# Patient Record
Sex: FEMALE | Race: WHITE | NOT HISPANIC OR LATINO | Employment: OTHER | URBAN - METROPOLITAN AREA
[De-identification: names, ages, dates, MRNs, and addresses within clinical notes are randomized per-mention and may not be internally consistent; named-entity substitution may affect disease eponyms.]

---

## 2017-01-26 ENCOUNTER — TELEPHONE (OUTPATIENT)
Dept: FAMILY MEDICINE | Facility: CLINIC | Age: 67
End: 2017-01-26

## 2017-01-26 NOTE — TELEPHONE ENCOUNTER
Spoke with pt regarding if she has a more recent eye exam. Pt stated that she didn't and she would make an apt. Also set her annual up with [facility] due to [facility] being booked up. Pt understanding verified.

## 2017-03-09 RX ORDER — ALLOPURINOL 300 MG/1
TABLET ORAL
Qty: 90 TABLET | Refills: 3 | Status: SHIPPED | OUTPATIENT
Start: 2017-03-09 | End: 2017-03-31 | Stop reason: DRUGHIGH

## 2017-03-13 RX ORDER — SITAGLIPTIN 100 MG/1
TABLET, FILM COATED ORAL
Qty: 90 TABLET | Refills: 3 | Status: SHIPPED | OUTPATIENT
Start: 2017-03-13 | End: 2017-03-31

## 2017-03-16 ENCOUNTER — LAB VISIT (OUTPATIENT)
Dept: LAB | Facility: HOSPITAL | Age: 67
End: 2017-03-16
Attending: FAMILY MEDICINE
Payer: MEDICARE

## 2017-03-16 DIAGNOSIS — E11.9 TYPE 2 DIABETES MELLITUS WITHOUT COMPLICATION: ICD-10-CM

## 2017-03-16 PROCEDURE — 83036 HEMOGLOBIN GLYCOSYLATED A1C: CPT

## 2017-03-16 PROCEDURE — 36415 COLL VENOUS BLD VENIPUNCTURE: CPT

## 2017-03-17 LAB
ESTIMATED AVG GLUCOSE: 214 MG/DL
HBA1C MFR BLD HPLC: 9.1 %

## 2017-03-24 ENCOUNTER — HOSPITAL ENCOUNTER (OUTPATIENT)
Dept: RADIOLOGY | Facility: HOSPITAL | Age: 67
Discharge: HOME | End: 2017-03-24
Attending: FAMILY MEDICINE
Payer: MEDICARE

## 2017-03-24 ENCOUNTER — DOCUMENTATION ONLY (OUTPATIENT)
Dept: FAMILY MEDICINE | Facility: CLINIC | Age: 67
End: 2017-03-24

## 2017-03-24 DIAGNOSIS — R92.8 ABNORMAL MAMMOGRAM: ICD-10-CM

## 2017-03-24 PROCEDURE — 77061 BREAST TOMOSYNTHESIS UNI: CPT | Mod: 26,RT,, | Performed by: RADIOLOGY

## 2017-03-24 PROCEDURE — 77065 DX MAMMO INCL CAD UNI: CPT | Mod: 26,RT,, | Performed by: RADIOLOGY

## 2017-03-24 PROCEDURE — 77061 BREAST TOMOSYNTHESIS UNI: CPT | Mod: TC,RT

## 2017-03-24 NOTE — PATIENT INSTRUCTIONS
Diabetes (General Information)  Diabetes is a long-term health problem. It means your body does not make enough insulin. Or it may mean that your body cannot use the insulin it makes. Insulin is a hormone in your body. It lets blood sugar (glucose) reach the cells in your body. All of your cells need glucose for fuel.  When you have diabetes, the glucose in your blood builds up because it cannot get into the cells. This buildup is called high blood sugar (hyperglycemia).  Your blood sugar level depends on several things. It depends on what kind of food you eat and how much of it you eat. It also depends on how much exercise you get, and how much insulin you have in your body. Eating too much of the wrong kinds of food or not taking diabetes medicine on time can cause high blood sugar. Infections can cause high blood sugar even if you are taking medicines correctly.  These things can also cause low blood sugar:  · Missing meals  · Not eating enough food  · Taking too much diabetes medicine  Diabetes can cause serious problems over time if you do not get treated. These problems include heart disease, stroke, kidney failure, and blindness. They also include nerve pain or loss of feeling in your legs and feet, and gangrene of the feet. By keeping your blood sugar under control you can prevent or delay these problems.  Normal blood sugar levels are 80 to 100 before a meal and less than 180 in the 1 to 2 hours after a meal.  Home care  Follow these guidelines when caring for yourself at home:  · Follow the diet your healthcare provider gives you. Take insulin or other diabetes medicine exactly as told to.  · Watch your blood sugar as you are told to. Keep a log of your results. This will help your provider change your medicines to keep your blood sugar under control.  · Try to reach your ideal weight. You may be able to cut back on or not have to take diabetes medicine if you eat the right foods and get exercise.  · Do  not smoke. Smoking worsens the effects of diabetes on your circulation. You are much more likely to have a heart attack if you have diabetes and you smoke.  · Take good care of your feet. If you have lost feeling in your feet, you may not see an injury or infection. Check your feet and between your toes at least once a week.  · Wear a medical alert bracelet or necklace, or carry a card in your wallet that says you have diabetes. This will help healthcare providers give you the right care if you get very ill and cannot tell them that you have diabetes.  Sick day plan  If you get a cold, the flu, or a bacterial or viral infection, take these steps:  · Look at your diabetes sick plan and call your healthcare provider as you were told to. You may need to call your provider right away if:  ¨ Your blood sugar is above 240 while taking your diabetes medicine  ¨ Your urine ketone levels are above normal or high  ¨ You have been vomiting more than 6 hours  ¨ You have trouble breathing or your breath ha s a fruity smell  ¨ You have a high fever  ¨ You have a fever for several days and you are not getting better  ¨ You get light-headed and are sleepier than usual  · Keep taking your diabetes pills (oral medicine) even if you have been vomiting and are feeling sick. Call your provider right away because you may need insulin to lower your blood sugar until you recover from your illness.  · Keep taking your insulin even if you have been vomiting and are feeling sick. Call your provider right away to ask if you need to change your insulin dose. This will depend on your blood sugar results.  · Check your blood sugar every 2 to 4 hours, or at least 4 times a day.  · Check your ketones often. If you are vomiting and having diarrhea, watch them more often.  · Do not skip meals. Try to eat small meals on a regular schedule. Do this even if you do not feel like eating.  · Drink water or other liquids that do not have caffeine or  calories. This will keep you from getting dehydrated. If you are nauseated or vomiting, takes small sips every 5 minutes. To prevent dehydration try to drink a cup (8 ounces) of fluids every hour while you are awake.  General care  Always bring a source of fast-acting sugar with you in case you have symptoms of low blood sugar (below 70). At the first sign of low blood sugar, eat or drink 15 to 20 grams of fast-acting sugar to raise your blood sugar. Examples are:  · 3 to 4 glucose tablets. You can buy these at most [employer].  · 4 ounces (1/2 cup) of regular (not diet) soft drinks  · 4 ounces (1/2 cup) of any fruit juice  · 8 ounces (1 cup) of milk  · 5 to 6 pieces of hard candy  · 1 tablespoon of honey  Check your blood sugar 15 minutes after treating yourself. If it is still below 70, take 15 to 20 more grams of fast-acting sugar. Test again in 15 minutes. If it returns to normal (70 or above), eat a snack or meal to keep your blood sugar in a safe range. If it stays low, call your doctor or go to an emergency room.  Follow-up care  Follow-up with your healthcare provider, or as advised. For more information about diabetes, visit the American Diabetes Association website at www.diabetes.org or call 114-479-2979.  When to seek medical advice  Call your healthcare provider right away if you have any of these symptoms of high blood sugar:  · Frequent urination  · Dizziness  · Drowsiness  · Thirst  · Headache  · Nausea or vomiting  · Abdominal pain  · Eyesight changes  · Fast breathing  · Confusion or loss of consciousness  Also call your provider right away if you have any of these signs of low blood sugar:  · Fatigue  · Headache  · Shakes  · Excess sweating  · Hunger  · Feeling anxious or restless  · Eyesight changes  · Drowsiness  · Weakness  · Confusion or loss of consciousness  Call 911  Call for emergency help right away if any of these occur:  · Chest pain or shortness of breath  · Dizziness or  fainting  · Weakness of an arm or leg or one side of the face  · Trouble speaking or seeing   Date Last Reviewed: 6/1/2016 © 2000-2016 [employer]. [address] [place] [address], [place] [address]. All rights reserved. This information is not intended as a substitute for professional medical care. Always follow your healthcare professional's instructions.        Controlling High Blood Pressure  High blood pressure (hypertension) is often called the silent killer. This is because many people who have it dont know it. High blood pressure is defined as 140/90 mm Hg or higher. Know your blood pressure and remember to check it regularly. Doing so can save your life. Here are some things you can do to help control your blood pressure.    Choose heart-healthy foods  · Select low-salt, low-fat foods. Limit sodium intake to 2,400 mg per day or the amount suggested by your healthcare provider.  · Limit canned, dried, cured, packaged, and fast foods. These can contain a lot of salt.  · Eat 8 to 10 servings of fruits and vegetables every day.  · Choose lean meats, fish, or chicken.  · Eat whole-grain pasta, brown rice, and beans.  · Eat 2 to 3 servings of low-fat or fat-free dairy products.  · Ask your doctor about the DASH eating plan. This plan helps reduce blood pressure.  · When you go to a restaurant, ask that your meal be prepared with no added salt.  Maintain a healthy weight  · Ask your healthcare provider how many calories to eat a day. Then stick to that number.  · Ask your healthcare provider what weight range is healthiest for you. If you are overweight, a weight loss of only 3% to 5% of your body weight can help lower blood pressure. Generally, a good weight loss goal is to lose 10% of your body weight in a year.  · Limit snacks and sweets.  · Get regular exercise.  Get up and get active  · Choose activities you enjoy. Find ones you can do with friends or family. This includes bicycling, dancing, walking,  and jogging.  · Park farther away from building entrances.  · Use stairs instead of the elevator.  · When you can, walk or bike instead of driving.  · [place] leaves, garden, or do household repairs.  · Be active at a moderate to vigorous level of physical activity for at least 40 minutes for a minimum of 3 to 4 days a week.   Manage stress  · Make time to relax and enjoy life. Find time to laugh.  · Communicate your concerns with your loved ones and your healthcare provider.  · Visit with family and friends, and keep up with hobbies.  Limit alcohol and quit smoking  · Men should have no more than 2 drinks per day.  · Women should have no more than 1 drink per day.  · Talk with your healthcare provider about quitting smoking. Smoking significantly increases your risk for heart disease and stroke. Ask your healthcare provider about community smoking cessation programs and other options.  Medicines  If lifestyle changes arent enough, your healthcare provider may prescribe high blood pressure medicine. Take all medicines as prescribed. If you have any questions about your medicines, ask your healthcare provider before stopping or changing them.   Date Last Reviewed: 4/27/2016  © 3612-2951 [employer]. [address] [place] [address]. All rights reserved. This information is not intended as a substitute for professional medical care. Always follow your healthcare professional's instructions.        Weight Management: Getting Started  Healthy bodies come in all shapes and sizes. Not all bodies are made to be thin. For some people, a healthy weight is higher than the average weight listed on weight charts. Your healthcare provider can help you decide on a healthy weight for you.    Reasons to lose weight  Losing weight can help with some health problems, such as high blood pressure, heart disease, diabetes, sleep apnea, and arthritis. You may also feel more energy.  Set your long-term goal  Your goal  doesn't even have to be a specific weight. You may decide on a fitness goal (such as being able to walk 10 miles a week), or a health goal (such as lowering your blood pressure). Choose a goal that is measurable and reasonable, so you know when you've reached it. A goal of reaching a BMI of less than 25 is not always reasonable (or possible).   Make an action plan  Habits dont change overnight. Setting your goals too high can leave you feeling discouraged if you cant reach them. Be realistic. Choose one or two small changes you can make now. Set an action plan for how you are going to make these changes. When you can stick to this plan, keep making a few more small changes. Taking small steps will help you stay on the path to success.  Track your progress  Write down your goals. Then, keep a daily record of your progress. Write down what you eat and how active you are. This record lets you look back on how much youve done. It may also help when youre feeling frustrated. Reward yourself for success. Even if you dont reach every goal, give yourself credit for what you do get done.  Get support  Encouragement from others can help make losing weight easier. Ask your family members and friends for support. They may even want to join you. Also look to your healthcare provider, registered dietitian, and  for help. Your local hospital can give you more information about nutrition, exercise, and weight loss.  Date Last Reviewed: 1/31/2016  © 8947-3120 [employer]. [address] [place] [address], [place] [address]. All rights reserved. This information is not intended as a substitute for professional medical care. Always follow your healthcare professional's instructions.        Walking for Fitness  Fitness walking has something for everyone, even people who are already fit. Walking is one of the safest ways to condition your body aerobically. It can boost energy, help you lose weight, and  reduce stress.    Physical benefits  · Walking strengthens your heart and lungs, and tones your muscles.  · When walking, your feet land with less impact than in other sports. This reduces chances of muscle, bone, and joint injury.  · Regular walking improves your cholesterol levels and lowers your risk of heart disease. And it helps you control your blood sugar if you have diabetes.  · Walking is a weight-bearing activity, which helps maintain bone density. This can help prevent osteoporosis.  Personal rewards  · Taking walks can help you relax and manage stress. And fitness walking may make you feel better about yourself.  · Walking can help you sleep better at night and make you less likely to be depressed.  · Regular walking may help maintain your memory as you get older.  · Walking is a great way to spend extra time with friends and family members. Be sure to invite your dog along!  Q&A about fitness walking  Q: Will walking keep me fit?  A: Yes. Regular walking at the right pace gives you all the benefits of other aerobic activities, such as jogging and swimming.  Q: Will walking help me lose weight and keep it off?  A: Yes. Per mile, walking can burn as many calories as jogging. Your health care provider can help work walking into your weight-loss plan.  Q: Is walking safe for my health?  A: Yes. Walking is safe if you have high blood pressure, diabetes, heart disease, or other conditions. Talk to your health care provider before you start.  Date Last Reviewed: 5/9/2015 © 2000-2016 [employer]. [address] [place] [address], [place] [address]. All rights reserved. This information is not intended as a substitute for professional medical care. Always follow your healthcare professional's instructions.

## 2017-03-24 NOTE — PROGRESS NOTES
Pre-Visit Chart Review  For Appointment Scheduled on 03/30/2017    Health Maintenance Due   Topic Date Due    DEXA SCAN  10/19/1990    Zoster Vaccine  10/19/2010    Pneumococcal (65+) (1 of 2 - PCV13) 10/19/2015    Influenza Vaccine  08/01/2016    Eye Exam  12/31/2016    Foot Exam  02/17/2017    Mammogram  03/01/2017

## 2017-03-31 ENCOUNTER — OFFICE VISIT (OUTPATIENT)
Dept: FAMILY MEDICINE | Facility: CLINIC | Age: 67
End: 2017-03-31
Payer: MEDICARE

## 2017-03-31 VITALS
TEMPERATURE: 98 F | SYSTOLIC BLOOD PRESSURE: 134 MMHG | HEART RATE: 70 BPM | DIASTOLIC BLOOD PRESSURE: 74 MMHG | WEIGHT: 187.81 LBS | BODY MASS INDEX: 34.56 KG/M2 | HEIGHT: 62 IN

## 2017-03-31 DIAGNOSIS — I10 ESSENTIAL HYPERTENSION: Primary | ICD-10-CM

## 2017-03-31 DIAGNOSIS — E78.5 HYPERLIPIDEMIA, UNSPECIFIED HYPERLIPIDEMIA TYPE: ICD-10-CM

## 2017-03-31 DIAGNOSIS — E66.9 OBESITY, CLASS I, BMI 30-34.9: ICD-10-CM

## 2017-03-31 DIAGNOSIS — E11.65 TYPE 2 DIABETES MELLITUS WITH HYPERGLYCEMIA, WITHOUT LONG-TERM CURRENT USE OF INSULIN: ICD-10-CM

## 2017-03-31 PROCEDURE — 99999 PR PBB SHADOW E&M-EST. PATIENT-LVL V: CPT | Mod: PBBFAC,,, | Performed by: PHYSICIAN ASSISTANT

## 2017-03-31 PROCEDURE — 99214 OFFICE O/P EST MOD 30 MIN: CPT | Mod: S$PBB,,, | Performed by: PHYSICIAN ASSISTANT

## 2017-03-31 PROCEDURE — 99215 OFFICE O/P EST HI 40 MIN: CPT | Mod: PBBFAC,PO | Performed by: PHYSICIAN ASSISTANT

## 2017-03-31 NOTE — PROGRESS NOTES
Subjective:       Patient ID: Jessica Luna is a 66 y.o. female.    Chief Complaint: Annual Exam    HPI Comments: Mrs. Luna is a 66 year old female who presents to clinic for follow up on type 2 diabetes, HTN, hyperlipidemia, and obesity. Since her last visit, she stopped Januvia due to cost, but states she has been compliant with amaryl 8 mg daily and metformin 1000 mg BID. Recent labs showed uncontrolled type 2 diabetes with HgbA1c 9.1%, which was previously controlled at 6.7%. She is planning to follow up with Dr. Cox for her annual eye exam. She is due for influenza and pneumonia vaccinations. She states her GYN has ordered a DEXA scan on her within the last several years, but she is not sure of the results or when repeat testing is due. Blood pressure is well controlled and she denies chest pain, shortness of breath, or lower ext edema. Recent CBC, CMP, and lipid panel were within normal limits.     Review of Systems   Constitutional: Negative for activity change, appetite change, fatigue and fever.   HENT: Negative for congestion, ear pain, hearing loss, sinus pressure and sore throat.    Eyes: Negative for photophobia and visual disturbance.   Respiratory: Negative for cough, chest tightness, shortness of breath and wheezing.    Cardiovascular: Negative for chest pain, palpitations and leg swelling.   Gastrointestinal: Negative for abdominal pain, constipation, diarrhea, nausea and vomiting.   Genitourinary: Negative for dysuria, flank pain and urgency.   Musculoskeletal: Negative for arthralgias.   Skin: Negative for pallor.   Allergic/Immunologic: Negative for environmental allergies and immunocompromised state.   Neurological: Negative for dizziness, weakness, light-headedness and headaches.   Hematological: Negative for adenopathy.   Psychiatric/Behavioral: Negative for confusion and sleep disturbance. The patient is not nervous/anxious.        Objective:      Vitals:    03/31/17 0934   BP:  134/74   Pulse: 70   Temp: 98.1 °F (36.7 °C)     Physical Exam   Constitutional: She is oriented to person, place, and time. She appears well-developed.   Obese body habitus.   HENT:   Head: Normocephalic and atraumatic.   Right Ear: Hearing, tympanic membrane, external ear and ear canal normal.   Left Ear: Hearing, tympanic membrane, external ear and ear canal normal.   Eyes: Conjunctivae, EOM and lids are normal. Pupils are equal, round, and reactive to light.   Cardiovascular: Regular rhythm, S1 normal and S2 normal.    Pulses:       Dorsalis pedis pulses are 2+ on the right side, and 2+ on the left side.        Posterior tibial pulses are 2+ on the right side, and 2+ on the left side.   Pulmonary/Chest: Effort normal and breath sounds normal.   Abdominal: Normal appearance and bowel sounds are normal.   Musculoskeletal:        Right foot: There is normal range of motion and no deformity.        Left foot: There is normal range of motion and no deformity.   Feet:   Right Foot:   Protective Sensation: 5 sites tested. 5 sites sensed.   Skin Integrity: Negative for ulcer, blister or skin breakdown.   Left Foot:   Protective Sensation: 5 sites tested. 5 sites sensed.   Skin Integrity: Negative for ulcer, blister or skin breakdown.   Neurological: She is alert and oriented to person, place, and time.   Skin: Skin is warm and dry.   Psychiatric: She has a normal mood and affect. Her speech is normal and behavior is normal. Thought content normal.   Vitals reviewed.      Assessment:       1. Essential hypertension    2. Type 2 diabetes mellitus with hyperglycemia, without long-term current use of insulin    3. Hyperlipidemia, unspecified hyperlipidemia type    4. Obesity, Class I, BMI 30-34.9        Plan:       Essential hypertension        - Controlled, continue current medications    Type 2 diabetes mellitus with hyperglycemia, without long-term current use of insulin        - Uncontrolled, continue metformin and  amaryl as prescribed. Discuss alternative treatment options with Dr. Brock.   -     Ambulatory Referral to Diabetes Education  - Foot exam completed today  - Follow up with Optometry for annual retinal exam    Hyperlipidemia, unspecified hyperlipidemia type        - Continue atorvastatin per Cardiology.     Obesity, Class I, BMI 30-34.9        - Uncontrolled, encouraged weight loss and increasing physical activity.     Patient readiness: acceptance and barriers:none    During the course of the visit the patient was educated and counseled about the following:     Diabetes:  Discussed foot care.  Reminded to get yearly retinal exam.  Hypertension:   Medication: no change.  Obesity:   Regular aerobic exercise program discussed.    Goals: Diabetes: Maintain Hemoglobin A1C below 7, Hypertension: Reduce Blood Pressure and Obesity: Reduce calorie intake and BMI    Did patient meet goals/outcomes: No to DM, Yes to HTN, No to Obesity    The following self management tools provided: declined    Patient Instructions (the written plan) was given to the patient/family.     Time spent with patient: 30 minutes      Information regarding pneumovax provided for patient review and consideration.

## 2017-03-31 NOTE — MR AVS SNAPSHOT
[facility]  [address]  [place] [address]  Phone: [phone]  Fax: [phone]                  Jessica Luna   3/31/2017 9:40 AM   Office Visit    Description:  Female : 1950   Provider:  Irene De La Rosa PA-C   Department:  [place] [facility]           Reason for Visit     Annual Exam           Diagnoses this Visit        Comments    Essential hypertension    -  Primary     Type 2 diabetes mellitus with hyperglycemia, without long-term current use of insulin         Hyperlipidemia, unspecified hyperlipidemia type         Obesity, Class I, BMI 30-34.9                To Do List           Future Appointments        Provider Department Dept Phone    2017 2:00 PM KULWANT, ENDOCRINE EDUCATOR [facility] Diabetes Management [phone]    2017 9:40 AM Felipa Brock MD [facility] [phone]      Goals (5 Years of Data)     None      [facility]     [facility] On Call Nurse Care Line -  Assistance  Unless otherwise directed by your provider, please contact [facility], our nurse care line that is available for  assistance.     Registered nurses in the [facility] provide: appointment scheduling, clinical advisement, health education, and other advisory services.  Call: [phone] (toll free)               Medications           Message regarding Medications     Verify the changes and/or additions to your medication regime listed below are the same as discussed with your clinician today.  If any of these changes or additions are incorrect, please notify your healthcare provider.        STOP taking these medications     JANUVIA 100 mg Tab TAKE ONE TABLET BY MOUTH ONCE DAILY           Verify that the below list of medications is an accurate representation of the medications you are currently taking.  If none reported, the list may be blank. If incorrect, please contact your healthcare provider. Carry this list with you  "in case of emergency.           Current Medications     acetaminophen (TYLENOL) 500 MG tablet Take 500 mg by mouth every 6 (six) hours as needed for Pain.    allopurinol (ZYLOPRIM) 100 MG tablet Take 100 mg by mouth once daily.    aspirin 81 MG Chew Take 1 tablet (81 mg total) by mouth once daily.    ATORVASTATIN CALCIUM (ATORVASTATIN ORAL) Take 1 tablet by mouth once daily.    glimepiride (AMARYL) 4 MG tablet Take 2 tablets (8 mg total) by mouth daily with breakfast.    losartan (COZAAR) 100 MG tablet Take 1 tablet (100 mg total) by mouth once daily.    MAGNESIUM CHLORIDE (SLOW-MAG) 64 mg TbSR once daily.     metformin (GLUCOPHAGE) 1000 MG tablet TAKE 1 TABLET (1,000 MG TOTAL) BY MOUTH 2 (TWO) TIMES DAILY WITH MEALS.    metoprolol (LOPRESSOR) 50 MG tablet Take 50 mg by mouth 2 (two) times daily. Twice a day    omega-3 fatty acids-vitamin E (OMEGA-3 FISH OIL) 1,000-5 mg-unit Cap Take 1 capsule by mouth 2 (two) times daily. Two times a day    potassium chloride SA (K-DUR,KLOR-CON) 20 MEQ tablet Take 20 mEq by mouth once daily. Every day    potassium citrate 15 mEq TbSR Take 1 tablet by mouth once daily.           Clinical Reference Information           Your Vitals Were     BP Pulse Temp Height Weight BMI    134/74 (BP Location: Right arm, Patient Position: Sitting, BP Method: Automatic) 70 98.1 °F (36.7 °C) (Oral) 5' 2" (1.575 m) 85.2 kg (187 lb 13.3 oz) 34.35 kg/m2      Blood Pressure          Most Recent Value    BP  134/74      Allergies as of 3/31/2017     No Known Drug Allergies      Immunizations Administered on Date of Encounter - 3/31/2017     None      Orders Placed During Today's Visit      Normal Orders This Visit    Ambulatory Referral to Diabetes Education       [facility] Sign-Up     Activating your [facility] account is as easy as 1-2-3!     1) Visit [email or web address], select Sign Up Now, enter this activation code and your date of birth, then select Next.  -[email or web address]  Expires: 5/15/2017 10:34 AM  "     2) Create a username and password to use when you visit [facility] in the future and select a security question in case you lose your password and select Next.    3) Enter your e-mail address and click Sign Up!    Additional Information  If you have questions, please e-mail [email or web address] or call [phone] to talk to our [facility] staff. Remember, [facility] is NOT to be used for urgent needs. For medical emergencies, dial 911.         Instructions      Diabetes (General Information)  Diabetes is a long-term health problem. It means your body does not make enough insulin. Or it may mean that your body cannot use the insulin it makes. Insulin is a hormone in your body. It lets blood sugar (glucose) reach the cells in your body. All of your cells need glucose for fuel.  When you have diabetes, the glucose in your blood builds up because it cannot get into the cells. This buildup is called high blood sugar (hyperglycemia).  Your blood sugar level depends on several things. It depends on what kind of food you eat and how much of it you eat. It also depends on how much exercise you get, and how much insulin you have in your body. Eating too much of the wrong kinds of food or not taking diabetes medicine on time can cause high blood sugar. Infections can cause high blood sugar even if you are taking medicines correctly.  These things can also cause low blood sugar:  · Missing meals  · Not eating enough food  · Taking too much diabetes medicine  Diabetes can cause serious problems over time if you do not get treated. These problems include heart disease, stroke, kidney failure, and blindness. They also include nerve pain or loss of feeling in your legs and feet, and gangrene of the feet. By keeping your blood sugar under control you can prevent or delay these problems.  Normal blood sugar levels are 80 to 100 before a meal and less than 180 in the 1 to 2 hours after a meal.  Home care  Follow these guidelines  when caring for yourself at home:  · Follow the diet your healthcare provider gives you. Take insulin or other diabetes medicine exactly as told to.  · Watch your blood sugar as you are told to. Keep a log of your results. This will help your provider change your medicines to keep your blood sugar under control.  · Try to reach your ideal weight. You may be able to cut back on or not have to take diabetes medicine if you eat the right foods and get exercise.  · Do not smoke. Smoking worsens the effects of diabetes on your circulation. You are much more likely to have a heart attack if you have diabetes and you smoke.  · Take good care of your feet. If you have lost feeling in your feet, you may not see an injury or infection. Check your feet and between your toes at least once a week.  · Wear a medical alert bracelet or necklace, or carry a card in your wallet that says you have diabetes. This will help healthcare providers give you the right care if you get very ill and cannot tell them that you have diabetes.  Sick day plan  If you get a cold, the flu, or a bacterial or viral infection, take these steps:  · Look at your diabetes sick plan and call your healthcare provider as you were told to. You may need to call your provider right away if:  ¨ Your blood sugar is above 240 while taking your diabetes medicine  ¨ Your urine ketone levels are above normal or high  ¨ You have been vomiting more than 6 hours  ¨ You have trouble breathing or your breath ha s a fruity smell  ¨ You have a high fever  ¨ You have a fever for several days and you are not getting better  ¨ You get light-headed and are sleepier than usual  · Keep taking your diabetes pills (oral medicine) even if you have been vomiting and are feeling sick. Call your provider right away because you may need insulin to lower your blood sugar until you recover from your illness.  · Keep taking your insulin even if you have been vomiting and are feeling sick.  Call your provider right away to ask if you need to change your insulin dose. This will depend on your blood sugar results.  · Check your blood sugar every 2 to 4 hours, or at least 4 times a day.  · Check your ketones often. If you are vomiting and having diarrhea, watch them more often.  · Do not skip meals. Try to eat small meals on a regular schedule. Do this even if you do not feel like eating.  · Drink water or other liquids that do not have caffeine or calories. This will keep you from getting dehydrated. If you are nauseated or vomiting, takes small sips every 5 minutes. To prevent dehydration try to drink a cup (8 ounces) of fluids every hour while you are awake.  General care  Always bring a source of fast-acting sugar with you in case you have symptoms of low blood sugar (below 70). At the first sign of low blood sugar, eat or drink 15 to 20 grams of fast-acting sugar to raise your blood sugar. Examples are:  · 3 to 4 glucose tablets. You can buy these at most drugstores.  · 4 ounces (1/2 cup) of regular (not diet) soft drinks  · 4 ounces (1/2 cup) of any fruit juice  · 8 ounces (1 cup) of milk  · 5 to 6 pieces of hard candy  · 1 tablespoon of honey  Check your blood sugar 15 minutes after treating yourself. If it is still below 70, take 15 to 20 more grams of fast-acting sugar. Test again in 15 minutes. If it returns to normal (70 or above), eat a snack or meal to keep your blood sugar in a safe range. If it stays low, call your doctor or go to an emergency room.  Follow-up care  Follow-up with your healthcare provider, or as advised. For more information about diabetes, visit the American Diabetes Association website at www.diabetes.org or call 326-528-6039.  When to seek medical advice  Call your healthcare provider right away if you have any of these symptoms of high blood sugar:  · Frequent urination  · Dizziness  · Drowsiness  · Thirst  · Headache  · Nausea or vomiting  · Abdominal pain  · Eyesight  changes  · Fast breathing  · Confusion or loss of consciousness  Also call your provider right away if you have any of these signs of low blood sugar:  · Fatigue  · Headache  · Shakes  · Excess sweating  · Hunger  · Feeling anxious or restless  · Eyesight changes  · Drowsiness  · Weakness  · Confusion or loss of consciousness  Call 911  Call for emergency help right away if any of these occur:  · Chest pain or shortness of breath  · Dizziness or fainting  · Weakness of an arm or leg or one side of the face  · Trouble speaking or seeing   Date Last Reviewed: 6/1/2016 © 2000-2016 [employer]. [address] [place] [address]. All rights reserved. This information is not intended as a substitute for professional medical care. Always follow your healthcare professional's instructions.        Controlling High Blood Pressure  High blood pressure (hypertension) is often called the silent killer. This is because many people who have it dont know it. High blood pressure is defined as 140/90 mm Hg or higher. Know your blood pressure and remember to check it regularly. Doing so can save your life. Here are some things you can do to help control your blood pressure.    Choose heart-healthy foods  · Select low-salt, low-fat foods. Limit sodium intake to 2,400 mg per day or the amount suggested by your healthcare provider.  · Limit canned, dried, cured, packaged, and fast foods. These can contain a lot of salt.  · Eat 8 to 10 servings of fruits and vegetables every day.  · Choose lean meats, fish, or chicken.  · Eat whole-grain pasta, brown rice, and beans.  · Eat 2 to 3 servings of low-fat or fat-free dairy products.  · Ask your doctor about the DASH eating plan. This plan helps reduce blood pressure.  · When you go to a restaurant, ask that your meal be prepared with no added salt.  Maintain a healthy weight  · Ask your healthcare provider how many calories to eat a day. Then stick to that  number.  · Ask your healthcare provider what weight range is healthiest for you. If you are overweight, a weight loss of only 3% to 5% of your body weight can help lower blood pressure. Generally, a good weight loss goal is to lose 10% of your body weight in a year.  · Limit snacks and sweets.  · Get regular exercise.  Get up and get active  · Choose activities you enjoy. Find ones you can do with friends or family. This includes bicycling, dancing, walking, and jogging.  · Park farther away from building entrances.  · Use stairs instead of the elevator.  · When you can, walk or bike instead of driving.  · [place] leaves, garden, or do household repairs.  · Be active at a moderate to vigorous level of physical activity for at least 40 minutes for a minimum of 3 to 4 days a week.   Manage stress  · Make time to relax and enjoy life. Find time to laugh.  · Communicate your concerns with your loved ones and your healthcare provider.  · Visit with family and friends, and keep up with hobbies.  Limit alcohol and quit smoking  · Men should have no more than 2 drinks per day.  · Women should have no more than 1 drink per day.  · Talk with your healthcare provider about quitting smoking. Smoking significantly increases your risk for heart disease and stroke. Ask your healthcare provider about community smoking cessation programs and other options.  Medicines  If lifestyle changes arent enough, your healthcare provider may prescribe high blood pressure medicine. Take all medicines as prescribed. If you have any questions about your medicines, ask your healthcare provider before stopping or changing them.   Date Last Reviewed: 4/27/2016 © 2000-2016 [employer]. [address] [place] [address], [place] [address]. All rights reserved. This information is not intended as a substitute for professional medical care. Always follow your healthcare professional's instructions.        Weight Management: Getting Started  Healthy  bodies come in all shapes and sizes. Not all bodies are made to be thin. For some people, a healthy weight is higher than the average weight listed on weight charts. Your healthcare provider can help you decide on a healthy weight for you.    Reasons to lose weight  Losing weight can help with some health problems, such as high blood pressure, heart disease, diabetes, sleep apnea, and arthritis. You may also feel more energy.  Set your long-term goal  Your goal doesn't even have to be a specific weight. You may decide on a fitness goal (such as being able to walk 10 miles a week), or a health goal (such as lowering your blood pressure). Choose a goal that is measurable and reasonable, so you know when you've reached it. A goal of reaching a BMI of less than 25 is not always reasonable (or possible).   Make an action plan  Habits dont change overnight. Setting your goals too high can leave you feeling discouraged if you cant reach them. Be realistic. Choose one or two small changes you can make now. Set an action plan for how you are going to make these changes. When you can stick to this plan, keep making a few more small changes. Taking small steps will help you stay on the path to success.  Track your progress  Write down your goals. Then, keep a daily record of your progress. Write down what you eat and how active you are. This record lets you look back on how much youve done. It may also help when youre feeling frustrated. Reward yourself for success. Even if you dont reach every goal, give yourself credit for what you do get done.  Get support  Encouragement from others can help make losing weight easier. Ask your family members and friends for support. They may even want to join you. Also look to your healthcare provider, registered dietitian, and  for help. Your local hospital can give you more information about nutrition, exercise, and weight loss.  Date Last Reviewed: 1/31/2016  ©  0147-7665 [employer]. [address] [place] [address], [place] [address]. All rights reserved. This information is not intended as a substitute for professional medical care. Always follow your healthcare professional's instructions.        Walking for Fitness  Fitness walking has something for everyone, even people who are already fit. Walking is one of the safest ways to condition your body aerobically. It can boost energy, help you lose weight, and reduce stress.    Physical benefits  · Walking strengthens your heart and lungs, and tones your muscles.  · When walking, your feet land with less impact than in other sports. This reduces chances of muscle, bone, and joint injury.  · Regular walking improves your cholesterol levels and lowers your risk of heart disease. And it helps you control your blood sugar if you have diabetes.  · Walking is a weight-bearing activity, which helps maintain bone density. This can help prevent osteoporosis.  Personal rewards  · Taking walks can help you relax and manage stress. And fitness walking may make you feel better about yourself.  · Walking can help you sleep better at night and make you less likely to be depressed.  · Regular walking may help maintain your memory as you get older.  · Walking is a great way to spend extra time with friends and family members. Be sure to invite your dog along!  Q&A about fitness walking  Q: Will walking keep me fit?  A: Yes. Regular walking at the right pace gives you all the benefits of other aerobic activities, such as jogging and swimming.  Q: Will walking help me lose weight and keep it off?  A: Yes. Per mile, walking can burn as many calories as jogging. Your health care provider can help work walking into your weight-loss plan.  Q: Is walking safe for my health?  A: Yes. Walking is safe if you have high blood pressure, diabetes, heart disease, or other conditions. Talk to your health care provider before you start.  Date Last  Reviewed: 5/9/2015  © 2365-9449 [employer]. [address] [place] [address], [place] [address]. All rights reserved. This information is not intended as a substitute for professional medical care. Always follow your healthcare professional's instructions.             Language Assistance Services     ATTENTION: Language assistance services are available, free of charge. Please call [phone].      ATENCIÓN: Si habla español, tiene a chavira disposición servicios gratuitos de asistencia lingüística. Llame al [phone].     CHÚ Ý: N?u b?n nói Ti?ng Vi?t, có các d?ch v? h? tr? ngôn ng? mi?n phí dành cho b?n. G?i s? [phone].         [facility] complies with applicable Federal civil rights laws and does not discriminate on the basis of race, color, national origin, age, disability, or sex.

## 2017-03-31 NOTE — Clinical Note
Please obtain DEXA scan from Dr. Delgado (GYN) Please call [facility] pharmacy to find out what dose of lipitor she is taking to update her medication list.  Please obtain eye exam from Dr. Cox

## 2017-04-03 RX ORDER — METOPROLOL TARTRATE 25 MG/1
TABLET, FILM COATED ORAL
COMMUNITY
Start: 2017-03-19 | End: 2017-08-30

## 2017-04-03 RX ORDER — ATORVASTATIN CALCIUM 40 MG/1
40 TABLET, FILM COATED ORAL DAILY
COMMUNITY
Start: 2017-02-02 | End: 2020-09-28 | Stop reason: SDUPTHER

## 2017-04-06 ENCOUNTER — TELEPHONE (OUTPATIENT)
Dept: FAMILY MEDICINE | Facility: CLINIC | Age: 67
End: 2017-04-06

## 2017-04-06 NOTE — TELEPHONE ENCOUNTER
Patient notified that a referral to our pharmacy assistance program to see if they can help with the cost of Januvia. Please let us know if she doesn't hear from them within a few days!  Patient verbalized understanding

## 2017-04-06 NOTE — TELEPHONE ENCOUNTER
Please call the patient and let her know that I have put in a referral to our pharmacy assistance program to see if they can help with the cost of Gerauvia. Please let us know if she doesn't hear from them within a few days!

## 2017-04-12 ENCOUNTER — CLINICAL SUPPORT (OUTPATIENT)
Dept: DIABETES | Facility: CLINIC | Age: 67
End: 2017-04-12
Payer: MEDICARE

## 2017-04-12 DIAGNOSIS — E11.65 TYPE 2 DIABETES MELLITUS WITH HYPERGLYCEMIA, WITHOUT LONG-TERM CURRENT USE OF INSULIN: ICD-10-CM

## 2017-04-12 PROCEDURE — G0108 DIAB MANAGE TRN  PER INDIV: HCPCS | Mod: PBBFAC,PO | Performed by: DIETITIAN, REGISTERED

## 2017-04-12 PROCEDURE — 99999 PR PBB SHADOW E&M-EST. PATIENT-LVL III: CPT | Mod: PBBFAC,,,

## 2017-04-12 PROCEDURE — 99213 OFFICE O/P EST LOW 20 MIN: CPT | Mod: PBBFAC,PO

## 2017-04-18 ENCOUNTER — TELEPHONE (OUTPATIENT)
Dept: PHARMACY | Facility: CLINIC | Age: 67
End: 2017-04-18

## 2017-04-18 NOTE — TELEPHONE ENCOUNTER
Spoke with pt to assist with the cost of medications. Pt stated her MD changed her medicine to a cheaper alternative. She will be following up with provider for blood work and will contact PPAP if assistance is needed.

## 2017-04-19 ENCOUNTER — TELEPHONE (OUTPATIENT)
Dept: FAMILY MEDICINE | Facility: CLINIC | Age: 67
End: 2017-04-19

## 2017-04-19 VITALS — BODY MASS INDEX: 34.41 KG/M2 | HEIGHT: 62 IN | WEIGHT: 187 LBS

## 2017-05-21 RX ORDER — METFORMIN HYDROCHLORIDE 1000 MG/1
TABLET ORAL
Qty: 180 TABLET | Refills: 3 | Status: SHIPPED | OUTPATIENT
Start: 2017-05-21 | End: 2018-06-29 | Stop reason: SDUPTHER

## 2017-06-13 ENCOUNTER — DOCUMENTATION ONLY (OUTPATIENT)
Dept: FAMILY MEDICINE | Facility: CLINIC | Age: 67
End: 2017-06-13

## 2017-06-13 NOTE — PROGRESS NOTES
Pre-Visit Chart Review  For Appointment Scheduled on 6/23/17.    Health Maintenance Due   Topic Date Due    DEXA SCAN  10/19/1990    Zoster Vaccine  10/19/2010    Pneumococcal (65+) (1 of 2 - PCV13) 10/19/2015    Eye Exam  07/29/2016    Hemoglobin A1c  06/16/2017

## 2017-06-19 DIAGNOSIS — E11.9 TYPE 2 DIABETES, HBA1C GOAL < 7%: Primary | ICD-10-CM

## 2017-06-19 DIAGNOSIS — N91.2 AMENORRHEA: ICD-10-CM

## 2017-06-19 DIAGNOSIS — E28.319 PREMATURE MENOPAUSE: ICD-10-CM

## 2017-06-19 DIAGNOSIS — N18.1 CHRONIC KIDNEY DISEASE, STAGE I: ICD-10-CM

## 2017-06-26 RX ORDER — GLIMEPIRIDE 4 MG/1
TABLET ORAL
Qty: 180 TABLET | Refills: 3 | Status: SHIPPED | OUTPATIENT
Start: 2017-06-26 | End: 2018-06-29 | Stop reason: SDUPTHER

## 2017-07-20 ENCOUNTER — LAB VISIT (OUTPATIENT)
Dept: LAB | Facility: HOSPITAL | Age: 67
End: 2017-07-20
Attending: RADIOLOGY
Payer: MEDICARE

## 2017-07-20 DIAGNOSIS — I25.10 CORONARY ATHEROSCLEROSIS OF UNSPECIFIED TYPE OF VESSEL, NATIVE OR GRAFT: ICD-10-CM

## 2017-07-20 DIAGNOSIS — E11.9 DIABETES MELLITUS WITHOUT COMPLICATION: ICD-10-CM

## 2017-07-20 DIAGNOSIS — E78.5 OTHER AND UNSPECIFIED HYPERLIPIDEMIA: Primary | ICD-10-CM

## 2017-07-20 LAB
ALBUMIN SERPL BCP-MCNC: 3.6 G/DL
ALP SERPL-CCNC: 115 U/L
ALT SERPL W/O P-5'-P-CCNC: 14 U/L
ANION GAP SERPL CALC-SCNC: 9 MMOL/L
AST SERPL-CCNC: 12 U/L
BILIRUB SERPL-MCNC: 0.4 MG/DL
BUN SERPL-MCNC: 26 MG/DL
CALCIUM SERPL-MCNC: 9.8 MG/DL
CHLORIDE SERPL-SCNC: 106 MMOL/L
CHOLEST/HDLC SERPL: 4 {RATIO}
CO2 SERPL-SCNC: 25 MMOL/L
CREAT SERPL-MCNC: 0.8 MG/DL
ERYTHROCYTE [DISTWIDTH] IN BLOOD BY AUTOMATED COUNT: 13.6 %
EST. GFR  (AFRICAN AMERICAN): >60 ML/MIN/1.73 M^2
EST. GFR  (NON AFRICAN AMERICAN): >60 ML/MIN/1.73 M^2
GLUCOSE SERPL-MCNC: 218 MG/DL
HCT VFR BLD AUTO: 37.8 %
HDL/CHOLESTEROL RATIO: 25.3 %
HDLC SERPL-MCNC: 162 MG/DL
HDLC SERPL-MCNC: 41 MG/DL
HGB BLD-MCNC: 12.7 G/DL
LDLC SERPL CALC-MCNC: 71 MG/DL
MCH RBC QN AUTO: 28.3 PG
MCHC RBC AUTO-ENTMCNC: 33.5 G/DL
MCV RBC AUTO: 84 FL
NEUTROPHILS # BLD AUTO: 7 K/UL
NONHDLC SERPL-MCNC: 121 MG/DL
PLATELET # BLD AUTO: 214 K/UL
PMV BLD AUTO: 9.5 FL
POTASSIUM SERPL-SCNC: 4.4 MMOL/L
PROT SERPL-MCNC: 7.1 G/DL
RBC # BLD AUTO: 4.49 M/UL
SODIUM SERPL-SCNC: 140 MMOL/L
TRIGL SERPL-MCNC: 250 MG/DL
WBC # BLD AUTO: 10 K/UL

## 2017-07-20 PROCEDURE — 80053 COMPREHEN METABOLIC PANEL: CPT

## 2017-07-20 PROCEDURE — 36415 COLL VENOUS BLD VENIPUNCTURE: CPT

## 2017-07-20 PROCEDURE — 80061 LIPID PANEL: CPT

## 2017-07-20 PROCEDURE — 85027 COMPLETE CBC AUTOMATED: CPT

## 2017-08-18 ENCOUNTER — DOCUMENTATION ONLY (OUTPATIENT)
Dept: FAMILY MEDICINE | Facility: CLINIC | Age: 67
End: 2017-08-18

## 2017-08-18 NOTE — PROGRESS NOTES
Pre-Visit Chart Review  For Appointment Scheduled on 8/30/17.    Health Maintenance Due   Topic Date Due    DEXA SCAN  10/19/1990    Zoster Vaccine  10/19/2010    Pneumococcal (65+) (1 of 2 - PCV13) 10/19/2015    Eye Exam  07/29/2016    Hemoglobin A1c  06/16/2017    Influenza Vaccine  08/01/2017

## 2017-08-30 ENCOUNTER — OFFICE VISIT (OUTPATIENT)
Dept: FAMILY MEDICINE | Facility: CLINIC | Age: 67
End: 2017-08-30
Payer: MEDICARE

## 2017-08-30 VITALS
TEMPERATURE: 98 F | BODY MASS INDEX: 34.4 KG/M2 | SYSTOLIC BLOOD PRESSURE: 131 MMHG | HEART RATE: 68 BPM | HEIGHT: 62 IN | DIASTOLIC BLOOD PRESSURE: 82 MMHG | WEIGHT: 186.94 LBS

## 2017-08-30 DIAGNOSIS — M89.9 DISORDER OF BONE AND CARTILAGE: ICD-10-CM

## 2017-08-30 DIAGNOSIS — I25.10 CORONARY ARTERY DISEASE INVOLVING NATIVE CORONARY ARTERY WITHOUT ANGINA PECTORIS, UNSPECIFIED WHETHER NATIVE OR TRANSPLANTED HEART: ICD-10-CM

## 2017-08-30 DIAGNOSIS — E66.9 OBESITY, CLASS I, BMI 30-34.9: ICD-10-CM

## 2017-08-30 DIAGNOSIS — E78.5 HYPERLIPIDEMIA ASSOCIATED WITH TYPE 2 DIABETES MELLITUS: ICD-10-CM

## 2017-08-30 DIAGNOSIS — E11.9 TYPE 2 DIABETES MELLITUS WITHOUT COMPLICATION, WITHOUT LONG-TERM CURRENT USE OF INSULIN: Primary | ICD-10-CM

## 2017-08-30 DIAGNOSIS — I15.2 HYPERTENSION ASSOCIATED WITH DIABETES: ICD-10-CM

## 2017-08-30 DIAGNOSIS — Z13.820 SCREENING FOR OSTEOPOROSIS: ICD-10-CM

## 2017-08-30 DIAGNOSIS — E11.69 HYPERLIPIDEMIA ASSOCIATED WITH TYPE 2 DIABETES MELLITUS: ICD-10-CM

## 2017-08-30 DIAGNOSIS — Z23 IMMUNIZATION DUE: ICD-10-CM

## 2017-08-30 DIAGNOSIS — E11.59 HYPERTENSION ASSOCIATED WITH DIABETES: ICD-10-CM

## 2017-08-30 DIAGNOSIS — M94.9 DISORDER OF BONE AND CARTILAGE: ICD-10-CM

## 2017-08-30 LAB
CREAT UR-MCNC: 26 MG/DL
MICROALBUMIN UR DL<=1MG/L-MCNC: <2.5 UG/ML
MICROALBUMIN/CREATININE RATIO: NORMAL UG/MG

## 2017-08-30 PROCEDURE — 99999 PR PBB SHADOW E&M-EST. PATIENT-LVL III: CPT | Mod: PBBFAC,,, | Performed by: FAMILY MEDICINE

## 2017-08-30 PROCEDURE — 3075F SYST BP GE 130 - 139MM HG: CPT | Mod: ,,, | Performed by: FAMILY MEDICINE

## 2017-08-30 PROCEDURE — 3079F DIAST BP 80-89 MM HG: CPT | Mod: ,,, | Performed by: FAMILY MEDICINE

## 2017-08-30 PROCEDURE — 82570 ASSAY OF URINE CREATININE: CPT

## 2017-08-30 PROCEDURE — G0009 ADMIN PNEUMOCOCCAL VACCINE: HCPCS | Mod: PBBFAC,PO

## 2017-08-30 PROCEDURE — 90670 PCV13 VACCINE IM: CPT | Mod: PBBFAC,PO

## 2017-08-30 PROCEDURE — 99214 OFFICE O/P EST MOD 30 MIN: CPT | Mod: 25,S$PBB,, | Performed by: FAMILY MEDICINE

## 2017-08-30 PROCEDURE — 3046F HEMOGLOBIN A1C LEVEL >9.0%: CPT | Mod: ,,, | Performed by: FAMILY MEDICINE

## 2017-08-30 PROCEDURE — 99213 OFFICE O/P EST LOW 20 MIN: CPT | Mod: PBBFAC,PO | Performed by: FAMILY MEDICINE

## 2017-08-30 PROCEDURE — 1159F MED LIST DOCD IN RCRD: CPT | Mod: ,,, | Performed by: FAMILY MEDICINE

## 2017-08-30 PROCEDURE — 1126F AMNT PAIN NOTED NONE PRSNT: CPT | Mod: ,,, | Performed by: FAMILY MEDICINE

## 2017-08-30 NOTE — PROGRESS NOTES
CHIEF COMPLAINT: follow up      HISTORY OF PRESENT ILLNESS:  Jesscia Luna is a 66 y.o. female patient who presents to clinic for follow up. She has not been seen by myself for her chronic medical conditions in over 2 years. She has type 2 DM and states that due to the price of januvia has only started taking this in the last several weeks. When she takes this along with the metformin and amaryl her fasting blood sugars are in the 120s. She had evidence of urine microalbuminuria and is on losartan, she was not able to make an appointment with nephrology.  She has CAD, hyperlipidemia and is on lipitor and ASA, she follows up with Dr. Bolton every 4 months. She is due for her eye exam and states that she has an appointment. She is due for her prevnar 13 and pneumovax 23. She is due for a DEXA scan.         REVIEW OF SYSTEMS:  The patient denies any fever, chills, night sweats, headaches, vision changes, difficulty speaking or swallowing, decreased hearing, weight loss, weight gain, chest pain, palpitations, shortness of breath, cough, nausea, vomiting, abdominal pain, dysuria, diarrhea, constipation, hematuria, hematochezia, melena, changes in her hair, skin, nails, numbness or weakness in her extremities, erythema, pain or swelling over any of her joints, myalgias, swollen glands, easy bruising, fatigue, edema, symptoms of anxiety or depression. She denies any vaginal discharge, breast masses, nipple discharge, change in the skin overlying her breasts.      MEDICATIONS:   Reviewed and/or reconciled in EPIC    ALLERGIES:  Reviewed and/or reconciled in [facility]    PAST MEDICAL/SURGICAL HISTORY:   Past Medical History:   Diagnosis Date    Anticoagulant long-term use     Arthritis     CAD (coronary artery disease)     Diabetes mellitus     Diabetes mellitus type II     Diverticulosis     Hyperlipidemia     Hypertension     Myocardial infarction     Renal stone     Wears glasses     Wears glasses       Past  "Surgical History:   Procedure Laterality Date     SECTION      CHOLECYSTECTOMY      COLONOSCOPY N/A 10/7/2015    Procedure: COLONOSCOPY;  Surgeon: Washington Rodriguez MD;  Location: [facility];  Service: Endoscopy;  Laterality: N/A;    CORONARY STENT PLACEMENT      2 vessels    CYSTOSCOPY      CYSTOSCOPY W/ LASER LITHOTRIPSY  12/15/15    HYSTERECTOMY      GRACE/BSO, DUB    PERCUTANEOUS NEPHROLITHOTRIPSY  2016    ROTATOR CUFF REPAIR      left    ureteroscopy  12/15/15       FAMILY HISTORY:    Family History   Problem Relation Age of Onset    Heart disease Father 90    Cancer Mother      breast cancer    Diabetes Brother        SOCIAL HISTORY:    Social History     Social History    Marital status:      Spouse name: N/A    Number of children: N/A    Years of education: N/A     Occupational History    Not on file.     Social History Main Topics    Smoking status: Never Smoker    Smokeless tobacco: Never Used    Alcohol use No    Drug use: No    Sexual activity: Yes     Partners: Male     Other Topics Concern    Not on file     Social History Narrative    No narrative on file       PHYSICAL EXAM:  VITAL SIGNS:   Vitals:    17 1447   BP: 131/82   BP Location: Right arm   Patient Position: Sitting   BP Method: Small (Automatic)   Pulse: 68   Temp: 98.2 °F (36.8 °C)   TempSrc: Oral   Weight: 84.8 kg (186 lb 15.2 oz)   Height: 5' 2" (1.575 m)     GENERAL:  Patient appears well nourished, sitting on exam table, in no acute distress.  HEENT:  Atraumatic, normocephalic, PERRLA, EOMI, no conjunctival injection, sclerae are anicteric, normal external auditory canals,TMs clear b/l, gross hearing intact to whisper, MMM, no oropharygneal erythema or exudate.  NECK:  Supple, normal ROM, trachea is midline , no supraclavicular or cervical LAD or masses palpated.  Thyroid gland not palpable.  CARDIOVASCULAR:  RRR, normal S1 and S2, no m/r/g.  RESPIRATORY:  CTA b/l, no wheezes, rhonchi, " rales.  No increased work of breathing, no  use of accessory muscles.  ABDOMEN:  Soft, nontender, nondistended, normoactive bowel sounds in all four quadrants, no rebound or guarding, no HSM or masses palpated.  Normal percussion.  EXTREMITIES:  2+ DP pulses b/l, no edema.  SKIN:  Warm, no lesions on exposed skin.  NEUROMUSCULAR:  Cranial nerves II-XII grossly intact.   2+ biceps and patellar reflexes b/l. No clubbing or cyanosis of digits/nails.  Steady gait.  PSYCH:  Patient is alert and oriented to person, time, place.  Normal insight and judgement    LABORATORY/IMAGING STUDIES: pending    ASSESSMENT/PLAN: This is a 66 y.o. female who presents to clinic annual exam  1. Type 2 diabetes mellitus without complication, without long-term current use of insulin  See below    2. Coronary artery disease involving native coronary artery without angina pectoris, unspecified whether native or transplanted heart, hyperlipidemia  Continue with ASA, lipitor, low fat diet. Follow up with Dr. Bolton as scheduled    3. Hypertension associated with diabetes  See below    4. Screening for osteoporosis  - DXA Bone Density Spine And Hip; Future    5. Immunization due  Will receive prevnar 13 in clinic today and need pneumovax 23 in 6 months.     6. Obesity, Class I, BMI 30-34.9  See below        Patient readiness: acceptance and barriers:economic    During the course of the visit the patient was educated and counseled about the following:     Diabetes:  Labs: hemoglobin A1C and microalbuminuria. Consider referral to nephrology. May need to find alternative to januvia due to cost.   Hypertension:   Medication: no change.  Obesity:   General weight loss/lifestyle modification strategies discussed (elicit support from others; identify saboteurs; non-food rewards, etc).  Diet interventions: moderate (500 kCal/d) deficit diet.  Informal exercise measures discussed, e.g. taking stairs instead of elevator.  Regular aerobic exercise program  discussed.    Goals: Diabetes: Maintain Hemoglobin A1C below 7, Hypertension: Reduce Blood Pressure and Obesity: Reduce calorie intake and BMI    Did patient meet goals/outcomes: No    The following self management tools provided: declined    Patient Instructions (the written plan) was given to the patient/family.     Time spent with patient: 30 minutes              FOLLOW UP: 6 months      Felipa Brock MD

## 2017-08-30 NOTE — PATIENT INSTRUCTIONS
Diabetes (General Information)  Diabetes is a long-term health problem. It means your body does not make enough insulin. Or it may mean that your body cannot use the insulin it makes. Insulin is a hormone in your body. It lets blood sugar (glucose) reach the cells in your body. All of your cells need glucose for fuel.  When you have diabetes, the glucose in your blood builds up because it cannot get into the cells. This buildup is called high blood sugar (hyperglycemia).  Your blood sugar level depends on several things. It depends on what kind of food you eat and how much of it you eat. It also depends on how much exercise you get, and how much insulin you have in your body. Eating too much of the wrong kinds of food or not taking diabetes medicine on time can cause high blood sugar. Infections can cause high blood sugar even if you are taking medicines correctly.  These things can also cause low blood sugar:  · Missing meals  · Not eating enough food  · Taking too much diabetes medicine  Diabetes can cause serious problems over time if you do not get treated. These problems include heart disease, stroke, kidney failure, and blindness. They also include nerve pain or loss of feeling in your legs and feet, and gangrene of the feet. By keeping your blood sugar under control you can prevent or delay these problems.  Normal blood sugar levels are 80 to 100 before a meal and less than 180 in the 1 to 2 hours after a meal.  Home care  Follow these guidelines when caring for yourself at home:  · Follow the diet your healthcare provider gives you. Take insulin or other diabetes medicine exactly as told to.  · Watch your blood sugar as you are told to. Keep a log of your results. This will help your provider change your medicines to keep your blood sugar under control.  · Try to reach your ideal weight. You may be able to cut back on or not have to take diabetes medicine if you eat the right foods and get exercise.  · Do  not smoke. Smoking worsens the effects of diabetes on your circulation. You are much more likely to have a heart attack if you have diabetes and you smoke.  · Take good care of your feet. If you have lost feeling in your feet, you may not see an injury or infection. Check your feet and between your toes at least once a week.  · Wear a medical alert bracelet or necklace, or carry a card in your wallet that says you have diabetes. This will help healthcare providers give you the right care if you get very ill and cannot tell them that you have diabetes.  Sick day plan  If you get a cold, the flu, or a bacterial or viral infection, take these steps:  · Look at your diabetes sick plan and call your healthcare provider as you were told to. You may need to call your provider right away if:  ¨ Your blood sugar is above 240 while taking your diabetes medicine  ¨ Your urine ketone levels are above normal or high  ¨ You have been vomiting more than 6 hours  ¨ You have trouble breathing or your breath ha s a fruity smell  ¨ You have a high fever  ¨ You have a fever for several days and you are not getting better  ¨ You get light-headed and are sleepier than usual  · Keep taking your diabetes pills (oral medicine) even if you have been vomiting and are feeling sick. Call your provider right away because you may need insulin to lower your blood sugar until you recover from your illness.  · Keep taking your insulin even if you have been vomiting and are feeling sick. Call your provider right away to ask if you need to change your insulin dose. This will depend on your blood sugar results.  · Check your blood sugar every 2 to 4 hours, or at least 4 times a day.  · Check your ketones often. If you are vomiting and having diarrhea, watch them more often.  · Do not skip meals. Try to eat small meals on a regular schedule. Do this even if you do not feel like eating.  · Drink water or other liquids that do not have caffeine or  calories. This will keep you from getting dehydrated. If you are nauseated or vomiting, takes small sips every 5 minutes. To prevent dehydration try to drink a cup (8 ounces) of fluids every hour while you are awake.  General care  Always bring a source of fast-acting sugar with you in case you have symptoms of low blood sugar (below 70). At the first sign of low blood sugar, eat or drink 15 to 20 grams of fast-acting sugar to raise your blood sugar. Examples are:  · 3 to 4 glucose tablets. You can buy these at most [employer].  · 4 ounces (1/2 cup) of regular (not diet) soft drinks  · 4 ounces (1/2 cup) of any fruit juice  · 8 ounces (1 cup) of milk  · 5 to 6 pieces of hard candy  · 1 tablespoon of honey  Check your blood sugar 15 minutes after treating yourself. If it is still below 70, take 15 to 20 more grams of fast-acting sugar. Test again in 15 minutes. If it returns to normal (70 or above), eat a snack or meal to keep your blood sugar in a safe range. If it stays low, call your doctor or go to an emergency room.  Follow-up care  Follow-up with your healthcare provider, or as advised. For more information about diabetes, visit the American Diabetes Association website at www.diabetes.org or call 224-600-8538.  When to seek medical advice  Call your healthcare provider right away if you have any of these symptoms of high blood sugar:  · Frequent urination  · Dizziness  · Drowsiness  · Thirst  · Headache  · Nausea or vomiting  · Abdominal pain  · Eyesight changes  · Fast breathing  · Confusion or loss of consciousness  Also call your provider right away if you have any of these signs of low blood sugar:  · Fatigue  · Headache  · Shakes  · Excess sweating  · Hunger  · Feeling anxious or restless  · Eyesight changes  · Drowsiness  · Weakness  · Confusion or loss of consciousness  Call 911  Call for emergency help right away if any of these occur:  · Chest pain or shortness of breath  · Dizziness or  fainting  · Weakness of an arm or leg or one side of the face  · Trouble speaking or seeing   Date Last Reviewed: 6/1/2016  © 3963-9526 [employer]. [address] [place] [address], [place] [address]. All rights reserved. This information is not intended as a substitute for professional medical care. Always follow your healthcare professional's instructions.

## 2017-08-31 ENCOUNTER — HOSPITAL ENCOUNTER (OUTPATIENT)
Dept: RADIOLOGY | Facility: CLINIC | Age: 67
Discharge: HOME | End: 2017-08-31
Attending: FAMILY MEDICINE
Payer: MEDICARE

## 2017-08-31 DIAGNOSIS — M94.9 DISORDER OF BONE AND CARTILAGE: ICD-10-CM

## 2017-08-31 DIAGNOSIS — Z13.820 SCREENING FOR OSTEOPOROSIS: ICD-10-CM

## 2017-08-31 DIAGNOSIS — M89.9 DISORDER OF BONE AND CARTILAGE: ICD-10-CM

## 2017-08-31 PROCEDURE — 77080 DXA BONE DENSITY AXIAL: CPT | Mod: 26,,, | Performed by: RADIOLOGY

## 2017-08-31 PROCEDURE — 77080 DXA BONE DENSITY AXIAL: CPT | Mod: TC,PO

## 2017-10-18 ENCOUNTER — TELEPHONE (OUTPATIENT)
Dept: FAMILY MEDICINE | Facility: CLINIC | Age: 67
End: 2017-10-18

## 2017-10-18 DIAGNOSIS — Z12.39 SCREENING FOR BREAST CANCER: ICD-10-CM

## 2017-10-18 DIAGNOSIS — R92.8 ABNORMAL MAMMOGRAM: Primary | ICD-10-CM

## 2017-10-18 NOTE — TELEPHONE ENCOUNTER
----- Message from Madhavi Desai sent at 10/17/2017 10:30 AM CDT -----  Patient is asking office to call her concerning a letter she received telling her it is time for her mammogram but office needs to send in the order to have it done at [facility]. Her last one was on 3/24/17 but she has a family history of cancer. Please call to schedule at [phone].

## 2017-10-18 NOTE — TELEPHONE ENCOUNTER
She is not getting the  Mammogram done in 6 months because of a family history of breast cancer.    She is getting it repeated because she has had abnormal mammograms of the right breast demonstrating calcifications. Please make sure she understands this.

## 2017-10-18 NOTE — TELEPHONE ENCOUNTER
Dr Brock patient to have 6 month u/s and mammogram. I can not scheduled the orders that are in the system.   is the mammogram correct

## 2017-10-27 RX ORDER — POTASSIUM CITRATE 15 MEQ/1
TABLET, EXTENDED RELEASE ORAL
Qty: 90 TABLET | Refills: 3 | Status: SHIPPED | OUTPATIENT
Start: 2017-10-27 | End: 2019-02-18 | Stop reason: SDUPTHER

## 2017-10-31 ENCOUNTER — HOSPITAL ENCOUNTER (OUTPATIENT)
Dept: RADIOLOGY | Facility: HOSPITAL | Age: 67
Discharge: HOME | End: 2017-10-31
Attending: FAMILY MEDICINE
Payer: MEDICARE

## 2017-10-31 DIAGNOSIS — R92.8 ABNORMAL MAMMOGRAM: ICD-10-CM

## 2017-10-31 DIAGNOSIS — Z12.39 SCREENING FOR BREAST CANCER: ICD-10-CM

## 2017-10-31 PROCEDURE — 77062 BREAST TOMOSYNTHESIS BI: CPT | Mod: TC

## 2017-10-31 PROCEDURE — 77066 DX MAMMO INCL CAD BI: CPT | Mod: 26,,, | Performed by: RADIOLOGY

## 2017-10-31 PROCEDURE — 77062 BREAST TOMOSYNTHESIS BI: CPT | Mod: 26,,, | Performed by: RADIOLOGY

## 2018-01-05 DIAGNOSIS — E11.9 TYPE 2 DIABETES MELLITUS WITHOUT COMPLICATION: ICD-10-CM

## 2018-03-19 RX ORDER — ALLOPURINOL 300 MG/1
TABLET ORAL
Qty: 90 TABLET | Refills: 3 | Status: SHIPPED | OUTPATIENT
Start: 2018-03-19 | End: 2019-01-07

## 2018-06-20 ENCOUNTER — LAB VISIT (OUTPATIENT)
Dept: LAB | Facility: HOSPITAL | Age: 68
End: 2018-06-20
Attending: INTERNAL MEDICINE
Payer: MEDICARE

## 2018-06-20 DIAGNOSIS — R94.31 NONSPECIFIC ABNORMAL ELECTROCARDIOGRAM (ECG) (EKG): Primary | ICD-10-CM

## 2018-06-20 DIAGNOSIS — I25.10 CORONARY ATHEROSCLEROSIS OF NATIVE CORONARY ARTERY: ICD-10-CM

## 2018-06-20 DIAGNOSIS — E11.9 DIABETES MELLITUS WITHOUT COMPLICATION: ICD-10-CM

## 2018-06-20 DIAGNOSIS — E78.5 HYPERLIPEMIA: ICD-10-CM

## 2018-06-20 LAB
ALBUMIN SERPL BCP-MCNC: 3.6 G/DL
ALP SERPL-CCNC: 122 U/L
ALT SERPL W/O P-5'-P-CCNC: 13 U/L
ANION GAP SERPL CALC-SCNC: 8 MMOL/L
AST SERPL-CCNC: 11 U/L
BASOPHILS # BLD AUTO: 0 K/UL
BASOPHILS NFR BLD: 0.3 %
BILIRUB SERPL-MCNC: 0.6 MG/DL
BUN SERPL-MCNC: 20 MG/DL
CALCIUM SERPL-MCNC: 10 MG/DL
CHLORIDE SERPL-SCNC: 107 MMOL/L
CHOLEST SERPL-MCNC: 148 MG/DL
CHOLEST/HDLC SERPL: 3.7 {RATIO}
CO2 SERPL-SCNC: 25 MMOL/L
CREAT SERPL-MCNC: 0.8 MG/DL
DIFFERENTIAL METHOD: ABNORMAL
EOSINOPHIL # BLD AUTO: 0.3 K/UL
EOSINOPHIL NFR BLD: 3.4 %
ERYTHROCYTE [DISTWIDTH] IN BLOOD BY AUTOMATED COUNT: 13.3 %
EST. GFR  (AFRICAN AMERICAN): >60 ML/MIN/1.73 M^2
EST. GFR  (NON AFRICAN AMERICAN): >60 ML/MIN/1.73 M^2
GLUCOSE SERPL-MCNC: 145 MG/DL
HCT VFR BLD AUTO: 37.3 %
HDLC SERPL-MCNC: 40 MG/DL
HDLC SERPL: 27 %
HGB BLD-MCNC: 12.9 G/DL
LDLC SERPL CALC-MCNC: 58.2 MG/DL
LYMPHOCYTES # BLD AUTO: 2.3 K/UL
LYMPHOCYTES NFR BLD: 27.1 %
MCH RBC QN AUTO: 29.2 PG
MCHC RBC AUTO-ENTMCNC: 34.7 G/DL
MCV RBC AUTO: 84 FL
MONOCYTES # BLD AUTO: 0.4 K/UL
MONOCYTES NFR BLD: 5.1 %
NEUTROPHILS # BLD AUTO: 5.5 K/UL
NEUTROPHILS NFR BLD: 64.1 %
NONHDLC SERPL-MCNC: 108 MG/DL
PLATELET # BLD AUTO: 216 K/UL
PMV BLD AUTO: 9.1 FL
POTASSIUM SERPL-SCNC: 4.4 MMOL/L
PROT SERPL-MCNC: 7 G/DL
RBC # BLD AUTO: 4.42 M/UL
SODIUM SERPL-SCNC: 140 MMOL/L
TRIGL SERPL-MCNC: 249 MG/DL
WBC # BLD AUTO: 8.5 K/UL

## 2018-06-20 PROCEDURE — 36415 COLL VENOUS BLD VENIPUNCTURE: CPT

## 2018-06-20 PROCEDURE — 80053 COMPREHEN METABOLIC PANEL: CPT

## 2018-06-20 PROCEDURE — 85025 COMPLETE CBC W/AUTO DIFF WBC: CPT

## 2018-06-20 PROCEDURE — 80061 LIPID PANEL: CPT

## 2018-07-02 RX ORDER — METFORMIN HYDROCHLORIDE 1000 MG/1
TABLET ORAL
Qty: 180 TABLET | Refills: 3 | Status: SHIPPED | OUTPATIENT
Start: 2018-07-02 | End: 2019-08-05 | Stop reason: SDUPTHER

## 2018-07-02 RX ORDER — GLIMEPIRIDE 4 MG/1
TABLET ORAL
Qty: 180 TABLET | Refills: 3 | Status: SHIPPED | OUTPATIENT
Start: 2018-07-02 | End: 2019-07-02 | Stop reason: SDUPTHER

## 2018-10-10 ENCOUNTER — TELEPHONE (OUTPATIENT)
Dept: FAMILY MEDICINE | Facility: CLINIC | Age: 68
End: 2018-10-10

## 2018-10-10 DIAGNOSIS — Z12.39 SCREENING FOR BREAST CANCER: Primary | ICD-10-CM

## 2018-10-10 NOTE — TELEPHONE ENCOUNTER
Called pt regarding below message. Pt is requesting orders for mammogram. Orders placed. Appt date, time, and location given. Pt verbalized understanding with no further questions.    ----- Message from Maria T Negrete sent at 10/9/2018  4:49 PM CDT -----  Contact: self  Patient needs mammogram orders. Please call patient at [phone]. Thanks!

## 2018-11-01 ENCOUNTER — HOSPITAL ENCOUNTER (OUTPATIENT)
Dept: RADIOLOGY | Facility: HOSPITAL | Age: 68
Discharge: HOME | End: 2018-11-01
Attending: FAMILY MEDICINE
Payer: MEDICARE

## 2018-11-01 DIAGNOSIS — Z12.39 SCREENING FOR BREAST CANCER: ICD-10-CM

## 2018-11-01 PROCEDURE — 77067 SCR MAMMO BI INCL CAD: CPT | Mod: 26,,, | Performed by: RADIOLOGY

## 2018-11-01 PROCEDURE — 77063 BREAST TOMOSYNTHESIS BI: CPT | Mod: 26,,, | Performed by: RADIOLOGY

## 2018-11-01 PROCEDURE — 77063 BREAST TOMOSYNTHESIS BI: CPT | Mod: TC

## 2018-11-06 ENCOUNTER — NURSE TRIAGE (OUTPATIENT)
Dept: ADMINISTRATIVE | Facility: CLINIC | Age: 68
End: 2018-11-06

## 2018-11-06 ENCOUNTER — DOCUMENTATION ONLY (OUTPATIENT)
Dept: FAMILY MEDICINE | Facility: CLINIC | Age: 68
End: 2018-11-06

## 2018-11-07 ENCOUNTER — OFFICE VISIT (OUTPATIENT)
Dept: FAMILY MEDICINE | Facility: CLINIC | Age: 68
End: 2018-11-07
Payer: MEDICARE

## 2018-11-07 ENCOUNTER — DOCUMENTATION ONLY (OUTPATIENT)
Dept: FAMILY MEDICINE | Facility: CLINIC | Age: 68
End: 2018-11-07

## 2018-11-07 ENCOUNTER — TELEPHONE (OUTPATIENT)
Dept: FAMILY MEDICINE | Facility: CLINIC | Age: 68
End: 2018-11-07

## 2018-11-07 VITALS
TEMPERATURE: 98 F | DIASTOLIC BLOOD PRESSURE: 70 MMHG | HEIGHT: 62 IN | HEART RATE: 78 BPM | WEIGHT: 183.19 LBS | SYSTOLIC BLOOD PRESSURE: 144 MMHG | BODY MASS INDEX: 33.71 KG/M2

## 2018-11-07 DIAGNOSIS — E11.9 TYPE 2 DIABETES MELLITUS WITHOUT COMPLICATION, WITHOUT LONG-TERM CURRENT USE OF INSULIN: Primary | ICD-10-CM

## 2018-11-07 DIAGNOSIS — E11.59 HYPERTENSION ASSOCIATED WITH DIABETES: ICD-10-CM

## 2018-11-07 DIAGNOSIS — E78.5 HYPERLIPIDEMIA ASSOCIATED WITH TYPE 2 DIABETES MELLITUS: ICD-10-CM

## 2018-11-07 DIAGNOSIS — E66.9 OBESITY, CLASS I, BMI 30-34.9: ICD-10-CM

## 2018-11-07 DIAGNOSIS — E11.69 HYPERLIPIDEMIA ASSOCIATED WITH TYPE 2 DIABETES MELLITUS: ICD-10-CM

## 2018-11-07 DIAGNOSIS — I15.2 HYPERTENSION ASSOCIATED WITH DIABETES: ICD-10-CM

## 2018-11-07 DIAGNOSIS — I25.10 CORONARY ARTERY DISEASE INVOLVING NATIVE CORONARY ARTERY WITHOUT ANGINA PECTORIS, UNSPECIFIED WHETHER NATIVE OR TRANSPLANTED HEART: ICD-10-CM

## 2018-11-07 DIAGNOSIS — Z23 IMMUNIZATION DUE: ICD-10-CM

## 2018-11-07 LAB
ALBUMIN/CREAT UR: NORMAL UG/MG
CREAT UR-MCNC: 37 MG/DL
MICROALBUMIN UR DL<=1MG/L-MCNC: <2.5 UG/ML

## 2018-11-07 PROCEDURE — 99214 OFFICE O/P EST MOD 30 MIN: CPT | Mod: PBBFAC,PO | Performed by: FAMILY MEDICINE

## 2018-11-07 PROCEDURE — 82043 UR ALBUMIN QUANTITATIVE: CPT

## 2018-11-07 PROCEDURE — 99999 PR PBB SHADOW E&M-EST. PATIENT-LVL IV: CPT | Mod: PBBFAC,,, | Performed by: FAMILY MEDICINE

## 2018-11-07 PROCEDURE — 90732 PPSV23 VACC 2 YRS+ SUBQ/IM: CPT | Mod: PBBFAC,PO

## 2018-11-07 PROCEDURE — 99214 OFFICE O/P EST MOD 30 MIN: CPT | Mod: 25,S$PBB,, | Performed by: FAMILY MEDICINE

## 2018-11-07 PROCEDURE — 90662 IIV NO PRSV INCREASED AG IM: CPT | Mod: PBBFAC,PO

## 2018-11-07 RX ORDER — METOPROLOL TARTRATE 25 MG/1
25 TABLET, FILM COATED ORAL 2 TIMES DAILY
COMMUNITY
End: 2020-09-28 | Stop reason: SDUPTHER

## 2018-11-07 NOTE — TELEPHONE ENCOUNTER
Patient called to report the following:     -patient cancel appointment by accident   -appointment rescheduled     Reason for Disposition   Requesting regular office appointment    Protocols used: ST INFORMATION ONLY CALL-A-AH

## 2018-11-07 NOTE — PROGRESS NOTES
CHIEF COMPLAINT: follow up type 2 DM, HTN      HISTORY OF PRESENT ILLNESS:  Jessica Luna is a 68 y.o. female patient who presents to clinic for follow up. She has not been seen by myself for her chronic medical conditions in over 1 year.  She has type 2 DM and is on metformin, amaryl, januvia, however the januvia is expensive. She had evidence of urine microalbuminuria and is on losartan, she was not able to make an appointment with nephrology.  She has CAD, hyperlipidemia and is on lipitor and ASA, she was established with Dr. Bolton and will be establishing care with a new cardiologist. . She is due for her eye exam and states that she has an appointment with Dr. Anderson this month. She is due for her influenza vaccine pneumovax 23.        REVIEW OF SYSTEMS:  The patient denies any fever, chills, night sweats, headaches, vision changes, difficulty speaking or swallowing, decreased hearing, weight loss, weight gain, chest pain, palpitations, shortness of breath, cough, nausea, vomiting, abdominal pain, dysuria, diarrhea, constipation, hematuria, hematochezia, melena, changes in her hair, skin, nails, numbness or weakness in her extremities, erythema, pain or swelling over any of her joints, myalgias, swollen glands, easy bruising, fatigue, edema, symptoms of anxiety or depression. She denies any vaginal discharge, breast masses, nipple discharge, change in the skin overlying her breasts.      MEDICATIONS:   Reviewed and/or reconciled in EPIC    ALLERGIES:  Reviewed and/or reconciled in [facility]    PAST MEDICAL/SURGICAL HISTORY:   Past Medical History:   Diagnosis Date    Anticoagulant long-term use     Arthritis     CAD (coronary artery disease)     Diabetes mellitus     Diabetes mellitus type II     Diverticulosis     Hyperlipidemia     Hypertension     Myocardial infarction     Obstructive uropathy 10/16/2015    Renal stone     Status post cystoscopy - Left laser percutaneous nephrolithotripsy  2016    Ureterolithiasis Obstructive-left 1/3/2015    Wears glasses     Wears glasses       Past Surgical History:   Procedure Laterality Date     SECTION      CHOLECYSTECTOMY      COLONOSCOPY N/A 10/7/2015    Procedure: COLONOSCOPY;  Surgeon: Washington Rodriguez MD;  Location: [facility];  Service: Endoscopy;  Laterality: N/A;    COLONOSCOPY N/A 10/7/2015    [facility] at [facility]    CORONARY STENT PLACEMENT  2006    2 vessels    CYSTOSCOPY      CYSTOSCOPY W/ LASER LITHOTRIPSY  12/15/15    CYSTOSCOPY W/ PERCUTANEOUS BLADDER STONE EXTRACTION Left 2016    [facility] at [facility] OR    CYSTOSCOPY WITH RETROGRADE PYELOGRAM Bilateral 2015    [facility] OR    CYSTOSCOPY WITH STENT PLACEMENT N/A 1/3/2015    [facility] by [facility]ra Guzmán [facility] at [facility] OR    CYSTOSCOPY, RETROGRADE, URETEROSCOPY, STENT PLACEMENT  10/16/2015    [facility] at [facility] OR    CYSTOSCOPY, RETROGRADE, URETEROSCOPY, STENT PLACEMENT N/A 2014    [facility] at [facility] OR    EXTRACTION - STONE Left 10/16/2015    [facility] at [facility] OR    EXTRACTION-STONE-URETEROSCOPY Left 12/15/2015    [facility] at [facility] OR    EXTRACTION-STONE-URETEROSCOPY Left 2014    [facility] at [facility]    HYSTERECTOMY      GRACE/BSO, DUB    LITHOTRIPSY-EXTRACORPOREAL SHOCK WAVE Left 2015    [facility] at [facility] OR    LITHOTRIPSY-EXTRACORPOREAL SHOCK WAVE Left 2015    [facility] at [facility] OR    LITHOTRIPSY-LASER Left 12/15/2015    [facility] at [facility] OR    LITHOTRIPSY-LASER Left 10/16/2015    [facility] at [facility]    LITHOTRIPSY-LASER Left 2014    [facility] OR    NEPHROSTOMY N/A 2016  "   [facility]    PERCUTANEOUS NEPHROLITHOTRIPSY  06/28/2016    PYLEOSCOPY Bilateral 12/15/2015    [facility]    REMOVAL-STONE-URETERAL- Holmium Laser Left 12/15/2015    [facility] OR    ROTATOR CUFF REPAIR      left    ureteroscopy  12/15/15       FAMILY HISTORY:    Family History   Problem Relation Age of Onset    Heart disease Father 90    Cancer Mother         breast cancer    Breast cancer Mother     Diabetes Brother     Cancer Sister         breast cancer    Breast cancer Sister        SOCIAL HISTORY:    Social History     Socioeconomic History    Marital status:      Spouse name: Not on file    Number of children: Not on file    Years of education: Not on file    Highest education level: Not on file   Social Needs    Financial resource strain: Not on file    Food insecurity - worry: Not on file    Food insecurity - inability: Not on file    Transportation needs - medical: Not on file    Transportation needs - non-medical: Not on file   Occupational History    Not on file   Tobacco Use    Smoking status: Never Smoker    Smokeless tobacco: Never Used   Substance and Sexual Activity    Alcohol use: No    Drug use: No    Sexual activity: Yes     Partners: Male   Other Topics Concern    Not on file   Social History Narrative    Not on file       PHYSICAL EXAM:  VITAL SIGNS:   Vitals:    11/07/18 1542   BP: (!) 160/81   BP Location: Left arm   Patient Position: Sitting   BP Method: Medium (Automatic)   Pulse: 78   Temp: 98.4 °F (36.9 °C)   TempSrc: Oral   Weight: 83.1 kg (183 lb 3.2 oz)   Height: 5' 2" (1.575 m)     GENERAL:  Patient appears well nourished, sitting on exam table, in no acute distress.  HEENT:  Atraumatic, normocephalic, PERRLA, EOMI, no conjunctival injection, sclerae are anicteric, normal external auditory canals,TMs clear b/l, gross hearing intact to whisper, " MMM, no oropharygneal erythema or exudate.  NECK:  Supple, normal ROM, trachea is midline , no supraclavicular or cervical LAD or masses palpated.  Thyroid gland not palpable.  CARDIOVASCULAR:  RRR, normal S1 and S2, no m/r/g.  RESPIRATORY:  CTA b/l, no wheezes, rhonchi, rales.  No increased work of breathing, no  use of accessory muscles.  ABDOMEN:  Soft, nontender, nondistended, normoactive bowel sounds in all four quadrants, no rebound or guarding, no HSM or masses palpated.  Normal percussion.  EXTREMITIES:  2+ DP pulses b/l, no edema.  SKIN:  Warm, no lesions on exposed skin.  NEUROMUSCULAR:  Cranial nerves II-XII grossly intact.   2+ biceps and patellar reflexes b/l. No clubbing or cyanosis of digits/nails.  Steady gait.  PSYCH:  Patient is alert and oriented to person, time, place.  Normal insight and judgement    LABORATORY/IMAGING STUDIES: pending    ASSESSMENT/PLAN: This is a 68 y.o. female who presents to clinic annual exam  1. Type 2 diabetes mellitus without complication, without long-term current use of insulin  See below    2. Coronary artery disease involving native coronary artery without angina pectoris, unspecified whether native or transplanted heart  -cmp  -lipid panel    3. Hyperlipidemia associated with type 2 diabetes mellitus  -cmp  -lipid panel    4. Hypertension associated with diabetes  See below    5. Obesity, Class I, BMI 30-34.9  See below    6. Immunization due  - Influenza - High Dose (65+) (PF) (IM)  - Pneumococcal Polysaccharide Vaccine (23 Valent) (SQ/IM)        Patient readiness: acceptance and barriers:economic    During the course of the visit the patient was educated and counseled about the following:     Diabetes:  Labs: hemoglobin A1C and microalbuminuria. cmp, lipid panel. Will contact pharmacy to find alternatives to januvia due to cost.   Hypertension:   Medication: no change. nurse bp check in 2-3 weeks  Obesity:   General weight loss/lifestyle modification strategies  discussed (elicit support from others; identify saboteurs; non-food rewards, etc).  Diet interventions: moderate (500 kCal/d) deficit diet.  Informal exercise measures discussed, e.g. taking stairs instead of elevator.  Regular aerobic exercise program discussed.    Goals: Diabetes: Maintain Hemoglobin A1C below 7, Hypertension: Reduce Blood Pressure and Obesity: Reduce calorie intake and BMI    Did patient meet goals/outcomes: No    The following self management tools provided: declined    Patient Instructions (the written plan) was given to the patient/family.     Time spent with patient: 30 minutes              FOLLOW UP: 6 months      Felipa Brock MD

## 2018-11-07 NOTE — PROGRESS NOTES
Pre-Visit Chart Review  For Appointment Scheduled on 11/7/2018    Health Maintenance Due   Topic Date Due    Zoster Vaccine  10/19/2010    Hemoglobin A1c  11/30/2017    Foot Exam  03/31/2018    Influenza Vaccine  08/01/2018    Pneumococcal (65+) (2 of 2 - PPSV23) 08/30/2018    Eye Exam  09/08/2018    Colonoscopy  10/07/2018

## 2018-11-07 NOTE — PROGRESS NOTES
Administered HD-Flu and Pneumovax 23 IM. Patient tolerated well. No bleeding at insertion site noted. Pain scale 0/10 with injection. Two patient identifiers used (Name and ). Asceptic technique maintained.

## 2018-11-08 ENCOUNTER — TELEPHONE (OUTPATIENT)
Dept: FAMILY MEDICINE | Facility: CLINIC | Age: 68
End: 2018-11-08

## 2018-11-08 ENCOUNTER — LAB VISIT (OUTPATIENT)
Dept: LAB | Facility: HOSPITAL | Age: 68
End: 2018-11-08
Attending: FAMILY MEDICINE
Payer: MEDICARE

## 2018-11-08 DIAGNOSIS — E11.9 TYPE 2 DIABETES MELLITUS WITHOUT COMPLICATION, WITHOUT LONG-TERM CURRENT USE OF INSULIN: ICD-10-CM

## 2018-11-08 LAB
ALBUMIN SERPL BCP-MCNC: 3.7 G/DL
ALP SERPL-CCNC: 107 U/L
ALT SERPL W/O P-5'-P-CCNC: 11 U/L
ANION GAP SERPL CALC-SCNC: 11 MMOL/L
AST SERPL-CCNC: 14 U/L
BILIRUB SERPL-MCNC: 0.7 MG/DL
BUN SERPL-MCNC: 17 MG/DL
CALCIUM SERPL-MCNC: 10.3 MG/DL
CHLORIDE SERPL-SCNC: 105 MMOL/L
CHOLEST SERPL-MCNC: 137 MG/DL
CHOLEST/HDLC SERPL: 3.8 {RATIO}
CO2 SERPL-SCNC: 24 MMOL/L
CREAT SERPL-MCNC: 0.8 MG/DL
EST. GFR  (AFRICAN AMERICAN): >60 ML/MIN/1.73 M^2
EST. GFR  (NON AFRICAN AMERICAN): >60 ML/MIN/1.73 M^2
ESTIMATED AVG GLUCOSE: 186 MG/DL
GLUCOSE SERPL-MCNC: 180 MG/DL
HBA1C MFR BLD HPLC: 8.1 %
HDLC SERPL-MCNC: 36 MG/DL
HDLC SERPL: 26.3 %
LDLC SERPL CALC-MCNC: 65.2 MG/DL
NONHDLC SERPL-MCNC: 101 MG/DL
POTASSIUM SERPL-SCNC: 4.3 MMOL/L
PROT SERPL-MCNC: 6.8 G/DL
SODIUM SERPL-SCNC: 140 MMOL/L
TRIGL SERPL-MCNC: 179 MG/DL

## 2018-11-08 PROCEDURE — 36415 COLL VENOUS BLD VENIPUNCTURE: CPT | Mod: PO

## 2018-11-08 PROCEDURE — 80061 LIPID PANEL: CPT

## 2018-11-08 PROCEDURE — 83036 HEMOGLOBIN GLYCOSYLATED A1C: CPT

## 2018-11-08 PROCEDURE — 80053 COMPREHEN METABOLIC PANEL: CPT

## 2018-11-08 NOTE — TELEPHONE ENCOUNTER
Called pt regarding below message. Pt confirmed pharmacy benefits are through [employer]. Confirmed ID# and Name on card. Informed pt I will call the insurance company again to determine an alternative to Januvia. Pt verbalized understanding with no further questions.     ----- Message from Jennifer Naik sent at 11/8/2018 11:15 AM CST -----  Contact: Jessica  Type:  Patient Returning Call    Who Called:  patient  Who Left Message for Patient:  Anushka  Does the patient know what this is regarding?:  Medication   Best Call Back Number:  [phone]  Additional Information:  na

## 2018-11-13 ENCOUNTER — TELEPHONE (OUTPATIENT)
Dept: FAMILY MEDICINE | Facility: CLINIC | Age: 68
End: 2018-11-13

## 2018-11-13 NOTE — TELEPHONE ENCOUNTER
Called Kenny regarding alternative to Januvia. Kenny will cover 50% of cost for Januvia leaving Pt responsibility of $64 for a 30 day supply. Per Kenny a Tier exception can be performed to possibly lower this cost. Alternative offer: Glipizide, glyburide, pioglitazone, tolazamite, tolbutamide. Unable to provide price for alternatives.     Med [facility] [place] [id number]   N [id number]  Grp: [id number]   ID: [id number] . Help Desk Number: [phone]

## 2018-11-13 NOTE — TELEPHONE ENCOUNTER
Called pt regarding below message. Informed pt that I have contacted her insurance company to determine alternative to Januvia. Insurance company is unable to confirm pts coverage stating no member can be found. Instructed pt to contact her insurance company to correct the issue. Informed pt I will call the pharmacy to see if they have an alternative number or can give an alternative to Januvia. Pt verbalized understanding with no further questions.     ----- Message from Rocky Suarez sent at 11/13/2018  8:29 AM CST -----  Type:  Patient Returning Call    Who Called:  Patient   Who Left Message for Patient:  rose mary  Does the patient know what this is regarding?:  Prescriptions   Best Call Back Number: [phone]

## 2018-11-15 NOTE — TELEPHONE ENCOUNTER
januvia sent to pharmacy. Needs to drop off or message bg values in 2-3 weeks, and needs follow up in 3 months, not 6.

## 2018-11-15 NOTE — TELEPHONE ENCOUNTER
Spoke with patient and gave her the results per Dr. Brock. Patient confirmed her understanding of the results without further questions. Appointment scheduled for 2/18.

## 2018-11-20 ENCOUNTER — TELEPHONE (OUTPATIENT)
Dept: RESEARCH | Facility: OTHER | Age: 68
End: 2018-11-20

## 2018-11-26 ENCOUNTER — TELEPHONE (OUTPATIENT)
Dept: RESEARCH | Facility: OTHER | Age: 68
End: 2018-11-26

## 2018-11-28 RX ORDER — POTASSIUM CITRATE 15 MEQ/1
TABLET, EXTENDED RELEASE ORAL
Qty: 30 TABLET | Refills: 11 | OUTPATIENT
Start: 2018-11-28

## 2018-11-29 ENCOUNTER — TELEPHONE (OUTPATIENT)
Dept: UROLOGY | Facility: CLINIC | Age: 68
End: 2018-11-29

## 2018-11-29 NOTE — TELEPHONE ENCOUNTER
----- Message from Maria T Negrete sent at 11/29/2018 11:01 AM CST -----  Contact: self  Patient would like to know why her prescription of potassium citrate 15 mEq TbSR was denied. Please call patient at [phone]. Thanks!

## 2018-11-29 NOTE — TELEPHONE ENCOUNTER
----- Message from Marni iMller sent at 11/29/2018 11:21 AM CST -----  Type:  Patient Returning Call    Who Called:  Self Who Left Message for Patient:  Suri   Does the patient know what this is regarding?:  NA Best Call Back Number:  [phone] Information:

## 2018-11-29 NOTE — TELEPHONE ENCOUNTER
Informed pt via voicemail that Dr. Ozuna wants her to come in for an appt before filling RX. Left call back number.

## 2018-12-18 ENCOUNTER — TELEPHONE (OUTPATIENT)
Dept: UROLOGY | Facility: CLINIC | Age: 68
End: 2018-12-18

## 2018-12-18 NOTE — TELEPHONE ENCOUNTER
Spoke w pt regarding schedule appt time change on 1/7/2019 from 3 pm to 1 pm pt voiced ok appt change time reminder mailed to pts home.

## 2019-01-06 NOTE — PROGRESS NOTES
"[facility] Urology Progress Note    Jessica Luna is a 68 y.o. female who presents for kidney stone follow up, transitioning care from Dr Ozuna    She underwent a percutaneous nephrolithotripsy on the left side for large multiple uric acid stones in June 2016.  Since then, the patient is doing very well.    She was last seen in Sept 2016 having recently performed a 24-hour urine with adequate urine volume at 3.1 liters with low UpH at 5.0 and low urinary citrate  She was started on UroCitK 15 mEq daily and advised to f/u in one year but did not.    6/20/16 L laser pcnl of multiple stones in renal pelvis after unsuccessful ESWL 1/7/15 and ESWL with retrograde contrast to outline stone filling defects 6/17/15 and 8/18/15, and subsequent ureteroscopy x2 with only partial stone extraction in Oct 2015 and Dec 2015. After all this she only had 11mm renal pelvic stone remaining on CT 1/11/16 and had stent in place but did not return for treatment until June 2016    Above was first known stone episode(s)    Has jeanie on allopurinol 300mg daily and 15mEq urocit-k daily  No problems, no pain, no hematuria dysuria stone passage  No interim stone passage  Uric acid in 2015 was 5.3 normal  Brother has stones    udip negative      ROS: A comprehensive 10 system review was performed and is negative except as noted above in HPI    PHYSICAL EXAM:    Vitals:    01/07/19 1251   BP: 139/67   Pulse: 69   Resp: 18   Temp: 98.3 °F (36.8 °C)     Body mass index is 33.47 kg/m². Weight: 83 kg (182 lb 15.7 oz) Height: 5' 2" (157.5 cm)       General: Alert, cooperative, no distress, appears stated age   Head: Normocephalic, without obvious abnormality, atraumatic   Eyes: PERRL, conjunctiva/corneas clear   Lungs: Respirations unlabored   Heart: Warm and well perfused   Abdomen: soft NT ND no CVAT  Extremities: Extremities normal, atraumatic, no cyanosis or edema   Skin: Skin color, texture, turgor normal, no rashes or lesions   Psych: " Appropriate   Neurologic: Non-focal       Recent Results (from the past 336 hour(s))   POCT URINE DIPSTICK WITHOUT MICROSCOPE    Collection Time: 01/07/19 12:52 PM   Result Value Ref Range    Color, UA yellow     Spec Grav UA 1.025     pH, UA 5     WBC, UA neg     Nitrite, UA neg     Protein neg     Glucose, UA neg     Ketones, UA neg     Urobilinogen, UA neg     Bilirubin neg     Blood, UA neg        ASSESSMENT   1. Nephrolithiasis  POCT URINE DIPSTICK WITHOUT MICROSCOPE    CT Renal Stone Study ABD Pelvis WO    Basic metabolic panel    Uric acid    US Retroperitoneal Complete (Kidney and       Plan    We did review that potassium citrate is used in the medical management of stone prevention for uric acid stone formers.  She is at a very low dose and only 15 mEq daily.  We did discuss that routine low dosing, which can be increased, is often 15 mEq b.i.d. so have change her prescription and advised of increased dose.  We did discuss that the use of allopurinol in medical management of stone disease is if there is hyperuricemia contributing.  She did have normal serum uric acid in 2015 and at this time advised she could discontinue her allopurinol use in favor of continuing only her potassium citrate and further future evaluation.  On chart review she is noted to also be on 20 mEq of potassium chloride as per Cardiology, and I will send a note to Dr. Jeronimo in a indicating the increased dose of potassium citrate for stone formation to see if her potassium chloride dosing should be adjusted.  We will advise patient upon receipt of answer.    Given how significant of a stone burden she had and how many procedure she went through to clear it previously, as well as stones being uric acid and not visible on x-ray, and with renal ultrasound as a non sensitive measure of screening for renal calculi, I did advise a repeat baseline CT stone protocol be done in the next few weeks to evaluate for any silent stone  disease.    Reviewed recommendations for stone prevention such as adequate daily hydration to produce goal daily UOP > 2L, low salt low sodium moderate protein diet, avoiding tea and other oxalate rich foods, and use of fresh lemon as citrate is a natural stone inhibitor.  As well, discussed adhering to low purine/low uric acid diet    I will have her repeat a 24 hour urine in 2 months, after adherence to increased dose of potassium citrate, and complete metabolic workup will get correlating serum labs including repeat assessment of serum uric acid the week of urine collection    Will chart check the results of her CT scan and her metabolic workup as noted above and if all are stable she can resume annual follow-up, with renal ultrasound as a screening measure in 1 year before follow-up

## 2019-01-07 ENCOUNTER — OFFICE VISIT (OUTPATIENT)
Dept: UROLOGY | Facility: CLINIC | Age: 69
End: 2019-01-07
Payer: MEDICARE

## 2019-01-07 VITALS
RESPIRATION RATE: 18 BRPM | HEART RATE: 69 BPM | WEIGHT: 183 LBS | SYSTOLIC BLOOD PRESSURE: 139 MMHG | BODY MASS INDEX: 33.68 KG/M2 | DIASTOLIC BLOOD PRESSURE: 67 MMHG | TEMPERATURE: 98 F | HEIGHT: 62 IN

## 2019-01-07 DIAGNOSIS — N20.0 NEPHROLITHIASIS: Primary | ICD-10-CM

## 2019-01-07 LAB
BILIRUB SERPL-MCNC: NORMAL MG/DL
BLOOD URINE, POC: NORMAL
COLOR, POC UA: YELLOW
GLUCOSE UR QL STRIP: NORMAL
KETONES UR QL STRIP: NORMAL
LEUKOCYTE ESTERASE URINE, POC: NORMAL
NITRITE, POC UA: NORMAL
PH, POC UA: 5
PROTEIN, POC: NORMAL
SPECIFIC GRAVITY, POC UA: 1.02
UROBILINOGEN, POC UA: NORMAL

## 2019-01-07 PROCEDURE — 99999 PR PBB SHADOW E&M-EST. PATIENT-LVL IV: ICD-10-PCS | Mod: PBBFAC,,, | Performed by: UROLOGY

## 2019-01-07 PROCEDURE — 99214 OFFICE O/P EST MOD 30 MIN: CPT | Mod: S$PBB,,, | Performed by: UROLOGY

## 2019-01-07 PROCEDURE — 99214 OFFICE O/P EST MOD 30 MIN: CPT | Mod: PBBFAC,PN | Performed by: UROLOGY

## 2019-01-07 PROCEDURE — 99999 PR PBB SHADOW E&M-EST. PATIENT-LVL IV: CPT | Mod: PBBFAC,,, | Performed by: UROLOGY

## 2019-01-07 PROCEDURE — 99214 PR OFFICE/OUTPT VISIT, EST, LEVL IV, 30-39 MIN: ICD-10-PCS | Mod: S$PBB,,, | Performed by: UROLOGY

## 2019-01-07 PROCEDURE — 81002 URINALYSIS NONAUTO W/O SCOPE: CPT | Mod: PBBFAC,PN | Performed by: UROLOGY

## 2019-01-07 RX ORDER — POTASSIUM CITRATE 15 MEQ/1
15 TABLET, EXTENDED RELEASE ORAL 2 TIMES DAILY
Qty: 180 TABLET | Refills: 3 | Status: SHIPPED | OUTPATIENT
Start: 2019-01-07 | End: 2020-01-30

## 2019-01-13 ENCOUNTER — TELEPHONE (OUTPATIENT)
Dept: UROLOGY | Facility: CLINIC | Age: 69
End: 2019-01-13

## 2019-01-13 NOTE — TELEPHONE ENCOUNTER
Please notify patient that as per recommendations from Dr Jeronimo, that with her increased dose of potassium citrate to twice daily dosing, she should discontinue the use of her potassium chloride.

## 2019-01-16 ENCOUNTER — HOSPITAL ENCOUNTER (OUTPATIENT)
Dept: RADIOLOGY | Facility: HOSPITAL | Age: 69
Discharge: HOME | End: 2019-01-16
Attending: UROLOGY
Payer: MEDICARE

## 2019-01-16 DIAGNOSIS — N20.0 NEPHROLITHIASIS: ICD-10-CM

## 2019-01-16 PROCEDURE — 74176 CT ABD & PELVIS W/O CONTRAST: CPT | Mod: 26,,, | Performed by: RADIOLOGY

## 2019-01-16 PROCEDURE — 74176 CT RENAL STONE STUDY ABD PELVIS WO: ICD-10-PCS | Mod: 26,,, | Performed by: RADIOLOGY

## 2019-01-16 PROCEDURE — 74176 CT ABD & PELVIS W/O CONTRAST: CPT | Mod: TC

## 2019-01-24 DIAGNOSIS — E11.9 TYPE 2 DIABETES MELLITUS WITHOUT COMPLICATION, UNSPECIFIED WHETHER LONG TERM INSULIN USE: ICD-10-CM

## 2019-01-29 ENCOUNTER — DOCUMENTATION ONLY (OUTPATIENT)
Dept: FAMILY MEDICINE | Facility: CLINIC | Age: 69
End: 2019-01-29

## 2019-01-29 NOTE — PROGRESS NOTES
Pre-Visit Chart Review  For Appointment Scheduled on 2/18/2019    Health Maintenance Due   Topic Date Due    Foot Exam  03/31/2018    Eye Exam  09/08/2018    Colonoscopy  10/07/2018

## 2019-02-04 ENCOUNTER — PATIENT OUTREACH (OUTPATIENT)
Dept: ADMINISTRATIVE | Facility: HOSPITAL | Age: 69
End: 2019-02-04

## 2019-02-04 DIAGNOSIS — E11.9 TYPE 2 DIABETES MELLITUS WITHOUT COMPLICATION, WITHOUT LONG-TERM CURRENT USE OF INSULIN: Primary | ICD-10-CM

## 2019-02-04 NOTE — LETTER
February 12, 2019    Jessica Luna  [address] [address]             [facility] [address] [place] [address]  Phone: [phone]

## 2019-02-18 ENCOUNTER — LAB VISIT (OUTPATIENT)
Dept: LAB | Facility: HOSPITAL | Age: 69
End: 2019-02-18
Attending: FAMILY MEDICINE
Payer: MEDICARE

## 2019-02-18 ENCOUNTER — OFFICE VISIT (OUTPATIENT)
Dept: FAMILY MEDICINE | Facility: CLINIC | Age: 69
End: 2019-02-18
Payer: MEDICARE

## 2019-02-18 VITALS
HEIGHT: 62 IN | HEART RATE: 59 BPM | DIASTOLIC BLOOD PRESSURE: 68 MMHG | SYSTOLIC BLOOD PRESSURE: 124 MMHG | TEMPERATURE: 98 F | WEIGHT: 183.63 LBS | BODY MASS INDEX: 33.79 KG/M2

## 2019-02-18 DIAGNOSIS — I15.2 HYPERTENSION ASSOCIATED WITH DIABETES: ICD-10-CM

## 2019-02-18 DIAGNOSIS — E11.9 TYPE 2 DIABETES MELLITUS WITHOUT COMPLICATION, WITHOUT LONG-TERM CURRENT USE OF INSULIN: Primary | ICD-10-CM

## 2019-02-18 DIAGNOSIS — E66.9 OBESITY, CLASS I, BMI 30-34.9: ICD-10-CM

## 2019-02-18 DIAGNOSIS — E11.9 TYPE 2 DIABETES MELLITUS WITHOUT COMPLICATION, WITHOUT LONG-TERM CURRENT USE OF INSULIN: ICD-10-CM

## 2019-02-18 DIAGNOSIS — I25.10 CORONARY ARTERY DISEASE INVOLVING NATIVE CORONARY ARTERY WITHOUT ANGINA PECTORIS, UNSPECIFIED WHETHER NATIVE OR TRANSPLANTED HEART: ICD-10-CM

## 2019-02-18 DIAGNOSIS — E11.69 HYPERLIPIDEMIA ASSOCIATED WITH TYPE 2 DIABETES MELLITUS: ICD-10-CM

## 2019-02-18 DIAGNOSIS — E11.59 HYPERTENSION ASSOCIATED WITH DIABETES: ICD-10-CM

## 2019-02-18 DIAGNOSIS — E11.9 ENCOUNTER FOR DIABETIC FOOT EXAM: ICD-10-CM

## 2019-02-18 DIAGNOSIS — E78.5 HYPERLIPIDEMIA ASSOCIATED WITH TYPE 2 DIABETES MELLITUS: ICD-10-CM

## 2019-02-18 LAB
ESTIMATED AVG GLUCOSE: 154 MG/DL
HBA1C MFR BLD HPLC: 7 %

## 2019-02-18 PROCEDURE — 36415 COLL VENOUS BLD VENIPUNCTURE: CPT | Mod: PO

## 2019-02-18 PROCEDURE — 99999 PR PBB SHADOW E&M-EST. PATIENT-LVL IV: CPT | Mod: PBBFAC,,, | Performed by: FAMILY MEDICINE

## 2019-02-18 PROCEDURE — 83036 HEMOGLOBIN GLYCOSYLATED A1C: CPT

## 2019-02-18 PROCEDURE — 99214 OFFICE O/P EST MOD 30 MIN: CPT | Mod: S$PBB,,, | Performed by: FAMILY MEDICINE

## 2019-02-18 PROCEDURE — 99214 PR OFFICE/OUTPT VISIT, EST, LEVL IV, 30-39 MIN: ICD-10-PCS | Mod: S$PBB,,, | Performed by: FAMILY MEDICINE

## 2019-02-18 PROCEDURE — 99214 OFFICE O/P EST MOD 30 MIN: CPT | Mod: PBBFAC,PO | Performed by: FAMILY MEDICINE

## 2019-02-18 PROCEDURE — 99999 PR PBB SHADOW E&M-EST. PATIENT-LVL IV: ICD-10-PCS | Mod: PBBFAC,,, | Performed by: FAMILY MEDICINE

## 2019-05-14 ENCOUNTER — HOSPITAL ENCOUNTER (OUTPATIENT)
Dept: RADIOLOGY | Facility: HOSPITAL | Age: 69
Discharge: HOME | End: 2019-05-14
Attending: ORTHOPAEDIC SURGERY
Payer: MEDICARE

## 2019-05-14 ENCOUNTER — OFFICE VISIT (OUTPATIENT)
Dept: ORTHOPEDICS | Facility: CLINIC | Age: 69
End: 2019-05-14
Payer: MEDICARE

## 2019-05-14 VITALS — HEIGHT: 62 IN | BODY MASS INDEX: 33.79 KG/M2 | WEIGHT: 183.63 LBS | RESPIRATION RATE: 20 BRPM

## 2019-05-14 DIAGNOSIS — M25.552 LEFT HIP PAIN: Primary | ICD-10-CM

## 2019-05-14 DIAGNOSIS — M25.552 LEFT HIP PAIN: ICD-10-CM

## 2019-05-14 DIAGNOSIS — S76.312A LEFT HAMSTRING STRAIN, INITIAL ENCOUNTER: Primary | ICD-10-CM

## 2019-05-14 PROCEDURE — 99203 PR OFFICE/OUTPT VISIT, NEW, LEVL III, 30-44 MIN: ICD-10-PCS | Mod: S$PBB,,, | Performed by: ORTHOPAEDIC SURGERY

## 2019-05-14 PROCEDURE — 99999 PR PBB SHADOW E&M-EST. PATIENT-LVL III: ICD-10-PCS | Mod: PBBFAC,,, | Performed by: ORTHOPAEDIC SURGERY

## 2019-05-14 PROCEDURE — 73502 X-RAY EXAM HIP UNI 2-3 VIEWS: CPT | Mod: TC,PN,LT

## 2019-05-14 PROCEDURE — 73502 X-RAY EXAM HIP UNI 2-3 VIEWS: CPT | Mod: 26,LT,, | Performed by: RADIOLOGY

## 2019-05-14 PROCEDURE — 99999 PR PBB SHADOW E&M-EST. PATIENT-LVL III: CPT | Mod: PBBFAC,,, | Performed by: ORTHOPAEDIC SURGERY

## 2019-05-14 PROCEDURE — 99203 OFFICE O/P NEW LOW 30 MIN: CPT | Mod: S$PBB,,, | Performed by: ORTHOPAEDIC SURGERY

## 2019-05-14 PROCEDURE — 99213 OFFICE O/P EST LOW 20 MIN: CPT | Mod: PBBFAC,25,PN | Performed by: ORTHOPAEDIC SURGERY

## 2019-05-14 PROCEDURE — 73502 XR HIP 2 VIEW LEFT: ICD-10-PCS | Mod: 26,LT,, | Performed by: RADIOLOGY

## 2019-05-15 NOTE — PROGRESS NOTES
Past Medical History:   Diagnosis Date    Anticoagulant long-term use     Arthritis     CAD (coronary artery disease)     Diabetes mellitus     Diabetes mellitus type II     Diverticulosis     Hyperlipidemia     Hypertension     Myocardial infarction     Obstructive uropathy 10/16/2015    Renal stone     Status post cystoscopy - Left laser percutaneous nephrolithotripsy 2016    Ureterolithiasis Obstructive-left 1/3/2015    Wears glasses     Wears glasses        Past Surgical History:   Procedure Laterality Date     SECTION      CHOLECYSTECTOMY      COLONOSCOPY N/A 10/7/2015    Performed by Washington Rodriguez [facility] at [facility]    CORONARY STENT PLACEMENT  2006    2 vessels    CYSTOSCOPY      CYSTOSCOPY W/ LASER LITHOTRIPSY  12/15/15    CYSTOSCOPY W/ PERCUTANEOUS BLADDER STONE EXTRACTION Left 2016    Per[facility] at [facility] OR    CYSTOSCOPY WITH RETROGRADE PYELOGRAM Bilateral 2015    [facility] at [facility] OR    CYSTOSCOPY WITH STENT PLACEMENT N/A 1/3/2015    Performed by [facility]ra Guzmán MD at [facility] OR    CYSTOSCOPY, RETROGRADE, URETEROSCOPY, STENT PLACEMENT  10/16/2015    Per[facility] by [facility] at [facility] OR    CYSTOSCOPY, RETROGRADE, URETEROSCOPY, STENT PLACEMENT N/A 2014    Per[facility] by [facility] at [facility] OR    EXTRACTION - STONE Left 10/16/2015    Per[facility] by [facility]zmán [facility] at [facility]    EXTRACTION-STONE-URETEROSCOPY Left 12/15/2015    Per[facility] OR    EXTRACTION-STONE-URETEROSCOPY Left 2014    Per[facility] at [facility] OR    HYSTERECTOMY      GRACE/BSO, DUB    LITHOTRIPSY-EXTRACORPOREAL SHOCK WAVE Left 2015    Per[facility] at [facility] OR    LITHOTRIPSY-EXTRACORPOREAL SHOCK WAVE Left 2015    Performed by Juan Carlos Ozuna [facility] at [facility]    LITHOTRIPSY-LASER Left 12/15/2015    Performed by  [facility]    LITHOTRIPSY-LASER Left 10/16/2015    Performed by [facility]zm[facility]    LITHOTRIPSY-LASER Left 8/5/2014    [facility]    NEPHROSTOMY N/A 6/28/2016    [facility]    PERCUTANEOUS NEPHROLITHOTRIPSY  06/28/2016    PYLEOSCOPY Bilateral 12/15/2015    [facility]    REMOVAL-STONE-URETERAL- Holmium Laser Left 12/15/2015    [facility]    ROTATOR CUFF REPAIR      left    ureteroscopy  12/15/15       Current Outpatient Medications   Medication Sig    acetaminophen (TYLENOL) 500 MG tablet Take 500 mg by mouth every 6 (six) hours as needed for Pain.    atorvastatin (LIPITOR) 40 MG tablet Take 40 mg by mouth once daily.     glimepiride (AMARYL) 4 MG tablet TAKE TWO TABLETS BY MOUTH ONCE DAILY WITH BREAKFAST    losartan (COZAAR) 100 MG tablet Take 1 tablet (100 mg total) by mouth once daily.    MAGNESIUM CHLORIDE (SLOW-MAG) 64 mg TbSR once daily.     metFORMIN (GLUCOPHAGE) 1000 MG tablet TAKE ONE TABLET BY MOUTH TWICE DAILY WITH MEALS    metoprolol tartrate (LOPRESSOR) 25 MG tablet Take 25 mg by mouth 2 (two) times daily.    omega-3 fatty acids-vitamin E (OMEGA-3 FISH OIL) 1,000-5 mg-unit Cap Take 1 capsule by mouth 2 (two) times daily. Two times a day    potassium citrate (UROCIT-K 15) 15 mEq TbSR Take 15 mEq by mouth 2 (two) times daily.    SITagliptin (JANUVIA) 100 MG Tab Take 1 tablet (100 mg total) by mouth once daily.    aspirin 81 MG Chew Take 1 tablet (81 mg total) by mouth once daily.     No current facility-administered medications for this visit.        Review of patient's allergies indicates:   Allergen Reactions    No known drug allergies        Family History   Problem Relation Age of Onset    Heart disease Father 90    Cancer Mother         breast cancer    Breast cancer Mother     Diabetes Brother      Kidney disease Brother         nephrectomy due to cancer    Spina bifida Brother     Cancer Sister         breast cancer    Breast cancer Sister        Social History     Socioeconomic History    Marital status:      Spouse name: Not on file    Number of children: Not on file    Years of education: Not on file    Highest education level: Not on file   Occupational History    Not on file   Social Needs    Financial resource strain: Not on file    Food insecurity:     Worry: Not on file     Inability: Not on file    Transportation needs:     Medical: Not on file     Non-medical: Not on file   Tobacco Use    Smoking status: Never Smoker    Smokeless tobacco: Never Used   Substance and Sexual Activity    Alcohol use: No    Drug use: No    Sexual activity: Yes     Partners: Male   Lifestyle    Physical activity:     Days per week: Not on file     Minutes per session: Not on file    Stress: Not on file   Relationships    Social connections:     Talks on phone: Not on file     Gets together: Not on file     Attends Taoist service: Not on file     Active member of club or organization: Not on file     Attends meetings of clubs or organizations: Not on file     Relationship status: Not on file   Other Topics Concern    Not on file   Social History Narrative    Not on file       Chief Complaint:   Chief Complaint   Patient presents with    Left Femur - Pain, Injury       Consulting Physician: Self, Aaareferral    History of present illness:    This is a 68 y.o. female who complains of left posterior thigh pain and swelling since she had a fall on 05/11/2019 off of her porch.  She reports that she landed approximately 4 ft down and strained her leg. She puts her pain an 8/10 and worse with activities.  She has been using Tylenol for pain.    Review of Systems:    Constitution: Denies chills, fever, and sweats.  HENT: Denies headaches or blurry vision.  Cardiovascular: Denies chest pain or  "irregular heart beat.  Respiratory: Denies cough or shortness of breath.  Gastrointestinal: Denies abdominal pain, nausea, or vomiting.  Musculoskeletal:  Denies muscle cramps.  Neurological: Denies dizziness or focal weakness.  Psychiatric/Behavioral: Normal mental status.  Hematologic/Lymphatic: Denies bleeding problem or easy bruising/bleeding.  Skin: Denies rash or suspicious lesions.    Examination:    Vital Signs:    Vitals:    05/14/19 1441   Resp: 20   Weight: 83.3 kg (183 lb 10.3 oz)   Height: 5' 2" (1.575 m)   PainSc:   8   PainLoc: Leg       Body mass index is 33.59 kg/m².    This a well-developed, well nourished patient in no acute distress.    Alert and oriented x 3 and cooperative to examination.       Physical Exam: Left Hip Exam    Gait:   Normal    Skin  Rash:   None  Scars:   None    Inspection  Erythema:  None  Bruising:  Posterior thigh  Swelling:  None  Masses:  None  Lymphadenopathy: None    Range of Motion  Flexion:  120°  Extension:  0°  External Rotation: 50°  Internal Rotation: 15°  Abduction:  50°    Straight Leg Raise: Positive in hamstring  Log Roll:  Negative    Tenderness  Groin:   Negative  Greater Trochanter: Negative  Hamstring  Middle tenderness  Strength:  5/5    Stability:  Normal    Sensation:  Intact    Vascular  Pulses:  Palpable distally    Left Knee Exam    Gait   Normal    Skin  Rash:   None  Scars:   None    Inspection  Erythema:  None  Bruising:  None  Effusion:  None  Masses:  None  Lymphadenopathy: None    Range of Motion: 0 to 130°    Medial Joint : None  Lateral Joint : None    Patellofemoral Tenderness: None  Patellofemoral Crepitus: None    Lachman:  Normal  Anterior Drawer: Normal  Posterior Drawer: Normal    Mumtaz's:  Negative  Apley's:  Negative    Varus Stress:  Stable  Valgus Stress:  Stable    Strength:  5/5    Pulses:  Palpable distal    Sensation:  Intact            Imaging: X-rays ordered and images interpreted today personally by " me of left hip appear normal.        Assessment: Left hamstring strain, initial encounter        Plan:  She has a strain of the midportion of her hamstring.  She does not have any tenderness proximally or distally.  Will go ahead and [facility]  She is using Tylenol for pain and will check her back in 2 weeks time.      DISCLAIMER: This note may have been dictated using voice recognition software and may contain grammatical errors.     NOTE: Consult report sent to referring provider via [employer] EMR.

## 2019-05-24 ENCOUNTER — TELEPHONE (OUTPATIENT)
Dept: ORTHOPEDICS | Facility: CLINIC | Age: 69
End: 2019-05-24

## 2019-05-24 NOTE — TELEPHONE ENCOUNTER
----- Message from Michel Mart sent at 5/24/2019  9:45 AM CDT -----  Contact: Jessica with [facility]  Type: Needs Medical Advice    Evaluation results  Best Call Back Number:   Additional Information: Please call Jessica

## 2019-05-28 ENCOUNTER — OFFICE VISIT (OUTPATIENT)
Dept: ORTHOPEDICS | Facility: CLINIC | Age: 69
End: 2019-05-28
Payer: MEDICARE

## 2019-05-28 VITALS — RESPIRATION RATE: 20 BRPM | WEIGHT: 183.63 LBS | HEIGHT: 62 IN | BODY MASS INDEX: 33.79 KG/M2

## 2019-05-28 DIAGNOSIS — S76.312A LEFT HAMSTRING STRAIN, INITIAL ENCOUNTER: Primary | ICD-10-CM

## 2019-05-28 PROCEDURE — 99999 PR PBB SHADOW E&M-EST. PATIENT-LVL III: ICD-10-PCS | Mod: PBBFAC,,, | Performed by: ORTHOPAEDIC SURGERY

## 2019-05-28 PROCEDURE — 99213 OFFICE O/P EST LOW 20 MIN: CPT | Mod: PBBFAC,PN | Performed by: ORTHOPAEDIC SURGERY

## 2019-05-28 PROCEDURE — 99999 PR PBB SHADOW E&M-EST. PATIENT-LVL III: CPT | Mod: PBBFAC,,, | Performed by: ORTHOPAEDIC SURGERY

## 2019-05-28 PROCEDURE — 99213 PR OFFICE/OUTPT VISIT, EST, LEVL III, 20-29 MIN: ICD-10-PCS | Mod: S$PBB,,, | Performed by: ORTHOPAEDIC SURGERY

## 2019-05-28 PROCEDURE — 99213 OFFICE O/P EST LOW 20 MIN: CPT | Mod: S$PBB,,, | Performed by: ORTHOPAEDIC SURGERY

## 2019-05-31 NOTE — PROGRESS NOTES
Past Medical History:   Diagnosis Date    Anticoagulant long-term use     Arthritis     CAD (coronary artery disease)     Diabetes mellitus     Diabetes mellitus type II     Diverticulosis     Hyperlipidemia     Hypertension     Myocardial infarction     Obstructive uropathy 10/16/2015    Renal stone     Status post cystoscopy - Left laser percutaneous nephrolithotripsy 2016    Ureterolithiasis Obstructive-left 1/3/2015    Wears glasses     Wears glasses        Past Surgical History:   Procedure Laterality Date     SECTION      CHOLECYSTECTOMY      COLONOSCOPY N/A 10/7/2015    [facility]    CORONARY STENT PLACEMENT  2006    2 vessels    CYSTOSCOPY      CYSTOSCOPY W/ LASER LITHOTRIPSY  12/15/15    CYSTOSCOPY W/ PERCUTANEOUS BLADDER STONE EXTRACTION Left 2016    Performed by Juan Carlos [facility]    CYSTOSCOPY WITH RETROGRADE PYELOGRAM Bilateral 2015    [facility]    CYSTOSCOPY WITH STENT PLACEMENT N/A 1/3/2015    [facility] by Mayra [facility] OR    CYSTOSCOPY, RETROGRADE, URETEROSCOPY, STENT PLACEMENT  10/16/2015    [facility]    CYSTOSCOPY, RETROGRADE, URETEROSCOPY, STENT PLACEMENT N/A 2014    [facility]    EXTRACTION - STONE Left 10/16/2015    [facility]    EXTRACTION-STONE-URETEROSCOPY Left 12/15/2015    [facility]    EXTRACTION-STONE-URETEROSCOPY Left 2014    [facility]    HYSTERECTOMY      GRACE/BSO, DUB    LITHOTRIPSY-EXTRACORPOREAL SHOCK WAVE Left 2015    [facility]    LITHOTRIPSY-EXTRACORPOREAL SHOCK WAVE Left 2015    [facility]    LITHOTRIPSY-LASER Left 12/15/2015    [facility]    LITHOTRIPSY-LASER Left 10/16/2015    [facility] OR    LITHOTRIPSY-LASER Left 8/5/2014    [facility]    NEPHROSTOMY N/A 6/28/2016    [facility]    PERCUTANEOUS NEPHROLITHOTRIPSY  06/28/2016    PYLEOSCOPY Bilateral 12/15/2015    [facility]    REMOVAL-STONE-URETERAL- Holmium Laser Left 12/15/2015    [facility] OR    ROTATOR CUFF REPAIR      left    ureteroscopy  12/15/15       Current Outpatient Medications   Medication Sig    acetaminophen (TYLENOL) 500 MG tablet Take 500 mg by mouth every 6 (six) hours as needed for Pain.    atorvastatin (LIPITOR) 40 MG tablet Take 40 mg by mouth once daily.     glimepiride (AMARYL) 4 MG tablet TAKE TWO TABLETS BY MOUTH ONCE DAILY WITH BREAKFAST    losartan (COZAAR) 100 MG tablet Take 1 tablet (100 mg total) by mouth once daily.    MAGNESIUM CHLORIDE (SLOW-MAG) 64 mg TbSR once daily.     metFORMIN (GLUCOPHAGE) 1000 MG tablet TAKE ONE TABLET BY MOUTH TWICE DAILY WITH MEALS    metoprolol tartrate (LOPRESSOR) 25 MG tablet Take 25 mg by mouth 2 (two) times daily.    omega-3 fatty acids-vitamin E (OMEGA-3 FISH OIL) 1,000-5 mg-unit Cap Take 1 capsule by mouth 2 (two) times daily. Two times a day    potassium citrate (UROCIT-K 15) 15 mEq TbSR Take 15 mEq by mouth 2 (two) times daily.    SITagliptin (JANUVIA) 100 MG Tab Take 1 tablet (100 mg total) by mouth once daily.    aspirin 81 MG Chew Take 1 tablet (81 mg total) by mouth once daily.     No current facility-administered medications for this visit.        Review of patient's allergies indicates:   Allergen Reactions    No known drug allergies        Family History   Problem Relation Age of Onset    Heart disease Father 90    Cancer Mother         breast cancer    Breast cancer Mother     Diabetes Brother      Kidney disease Brother         nephrectomy due to cancer    Spina bifida Brother     Cancer Sister         breast cancer    Breast cancer Sister        Social History     Socioeconomic History    Marital status:      Spouse name: Not on file    Number of children: Not on file    Years of education: Not on file    Highest education level: Not on file   Occupational History    Not on file   Social Needs    Financial resource strain: Not on file    Food insecurity:     Worry: Not on file     Inability: Not on file    Transportation needs:     Medical: Not on file     Non-medical: Not on file   Tobacco Use    Smoking status: Never Smoker    Smokeless tobacco: Never Used   Substance and Sexual Activity    Alcohol use: No    Drug use: No    Sexual activity: Yes     Partners: Male   Lifestyle    Physical activity:     Days per week: Not on file     Minutes per session: Not on file    Stress: Not on file   Relationships    Social connections:     Talks on phone: Not on file     Gets together: Not on file     Attends [employer] service: Not on file     Active member of club or organization: Not on file     Attends meetings of clubs or organizations: Not on file     Relationship status: Not on file   Other Topics Concern    Not on file   Social History Narrative    Not on file       Chief Complaint:   Chief Complaint   Patient presents with    Left Femur - Pain       Consulting Physician: No ref. provider found    History of present illness:    This is a 68 y.o. female who complains of left posterior thigh pain and swelling since she had a fall on 05/11/2019 off of her porch.  She reports that she landed approximately 4 ft down and strained her leg. She puts her pain an 0/10.    Review of Systems:    Constitution: Denies chills, fever, and sweats.  HENT: Denies headaches or blurry vision.  Cardiovascular: Denies chest pain or irregular heart beat.  Respiratory: Denies cough or shortness of  "breath.  Gastrointestinal: Denies abdominal pain, nausea, or vomiting.  Musculoskeletal:  Denies muscle cramps.  Neurological: Denies dizziness or focal weakness.  Psychiatric/Behavioral: Normal mental status.  Hematologic/Lymphatic: Denies bleeding problem or easy bruising/bleeding.  Skin: Denies rash or suspicious lesions.    Examination:    Vital Signs:    Vitals:    05/28/19 0956   Resp: 20   Weight: 83.3 kg (183 lb 10.3 oz)   Height: 5' 2" (1.575 m)   PainSc: 0-No pain   PainLoc: Leg       Body mass index is 33.59 kg/m².    This a well-developed, well nourished patient in no acute distress.    Alert and oriented x 3 and cooperative to examination.       Physical Exam: Left Hip Exam    Gait:   Normal    Skin  Rash:   None  Scars:   None    Inspection  Erythema:  None  Bruising:  none  Swelling:  None  Masses:  None  Lymphadenopathy: None    Range of Motion  Flexion:  120°  Extension:  0°  External Rotation: 50°  Internal Rotation: 15°  Abduction:  50°    Straight Leg Raise: mild  Log Roll:  Negative    Tenderness  Groin:   Negative  Greater Trochanter: Negative  Hamstring  Middle tenderness  Strength:  5/5    Stability:  Normal    Sensation:  Intact    Vascular  Pulses:  Palpable distally    Left Knee Exam    Gait   Normal    Skin  Rash:   None  Scars:   None    Inspection  Erythema:  None  Bruising:  None  Effusion:  None  Masses:  None  Lymphadenopathy: None    Range of Motion: 0 to 130°    Medial Joint : None  Lateral Joint : None    Patellofemoral Tenderness: None  Patellofemoral Crepitus: None    Lachman:  Normal  Anterior Drawer: Normal  Posterior Drawer: Normal    Mumtaz's:  Negative  Apley's:  Negative    Varus Stress:  Stable  Valgus Stress:  Stable    Strength:  5/5    Pulses:  Palpable distal    Sensation:  Intact            Imaging: X-rays of left hip appear normal.        Assessment: Left hamstring strain, initial encounter        Plan:  She has a strain of the midportion of " her hamstring.  She is improving with PT. Back in 4 weeks.      DISCLAIMER: This note may have been dictated using voice recognition software and may contain grammatical errors.     NOTE: Consult report sent to referring provider via [employer] EMR.

## 2019-06-17 ENCOUNTER — LAB VISIT (OUTPATIENT)
Dept: LAB | Facility: HOSPITAL | Age: 69
End: 2019-06-17
Attending: INTERNAL MEDICINE
Payer: MEDICARE

## 2019-06-17 DIAGNOSIS — I10 ESSENTIAL HYPERTENSION, MALIGNANT: ICD-10-CM

## 2019-06-17 DIAGNOSIS — R94.31 NONSPECIFIC ABNORMAL ELECTROCARDIOGRAM (ECG) (EKG): ICD-10-CM

## 2019-06-17 DIAGNOSIS — I25.10 CORONARY ATHEROSCLEROSIS OF NATIVE CORONARY ARTERY: Primary | ICD-10-CM

## 2019-06-17 DIAGNOSIS — I42.7: ICD-10-CM

## 2019-06-17 DIAGNOSIS — E11.9 DIABETES MELLITUS WITHOUT COMPLICATION: ICD-10-CM

## 2019-06-17 DIAGNOSIS — E78.5 HYPERLIPEMIA: ICD-10-CM

## 2019-06-17 DIAGNOSIS — Z00.00 ROUTINE GENERAL MEDICAL EXAMINATION AT A HEALTH CARE FACILITY: ICD-10-CM

## 2019-06-17 LAB
ALBUMIN SERPL BCP-MCNC: 3.9 G/DL (ref 3.5–5.2)
ALP SERPL-CCNC: 87 U/L (ref 55–135)
ALT SERPL W/O P-5'-P-CCNC: 14 U/L (ref 10–44)
ANION GAP SERPL CALC-SCNC: 10 MMOL/L (ref 8–16)
AST SERPL-CCNC: 14 U/L (ref 10–40)
BASOPHILS # BLD AUTO: 0.05 K/UL (ref 0–0.2)
BASOPHILS NFR BLD: 0.5 % (ref 0–1.9)
BILIRUB SERPL-MCNC: 0.5 MG/DL (ref 0.1–1)
BUN SERPL-MCNC: 17 MG/DL (ref 8–23)
CALCIUM SERPL-MCNC: 10.1 MG/DL (ref 8.7–10.5)
CHLORIDE SERPL-SCNC: 109 MMOL/L (ref 95–110)
CHOLEST SERPL-MCNC: 126 MG/DL (ref 120–199)
CHOLEST/HDLC SERPL: 3.4 {RATIO} (ref 2–5)
CO2 SERPL-SCNC: 24 MMOL/L (ref 23–29)
CREAT SERPL-MCNC: 0.8 MG/DL (ref 0.5–1.4)
DIFFERENTIAL METHOD: ABNORMAL
EOSINOPHIL # BLD AUTO: 0.3 K/UL (ref 0–0.5)
EOSINOPHIL NFR BLD: 2.8 % (ref 0–8)
ERYTHROCYTE [DISTWIDTH] IN BLOOD BY AUTOMATED COUNT: 13.8 % (ref 11.5–14.5)
EST. GFR  (AFRICAN AMERICAN): >60 ML/MIN/1.73 M^2
EST. GFR  (NON AFRICAN AMERICAN): >60 ML/MIN/1.73 M^2
GLUCOSE SERPL-MCNC: 102 MG/DL (ref 70–110)
HCT VFR BLD AUTO: 39.1 % (ref 37–48.5)
HDLC SERPL-MCNC: 37 MG/DL (ref 40–75)
HDLC SERPL: 29.4 % (ref 20–50)
HGB BLD-MCNC: 12.4 G/DL (ref 12–16)
IMM GRANULOCYTES # BLD AUTO: 0.05 K/UL (ref 0–0.04)
IMM GRANULOCYTES NFR BLD AUTO: 0.5 % (ref 0–0.5)
LDLC SERPL CALC-MCNC: 65.2 MG/DL (ref 63–159)
LYMPHOCYTES # BLD AUTO: 2.3 K/UL (ref 1–4.8)
LYMPHOCYTES NFR BLD: 21.7 % (ref 18–48)
MAGNESIUM SERPL-MCNC: 1.2 MG/DL (ref 1.6–2.6)
MCH RBC QN AUTO: 28.8 PG (ref 27–31)
MCHC RBC AUTO-ENTMCNC: 31.7 G/DL (ref 32–36)
MCV RBC AUTO: 91 FL (ref 82–98)
MONOCYTES # BLD AUTO: 0.5 K/UL (ref 0.3–1)
MONOCYTES NFR BLD: 5.1 % (ref 4–15)
NEUTROPHILS # BLD AUTO: 7.3 K/UL (ref 1.8–7.7)
NEUTROPHILS NFR BLD: 69.4 % (ref 38–73)
NONHDLC SERPL-MCNC: 89 MG/DL
NRBC BLD-RTO: 0 /100 WBC
PLATELET # BLD AUTO: 251 K/UL (ref 150–350)
PMV BLD AUTO: 11.9 FL (ref 9.2–12.9)
POTASSIUM SERPL-SCNC: 4.1 MMOL/L (ref 3.5–5.1)
PROT SERPL-MCNC: 7.1 G/DL (ref 6–8.4)
RBC # BLD AUTO: 4.31 M/UL (ref 4–5.4)
SODIUM SERPL-SCNC: 143 MMOL/L (ref 136–145)
TRIGL SERPL-MCNC: 119 MG/DL (ref 30–150)
WBC # BLD AUTO: 10.53 K/UL (ref 3.9–12.7)

## 2019-06-17 PROCEDURE — 36415 COLL VENOUS BLD VENIPUNCTURE: CPT | Mod: PO

## 2019-06-17 PROCEDURE — 83735 ASSAY OF MAGNESIUM: CPT

## 2019-06-17 PROCEDURE — 80061 LIPID PANEL: CPT

## 2019-06-17 PROCEDURE — 85025 COMPLETE CBC W/AUTO DIFF WBC: CPT

## 2019-06-17 PROCEDURE — 80053 COMPREHEN METABOLIC PANEL: CPT

## 2019-07-03 RX ORDER — GLIMEPIRIDE 4 MG/1
TABLET ORAL
Qty: 180 TABLET | Refills: 0 | Status: SHIPPED | OUTPATIENT
Start: 2019-07-03 | End: 2019-09-30 | Stop reason: SDUPTHER

## 2019-07-08 ENCOUNTER — LAB VISIT (OUTPATIENT)
Dept: LAB | Facility: HOSPITAL | Age: 69
End: 2019-07-08
Attending: INTERNAL MEDICINE
Payer: MEDICARE

## 2019-07-08 ENCOUNTER — OFFICE VISIT (OUTPATIENT)
Dept: ORTHOPEDICS | Facility: CLINIC | Age: 69
End: 2019-07-08
Payer: MEDICARE

## 2019-07-08 VITALS — WEIGHT: 183.63 LBS | HEIGHT: 62 IN | BODY MASS INDEX: 33.79 KG/M2 | RESPIRATION RATE: 20 BRPM

## 2019-07-08 DIAGNOSIS — E83.42 HYPOMAGNESEMIA: Primary | ICD-10-CM

## 2019-07-08 DIAGNOSIS — S76.312A LEFT HAMSTRING STRAIN, INITIAL ENCOUNTER: Primary | ICD-10-CM

## 2019-07-08 LAB — MAGNESIUM SERPL-MCNC: 1.3 MG/DL (ref 1.6–2.6)

## 2019-07-08 PROCEDURE — 83735 ASSAY OF MAGNESIUM: CPT

## 2019-07-08 PROCEDURE — 36415 COLL VENOUS BLD VENIPUNCTURE: CPT

## 2019-07-08 PROCEDURE — 99213 OFFICE O/P EST LOW 20 MIN: CPT | Mod: S$PBB,,, | Performed by: ORTHOPAEDIC SURGERY

## 2019-07-08 PROCEDURE — 99213 PR OFFICE/OUTPT VISIT, EST, LEVL III, 20-29 MIN: ICD-10-PCS | Mod: S$PBB,,, | Performed by: ORTHOPAEDIC SURGERY

## 2019-07-08 PROCEDURE — 99999 PR PBB SHADOW E&M-EST. PATIENT-LVL III: ICD-10-PCS | Mod: PBBFAC,,, | Performed by: ORTHOPAEDIC SURGERY

## 2019-07-08 PROCEDURE — 99213 OFFICE O/P EST LOW 20 MIN: CPT | Mod: PBBFAC,PN | Performed by: ORTHOPAEDIC SURGERY

## 2019-07-08 PROCEDURE — 99999 PR PBB SHADOW E&M-EST. PATIENT-LVL III: CPT | Mod: PBBFAC,,, | Performed by: ORTHOPAEDIC SURGERY

## 2019-07-18 NOTE — PROGRESS NOTES
Past Medical History:   Diagnosis Date    Anticoagulant long-term use     Arthritis     CAD (coronary artery disease)     Diabetes mellitus     Diabetes mellitus type II     Diverticulosis     Hyperlipidemia     Hypertension     Myocardial infarction     Obstructive uropathy 10/16/2015    Renal stone     Status post cystoscopy - Left laser percutaneous nephrolithotripsy 2016    Ureterolithiasis Obstructive-left 1/3/2015    Wears glasses     Wears glasses        Past Surgical History:   Procedure Laterality Date     SECTION      CHOLECYSTECTOMY      COLONOSCOPY N/A 10/7/2015    [facility]formed by [facility]    CORONARY STENT PLACEMENT  2006    2 vessels    CYSTOSCOPY      CYSTOSCOPY W/ LASER LITHOTRIPSY  12/15/15    CYSTOSCOPY W/ PERCUTANEOUS BLADDER STONE EXTRACTION Left 2016    [facility]    CYSTOSCOPY WITH RETROGRADE PYELOGRAM Bilateral 2015    [facility] OR    CYSTOSCOPY WITH STENT PLACEMENT N/A 1/3/2015    [facility] OR    CYSTOSCOPY, RETROGRADE, URETEROSCOPY, STENT PLACEMENT  10/16/2015    [facility]    CYSTOSCOPY, RETROGRADE, URETEROSCOPY, STENT PLACEMENT N/A 2014    [facility]    EXTRACTION - STONE Left 10/16/2015    [facility]    EXTRACTION-STONE-URETEROSCOPY Left 12/15/2015    [facility]    EXTRACTION-STONE-URETEROSCOPY Left 2014    [facility]    HYSTERECTOMY      GRACE/BSO, DUB    LITHOTRIPSY-EXTRACORPOREAL SHOCK WAVE Left 2015    [facility]    LITHOTRIPSY-EXTRACORPOREAL SHOCK WAVE Left 2015    [facility]    LITHOTRIPSY-LASER Left 12/15/2015    [facility]    LITHOTRIPSY-LASER Left 10/16/2015    [facility]    LITHOTRIPSY-LASER Left 8/5/2014    [facility]    NEPHROSTOMY N/A 6/28/2016    [facility]    PERCUTANEOUS NEPHROLITHOTRIPSY  06/28/2016    PYLEOSCOPY Bilateral 12/15/2015    [facility]    REMOVAL-STONE-URETERAL- Holmium Laser Left 12/15/2015    [facility]    ROTATOR CUFF REPAIR      left    ureteroscopy  12/15/15       Current Outpatient Medications   Medication Sig    acetaminophen (TYLENOL) 500 MG tablet Take 500 mg by mouth every 6 (six) hours as needed for Pain.    atorvastatin (LIPITOR) 40 MG tablet Take 40 mg by mouth once daily.     glimepiride (AMARYL) 4 MG tablet TAKE 2 TABLETS BY MOUTH ONCE DAILY WITH BREAKFAST    losartan (COZAAR) 100 MG tablet Take 1 tablet (100 mg total) by mouth once daily.    metFORMIN (GLUCOPHAGE) 1000 MG tablet TAKE ONE TABLET BY MOUTH TWICE DAILY WITH MEALS    metoprolol tartrate (LOPRESSOR) 25 MG tablet Take 25 mg by mouth 2 (two) times daily.    omega-3 fatty acids-vitamin E (OMEGA-3 FISH OIL) 1,000-5 mg-unit Cap Take 1 capsule by mouth 2 (two) times daily. Two times a day    potassium citrate (UROCIT-K 15) 15 mEq TbSR Take 15 mEq by mouth 2 (two) times daily.    SITagliptin (JANUVIA) 100 MG Tab Take 1 tablet (100 mg total) by mouth once daily.    aspirin 81 MG Chew Take 1 tablet (81 mg total) by mouth once daily.    MAGNESIUM CHLORIDE (SLOW-MAG) 64 mg TbSR once daily.      No current facility-administered medications for this visit.        Review of patient's allergies indicates:   Allergen Reactions    No known drug allergies        Family History   Problem Relation Age of Onset    Heart disease Father 90    Cancer Mother         breast cancer    Breast cancer Mother     Diabetes Brother      Kidney disease Brother         nephrectomy due to cancer    Spina bifida Brother     Cancer Sister         breast cancer    Breast cancer Sister        Social History     Socioeconomic History    Marital status:      Spouse name: Not on file    Number of children: Not on file    Years of education: Not on file    Highest education level: Not on file   Occupational History    Not on file   Social Needs    Financial resource strain: Not on file    Food insecurity:     Worry: Not on file     Inability: Not on file    Transportation needs:     Medical: Not on file     Non-medical: Not on file   Tobacco Use    Smoking status: Never Smoker    Smokeless tobacco: Never Used   Substance and Sexual Activity    Alcohol use: No    Drug use: No    Sexual activity: Yes     Partners: Male   Lifestyle    Physical activity:     Days per week: Not on file     Minutes per session: Not on file    Stress: Not on file   Relationships    Social connections:     Talks on phone: Not on file     Gets together: Not on file     Attends Yarsanism service: Not on file     Active member of club or organization: Not on file     Attends meetings of clubs or organizations: Not on file     Relationship status: Not on file   Other Topics Concern    Not on file   Social History Narrative    Not on file       Chief Complaint:   Chief Complaint   Patient presents with    left hamstring strain       Consulting Physician: No ref. provider found    History of present illness:    This is a 68 y.o. female who complains of left posterior thigh pain and swelling since she had a fall on 05/11/2019 off of her porch.  She reports that she landed approximately 4 ft down and strained her leg. She puts her pain an 0/10.    Review of Systems:    Constitution: Denies chills, fever, and sweats.  HENT: Denies headaches or blurry vision.  Cardiovascular: Denies chest pain or irregular heart beat.  Respiratory: Denies cough or shortness of  "breath.  Gastrointestinal: Denies abdominal pain, nausea, or vomiting.  Musculoskeletal:  Denies muscle cramps.  Neurological: Denies dizziness or focal weakness.  Psychiatric/Behavioral: Normal mental status.  Hematologic/Lymphatic: Denies bleeding problem or easy bruising/bleeding.  Skin: Denies rash or suspicious lesions.    Examination:    Vital Signs:    Vitals:    07/08/19 1423   Resp: 20   Weight: 83.3 kg (183 lb 10.3 oz)   Height: 5' 2" (1.575 m)   PainSc: 0-No pain   PainLoc: Leg       Body mass index is 33.59 kg/m².    This a well-developed, well nourished patient in no acute distress.    Alert and oriented x 3 and cooperative to examination.       Physical Exam: Left Hip Exam    Gait:   Normal    Skin  Rash:   None  Scars:   None    Inspection  Erythema:  None  Bruising:  none  Swelling:  None  Masses:  None  Lymphadenopathy: None    Range of Motion  Flexion:  120°  Extension:  0°  External Rotation: 50°  Internal Rotation: 15°  Abduction:  50°    Straight Leg Raise: mild  Log Roll:  Negative    Tenderness  Groin:   Negative  Greater Trochanter: Negative  Hamstring  Middle tenderness  Strength:  5/5    Stability:  Normal    Sensation:  Intact    Vascular  Pulses:  Palpable distally    Left Knee Exam    Gait   Normal    Skin  Rash:   None  Scars:   None    Inspection  Erythema:  None  Bruising:  None  Effusion:  None  Masses:  None  Lymphadenopathy: None    Range of Motion: 0 to 130°    Medial Joint : None  Lateral Joint : None    Patellofemoral Tenderness: None  Patellofemoral Crepitus: None    Lachman:  Normal  Anterior Drawer: Normal  Posterior Drawer: Normal    Mumtaz's:  Negative  Apley's:  Negative    Varus Stress:  Stable  Valgus Stress:  Stable    Strength:  5/5    Pulses:  Palpable distal    Sensation:  Intact            Imaging: X-rays of left hip appear normal.        Assessment: Left hamstring strain, initial encounter        Plan: Full ROM with no TTP or swelling. " PRN.    DISCLAIMER: This note may have been dictated using voice recognition software and may contain grammatical errors.     NOTE: Consult report sent to referring provider via [employer] EMR.

## 2019-07-30 ENCOUNTER — LAB VISIT (OUTPATIENT)
Dept: LAB | Facility: HOSPITAL | Age: 69
End: 2019-07-30
Attending: INTERNAL MEDICINE
Payer: MEDICARE

## 2019-07-30 DIAGNOSIS — E83.42 HYPOMAGNESEMIA: Primary | ICD-10-CM

## 2019-07-30 LAB — MAGNESIUM SERPL-MCNC: 1.3 MG/DL (ref 1.6–2.6)

## 2019-07-30 PROCEDURE — 36415 COLL VENOUS BLD VENIPUNCTURE: CPT | Mod: PO

## 2019-07-30 PROCEDURE — 83735 ASSAY OF MAGNESIUM: CPT

## 2019-07-31 ENCOUNTER — PATIENT OUTREACH (OUTPATIENT)
Dept: ADMINISTRATIVE | Facility: HOSPITAL | Age: 69
End: 2019-07-31

## 2019-08-01 RX ORDER — METFORMIN HYDROCHLORIDE 1000 MG/1
1000 TABLET ORAL 2 TIMES DAILY WITH MEALS
Qty: 180 TABLET | Refills: 3 | OUTPATIENT
Start: 2019-08-01

## 2019-08-05 ENCOUNTER — TELEPHONE (OUTPATIENT)
Dept: FAMILY MEDICINE | Facility: CLINIC | Age: 69
End: 2019-08-05

## 2019-08-05 RX ORDER — METFORMIN HYDROCHLORIDE 1000 MG/1
1000 TABLET ORAL 2 TIMES DAILY WITH MEALS
Qty: 180 TABLET | Refills: 3 | Status: CANCELLED | OUTPATIENT
Start: 2019-08-05

## 2019-08-05 RX ORDER — METFORMIN HYDROCHLORIDE 1000 MG/1
1000 TABLET ORAL 2 TIMES DAILY WITH MEALS
Qty: 180 TABLET | Refills: 0 | Status: SHIPPED | OUTPATIENT
Start: 2019-08-05 | End: 2019-11-15 | Stop reason: SDUPTHER

## 2019-08-05 NOTE — TELEPHONE ENCOUNTER
LOV: 2/18/19 Delmy  Next appt: 8/14/19 [facility] care w/Juan   Labs: 6/17/19  Last refill: 7/2/18       Sent to on call.

## 2019-08-05 NOTE — TELEPHONE ENCOUNTER
----- Message from Evelyn Herrera sent at 8/5/2019  3:34 PM CDT -----  Contact: Patient  Type: Needs Medical Advice    Who Called:  Patient  Pharmacy name and phone #:    [id number] - [place] - [address]  [place] [address]  Phone: [phone] Fax: [phone]      Best Call Back Number: [phone]  Additional Information: Calling to speak with the Nurse to follow up on the refill request for metFORMIN (GLUCOPHAGE) 1000 MG tablet. She is scheduled to see  Dr Martinez on 8/14/19 but she will be out of the medication today. Please advise

## 2019-08-10 NOTE — PROGRESS NOTES
[facility] Urology Progress Note    Jessica Luna is a 68 y.o. female who presents for evaluation of gross hematuria, with history of kidney stones    She underwent a percutaneous nephrolithotripsy on the left side for large multiple uric acid stones in June 2016.  Previously followed by Dr Ozuna, last seen 9/16 after having recently performed a 24-hour urine with adequate urine volume at 3.1 liters with low UpH at 5.0 and low urinary citrate. She was started on UroCitK 15 mEq daily.  6/20/16 L laser pcnl of multiple stones in renal pelvis after unsuccessful ESWL 1/7/15 and ESWL with retrograde contrast to outline stone filling defects 6/17/15 and 8/18/15, and subsequent ureteroscopy x2 with only partial stone extraction in Oct 2015 and Dec 2015. After all this she only had 11mm renal pelvic stone remaining on CT 1/11/16 and had stent in place but did not return for treatment until June 2016. This was her first known stone episode(s)    She established care with me 1/13/19. Had been on allopurinol 300mg daily and 15mEq urocit-k daily. Denied hematuria, dysuria, interim stone passage  DCed allopurinol and increased UroCitK to 15mEq BID. DCed KCl. CT unremarkable with only punctate 1mm stone.    She returns today noting:  Approximately 4 weeks ago she had 1 day of blood in the urine with all voids.  Noted to be pink without clots were thick blood.  This spontaneously resolved.  She had no flank pain dysuria or any other associated symptoms.  She did not think much of it until approximately 1 week ago this recurred in a similar fashion. She again a search that she has had no pain, flank pain, burning with urination.  Because of her history of uric acid stones we did do a CT stone protocol in January of this year which was negative except for a small 1 mm stone  Mostly drinks water and has increased water intake.  Has been compliant with her potassium citrate twice daily.  Is a diabetic, poorly controlled. A1C 7.0 in  "Feb 2019.   Of note does have low Magnesium and had bad reaction to MagOx  Nonsmoker.  Brother had cancer in his kidney and it was removed.  Fell through bannister flat on stomach in May. No injury or fractures, just pulled hamstring    ROS: A comprehensive 10 system review was performed and is negative except as noted above in HPI    PHYSICAL EXAM:    Vitals:    08/12/19 0902   BP: (!) 159/86   Pulse: 75   Resp: 18     Body mass index is 31.51 kg/m². Weight: 78.1 kg (172 lb 4.6 oz) Height: 5' 2" (157.5 cm)       General: Alert, cooperative, no distress, appears stated age   Head: Normocephalic, without obvious abnormality, atraumatic   Eyes: PERRL, conjunctiva/corneas clear   Lungs: Respirations unlabored   Heart: Warm and well perfused   Abdomen: soft NT ND no CVAT  Extremities: Extremities normal, atraumatic, no cyanosis or edema   Skin: Skin color, texture, turgor normal, no rashes or lesions   Psych: Appropriate   Neurologic: Non-focal       Recent Results (from the past 336 hour(s))   Magnesium    Collection Time: 07/30/19  8:42 AM   Result Value Ref Range    Magnesium 1.3 (L) 1.6 - 2.6 mg/dL   POCT URINE DIPSTICK WITHOUT MICROSCOPE    Collection Time: 08/12/19  9:13 AM   Result Value Ref Range    Color, UA yellow     Spec Grav UA 1.020     pH, UA 5     WBC, UA tr     Nitrite, UA neg     Protein neg     Glucose, UA neg     Ketones, UA neg     Urobilinogen, UA 0.2     Bilirubin neg     Blood, UA small        ASSESSMENT   1. Gross hematuria  POCT URINE DIPSTICK WITHOUT MICROSCOPE    Urine culture    Cytology, urine    CT Urogram Abd Pelvis W WO    Basic metabolic panel    Case Request Operating Room: CYSTOSCOPY    Place in Outpatient   2. History of uric acid staghorn calculus  Uric acid       Plan    We did discuss the appropriate workup for as well as etiologies of gross hematuria.  She did have gross hematuria as a presenting sign of her significant uric acid staghorn calculus in the past, though has had no " flank pain.  She does not have recurrent urinary tract infections and did not have any dysuria associated with the blood in her urine.  Her urine is nitrite negative today though it does still have small blood and trace leukocytes and there is concern for recurrence of stones.      We did discuss that the workup for blood in the urine includes a CT urogram and the non contrasted phase will identify any urinary tract calcifications and a contrasted phase will further evaluate her upper urinary tracts.  I will send the urine for culture as well as cytology, and we did discuss that a cystoscopy is the last step and a workup for blood in the urine.  Will go ahead and plan an office based cystoscopy at the [facility] and the procedure in detail diagnostic flexible cystourethroscopy with local instillation of xylocaine jelly for local anesthesia was described to the patient and all questions answered.      Certainly if there is significant stone disease requiring intervention on her CT scan, cystoscopy could be bundled into any treatment at that time.  At this time, all urine test and CT scan have been ordered and cystoscopy has been planned for 9/10/19.  Can adjust this plan as needed based on result review.  As well, given her history of uric acid stones, having discontinued allopurinol and focused on urinary alkalization therapy, as I recheck her BMP for renal function prior to CT scan, I will also recheck a serum uric acid.    20 min spent in encounter, over half in counseling.  All questions patient had were answered and she is agreeable to the treatment plan.

## 2019-08-12 ENCOUNTER — OFFICE VISIT (OUTPATIENT)
Dept: UROLOGY | Facility: CLINIC | Age: 69
End: 2019-08-12
Payer: MEDICARE

## 2019-08-12 ENCOUNTER — LAB VISIT (OUTPATIENT)
Dept: LAB | Facility: HOSPITAL | Age: 69
End: 2019-08-12
Attending: UROLOGY
Payer: MEDICARE

## 2019-08-12 VITALS
DIASTOLIC BLOOD PRESSURE: 86 MMHG | SYSTOLIC BLOOD PRESSURE: 159 MMHG | HEIGHT: 62 IN | RESPIRATION RATE: 18 BRPM | WEIGHT: 172.31 LBS | BODY MASS INDEX: 31.71 KG/M2 | HEART RATE: 75 BPM

## 2019-08-12 DIAGNOSIS — N20.0 NEPHROLITHIASIS: ICD-10-CM

## 2019-08-12 DIAGNOSIS — Z87.442 HISTORY OF URIC ACID STAGHORN CALCULUS: ICD-10-CM

## 2019-08-12 DIAGNOSIS — R31.0 GROSS HEMATURIA: Primary | ICD-10-CM

## 2019-08-12 LAB
ANION GAP SERPL CALC-SCNC: 10 MMOL/L (ref 8–16)
BILIRUB SERPL-MCNC: ABNORMAL MG/DL
BLOOD URINE, POC: ABNORMAL
BUN SERPL-MCNC: 19 MG/DL (ref 8–23)
CALCIUM SERPL-MCNC: 10.4 MG/DL (ref 8.7–10.5)
CHLORIDE SERPL-SCNC: 107 MMOL/L (ref 95–110)
CO2 SERPL-SCNC: 25 MMOL/L (ref 23–29)
COLOR, POC UA: YELLOW
CREAT SERPL-MCNC: 0.8 MG/DL (ref 0.5–1.4)
EST. GFR  (AFRICAN AMERICAN): >60 ML/MIN/1.73 M^2
EST. GFR  (NON AFRICAN AMERICAN): >60 ML/MIN/1.73 M^2
GLUCOSE SERPL-MCNC: 128 MG/DL (ref 70–110)
GLUCOSE UR QL STRIP: ABNORMAL
KETONES UR QL STRIP: ABNORMAL
LEUKOCYTE ESTERASE URINE, POC: ABNORMAL
NITRITE, POC UA: ABNORMAL
PH, POC UA: 5
POTASSIUM SERPL-SCNC: 4.9 MMOL/L (ref 3.5–5.1)
PROTEIN, POC: ABNORMAL
SODIUM SERPL-SCNC: 142 MMOL/L (ref 136–145)
SPECIFIC GRAVITY, POC UA: 1.02
URATE SERPL-MCNC: 6 MG/DL (ref 2.4–5.7)
UROBILINOGEN, POC UA: 0.2

## 2019-08-12 PROCEDURE — 88112 CYTOLOGY SPECIMEN-URINE: ICD-10-PCS | Mod: 26,,, | Performed by: PATHOLOGY

## 2019-08-12 PROCEDURE — 99214 PR OFFICE/OUTPT VISIT, EST, LEVL IV, 30-39 MIN: ICD-10-PCS | Mod: S$PBB,,, | Performed by: UROLOGY

## 2019-08-12 PROCEDURE — 87086 URINE CULTURE/COLONY COUNT: CPT

## 2019-08-12 PROCEDURE — 99214 OFFICE O/P EST MOD 30 MIN: CPT | Mod: S$PBB,,, | Performed by: UROLOGY

## 2019-08-12 PROCEDURE — 36415 COLL VENOUS BLD VENIPUNCTURE: CPT

## 2019-08-12 PROCEDURE — 80048 BASIC METABOLIC PNL TOTAL CA: CPT

## 2019-08-12 PROCEDURE — 88112 CYTOPATH CELL ENHANCE TECH: CPT | Mod: 26,,, | Performed by: PATHOLOGY

## 2019-08-12 PROCEDURE — 99214 OFFICE O/P EST MOD 30 MIN: CPT | Mod: PBBFAC,PN | Performed by: UROLOGY

## 2019-08-12 PROCEDURE — 99999 PR PBB SHADOW E&M-EST. PATIENT-LVL IV: CPT | Mod: PBBFAC,,, | Performed by: UROLOGY

## 2019-08-12 PROCEDURE — 99999 PR PBB SHADOW E&M-EST. PATIENT-LVL IV: ICD-10-PCS | Mod: PBBFAC,,, | Performed by: UROLOGY

## 2019-08-12 PROCEDURE — 88112 CYTOPATH CELL ENHANCE TECH: CPT | Performed by: PATHOLOGY

## 2019-08-12 PROCEDURE — 84550 ASSAY OF BLOOD/URIC ACID: CPT

## 2019-08-12 PROCEDURE — 81002 URINALYSIS NONAUTO W/O SCOPE: CPT | Mod: PBBFAC,PN | Performed by: UROLOGY

## 2019-08-12 RX ORDER — LIDOCAINE HYDROCHLORIDE 20 MG/ML
JELLY TOPICAL ONCE
Status: CANCELLED | OUTPATIENT
Start: 2019-08-12 | End: 2019-08-12

## 2019-08-14 LAB — BACTERIA UR CULT: NORMAL

## 2019-08-19 ENCOUNTER — LAB VISIT (OUTPATIENT)
Dept: LAB | Facility: HOSPITAL | Age: 69
End: 2019-08-19
Attending: INTERNAL MEDICINE
Payer: MEDICARE

## 2019-08-19 DIAGNOSIS — E83.42 HYPOMAGNESEMIA: Primary | ICD-10-CM

## 2019-08-19 LAB — MAGNESIUM SERPL-MCNC: 1.3 MG/DL (ref 1.6–2.6)

## 2019-08-19 PROCEDURE — 36415 COLL VENOUS BLD VENIPUNCTURE: CPT

## 2019-08-19 PROCEDURE — 83735 ASSAY OF MAGNESIUM: CPT

## 2019-08-19 RX ORDER — ALLOPURINOL 100 MG/1
100 TABLET ORAL DAILY
Qty: 30 TABLET | Refills: 11 | Status: SHIPPED | OUTPATIENT
Start: 2019-08-19 | End: 2020-08-19

## 2019-08-20 ENCOUNTER — INFUSION (OUTPATIENT)
Dept: INFUSION THERAPY | Facility: HOSPITAL | Age: 69
End: 2019-08-20
Attending: INTERNAL MEDICINE
Payer: MEDICARE

## 2019-08-20 VITALS
SYSTOLIC BLOOD PRESSURE: 118 MMHG | TEMPERATURE: 99 F | OXYGEN SATURATION: 93 % | DIASTOLIC BLOOD PRESSURE: 84 MMHG | HEART RATE: 63 BPM | RESPIRATION RATE: 14 BRPM

## 2019-08-20 DIAGNOSIS — R31.0 GROSS HEMATURIA: Primary | ICD-10-CM

## 2019-08-20 PROCEDURE — 63600175 PHARM REV CODE 636 W HCPCS: Performed by: INTERNAL MEDICINE

## 2019-08-20 PROCEDURE — 96365 THER/PROPH/DIAG IV INF INIT: CPT

## 2019-08-20 PROCEDURE — 96370 SC THER INFUSION ADDL HR: CPT

## 2019-08-20 NOTE — PATIENT INSTRUCTIONS
Instructed to follow up with Dr. Jeroniom as scheduled or to notify him with any problems or concerns or return to the ER for any serious sx/sy such as CP, SOB

## 2019-08-21 ENCOUNTER — HOSPITAL ENCOUNTER (OUTPATIENT)
Dept: RADIOLOGY | Facility: HOSPITAL | Age: 69
Discharge: HOME | End: 2019-08-21
Attending: UROLOGY
Payer: MEDICARE

## 2019-08-21 DIAGNOSIS — R31.0 GROSS HEMATURIA: ICD-10-CM

## 2019-08-21 PROCEDURE — 25500020 PHARM REV CODE 255: Performed by: UROLOGY

## 2019-08-21 PROCEDURE — 74178 CT ABD&PLV WO CNTR FLWD CNTR: CPT | Mod: 26,,, | Performed by: RADIOLOGY

## 2019-08-21 PROCEDURE — 74178 CT ABD&PLV WO CNTR FLWD CNTR: CPT | Mod: TC

## 2019-08-21 PROCEDURE — 74178 CT UROGRAM ABD PELVIS W WO: ICD-10-PCS | Mod: 26,,, | Performed by: RADIOLOGY

## 2019-08-21 RX ADMIN — IOHEXOL 125 ML: 350 INJECTION, SOLUTION INTRAVENOUS at 08:08

## 2019-08-29 ENCOUNTER — LAB VISIT (OUTPATIENT)
Dept: LAB | Facility: HOSPITAL | Age: 69
End: 2019-08-29
Attending: INTERNAL MEDICINE
Payer: MEDICARE

## 2019-08-29 DIAGNOSIS — E83.42 HYPOMAGNESEMIA: Primary | ICD-10-CM

## 2019-08-29 LAB — MAGNESIUM SERPL-MCNC: 1.4 MG/DL (ref 1.6–2.6)

## 2019-08-29 PROCEDURE — 36415 COLL VENOUS BLD VENIPUNCTURE: CPT

## 2019-08-29 PROCEDURE — 83735 ASSAY OF MAGNESIUM: CPT

## 2019-09-05 DIAGNOSIS — N20.0 RENAL CALCULUS, LEFT: ICD-10-CM

## 2019-09-05 DIAGNOSIS — R31.0 GROSS HEMATURIA: Primary | ICD-10-CM

## 2019-09-12 ENCOUNTER — HOSPITAL ENCOUNTER (OUTPATIENT)
Dept: RADIOLOGY | Facility: HOSPITAL | Age: 69
Discharge: HOME | End: 2019-09-12
Attending: UROLOGY
Payer: MEDICARE

## 2019-09-12 ENCOUNTER — HOSPITAL ENCOUNTER (OUTPATIENT)
Dept: PREADMISSION TESTING | Facility: HOSPITAL | Age: 69
Discharge: HOME | End: 2019-09-12
Attending: UROLOGY
Payer: MEDICARE

## 2019-09-12 VITALS — HEIGHT: 62 IN | BODY MASS INDEX: 32.39 KG/M2 | WEIGHT: 176 LBS

## 2019-09-12 DIAGNOSIS — N20.0 RENAL CALCULUS, LEFT: ICD-10-CM

## 2019-09-12 DIAGNOSIS — R31.0 GROSS HEMATURIA: ICD-10-CM

## 2019-09-12 PROCEDURE — 99900103 DSU ONLY-NO CHARGE-INITIAL HR (STAT)

## 2019-09-12 PROCEDURE — 71046 X-RAY EXAM CHEST 2 VIEWS: CPT | Mod: 26,,, | Performed by: RADIOLOGY

## 2019-09-12 PROCEDURE — 71046 X-RAY EXAM CHEST 2 VIEWS: CPT | Mod: TC,FY

## 2019-09-12 PROCEDURE — 71046 XR CHEST PA AND LATERAL: ICD-10-PCS | Mod: 26,,, | Performed by: RADIOLOGY

## 2019-09-12 PROCEDURE — 99900104 DSU ONLY-NO CHARGE-EA ADD'L HR (STAT)

## 2019-09-12 NOTE — DISCHARGE INSTRUCTIONS
To confirm, Your doctor has instructed you that surgery is scheduled for: 9/19/19   EMMANUEL  [phone]    Please report to [facility], [facility] the morning of surgery. You must check-in and receive a wristband before going to your procedure.    Pre-Op will call the afternoon prior to surgery between 1:00 and 6:00 PM with the final arrival time.  Phone number: [phone]    PLEASE NOTE:  The surgery schedule has many variables which may affect the time of your surgery case.  Family members should be available if your surgery time changes.  Plan to be here the day of your procedure between 4-6 hours.    MEDICATIONS:  TAKE ONLY THESE MEDICATIONS WITH A SMALL SIP OF WATER THE MORNING OF YOUR PROCEDURE:  LIPITOOS  DO NOT TAKE THESE MEDICATIONS 5-7 DAYS PRIOR to your procedure or per your surgeon's request: ASPIRIN, ALEVE, ADVIL, IBUPROFEN, FISH OIL VITAMIN E, HERBALS - LAST DOSE 9/13/19  (May take Tylenol)                                               NO METFORMIN PM OR AM BEFORE SURGERY    ONLY if you are prescribed any types of blood thinners such as:  Aspirin, Coumadin, Plavix, Pradaxa, Xarelto, Aggrenox, Effient, Eliquis, Savasya, Brilinta, or any other, ask your surgeon whether you should stop taking them and how long before surgery you should stop.  You may also need to verify with the prescribing physician if it is ok to stop your medication.      INSTRUCTIONS IMPORTANT!!  · Do not eat or drink anything between midnight and the time of your procedure- this includes gum, mints, and candy.  · Do not smoke or drink alcoholic beverages 24 hours prior to your procedure.  · Shower the night before AND the morning of your procedure with a Chlorhexidine wash such as Hibiclens or Dial antibacterial soap from the neck down.  Do not get it on your face or in your eyes.  You may use your own shampoo and face wash. This helps your skin to be as bacteria free as possible.    · If you wear contact  lenses, dentures, hearing aids or glasses, bring a container to put them in during surgery and give to a family member for safe keeping.  Please leave all jewelry, piercing's and valuables at home.   · DO NOT remove hair from the surgery site.  Do not shave the incision site unless you are given specific instructions to do so.    · ONLY if you have been diagnosed with sleep apnea please bring your C-PAP machine.  · ONLY if you wear home oxygen please bring your portable oxygen tank the day of your procedure.  · ONLY if you have a history of OPEN HEART SURGERY you will need a clearance from your Cardiologist per Anesthesia.      · ONLY for patients requiring bowel prep, written instructions will be given by your doctor's office.  · ONLY if you have a neuro stimulator, please bring the controller with you the morning of surgery  · ONLY if a type and screen test is needed before surgery, please return:  · If your doctor has scheduled you for an overnight stay, bring a small overnight bag with any personal items you need.  · Make arrangements in advance for transportation home by a responsible adult.  It is not safe to drive a vehicle during the 24 hours after anesthesia.      · Visiting hours are 10:00AM to 8:30PM.  For the safety of all patients, visitors under the age of 12 are not allowed above the first floor of the hospital.    · All [facility] facilities and properties are tobacco free.  Smoking is NOT allowed.       If you have any questions about these instructions, call Pre-Op Admit  Nursing at [phone] or the Pre-Op Day Surgery Unit at [phone].

## 2019-09-18 ENCOUNTER — OFFICE VISIT (OUTPATIENT)
Dept: GASTROENTEROLOGY | Facility: CLINIC | Age: 69
End: 2019-09-18
Payer: MEDICARE

## 2019-09-18 ENCOUNTER — ANESTHESIA EVENT (OUTPATIENT)
Dept: SURGERY | Facility: HOSPITAL | Age: 69
End: 2019-09-18
Payer: MEDICARE

## 2019-09-18 VITALS
DIASTOLIC BLOOD PRESSURE: 72 MMHG | SYSTOLIC BLOOD PRESSURE: 159 MMHG | HEART RATE: 60 BPM | BODY MASS INDEX: 32.34 KG/M2 | WEIGHT: 176.81 LBS

## 2019-09-18 DIAGNOSIS — R16.2 HEPATOSPLENOMEGALY: ICD-10-CM

## 2019-09-18 DIAGNOSIS — R19.7 DIARRHEA, UNSPECIFIED TYPE: Primary | ICD-10-CM

## 2019-09-18 PROCEDURE — 99999 PR PBB SHADOW E&M-EST. PATIENT-LVL III: ICD-10-PCS | Mod: PBBFAC,,, | Performed by: INTERNAL MEDICINE

## 2019-09-18 PROCEDURE — 99999 PR PBB SHADOW E&M-EST. PATIENT-LVL III: CPT | Mod: PBBFAC,,, | Performed by: INTERNAL MEDICINE

## 2019-09-18 PROCEDURE — 99213 OFFICE O/P EST LOW 20 MIN: CPT | Mod: PBBFAC,PO | Performed by: INTERNAL MEDICINE

## 2019-09-18 PROCEDURE — 99214 PR OFFICE/OUTPT VISIT, EST, LEVL IV, 30-39 MIN: ICD-10-PCS | Mod: S$PBB,,, | Performed by: INTERNAL MEDICINE

## 2019-09-18 PROCEDURE — 99214 OFFICE O/P EST MOD 30 MIN: CPT | Mod: S$PBB,,, | Performed by: INTERNAL MEDICINE

## 2019-09-18 RX ORDER — TALC
0.5 POWDER (GRAM) TOPICAL DAILY
COMMUNITY

## 2019-09-18 NOTE — H&P (VIEW-ONLY)
[facility]    CC: Diarrhea    HPI 68 y.o. female with history of diabetes presents for evaluation of 2-3 months of persistent, non-bloody, primarily post-prandial diarrhea not associated with nausea, vomiting, abdominal pain or weight loss. She stopped using artificial sweeteners without improvement in symptoms and she avoids lactose. She denies NSAID use. Her last colonoscopy was in 2015 with 5 small tubular adenomas removed.       Past Medical History:   Diagnosis Date    Anticoagulant long-term use     Arthritis     CAD (coronary artery disease)     Diabetes mellitus     Diabetes mellitus type II     Diverticulosis     Hyperlipidemia     Hypertension     Low magnesium levels     Myocardial infarction     Obstructive uropathy 10/16/2015    Renal stone     Status post cystoscopy - Left laser percutaneous nephrolithotripsy 6/29/2016    Ureterolithiasis Obstructive-left 1/3/2015    Wears dentures     FULL UPPER - PARTIAL BOTTOM    Wears glasses     Wears glasses          Review of Systems  General ROS: negative for - chills, fever or weight loss  Cardiovascular ROS: no chest pain or dyspnea on exertion  Gastrointestinal ROS: + diarrhea, no vomiting, no blood in stool     Physical Examination  BP (!) 159/72   Pulse 60   Wt 80.2 kg (176 lb 12.9 oz)   BMI 32.34 kg/m²   General appearance: alert, cooperative, no distress  HENT: Normocephalic, atraumatic, neck symmetrical, no nasal discharge, sclera anicteric   Lungs: clear to auscultation bilaterally, symmetric chest wall expansion bilaterally  Heart: regular rate and rhythm without rub; no displacement of the PMI   Abdomen: soft, nontender,nondistended, BS active  Extremities: extremities symmetric; no clubbing, cyanosis, or edema    Labs:  Lab Results   Component Value Date    WBC 9.18 09/12/2019    HGB 12.2 09/12/2019    HCT 37.0 09/12/2019    MCV 86 09/12/2019     09/12/2019         Imaging:  CT urogram was independently  visualized and reviewed by me and showed renal stones, chronic HSM    Assessment:   68 y.o. female presents for evaluation of several months of post-prandial diarrhea    Plan:  -Schedule colonoscopy with biopsies for microscopic colitis  -Check stool studies   -TTG, IgA  -? Etiology of chronic, stable HSM (? Fatty liver)- will check abdominal ultrasound, LFTs and viral hepatitis serologies    Viviane Simeon MD  [facility]  [address], [address]  [place] [address]  Office: [phone]  Fax: [phone]

## 2019-09-18 NOTE — PROGRESS NOTES
[facility]    CC: Diarrhea    HPI 68 y.o. female with history of diabetes presents for evaluation of 2-3 months of persistent, non-bloody, primarily post-prandial diarrhea not associated with nausea, vomiting, abdominal pain or weight loss. She stopped using artificial sweeteners without improvement in symptoms and she avoids lactose. She denies NSAID use. Her last colonoscopy was in 2015 with 5 small tubular adenomas removed.       Past Medical History:   Diagnosis Date    Anticoagulant long-term use     Arthritis     CAD (coronary artery disease)     Diabetes mellitus     Diabetes mellitus type II     Diverticulosis     Hyperlipidemia     Hypertension     Low magnesium levels     Myocardial infarction     Obstructive uropathy 10/16/2015    Renal stone     Status post cystoscopy - Left laser percutaneous nephrolithotripsy 6/29/2016    Ureterolithiasis Obstructive-left 1/3/2015    Wears dentures     FULL UPPER - PARTIAL BOTTOM    Wears glasses     Wears glasses          Review of Systems  General ROS: negative for - chills, fever or weight loss  Cardiovascular ROS: no chest pain or dyspnea on exertion  Gastrointestinal ROS: + diarrhea, no vomiting, no blood in stool     Physical Examination  BP (!) 159/72   Pulse 60   Wt 80.2 kg (176 lb 12.9 oz)   BMI 32.34 kg/m²   General appearance: alert, cooperative, no distress  HENT: Normocephalic, atraumatic, neck symmetrical, no nasal discharge, sclera anicteric   Lungs: clear to auscultation bilaterally, symmetric chest wall expansion bilaterally  Heart: regular rate and rhythm without rub; no displacement of the PMI   Abdomen: soft, nontender,nondistended, BS active  Extremities: extremities symmetric; no clubbing, cyanosis, or edema    Labs:  Lab Results   Component Value Date    WBC 9.18 09/12/2019    HGB 12.2 09/12/2019    HCT 37.0 09/12/2019    MCV 86 09/12/2019     09/12/2019         Imaging:  CT urogram was independently  visualized and reviewed by me and showed renal stones, chronic HSM    Assessment:   68 y.o. female presents for evaluation of several months of post-prandial diarrhea    Plan:  -Schedule colonoscopy with biopsies for microscopic colitis  -Check stool studies   -TTG, IgA  -? Etiology of chronic, stable HSM (? Fatty liver)- will check abdominal ultrasound, LFTs and viral hepatitis serologies    Viviane Simeon MD  [facility]  [address]  [place] [address]  Office: [phone]  Fax: [phone]

## 2019-09-19 ENCOUNTER — HOSPITAL ENCOUNTER (OUTPATIENT)
Facility: HOSPITAL | Age: 69
Discharge: HOME | End: 2019-09-19
Attending: UROLOGY | Admitting: UROLOGY
Payer: MEDICARE

## 2019-09-19 ENCOUNTER — ANESTHESIA (OUTPATIENT)
Dept: SURGERY | Facility: HOSPITAL | Age: 69
End: 2019-09-19
Payer: MEDICARE

## 2019-09-19 DIAGNOSIS — N20.0 RENAL CALCULUS, LEFT: ICD-10-CM

## 2019-09-19 DIAGNOSIS — R31.0 GROSS HEMATURIA: ICD-10-CM

## 2019-09-19 PROCEDURE — 36000707: Performed by: UROLOGY

## 2019-09-19 PROCEDURE — 25000003 PHARM REV CODE 250: Performed by: ANESTHESIOLOGY

## 2019-09-19 PROCEDURE — 71000033 HC RECOVERY, INTIAL HOUR: Performed by: UROLOGY

## 2019-09-19 PROCEDURE — 52356 PR CYSTO/URETERO W/LITHOTRIPSY: ICD-10-PCS | Mod: LT,ICN,, | Performed by: UROLOGY

## 2019-09-19 PROCEDURE — 52356 CYSTO/URETERO W/LITHOTRIPSY: CPT | Mod: LT,ICN,, | Performed by: UROLOGY

## 2019-09-19 PROCEDURE — 37000009 HC ANESTHESIA EA ADD 15 MINS: Performed by: UROLOGY

## 2019-09-19 PROCEDURE — 74420 UROGRAPHY RTRGR +-KUB: CPT | Mod: 26,ICN,, | Performed by: UROLOGY

## 2019-09-19 PROCEDURE — C2617 STENT, NON-COR, TEM W/O DEL: HCPCS | Performed by: UROLOGY

## 2019-09-19 PROCEDURE — D9220A PRA ANESTHESIA: ICD-10-PCS | Mod: ANES,,, | Performed by: ANESTHESIOLOGY

## 2019-09-19 PROCEDURE — 36000706: Performed by: UROLOGY

## 2019-09-19 PROCEDURE — 37000008 HC ANESTHESIA 1ST 15 MINUTES: Performed by: UROLOGY

## 2019-09-19 PROCEDURE — 63600175 PHARM REV CODE 636 W HCPCS: Performed by: NURSE ANESTHETIST, CERTIFIED REGISTERED

## 2019-09-19 PROCEDURE — 74420 PR  X-RAY RETROGRADE PYELOGRAM: ICD-10-PCS | Mod: 26,ICN,, | Performed by: UROLOGY

## 2019-09-19 PROCEDURE — 76000 PR  FLUOROSCOPE EXAMINATION: ICD-10-PCS | Mod: 26,59,ICN, | Performed by: UROLOGY

## 2019-09-19 PROCEDURE — D9220A PRA ANESTHESIA: ICD-10-PCS | Mod: CRNA,,, | Performed by: NURSE ANESTHETIST, CERTIFIED REGISTERED

## 2019-09-19 PROCEDURE — 71000039 HC RECOVERY, EACH ADD'L HOUR: Performed by: UROLOGY

## 2019-09-19 PROCEDURE — D9220A PRA ANESTHESIA: Mod: ANES,,, | Performed by: ANESTHESIOLOGY

## 2019-09-19 PROCEDURE — 63600175 PHARM REV CODE 636 W HCPCS: Performed by: ANESTHESIOLOGY

## 2019-09-19 PROCEDURE — 71000015 HC POSTOP RECOV 1ST HR: Performed by: UROLOGY

## 2019-09-19 PROCEDURE — 71000016 HC POSTOP RECOV ADDL HR: Performed by: UROLOGY

## 2019-09-19 PROCEDURE — C1758 CATHETER, URETERAL: HCPCS | Performed by: UROLOGY

## 2019-09-19 PROCEDURE — 63600175 PHARM REV CODE 636 W HCPCS: Performed by: UROLOGY

## 2019-09-19 PROCEDURE — C1894 INTRO/SHEATH, NON-LASER: HCPCS | Performed by: UROLOGY

## 2019-09-19 PROCEDURE — 82365 CALCULUS SPECTROSCOPY: CPT

## 2019-09-19 PROCEDURE — 76000 FLUOROSCOPY <1 HR PHYS/QHP: CPT | Mod: 26,59,ICN, | Performed by: UROLOGY

## 2019-09-19 PROCEDURE — 99900103 DSU ONLY-NO CHARGE-INITIAL HR (STAT): Performed by: UROLOGY

## 2019-09-19 PROCEDURE — C1769 GUIDE WIRE: HCPCS | Performed by: UROLOGY

## 2019-09-19 PROCEDURE — D9220A PRA ANESTHESIA: Mod: CRNA,,, | Performed by: NURSE ANESTHETIST, CERTIFIED REGISTERED

## 2019-09-19 PROCEDURE — 99900104 DSU ONLY-NO CHARGE-EA ADD'L HR (STAT): Performed by: UROLOGY

## 2019-09-19 PROCEDURE — 27200651 HC AIRWAY, LMA: Performed by: NURSE ANESTHETIST, CERTIFIED REGISTERED

## 2019-09-19 PROCEDURE — 25000003 PHARM REV CODE 250: Performed by: NURSE ANESTHETIST, CERTIFIED REGISTERED

## 2019-09-19 PROCEDURE — 25500020 PHARM REV CODE 255: Performed by: UROLOGY

## 2019-09-19 PROCEDURE — 27201423 OPTIME MED/SURG SUP & DEVICES STERILE SUPPLY: Performed by: UROLOGY

## 2019-09-19 DEVICE — STENT SET URETERAL 6FR 22CM: Type: IMPLANTABLE DEVICE | Site: URETER | Status: FUNCTIONAL

## 2019-09-19 RX ORDER — SODIUM CHLORIDE, SODIUM LACTATE, POTASSIUM CHLORIDE, CALCIUM CHLORIDE 600; 310; 30; 20 MG/100ML; MG/100ML; MG/100ML; MG/100ML
INJECTION, SOLUTION INTRAVENOUS CONTINUOUS
Status: DISCONTINUED | OUTPATIENT
Start: 2019-09-19 | End: 2019-09-19 | Stop reason: HOSPADM

## 2019-09-19 RX ORDER — CEFAZOLIN SODIUM 2 G/50ML
2 SOLUTION INTRAVENOUS
Status: DISCONTINUED | OUTPATIENT
Start: 2019-09-19 | End: 2019-09-19 | Stop reason: HOSPADM

## 2019-09-19 RX ORDER — FENTANYL CITRATE 50 UG/ML
25 INJECTION, SOLUTION INTRAMUSCULAR; INTRAVENOUS EVERY 5 MIN PRN
Status: DISCONTINUED | OUTPATIENT
Start: 2019-09-19 | End: 2019-09-19 | Stop reason: HOSPADM

## 2019-09-19 RX ORDER — ONDANSETRON 2 MG/ML
INJECTION INTRAMUSCULAR; INTRAVENOUS
Status: DISCONTINUED | OUTPATIENT
Start: 2019-09-19 | End: 2019-09-19

## 2019-09-19 RX ORDER — LIDOCAINE HYDROCHLORIDE 10 MG/ML
1 INJECTION, SOLUTION EPIDURAL; INFILTRATION; INTRACAUDAL; PERINEURAL ONCE
Status: DISCONTINUED | OUTPATIENT
Start: 2019-09-19 | End: 2019-09-19 | Stop reason: HOSPADM

## 2019-09-19 RX ORDER — METOPROLOL TARTRATE 1 MG/ML
3 INJECTION, SOLUTION INTRAVENOUS ONCE AS NEEDED
Status: COMPLETED | OUTPATIENT
Start: 2019-09-19 | End: 2019-09-19

## 2019-09-19 RX ORDER — OXYCODONE HYDROCHLORIDE 5 MG/1
5 TABLET ORAL
Status: DISCONTINUED | OUTPATIENT
Start: 2019-09-19 | End: 2019-09-19 | Stop reason: HOSPADM

## 2019-09-19 RX ORDER — EPHEDRINE SULFATE 50 MG/ML
INJECTION, SOLUTION INTRAVENOUS
Status: DISCONTINUED | OUTPATIENT
Start: 2019-09-19 | End: 2019-09-19

## 2019-09-19 RX ORDER — MIDAZOLAM HYDROCHLORIDE 1 MG/ML
INJECTION, SOLUTION INTRAMUSCULAR; INTRAVENOUS
Status: DISCONTINUED | OUTPATIENT
Start: 2019-09-19 | End: 2019-09-19

## 2019-09-19 RX ORDER — SULFAMETHOXAZOLE AND TRIMETHOPRIM 800; 160 MG/1; MG/1
1 TABLET ORAL 2 TIMES DAILY
Qty: 10 TABLET | Refills: 0 | Status: SHIPPED | OUTPATIENT
Start: 2019-09-19 | End: 2019-09-24

## 2019-09-19 RX ORDER — ONDANSETRON 2 MG/ML
4 INJECTION INTRAMUSCULAR; INTRAVENOUS DAILY PRN
Status: DISCONTINUED | OUTPATIENT
Start: 2019-09-19 | End: 2019-09-19 | Stop reason: HOSPADM

## 2019-09-19 RX ORDER — SODIUM CHLORIDE 0.9 % (FLUSH) 0.9 %
3 SYRINGE (ML) INJECTION
Status: DISCONTINUED | OUTPATIENT
Start: 2019-09-19 | End: 2019-09-19 | Stop reason: HOSPADM

## 2019-09-19 RX ORDER — FENTANYL CITRATE 50 UG/ML
INJECTION, SOLUTION INTRAMUSCULAR; INTRAVENOUS
Status: DISCONTINUED | OUTPATIENT
Start: 2019-09-19 | End: 2019-09-19

## 2019-09-19 RX ORDER — GLYCOPYRROLATE 0.2 MG/ML
INJECTION INTRAMUSCULAR; INTRAVENOUS
Status: DISCONTINUED | OUTPATIENT
Start: 2019-09-19 | End: 2019-09-19

## 2019-09-19 RX ORDER — PHENYLEPHRINE HYDROCHLORIDE 10 MG/ML
INJECTION INTRAVENOUS
Status: DISCONTINUED | OUTPATIENT
Start: 2019-09-19 | End: 2019-09-19

## 2019-09-19 RX ORDER — TAMSULOSIN HYDROCHLORIDE 0.4 MG/1
0.4 CAPSULE ORAL DAILY
Qty: 30 CAPSULE | Refills: 0 | Status: ON HOLD | OUTPATIENT
Start: 2019-09-19 | End: 2019-10-01 | Stop reason: HOSPADM

## 2019-09-19 RX ORDER — DEXAMETHASONE SODIUM PHOSPHATE 4 MG/ML
INJECTION, SOLUTION INTRA-ARTICULAR; INTRALESIONAL; INTRAMUSCULAR; INTRAVENOUS; SOFT TISSUE
Status: DISCONTINUED | OUTPATIENT
Start: 2019-09-19 | End: 2019-09-19

## 2019-09-19 RX ORDER — MAGNESIUM SULFATE HEPTAHYDRATE 40 MG/ML
2 INJECTION, SOLUTION INTRAVENOUS ONCE
Status: COMPLETED | OUTPATIENT
Start: 2019-09-19 | End: 2019-09-19

## 2019-09-19 RX ORDER — LIDOCAINE HCL/PF 100 MG/5ML
SYRINGE (ML) INTRAVENOUS
Status: DISCONTINUED | OUTPATIENT
Start: 2019-09-19 | End: 2019-09-19

## 2019-09-19 RX ORDER — KETOROLAC TROMETHAMINE 30 MG/ML
INJECTION, SOLUTION INTRAMUSCULAR; INTRAVENOUS
Status: DISCONTINUED | OUTPATIENT
Start: 2019-09-19 | End: 2019-09-19

## 2019-09-19 RX ORDER — KETOROLAC TROMETHAMINE 10 MG/1
10 TABLET, FILM COATED ORAL EVERY 8 HOURS PRN
Qty: 10 TABLET | Refills: 0 | Status: SHIPPED | OUTPATIENT
Start: 2019-09-19 | End: 2019-09-26

## 2019-09-19 RX ORDER — PROPOFOL 10 MG/ML
VIAL (ML) INTRAVENOUS
Status: DISCONTINUED | OUTPATIENT
Start: 2019-09-19 | End: 2019-09-19

## 2019-09-19 RX ADMIN — MIDAZOLAM 2 MG: 1 INJECTION INTRAMUSCULAR; INTRAVENOUS at 09:09

## 2019-09-19 RX ADMIN — FENTANYL CITRATE 100 MCG: 50 INJECTION, SOLUTION INTRAMUSCULAR; INTRAVENOUS at 09:09

## 2019-09-19 RX ADMIN — GLYCOPYRROLATE 0.2 MG: 0.2 INJECTION, SOLUTION INTRAMUSCULAR; INTRAVENOUS at 10:09

## 2019-09-19 RX ADMIN — METOPROLOL TARTRATE 3 MG: 1 INJECTION, SOLUTION INTRAVENOUS at 12:09

## 2019-09-19 RX ADMIN — ONDANSETRON 4 MG: 2 INJECTION, SOLUTION INTRAMUSCULAR; INTRAVENOUS at 09:09

## 2019-09-19 RX ADMIN — KETOROLAC TROMETHAMINE 30 MG: 30 INJECTION, SOLUTION INTRAMUSCULAR; INTRAVENOUS at 10:09

## 2019-09-19 RX ADMIN — PHENYLEPHRINE HYDROCHLORIDE 100 MCG: 10 INJECTION INTRAVENOUS at 09:09

## 2019-09-19 RX ADMIN — PROPOFOL 150 MG: 10 INJECTION, EMULSION INTRAVENOUS at 09:09

## 2019-09-19 RX ADMIN — FENTANYL CITRATE 100 MCG: 50 INJECTION, SOLUTION INTRAMUSCULAR; INTRAVENOUS at 10:09

## 2019-09-19 RX ADMIN — SODIUM CHLORIDE, SODIUM LACTATE, POTASSIUM CHLORIDE, AND CALCIUM CHLORIDE: .6; .31; .03; .02 INJECTION, SOLUTION INTRAVENOUS at 06:09

## 2019-09-19 RX ADMIN — PHENYLEPHRINE HYDROCHLORIDE 200 MCG: 10 INJECTION INTRAVENOUS at 10:09

## 2019-09-19 RX ADMIN — PHENYLEPHRINE HYDROCHLORIDE 200 MCG: 10 INJECTION INTRAVENOUS at 09:09

## 2019-09-19 RX ADMIN — SODIUM CHLORIDE, SODIUM LACTATE, POTASSIUM CHLORIDE, AND CALCIUM CHLORIDE: .6; .31; .03; .02 INJECTION, SOLUTION INTRAVENOUS at 10:09

## 2019-09-19 RX ADMIN — MAGNESIUM SULFATE 2 G: 2 INJECTION INTRAVENOUS at 06:09

## 2019-09-19 RX ADMIN — EPHEDRINE SULFATE 25 MG: 50 INJECTION, SOLUTION INTRAMUSCULAR; INTRAVENOUS; SUBCUTANEOUS at 10:09

## 2019-09-19 RX ADMIN — LIDOCAINE HYDROCHLORIDE 100 MG: 20 INJECTION, SOLUTION INTRAVENOUS at 09:09

## 2019-09-19 RX ADMIN — DEXAMETHASONE SODIUM PHOSPHATE 4 MG: 4 INJECTION, SOLUTION INTRAMUSCULAR; INTRAVENOUS at 09:09

## 2019-09-19 NOTE — DISCHARGE INSTRUCTIONS
General Information:    1.  Do not drink alcoholic beverages including beer for 24 hours or as long as you are on pain medication..  2.  Do not drive a motor vehicle, operate machinery or power tools, or signs legal papers for 24 hours or as long as you are on pain medication.   3.  You may experience light-headedness, dizziness, and sleepiness following surgery. Please do not stay alone. A responsible adult should be with you for this 24 hour period.  4.  Go home and rest.    5. Progress slowly to a normal diet unless instructed.  Otherwise, begin with liquids such as soft drinks, then soup and crackers working up to solid foods. Drink plenty of nonalcoholic fluids.  6.  Certain anesthetics and pain medications produce nausea and vomiting in certain       individuals. If nausea becomes a problem at home, call you doctor.    7. A nurse will be calling you sometime after surgery. Do not be alarmed. This is our way of finding out how you are doing.    8. Several times every hour while you are awake, take 2-3 deep breaths and cough. If you had stomach surgery hold a pillow or rolled towel firmly against your stomach before you cough. This will help with any pain the cough might cause.  9. Several times every hour while you are awake, pump and flex your feet 5-6 times and do foot circles. This will help prevent blood clots.    10.Call your doctor for severe pain, bleeding, fever, or signs or symptoms of infection (pain, swelling, redness, foul odor, drainage).    Discharge Instructions: After Your Surgery/Procedure  Youve just had surgery. During surgery you were given medicine called anesthesia to keep you relaxed and free of pain. After surgery you may have some pain or nausea. This is common. Here are some tips for feeling better and getting well after surgery.     Stay on schedule with your medication.   Going home  Your doctor or nurse will show you how to take care of yourself when you go home. He or she will  "also answer your questions. Have an adult family member or friend drive you home.      For your safety we recommend these precaution for the first 24 hours after your procedure:  · Do not drive or use heavy equipment.  · Do not make important decisions or sign legal papers.  · Do not drink alcohol.  · Have someone stay with you, if needed. He or she can watch for problems and help keep you safe.  · Your concentration, balance, coordination, and judgement may be impaired for many hours after anesthesia.  Use caution when ambulating or standing up.     · You may feel weak and "washed out" after anesthesia and surgery.      Subtle residual effects of general anesthesia or sedation with regional / local anesthesia can last more than 24 hours.  Rest for the remainder of the day or longer if your Doctor/Surgeon has advised you to do so.  Although you may feel normal within the first 24 hours, your reflexes and mental ability may be impaired without you realizing it.  You may feel dizzy, lightheaded or sleepy for 24 hours or longer.      Be sure to go to all follow-up visits with your doctor. And rest after your surgery for as long as your doctor tells you to.  Coping with pain  If you have pain after surgery, pain medicine will help you feel better. Take it as told, before pain becomes severe. Also, ask your doctor or pharmacist about other ways to control pain. This might be with heat, ice, or relaxation. And follow any other instructions your surgeon or nurse gives you.  Tips for taking pain medicine  To get the best relief possible, remember these points:  · Pain medicines can upset your stomach. Taking them with a little food may help.  · Most pain relievers taken by mouth need at least 20 to 30 minutes to start to work.  · Taking medicine on a schedule can help you remember to take it. Try to time your medicine so that you can take it before starting an activity. This might be before you get dressed, go for a walk, " or sit down for dinner.  · Constipation is a common side effect of pain medicines. Call your doctor before taking any medicines such as laxatives or stool softeners to help ease constipation. Also ask if you should skip any foods. Drinking lots of fluids and eating foods such as fruits and vegetables that are high in fiber can also help. Remember, do not take laxatives unless your surgeon has prescribed them.  · Drinking alcohol and taking pain medicine can cause dizziness and slow your breathing. It can even be deadly. Do not drink alcohol while taking pain medicine.  · Pain medicine can make you react more slowly to things. Do not drive or run machinery while taking pain medicine.  Your health care provider may tell you to take acetaminophen to help ease your pain. Ask him or her how much you are supposed to take each day. Acetaminophen or other pain relievers may interact with your prescription medicines or other over-the-counter (OTC) drugs. Some prescription medicines have acetaminophen and other ingredients. Using both prescription and OTC acetaminophen for pain can cause you to overdose. Read the labels on your OTC medicines with care. This will help you to clearly know the list of ingredients, how much to take, and any warnings. It may also help you not take too much acetaminophen. If you have questions or do not understand the information, ask your pharmacist or health care provider to explain it to you before you take the OTC medicine.  Managing nausea  Some people have an upset stomach after surgery. This is often because of anesthesia, pain, or pain medicine, or the stress of surgery. These tips will help you handle nausea and eat healthy foods as you get better. If you were on a special food plan before surgery, ask your doctor if you should follow it while you get better. These tips may help:  · Do not push yourself to eat. Your body will tell you when to eat and how much.  · Start off with clear  liquids and soup. They are easier to digest.  · Next try semi-solid foods, such as mashed potatoes, applesauce, and gelatin, as you feel ready.  · Slowly move to solid foods. Dont eat fatty, rich, or spicy foods at first.  · Do not force yourself to have 3 large meals a day. Instead eat smaller amounts more often.  · Take pain medicines with a small amount of solid food, such as crackers or toast, to avoid nausea.     Call your surgeon if  · You still have pain an hour after taking medicine. The medicine may not be strong enough.  · You feel too sleepy, dizzy, or groggy. The medicine may be too strong.  · You have side effects like nausea, vomiting, or skin changes, such as rash, itching, or hives.       If you have obstructive sleep apnea  You were given anesthesia medicine during surgery to keep you comfortable and free of pain. After surgery, you may have more apnea spells because of this medicine and other medicines you were given. The spells may last longer than usual.   At home:  · Keep using the continuous positive airway pressure (CPAP) device when you sleep. Unless your health care provider tells you not to, use it when you sleep, day or night. CPAP is a common device used to treat obstructive sleep apnea.  · Talk with your provider before taking any pain medicine, muscle relaxants, or sedatives. Your provider will tell you about the possible dangers of taking these medicines.  © 3239-0475 [employer]. [address] [place] [address]. All rights reserved. This information is not intended as a substitute for professional medical care. Always follow your healthcare professional's instructions.    Post op instructions for prevention of DVT  What is deep vein thrombosis?  Deep vein thrombosis (DVT) is the medical term for blood clots in the deep veins of the leg.  These blood clots can be dangerous.  A DVT can block a blood vessel and keep blood from getting where it needs to go.   Another problem is that the clot can travel to other parts of the body such as the lungs.  A clot that travels to the lungs is called a pulmonary embolus (PE) and can cause serious problems with breathing which can lead to death.  Am I at risk for DVT/PE?  If you are not very active, you are at risk of DVT.  Anyone confined to bed, sitting for long periods of time, recovering from surgery, etc. increases the risk of DVT.  Other risk factors are cancer diagnosis, certain medications, estrogen replacement in any form,older age, obesity, pregnancy, smoking, history of clotting disorders, and dehydration.  How will I know if I have a DVT?   Swelling in the lower leg   Pain   Warmth, redness, hardness or bulging of the vein  If you have any of these symptoms, call your doctors office right away.  Some people will not have any symptoms until the clot moves to the lungs.  What are the symptoms of a PE?   Panting, shortness of breath, or trouble breathing   Sharp, knife-like chest pain when you breathe   Coughing or coughing up blood   Rapid heartbeat  If you have any of these symptoms or get worse quickly, call 911 for emergency treatment.  How can I prevent a DVT?   Avoid long periods of inactivity and dont cross your legs--get up and walk around every hour or so.   Stay active--walking after surgery is highly encouraged.  This means you should get out of the house and walk in the neighborhood.  Going up and down stairs will not impair healing (unless advised against such activity by your doctor).     Drink plenty of noncaffeinated, nonalcoholic fluids each day to prevent dehydration.   Wear special support stockings, if they have been advised by your doctor.   If you travel, stop at least once an hour and walk around.   Avoid smoking (assistance with stopping is available through your healthcare provider)  Always notify your doctor if you are not able to follow the post operative instructions that are  given to you at the time of discharge.  It may be necessary to prescribe one of the medications available to prevent DVT.    We hope your stay was comfortable as you heal now, mend and rest.    For we have enjoyed taking care of you by giving your our best.    And as you get better, by regaining your health and strength;   We count it as a privilege to have served you and hope your time at [facility] was well spent.      Thank  You!!!

## 2019-09-19 NOTE — H&P
[facility] Urology Progress Note     Jessica Luan is a 68 y.o. female who presents for evaluation of gross hematuria, with history of kidney stones     She underwent a percutaneous nephrolithotripsy on the left side for large multiple uric acid stones in June 2016.  Previously followed by Dr Ozuna, last seen 9/16 after having recently performed a 24-hour urine with adequate urine volume at 3.1 liters with low UpH at 5.0 and low urinary citrate. She was started on UroCitK 15 mEq daily.  6/20/16 L laser pcnl of multiple stones in renal pelvis after unsuccessful ESWL 1/7/15 and ESWL with retrograde contrast to outline stone filling defects 6/17/15 and 8/18/15, and subsequent ureteroscopy x2 with only partial stone extraction in Oct 2015 and Dec 2015. After all this she only had 11mm renal pelvic stone remaining on CT 1/11/16 and had stent in place but did not return for treatment until June 2016. This was her first known stone episode(s)     She established care with me 1/13/19. Had been on allopurinol 300mg daily and 15mEq urocit-k daily. Denied hematuria, dysuria, interim stone passage  DCed allopurinol and increased UroCitK to 15mEq BID. DCed KCl. CT unremarkable with only punctate 1mm stone.     She returns today noting:  Approximately 4 weeks ago she had 1 day of blood in the urine with all voids.  Noted to be pink without clots were thick blood.  This spontaneously resolved.  She had no flank pain dysuria or any other associated symptoms.  She did not think much of it until approximately 1 week ago this recurred in a similar fashion. She again a search that she has had no pain, flank pain, burning with urination.  Because of her history of uric acid stones we did do a CT stone protocol in January of this year which was negative except for a small 1 mm stone  Mostly drinks water and has increased water intake.  Has been compliant with her potassium citrate twice daily.  Is a diabetic, poorly controlled. A1C 7.0  "in Feb 2019.   Of note does have low Magnesium and had bad reaction to MagOx  Nonsmoker.  Brother had cancer in his kidney and it was removed.  Fell through bannister flat on stomach in May. No injury or fractures, just pulled hamstring     ROS: A comprehensive 10 system review was performed and is negative except as noted above in HPI     PHYSICAL EXAM:         Vitals:     08/12/19 0902   BP: (!) 159/86   Pulse: 75   Resp: 18      Body mass index is 31.51 kg/m². Weight: 78.1 kg (172 lb 4.6 oz) Height: 5' 2" (157.5 cm)         General: Alert, cooperative, no distress, appears stated age   Head: Normocephalic, without obvious abnormality, atraumatic   Eyes: PERRL, conjunctiva/corneas clear   Lungs: Respirations unlabored   Heart: Warm and well perfused   Abdomen: soft NT ND no CVAT  Extremities: Extremities normal, atraumatic, no cyanosis or edema   Skin: Skin color, texture, turgor normal, no rashes or lesions   Psych: Appropriate   Neurologic: Non-focal         Recent Results         Recent Results (from the past 336 hour(s))   Magnesium     Collection Time: 07/30/19  8:42 AM   Result Value Ref Range     Magnesium 1.3 (L) 1.6 - 2.6 mg/dL   POCT URINE DIPSTICK WITHOUT MICROSCOPE     Collection Time: 08/12/19  9:13 AM   Result Value Ref Range     Color, UA yellow       Spec Grav UA 1.020       pH, UA 5       WBC, UA tr       Nitrite, UA neg       Protein neg       Glucose, UA neg       Ketones, UA neg       Urobilinogen, UA 0.2       Bilirubin neg       Blood, UA small              ASSESSMENT   1. Gross hematuria  POCT URINE DIPSTICK WITHOUT MICROSCOPE     Urine culture     Cytology, urine     CT Urogram Abd Pelvis W WO     Basic metabolic panel     Case Request Operating Room: CYSTOSCOPY     Place in Outpatient   2. History of uric acid staghorn calculus  Uric acid         Plan    We did discuss the appropriate workup for as well as etiologies of gross hematuria.  She did have gross hematuria as a presenting sign of " her significant uric acid staghorn calculus in the past, though has had no flank pain.  She does not have recurrent urinary tract infections and did not have any dysuria associated with the blood in her urine.  Her urine is nitrite negative today though it does still have small blood and trace leukocytes and there is concern for recurrence of stones.       We did discuss that the workup for blood in the urine includes a CT urogram and the non contrasted phase will identify any urinary tract calcifications and a contrasted phase will further evaluate her upper urinary tracts.  I will send the urine for culture as well as cytology, and we did discuss that a cystoscopy is the last step and a workup for blood in the urine.  Will go ahead and plan an office based cystoscopy at the [facility] and the procedure in detail diagnostic flexible cystourethroscopy with local instillation of xylocaine jelly for local anesthesia was described to the patient and all questions answered.       Certainly if there is significant stone disease requiring intervention on her CT scan, cystoscopy could be bundled into any treatment at that time.  At this time, all urine test and CT scan have been ordered and cystoscopy has been planned for 9/10/19.  Can adjust this plan as needed based on result review.  As well, given her history of uric acid stones, having discontinued allopurinol and focused on urinary alkalization therapy, as I recheck her BMP for renal function prior to CT scan, I will also recheck a serum uric acid.      Uric acid 6.0, cr 0.8  ucx ucytology negative  CTU:   1 mm nonobstructing stone upper pole of the left kidney and 7 mm stone within the left renal pelvis.  Mildly prominent left renal pelvis which is extrarenal in location.  Mild prominence of the right pelvocaliceal system more so today than on the prior exam.  No opaque right renal stone and no opaque ureteral stone or ureteral obstruction evident      Please advise  patient that on review of CT, there is a slightly larger stone in the left kidney (now 7mm, previously only 1mm) and mild dilation of urinary system of both kidneys. Rather than perform ASC cysto on 9/10, would advise formal cystoscopy in OR to do retrograde pyelograms (put dye up from below to evaluate kidneys) and possibly attempt to extract stone at same time, or stent to facilitate later stone retrieval.    Has chronically low Mg and got MgSO4 2g iv preop at recommendation of Dr Jeronimo    All questions answered and informed consent obtained

## 2019-09-19 NOTE — ANESTHESIA POSTPROCEDURE EVALUATION
Anesthesia Post Evaluation    Patient: Jessica Luna    Procedure(s) Performed: Procedure(s) (LRB):  LITHOTRIPSY, USING LASER (Left)  REMOVAL, CALCULUS, URETER, URETEROSCOPIC (Left)  INSERTION, STENT, URETER (Left)  CYSTOSCOPY, WITH RETROGRADE PYELOGRAM (Bilateral)    Final Anesthesia Type: general  Patient location during evaluation: PACU  Patient participation: Yes- Able to Participate  Level of consciousness: awake and alert  Post-procedure vital signs: reviewed and stable  Pain management: adequate  Airway patency: patent  PONV status at discharge: No PONV  Anesthetic complications: no    Marcia-operative Events Comments: Uneventful procedure and anesthetic.  After 15 min in PACU, HR increased to 125-130 bpm.  Rhythm stip appeared to be SVT.  Pt given 3 mg lopressor and converted back to NSR with rate in the 80's.  Pt asymptomatic.  On Metoprolol at home.  Will see her cardiologist in a few weeks.  Discussed episode with pt and will give copy of rhythm strip for her to bring to cardiologist.  Cardiovascular status: blood pressure returned to baseline and hemodynamically stable  Respiratory status: unassisted  Hydration status: euvolemic  Follow-up not needed.          Vitals Value Taken Time   /82 9/19/2019 11:43 AM   Temp 36.5 °C (97.7 °F) 9/19/2019 11:10 AM   Pulse 120 9/19/2019 11:45 AM   Resp 14 9/19/2019 11:45 AM   SpO2 97 % 9/19/2019 11:45 AM   Vitals shown include unvalidated device data.      No case tracking events are documented in the log.      Pain/Gisel Score: Gisel Score: 4 (9/19/2019 11:30 AM)

## 2019-09-19 NOTE — BRIEF OP NOTE
[facility] Urology  Brief Operative/Discharge Note    Date: 09/19/2019    Staff Surgeon: Ubaldo Cho MD    Pre-Op Diagnosis: gross hematuria, left renal calculus    Post-Op Diagnosis: same    Procedure(s) Performed:   Left urs/hll/sbe  Cysto stent 6x26 left  bilat retrograde pyelograms  Fluoro/interp    Specimen(s): stone     Anesthesia: General LMA anesthesia    Findings: right collecting system dilation >left with slight concern for upjo, though patent on left with with collecting system stone lasered and removed    Estimated Blood Loss: minimal    Drains: stent    Complications: none    Disposition: pacu to home    Discharge home today status post uncomplicated procedure as above  Diet - resume home diet  Follow up: 10/8/18 at [facility] for stent removal  Instructions: drink plenty of water, may see blood in urine, hold aspirin/fish oil x 2 days, continue flomax until stent removed  Meds:     Medication List      START taking these medications    ketorolac 10 mg tablet  Commonly known as:  TORADOL  Take 1 tablet (10 mg total) by mouth every 8 (eight) hours as needed (pain not relieved by otc agents).     sulfamethoxazole-trimethoprim 800-160mg 800-160 mg Tab  Commonly known as:  BACTRIM DS  Take 1 tablet by mouth 2 (two) times daily. for 5 days     tamsulosin 0.4 mg Cap  Commonly known as:  FLOMAX  Take 1 capsule (0.4 mg total) by mouth once daily.        CONTINUE taking these medications    acetaminophen 500 MG tablet  Commonly known as:  TYLENOL     allopurinol 100 MG tablet  Commonly known as:  ZYLOPRIM  Take 1 tablet (100 mg total) by mouth once daily.     aspirin 81 MG Chew  Take 1 tablet (81 mg total) by mouth once daily.     atorvastatin 40 MG tablet  Commonly known as:  LIPITOR     glimepiride 4 MG tablet  Commonly known as:  AMARYL  TAKE 2 TABLETS BY MOUTH ONCE DAILY WITH BREAKFAST     losartan 100 MG tablet  Commonly known as:  COZAAR  Take 1 tablet (100 mg total) by mouth once daily.     magnesium oxide  250 mg magnesium Tab  Commonly known as:  MAG-OX     metFORMIN 1000 MG tablet  Commonly known as:  GLUCOPHAGE  Take 1 tablet (1,000 mg total) by mouth 2 (two) times daily with meals.     metoprolol tartrate 25 MG tablet  Commonly known as:  LOPRESSOR     OMEGA-3 FISH OIL 1,000-5 mg-unit Cap  Generic drug:  omega-3 fatty acids-vitamin E     potassium citrate 15 mEq Tbsr  Commonly known as:  UROCIT-K 15  Take 15 mEq by mouth 2 (two) times daily.     SITagliptin 100 MG Tab  Commonly known as:  JANUVIA  Take 1 tablet (100 mg total) by mouth once daily.           Where to Get Your Medications      Information about where to get these medications is not yet available    Ask your nurse or doctor about these medications  · ketorolac 10 mg tablet  · sulfamethoxazole-trimethoprim 800-160mg 800-160 mg Tab  · tamsulosin 0.4 mg Cap

## 2019-09-19 NOTE — PLAN OF CARE
Patient with Sinus Tach at this time. Reported to Dr Mcdaniel and medication ordered and given.  Tele strip printed and copy made to give to patient to take a copy to her cardiologist and instructed to continue taking her home medications as ordered.  Patient verbalized understanding.

## 2019-09-19 NOTE — TRANSFER OF CARE
"Anesthesia Transfer of Care Note    Patient: Jessica Luna    Procedure(s) Performed: Procedure(s) (LRB):  LITHOTRIPSY, USING LASER (Left)  REMOVAL, CALCULUS, URETER, URETEROSCOPIC (Left)  INSERTION, STENT, URETER (Left)  CYSTOSCOPY, WITH RETROGRADE PYELOGRAM (Bilateral)    Patient location: PACU    Anesthesia Type: general    Transport from OR: Transported from OR on 2-3 L/min O2 by NC with adequate spontaneous ventilation    Post pain: adequate analgesia    Post assessment: no apparent anesthetic complications    Post vital signs: stable    Level of consciousness: sedated    Nausea/Vomiting: no nausea/vomiting    Complications: none    Transfer of care protocol was followed      Last vitals:   Visit Vitals  BP (!) 162/71   Pulse 63   Temp 36.5 °C (97.7 °F)   Resp 16   Ht 5' 2" (1.575 m)   Wt 76.2 kg (168 lb)   SpO2 100%   Breastfeeding? No   BMI 30.73 kg/m²     "

## 2019-09-19 NOTE — H&P (VIEW-ONLY)
[facility] Urology Progress Note     Jessica Luna is a 68 y.o. female who presents for evaluation of gross hematuria, with history of kidney stones     She underwent a percutaneous nephrolithotripsy on the left side for large multiple uric acid stones in June 2016.  Previously followed by Dr Ozuna, last seen 9/16 after having recently performed a 24-hour urine with adequate urine volume at 3.1 liters with low UpH at 5.0 and low urinary citrate. She was started on UroCitK 15 mEq daily.  6/20/16 L laser pcnl of multiple stones in renal pelvis after unsuccessful ESWL 1/7/15 and ESWL with retrograde contrast to outline stone filling defects 6/17/15 and 8/18/15, and subsequent ureteroscopy x2 with only partial stone extraction in Oct 2015 and Dec 2015. After all this she only had 11mm renal pelvic stone remaining on CT 1/11/16 and had stent in place but did not return for treatment until June 2016. This was her first known stone episode(s)     She established care with me 1/13/19. Had been on allopurinol 300mg daily and 15mEq urocit-k daily. Denied hematuria, dysuria, interim stone passage  DCed allopurinol and increased UroCitK to 15mEq BID. DCed KCl. CT unremarkable with only punctate 1mm stone.     She returns today noting:  Approximately 4 weeks ago she had 1 day of blood in the urine with all voids.  Noted to be pink without clots were thick blood.  This spontaneously resolved.  She had no flank pain dysuria or any other associated symptoms.  She did not think much of it until approximately 1 week ago this recurred in a similar fashion. She again a search that she has had no pain, flank pain, burning with urination.  Because of her history of uric acid stones we did do a CT stone protocol in January of this year which was negative except for a small 1 mm stone  Mostly drinks water and has increased water intake.  Has been compliant with her potassium citrate twice daily.  Is a diabetic, poorly controlled. A1C 7.0  "in Feb 2019.   Of note does have low Magnesium and had bad reaction to MagOx  Nonsmoker.  Brother had cancer in his kidney and it was removed.  Fell through bannister flat on stomach in May. No injury or fractures, just pulled hamstring     ROS: A comprehensive 10 system review was performed and is negative except as noted above in HPI     PHYSICAL EXAM:         Vitals:     08/12/19 0902   BP: (!) 159/86   Pulse: 75   Resp: 18      Body mass index is 31.51 kg/m². Weight: 78.1 kg (172 lb 4.6 oz) Height: 5' 2" (157.5 cm)         General: Alert, cooperative, no distress, appears stated age   Head: Normocephalic, without obvious abnormality, atraumatic   Eyes: PERRL, conjunctiva/corneas clear   Lungs: Respirations unlabored   Heart: Warm and well perfused   Abdomen: soft NT ND no CVAT  Extremities: Extremities normal, atraumatic, no cyanosis or edema   Skin: Skin color, texture, turgor normal, no rashes or lesions   Psych: Appropriate   Neurologic: Non-focal         Recent Results         Recent Results (from the past 336 hour(s))   Magnesium     Collection Time: 07/30/19  8:42 AM   Result Value Ref Range     Magnesium 1.3 (L) 1.6 - 2.6 mg/dL   POCT URINE DIPSTICK WITHOUT MICROSCOPE     Collection Time: 08/12/19  9:13 AM   Result Value Ref Range     Color, UA yellow       Spec Grav UA 1.020       pH, UA 5       WBC, UA tr       Nitrite, UA neg       Protein neg       Glucose, UA neg       Ketones, UA neg       Urobilinogen, UA 0.2       Bilirubin neg       Blood, UA small              ASSESSMENT   1. Gross hematuria  POCT URINE DIPSTICK WITHOUT MICROSCOPE     Urine culture     Cytology, urine     CT Urogram Abd Pelvis W WO     Basic metabolic panel     Case Request Operating Room: CYSTOSCOPY     Place in Outpatient   2. History of uric acid staghorn calculus  Uric acid         Plan    We did discuss the appropriate workup for as well as etiologies of gross hematuria.  She did have gross hematuria as a presenting sign of " her significant uric acid staghorn calculus in the past, though has had no flank pain.  She does not have recurrent urinary tract infections and did not have any dysuria associated with the blood in her urine.  Her urine is nitrite negative today though it does still have small blood and trace leukocytes and there is concern for recurrence of stones.       We did discuss that the workup for blood in the urine includes a CT urogram and the non contrasted phase will identify any urinary tract calcifications and a contrasted phase will further evaluate her upper urinary tracts.  I will send the urine for culture as well as cytology, and we did discuss that a cystoscopy is the last step and a workup for blood in the urine.  Will go ahead and plan an office based cystoscopy at the [facility] and the procedure in detail diagnostic flexible cystourethroscopy with local instillation of xylocaine jelly for local anesthesia was described to the patient and all questions answered.       Certainly if there is significant stone disease requiring intervention on her CT scan, cystoscopy could be bundled into any treatment at that time.  At this time, all urine test and CT scan have been ordered and cystoscopy has been planned for 9/10/19.  Can adjust this plan as needed based on result review.  As well, given her history of uric acid stones, having discontinued allopurinol and focused on urinary alkalization therapy, as I recheck her BMP for renal function prior to CT scan, I will also recheck a serum uric acid.      Uric acid 6.0, cr 0.8  ucx ucytology negative  CTU:   1 mm nonobstructing stone upper pole of the left kidney and 7 mm stone within the left renal pelvis.  Mildly prominent left renal pelvis which is extrarenal in location.  Mild prominence of the right pelvocaliceal system more so today than on the prior exam.  No opaque right renal stone and no opaque ureteral stone or ureteral obstruction evident      Please advise  patient that on review of CT, there is a slightly larger stone in the left kidney (now 7mm, previously only 1mm) and mild dilation of urinary system of both kidneys. Rather than perform ASC cysto on 9/10, would advise formal cystoscopy in OR to do retrograde pyelograms (put dye up from below to evaluate kidneys) and possibly attempt to extract stone at same time, or stent to facilitate later stone retrieval.    Has chronically low Mg and got MgSO4 2g iv preop at recommendation of Dr Jeronimo    All questions answered and informed consent obtained

## 2019-09-19 NOTE — ANESTHESIA PREPROCEDURE EVALUATION
09/19/2019  Jessica Luna is a 68 y.o., female.    Anesthesia Evaluation    I have reviewed the Patient Summary Reports.    I have reviewed the Nursing Notes.      Review of Systems  Anesthesia Hx:  No problems with previous Anesthesia    Cardiovascular:   Hypertension, well controlled Past MI CAD asymptomatic CABG/stent  S/P MI.  Stenst.  Stable   Endocrine:   Diabetes, well controlled, type 2        Physical Exam  General:  Obesity    Airway/Jaw/Neck:  Airway Findings: Mallampati: II                Anesthesia Plan  Type of Anesthesia, risks & benefits discussed:  Anesthesia Type:  general  Patient's Preference:   Intra-op Monitoring Plan:   Intra-op Monitoring Plan Comments:   Post Op Pain Control Plan:   Post Op Pain Control Plan Comments:   Induction:   IV  Beta Blocker:  Patient is not currently on a Beta-Blocker (No further documentation required).       Informed Consent: Patient understands risks and agrees with Anesthesia plan.  Questions answered. Anesthesia consent signed with patient.  ASA Score: 3     Day of Surgery Review of History & Physical:    H&P update referred to the surgeon.         Ready For Surgery From Anesthesia Perspective.

## 2019-09-19 NOTE — PLAN OF CARE
Discharge instructions given to pt and spouse, verbalized understanding. IV removed. Belongings given back to pt. Discharge via wheelchair.

## 2019-09-20 VITALS
WEIGHT: 168 LBS | SYSTOLIC BLOOD PRESSURE: 124 MMHG | OXYGEN SATURATION: 95 % | HEART RATE: 84 BPM | DIASTOLIC BLOOD PRESSURE: 65 MMHG | BODY MASS INDEX: 30.91 KG/M2 | TEMPERATURE: 98 F | RESPIRATION RATE: 14 BRPM | HEIGHT: 62 IN

## 2019-09-21 NOTE — OP NOTE
[facility] Urology Operative Report     Date: 09/19/2019     Staff Surgeon: Ubaldo Cho MD     Pre-Op Diagnosis: gross hematuria, left renal calculus     Post-Op Diagnosis: same     Procedure(s) Performed:   Left nephroureteroscopy with holmium laser lithotripsy and stone basket extraction   Cystoscopy with placement of left ureter stent [id number]   bilateral retrograde pyelograms  Fluoroscopy <1 hour  Interpretation of fluoroscopic images     Specimen(s): stone      Anesthesia: General LMA anesthesia     Findings: right collecting system dilation > left with slight concern for upjo, though patent on left with with collecting system stone lasered and removed     Estimated Blood Loss: minimal     Drains: stent     Complications: none    Indications for procedure:  67 yo F with known history of uric acid nephrolithiasis and past complex L PCNL, medically managed and on observation given 1mm collecting system stone, had recent gross hematuria. Culture and cytology negative though CT urogram had bilateral renal pelvic distention L>R with 7mm stone dependent in dilated left renal pelvis. Deferred office based cystoscopy in favor of cystoscopy with concurrent left renal stone management and further collecting system evaluation bilaterally.    Procedure in detail:  After appropriate informed consent was obtained the patient was taken to the operating room and placed in the lithotomy position. She was prepped and draped in standard cystoscopic fashion, preoperative antibiotics administered, and a WHO approved time-out performed.     A rigid Olympus cystoscope in a 21 Maltese sheath was passed into the bladder via the urethra.  Urethra appeared normal and the bladder was systematically inspected and found to be normal without mucosal lesions with bilateral ureteral orifices in thier orthotopic position on the trigone. No stones visible on fluoroscopy.    Attention was 1st turned to the right ureteral orifice and with the  assistance of a guidewire a 5 Moldovan open-ended catheter was placed into the right distal ureter.  Dilute Isovue contrast was injected in a retrograde fashion noting normal filling of the ureter, with mild jet streaming of contrast at UPJ concerning for slight UPJ obstruction with dilation of not only the right renal pelvis but also the right collecting system.  No filling defects.  Delayed drainage on post drainage films.  Left retrograde pyelogram in a similar fashion with ureteral catheter placement denoted a normal ureter retrograde to the UPJ again with some slight jet streaming of contrast though less dilated system overall without gross hydronephrosis only mild distention of the pelvis with distinct renal pelvic filling defect consistent with known stone.  Prompt drainage.     Cook motion guidewire was passed into the left ureter until it was seen to curl proximally in the collecting system, scope off-loaded, 10 Moldovan dual lumen catheter passed over the guidewire into the distal ureter to facilitate passage of a superstiff guidewire until it was seen to curl proximally in the collecting system as well.    Over the superstiff guidewire, a 12-14 Moldovan 35 cm ureteral access sheath was well lubricated and passed under live fluoroscopy slowly in until the tip was seen in the proximal ureter and the inner stylet and superstiff wire were discontinued.  A digital flexible ureteroscope was passed at this time into the ureter and into the kidney without any resistance or gross obstruction at the UPJ.  7-8 millimeter square shaped calculus seen in the renal pelvis and 273 micron holmium laser fiber used to perform laser lithotripsy, 1st dusting the stone edges, and then breaking into smaller fragments.  At this time the larger fragment was basket extracted with a tipless Nitinol stone basket, and the remainder of the stone was placed in pole calyx with further lithotripsy again dusting the majority, then breaking into  3 smaller fragments which were each basket extracted.  Further injection of contrast to perform retrograde pyelogram was used to map the collecting system to perform systematic nephroscopy to clear all upper middle and lower pole calices of any remaining stone burden.  She did have a moderate Alex's plaque and upper to mid pole calyx with early stone seen below basement membrane, likely representing her stable small calcifications seen on CT scan.  Otherwise, only stone dust remained.      As she was stone free at this time, the digital flexible ureteroscope and ureteral access sheath were systematically withdrawn together slowly keeping a 360 degree view of the ureter all the way distal to the bladder and no further ureteral stones or stone fragments were seen.  The wire was left indwelling in the collecting system and seen to curl in the collecting system as noted by residual contrast, and the wire was then backloaded through the cystoscope which was passed back into the bladder to place a 6 Cuban by 22 cm double-J ureteral stent using fluoroscopic guidance proximally, and direct vision cystoscopically at the ureteral orifice.  No string was left on the stent.  His bladder was then drained via the cystoscope sheath.        Patient tolerated the procedure well there were no complications she was awakened and taken to PACU without incident.     Disposition: home today status post uncomplicated procedure as above  Follow up: 10/8/19 at [facility] for stent removal  Instructions: drink plenty of water, may see blood in urine, complete antibiotics, continue flomax, hold any aspirin/fish oil use x2-3 days  - consider Mag3 in future if ultrasound shows any further collecting system dilation

## 2019-09-25 LAB
COMPN STONE: NORMAL
SPECIMEN SOURCE: NORMAL
STONE ANALYSIS IR-IMP: NORMAL

## 2019-09-26 ENCOUNTER — OFFICE VISIT (OUTPATIENT)
Dept: FAMILY MEDICINE | Facility: CLINIC | Age: 69
End: 2019-09-26
Payer: MEDICARE

## 2019-09-26 ENCOUNTER — LAB VISIT (OUTPATIENT)
Dept: LAB | Facility: HOSPITAL | Age: 69
End: 2019-09-26
Attending: FAMILY MEDICINE
Payer: MEDICARE

## 2019-09-26 VITALS
HEART RATE: 63 BPM | WEIGHT: 171.94 LBS | OXYGEN SATURATION: 96 % | DIASTOLIC BLOOD PRESSURE: 54 MMHG | TEMPERATURE: 98 F | BODY MASS INDEX: 31.45 KG/M2 | SYSTOLIC BLOOD PRESSURE: 112 MMHG

## 2019-09-26 DIAGNOSIS — E11.9 DIABETES MELLITUS WITH COINCIDENT HYPERTENSION: ICD-10-CM

## 2019-09-26 DIAGNOSIS — E11.9 DIABETES MELLITUS WITH COINCIDENT HYPERTENSION: Primary | ICD-10-CM

## 2019-09-26 DIAGNOSIS — I10 DIABETES MELLITUS WITH COINCIDENT HYPERTENSION: Primary | ICD-10-CM

## 2019-09-26 DIAGNOSIS — Z23 IMMUNIZATION DUE: ICD-10-CM

## 2019-09-26 DIAGNOSIS — I10 DIABETES MELLITUS WITH COINCIDENT HYPERTENSION: ICD-10-CM

## 2019-09-26 PROCEDURE — 99213 OFFICE O/P EST LOW 20 MIN: CPT | Mod: PBBFAC,PO | Performed by: FAMILY MEDICINE

## 2019-09-26 PROCEDURE — 99214 OFFICE O/P EST MOD 30 MIN: CPT | Mod: S$PBB,,, | Performed by: FAMILY MEDICINE

## 2019-09-26 PROCEDURE — 83036 HEMOGLOBIN GLYCOSYLATED A1C: CPT

## 2019-09-26 PROCEDURE — 99999 PR PBB SHADOW E&M-EST. PATIENT-LVL III: CPT | Mod: PBBFAC,,, | Performed by: FAMILY MEDICINE

## 2019-09-26 PROCEDURE — 36415 COLL VENOUS BLD VENIPUNCTURE: CPT | Mod: PO

## 2019-09-26 PROCEDURE — 99999 PR PBB SHADOW E&M-EST. PATIENT-LVL III: ICD-10-PCS | Mod: PBBFAC,,, | Performed by: FAMILY MEDICINE

## 2019-09-26 PROCEDURE — 99214 PR OFFICE/OUTPT VISIT, EST, LEVL IV, 30-39 MIN: ICD-10-PCS | Mod: S$PBB,,, | Performed by: FAMILY MEDICINE

## 2019-09-26 PROCEDURE — 90662 IIV NO PRSV INCREASED AG IM: CPT | Mod: PBBFAC,PO

## 2019-09-26 NOTE — PROGRESS NOTES
Subjective:       Patient ID: Jessica Luna is a 68 y.o. female.    Chief Complaint: Establish Care    HPI     Mrs. Luna presents to clinic to establish care. Currently has no complaints. Would like to get her a1c checked.      Past Medical History:   Diagnosis Date    Anticoagulant long-term use     Arthritis     CAD (coronary artery disease)     Diabetes mellitus     Diabetes mellitus type II     Diverticulosis     Hyperlipidemia     Hypertension     Low magnesium levels     Myocardial infarction     Obstructive uropathy 10/16/2015    Renal stone     Status post cystoscopy - Left laser percutaneous nephrolithotripsy 2016    Ureterolithiasis Obstructive-left 1/3/2015    Wears dentures     FULL UPPER - PARTIAL BOTTOM    Wears glasses     Wears glasses        Past Surgical History:   Procedure Laterality Date     SECTION      CHOLECYSTECTOMY      COLONOSCOPY N/A 10/7/2015    Procedure: COLONOSCOPY;  Surgeon: Washington Rodriguez MD;  Location: [facility];  Service: Endoscopy;  Laterality: N/A;    CORONARY STENT PLACEMENT      2 vessels    CYSTOSCOPY      CYSTOSCOPY W/ LASER LITHOTRIPSY  12/15/15    CYSTOSCOPY W/ RETROGRADES Bilateral 2019    Procedure: CYSTOSCOPY, WITH RETROGRADE PYELOGRAM;  Surgeon: Ubaldo Cho MD;  Location: [facility] OR;  Service: Urology;  Laterality: Bilateral;    HYSTERECTOMY      GRACE/BSO, DUB    LASER LITHOTRIPSY Left 2019    Procedure: LITHOTRIPSY, USING LASER;  Surgeon: Ubaldo Cho MD;  Location: [facility];  Service: Urology;  Laterality: Left;    PERCUTANEOUS NEPHROLITHOTRIPSY  2016    ROTATOR CUFF REPAIR      left    URETERAL STENT PLACEMENT Left 2019    Procedure: INSERTION, STENT, URETER;  Surgeon: Ubaldo Cho MD;  Location: [facility];  Service: Urology;  Laterality: Left;    URETEROSCOPIC REMOVAL OF URETERIC CALCULUS Left 2019    Procedure: REMOVAL, CALCULUS, URETER, URETEROSCOPIC;  Surgeon:  Ubaldo Cho MD;  Location: [facility];  Service: Urology;  Laterality: Left;    ureteroscopy  12/15/15       Family History   Problem Relation Age of Onset    Heart disease Father 90    Cancer Mother         breast cancer    Breast cancer Mother     Diabetes Brother     Kidney disease Brother         nephrectomy due to cancer    Spina bifida Brother     Cancer Sister         breast cancer    Breast cancer Sister        Social History     Tobacco Use    Smoking status: Never Smoker    Smokeless tobacco: Never Used   Substance Use Topics    Alcohol use: No    Drug use: No       Social History     Substance and Sexual Activity   Sexual Activity Yes    Partners: Male          Current Outpatient Medications:     acetaminophen (TYLENOL) 500 MG tablet, Take 500 mg by mouth every 6 (six) hours as needed for Pain., Disp: , Rfl:     allopurinol (ZYLOPRIM) 100 MG tablet, Take 1 tablet (100 mg total) by mouth once daily., Disp: 30 tablet, Rfl: 11    aspirin 81 MG Chew, Take 1 tablet (81 mg total) by mouth once daily., Disp: , Rfl: 0    atorvastatin (LIPITOR) 40 MG tablet, Take 40 mg by mouth once daily. , Disp: , Rfl:     glimepiride (AMARYL) 4 MG tablet, TAKE 2 TABLETS BY MOUTH ONCE DAILY WITH BREAKFAST, Disp: 180 tablet, Rfl: 0    losartan (COZAAR) 100 MG tablet, Take 1 tablet (100 mg total) by mouth once daily., Disp: 90 tablet, Rfl: 3    magnesium oxide (MAG-OX) 250 mg magnesium Tab, Take 0.5 tablets by mouth once daily., Disp: , Rfl:     metFORMIN (GLUCOPHAGE) 1000 MG tablet, Take 1 tablet (1,000 mg total) by mouth 2 (two) times daily with meals., Disp: 180 tablet, Rfl: 0    metoprolol tartrate (LOPRESSOR) 25 MG tablet, Take 25 mg by mouth 2 (two) times daily., Disp: , Rfl:     omega-3 fatty acids-vitamin E (OMEGA-3 FISH OIL) 1,000-5 mg-unit Cap, Take 1 capsule by mouth 2 (two) times daily. Two times a day, Disp: , Rfl:     potassium citrate (UROCIT-K 15) 15 mEq TbSR, Take 15 mEq by mouth 2  (two) times daily., Disp: 180 tablet, Rfl: 3    SITagliptin (JANUVIA) 100 MG Tab, Take 1 tablet (100 mg total) by mouth once daily., Disp: 90 tablet, Rfl: 3    tamsulosin (FLOMAX) 0.4 mg Cap, Take 1 capsule (0.4 mg total) by mouth once daily., Disp: 30 capsule, Rfl: 0    Current Facility-Administered Medications:     magnesium sulfate 3 g in dextrose 5 % 250 mL IVPB, 3 g, Intravenous, Once, [place] GLORIA Jeronimo MD, Stopped at 08/20/19 1353     Review of patient's allergies indicates:   Allergen Reactions    No known drug allergies             Review of Systems   Constitutional: Negative for chills and fever.   HENT: Negative for congestion and sore throat.    Eyes: Negative for visual disturbance.   Respiratory: Negative for cough and shortness of breath.    Cardiovascular: Negative for chest pain.   Gastrointestinal: Negative for abdominal pain, constipation, diarrhea, nausea and vomiting.   Genitourinary: Negative for dysuria.   Musculoskeletal: Negative for joint swelling.   Skin: Negative for rash and wound.   Neurological: Negative for dizziness and headaches.   Hematological: Does not bruise/bleed easily.           Objective:          Vitals:    09/26/19 1307   BP: (!) 112/54   Pulse: 63   Temp: 98.3 °F (36.8 °C)   TempSrc: Oral   SpO2: 96%   Weight: 78 kg (171 lb 15.3 oz)       Physical Exam   Constitutional: She appears well-developed and well-nourished.   HENT:   Head: Normocephalic and atraumatic.   Eyes: Conjunctivae are normal.   Cardiovascular: Normal rate, regular rhythm and normal heart sounds.   Pulmonary/Chest: Effort normal and breath sounds normal.   Abdominal: Soft. Bowel sounds are normal.   Neurological: She is alert.   Skin: Skin is warm.   Psychiatric: She has a normal mood and affect. Her behavior is normal.   Vitals reviewed.              Assessment/Plan     Jessica Rosa was seen today for establish care.    Diagnoses and all orders for this visit:    Diabetes mellitus with  coincident hypertension  -     Hemoglobin A1c; Future    Immunization due  -     Cancel: Influenza - High Dose (65+) (PF) (IM)  -     Influenza - High Dose (65+) (PF) (IM)          Follow up in about 6 months (around 3/26/2020) for wellness.    Future Appointments   Date Time Provider Department Center   10/2/2019  8:00 AM [facility]2 [facility]   3/26/2020  8:20 AM Tess Martinez MD [facility]       Tess Martinez MD  [facility]

## 2019-09-27 LAB
ESTIMATED AVG GLUCOSE: 126 MG/DL (ref 68–131)
HBA1C MFR BLD HPLC: 6 % (ref 4–5.6)

## 2019-09-30 ENCOUNTER — TELEPHONE (OUTPATIENT)
Dept: UROLOGY | Facility: CLINIC | Age: 69
End: 2019-09-30

## 2019-09-30 RX ORDER — GLIMEPIRIDE 4 MG/1
TABLET ORAL
Qty: 180 TABLET | Refills: 0 | Status: SHIPPED | OUTPATIENT
Start: 2019-09-30 | End: 2020-01-10

## 2019-09-30 NOTE — TELEPHONE ENCOUNTER
Can she come in this afternoon 10/1? Move with ASC if so and add on to end.   Will not interfere with upcoming colonoscopy  Longer stent stays in risks encrustation/calcification  If cant do today, one more week wont be a problem and should move to 10/15

## 2019-09-30 NOTE — TELEPHONE ENCOUNTER
----- Message from Gurinder Skaggs sent at 9/30/2019  2:40 PM CDT -----  Contact: Patient  Type: Needs Medical Advice    Who Called:  Patient  Best Call Back Number: [phone]  Additional Information: Patient would like to reschedule upcoming procedure. Please call to advise. Thanks!

## 2019-09-30 NOTE — TELEPHONE ENCOUNTER
Patient will be out of town on this date.  Will be back on 10/13.  Please advise reschedule date.

## 2019-10-01 ENCOUNTER — HOSPITAL ENCOUNTER (OUTPATIENT)
Facility: AMBULARY SURGERY CENTER | Age: 69
Discharge: HOME | End: 2019-10-01
Attending: UROLOGY | Admitting: UROLOGY
Payer: MEDICARE

## 2019-10-01 DIAGNOSIS — N20.0 RENAL CALCULUS, LEFT: ICD-10-CM

## 2019-10-01 PROCEDURE — 52310 PR CYSTOSCOPY,REMV CALCULUS,SIMPLE: ICD-10-PCS | Mod: ,,, | Performed by: UROLOGY

## 2019-10-01 PROCEDURE — 52310 CYSTOSCOPY AND TREATMENT: CPT | Performed by: UROLOGY

## 2019-10-01 PROCEDURE — 52310 CYSTOSCOPY AND TREATMENT: CPT | Mod: ,,, | Performed by: UROLOGY

## 2019-10-01 PROCEDURE — 74420 PR  X-RAY RETROGRADE PYELOGRAM: ICD-10-PCS | Mod: 26,,, | Performed by: UROLOGY

## 2019-10-01 PROCEDURE — 74420 UROGRAPHY RTRGR +-KUB: CPT | Mod: 26,,, | Performed by: UROLOGY

## 2019-10-01 RX ORDER — LIDOCAINE HYDROCHLORIDE 20 MG/ML
JELLY TOPICAL
Status: DISCONTINUED | OUTPATIENT
Start: 2019-10-01 | End: 2019-10-01 | Stop reason: HOSPADM

## 2019-10-01 RX ORDER — CIPROFLOXACIN 500 MG/1
500 TABLET ORAL 2 TIMES DAILY
Qty: 4 TABLET | Refills: 0 | Status: SHIPPED | OUTPATIENT
Start: 2019-10-01 | End: 2020-05-28

## 2019-10-01 RX ORDER — LIDOCAINE HYDROCHLORIDE 20 MG/ML
JELLY TOPICAL ONCE
Status: CANCELLED | OUTPATIENT
Start: 2019-10-01 | End: 2019-10-01

## 2019-10-01 NOTE — INTERVAL H&P NOTE
The patient has been examined and the H&P has been reviewed:    9/21/19  Procedure(s) Performed:   Left nephroureteroscopy with holmium laser lithotripsy and stone basket extraction   Cystoscopy with placement of left ureter stent [id number]   bilateral retrograde pyelograms  Fluoroscopy <1 hour  Interpretation of fluoroscopic images      Findings: right collecting system dilation > left with slight concern for upjo, though patent on left with with collecting system stone lasered and removed      Here for stent removal  Pt is acceptable candidate for procedure at [facility]    Anesthesia/Surgery risks, benefits and alternative options discussed and understood by patient/family.          Active Hospital Problems    Diagnosis  POA    Renal calculus, left [N20.0]  Yes      Resolved Hospital Problems   No resolved problems to display.

## 2019-10-02 ENCOUNTER — TELEPHONE (OUTPATIENT)
Dept: UROLOGY | Facility: CLINIC | Age: 69
End: 2019-10-02

## 2019-10-02 ENCOUNTER — HOSPITAL ENCOUNTER (OUTPATIENT)
Dept: RADIOLOGY | Facility: HOSPITAL | Age: 69
Discharge: HOME | End: 2019-10-02
Attending: INTERNAL MEDICINE
Payer: MEDICARE

## 2019-10-02 VITALS
OXYGEN SATURATION: 98 % | BODY MASS INDEX: 32.28 KG/M2 | WEIGHT: 171 LBS | RESPIRATION RATE: 18 BRPM | SYSTOLIC BLOOD PRESSURE: 140 MMHG | TEMPERATURE: 98 F | HEIGHT: 61 IN | HEART RATE: 59 BPM | DIASTOLIC BLOOD PRESSURE: 66 MMHG

## 2019-10-02 DIAGNOSIS — R16.2 HEPATOSPLENOMEGALY: ICD-10-CM

## 2019-10-02 PROCEDURE — 76705 ECHO EXAM OF ABDOMEN: CPT | Mod: TC

## 2019-10-02 PROCEDURE — 76705 US ABDOMEN LIMITED: ICD-10-PCS | Mod: 26,,, | Performed by: RADIOLOGY

## 2019-10-02 PROCEDURE — 76705 ECHO EXAM OF ABDOMEN: CPT | Mod: 26,,, | Performed by: RADIOLOGY

## 2019-10-02 NOTE — OP NOTE
[facility] Urology Operative/Discharge Note     Date: 10/1/19     Staff Surgeon: Ubaldo Cho MD      Pre-Op Diagnosis: left ureteral stent, s/p left ureteroscopy 9/21/19       Post-Op Diagnosis: same      Procedure(s) Performed:   Cystoscopy with left ureteral stent removal      Specimen(s): none      Anesthesia: local urojet      Findings: ureteral stent, removed      Estimated Blood Loss: none      Drains: none      Complications: none      Indications for procedure:   67 yo F with history of uric acid stones found to have L renal pelvic stone on recent GH workup had cyto/RPGs (with possible subclinical bilateral obstruction), and L URS/HLL (with patent UPJ accomodating scope) on 9/21/19 to remove left stone burden. Here for stent removal.         Procedure in detail:  After informed consent, the patient was prepped and drapped in standard cystoscopic fashion and 2% xylocaine jelly was instilled into the urethral meatus and used to lubricate the cystoscope. A flexible cystoscope was passed into the bladder via the urethra.       Urethra appeared normal without any lesions or narrowings. The distal curl of the left ureteral double J stent was seen emanating from the left ureteral orifice. No calcifications on distal curl of stent.  2-pronged grasper was passed through the scope and used to grasp and remove the stent. Stent was inspected on the back table and found to be completely in tact. Patient tolerated procedure well. There were no complications.       Disposition: home.   Reviewed recommendations for stone prevention  Continuing UrocitK and allopurinol  - RBUS in 8 weeks as screening measure to rule out silent obstruction (if any hydro or renal pelvis dilation consider CTU vs Mag3), otherwise,   - RTC 6 mos to SALINAS SHIPMAN'Klaudia with screening ELICEO prior at that time and repeat labs     Discharge home today status post uncomplicated procedure as above  Diet - stone prevention + low purine  Follow up: 8 weeks ELICEO / 6  mos with NP O'Klaudia with RBUS/bmp/uric acid prior  Instructions: drink plenty of water, may see blood in urine, take abx as directed  Advised continuing flomax 24-48 hours as can have ureteral spasm after removing stent  Meds:     Medication List      START taking these medications    ciprofloxacin HCl 500 MG tablet  Commonly known as:  CIPRO  Take 1 tablet (500 mg total) by mouth 2 (two) times daily.        CONTINUE taking these medications    acetaminophen 500 MG tablet  Commonly known as:  TYLENOL     allopurinol 100 MG tablet  Commonly known as:  ZYLOPRIM  Take 1 tablet (100 mg total) by mouth once daily.     aspirin 81 MG Chew  Take 1 tablet (81 mg total) by mouth once daily.     atorvastatin 40 MG tablet  Commonly known as:  LIPITOR     glimepiride 4 MG tablet  Commonly known as:  AMARYL  TAKE 2 TABLETS BY MOUTH ONCE DAILY WITH BREAKFAST     losartan 100 MG tablet  Commonly known as:  COZAAR  Take 1 tablet (100 mg total) by mouth once daily.     magnesium oxide 250 mg magnesium Tab  Commonly known as:  MAG-OX     metFORMIN 1000 MG tablet  Commonly known as:  GLUCOPHAGE  Take 1 tablet (1,000 mg total) by mouth 2 (two) times daily with meals.     metoprolol tartrate 25 MG tablet  Commonly known as:  LOPRESSOR     OMEGA-3 FISH OIL 1,000-5 mg-unit Cap  Generic drug:  omega-3 fatty acids-vitamin E     potassium citrate 15 mEq Tbsr  Commonly known as:  UROCIT-K 15  Take 15 mEq by mouth 2 (two) times daily.     SITagliptin 100 MG Tab  Commonly known as:  JANUVIA  Take 1 tablet (100 mg total) by mouth once daily.        STOP taking these medications    tamsulosin 0.4 mg Cap  Commonly known as:  FLOMAX           Where to Get Your Medications      These medications were sent to [id number]  [address]NA [address] - [address] [facility].  [address] [place], [place] [address]ANA[place] [address]    Phone:  [phone]   · ciprofloxacin HCl 500 MG tablet

## 2019-10-02 NOTE — TELEPHONE ENCOUNTER
Spoke w pt to schedule f/u testing.    Needs 8 week renal us  Then 6 mos renal us and labs (bmp/uric acid) prior to 6 mos fu tyler  Please arrange all w pt    Arranged with pt voiced ok and understanding.

## 2019-10-03 ENCOUNTER — HOSPITAL ENCOUNTER (OUTPATIENT)
Facility: HOSPITAL | Age: 69
Discharge: HOME | End: 2019-10-03
Attending: INTERNAL MEDICINE | Admitting: INTERNAL MEDICINE
Payer: MEDICARE

## 2019-10-03 ENCOUNTER — ANESTHESIA (OUTPATIENT)
Dept: ENDOSCOPY | Facility: HOSPITAL | Age: 69
End: 2019-10-03
Payer: MEDICARE

## 2019-10-03 ENCOUNTER — ANESTHESIA EVENT (OUTPATIENT)
Dept: ENDOSCOPY | Facility: HOSPITAL | Age: 69
End: 2019-10-03
Payer: MEDICARE

## 2019-10-03 DIAGNOSIS — R19.7 DIARRHEA, UNSPECIFIED: ICD-10-CM

## 2019-10-03 PROCEDURE — D9220A PRA ANESTHESIA: Mod: ANES,,, | Performed by: ANESTHESIOLOGY

## 2019-10-03 PROCEDURE — 37000008 HC ANESTHESIA 1ST 15 MINUTES: Performed by: INTERNAL MEDICINE

## 2019-10-03 PROCEDURE — 45380 COLONOSCOPY AND BIOPSY: CPT | Performed by: INTERNAL MEDICINE

## 2019-10-03 PROCEDURE — 45380 PR COLONOSCOPY,BIOPSY: ICD-10-PCS | Mod: ,,, | Performed by: INTERNAL MEDICINE

## 2019-10-03 PROCEDURE — 45380 COLONOSCOPY AND BIOPSY: CPT | Mod: ,,, | Performed by: INTERNAL MEDICINE

## 2019-10-03 PROCEDURE — 88305 TISSUE EXAM BY PATHOLOGIST: CPT | Mod: 59 | Performed by: PATHOLOGY

## 2019-10-03 PROCEDURE — 63600175 PHARM REV CODE 636 W HCPCS: Performed by: NURSE ANESTHETIST, CERTIFIED REGISTERED

## 2019-10-03 PROCEDURE — 88305 TISSUE SPECIMEN TO PATHOLOGY - SURGERY: ICD-10-PCS | Mod: 26,,, | Performed by: PATHOLOGY

## 2019-10-03 PROCEDURE — 37000009 HC ANESTHESIA EA ADD 15 MINS: Performed by: INTERNAL MEDICINE

## 2019-10-03 PROCEDURE — D9220A PRA ANESTHESIA: ICD-10-PCS | Mod: CRNA,,, | Performed by: NURSE ANESTHETIST, CERTIFIED REGISTERED

## 2019-10-03 PROCEDURE — D9220A PRA ANESTHESIA: Mod: CRNA,,, | Performed by: NURSE ANESTHETIST, CERTIFIED REGISTERED

## 2019-10-03 PROCEDURE — 63600175 PHARM REV CODE 636 W HCPCS: Performed by: INTERNAL MEDICINE

## 2019-10-03 PROCEDURE — 27201012 HC FORCEPS, HOT/COLD, DISP: Performed by: INTERNAL MEDICINE

## 2019-10-03 PROCEDURE — D9220A PRA ANESTHESIA: ICD-10-PCS | Mod: ANES,,, | Performed by: ANESTHESIOLOGY

## 2019-10-03 RX ORDER — PROPOFOL 10 MG/ML
VIAL (ML) INTRAVENOUS
Status: DISCONTINUED | OUTPATIENT
Start: 2019-10-03 | End: 2019-10-03

## 2019-10-03 RX ORDER — LIDOCAINE HCL/PF 100 MG/5ML
SYRINGE (ML) INTRAVENOUS
Status: DISCONTINUED | OUTPATIENT
Start: 2019-10-03 | End: 2019-10-03

## 2019-10-03 RX ORDER — SODIUM CHLORIDE 9 MG/ML
INJECTION, SOLUTION INTRAVENOUS CONTINUOUS
Status: DISCONTINUED | OUTPATIENT
Start: 2019-10-03 | End: 2019-10-03 | Stop reason: HOSPADM

## 2019-10-03 RX ADMIN — PROPOFOL 80 MG: 10 INJECTION, EMULSION INTRAVENOUS at 09:10

## 2019-10-03 RX ADMIN — PROPOFOL 40 MG: 10 INJECTION, EMULSION INTRAVENOUS at 09:10

## 2019-10-03 RX ADMIN — SODIUM CHLORIDE: 0.9 INJECTION, SOLUTION INTRAVENOUS at 09:10

## 2019-10-03 RX ADMIN — LIDOCAINE HYDROCHLORIDE 75 MG: 20 INJECTION, SOLUTION INTRAVENOUS at 09:10

## 2019-10-03 NOTE — TRANSFER OF CARE
Anesthesia Transfer of Care Note    Patient: Jessica Luna    Procedure(s) Performed: Procedure(s) (LRB):  COLONOSCOPY (N/A)    Patient location: PACU    Anesthesia Type: general    Transport from OR: Transported from OR on room air with adequate spontaneous ventilation    Post pain: adequate analgesia    Post assessment: no apparent anesthetic complications and tolerated procedure well    Post vital signs: stable    Level of consciousness: awake, alert and oriented    Nausea/Vomiting: no nausea/vomiting    Complications: none    Transfer of care protocol was followed      Last vitals:   Visit Vitals  /71   Pulse 87   Temp 36.7 °C (98.1 °F) (Skin)   Resp 19   SpO2 100%   Breastfeeding? No

## 2019-10-03 NOTE — ANESTHESIA PREPROCEDURE EVALUATION
10/03/2019  Jessica Luna is a 68 y.o., female.    Anesthesia Evaluation    I have reviewed the Patient Summary Reports.    I have reviewed the Nursing Notes.   I have reviewed the Medications.     Review of Systems  Anesthesia Hx:  Denies Family Hx of Anesthesia complications.   Denies Personal Hx of Anesthesia complications.   Social:  Non-Smoker    Cardiovascular:   Hypertension Past MI CAD asymptomatic CABG/stent  ECG has been reviewed.    Renal/:   renal calculi    Hepatic/GI:   Bowel Prep.    Neurological:   Denies TIA.    Endocrine:   Diabetes, type 2        Physical Exam  General:  Obesity    Airway/Jaw/Neck:  Airway Findings: Mouth Opening: Normal Tongue: Normal  General Airway Assessment: Adult  Mallampati: II  TM Distance: Normal, at least 6 cm  Jaw/Neck Findings:  Neck ROM: Normal ROM      Dental:  Dental Findings: Upper Dentures, Lower partial dentures   Chest/Lungs:  Chest/Lungs Clear    Heart/Vascular:  Heart Findings: Normal            Anesthesia Plan  Type of Anesthesia, risks & benefits discussed:  Anesthesia Type:  general  Patient's Preference:   Intra-op Monitoring Plan: standard ASA monitors  Intra-op Monitoring Plan Comments:   Post Op Pain Control Plan:   Post Op Pain Control Plan Comments:   Induction:   IV  Beta Blocker:  Patient is not currently on a Beta-Blocker (No further documentation required).       Informed Consent: Patient understands risks and agrees with Anesthesia plan.  Questions answered. Anesthesia consent signed with patient.  ASA Score: 3     Day of Surgery Review of History & Physical:    H&P update referred to the provider.         Ready For Surgery From Anesthesia Perspective.

## 2019-10-03 NOTE — ANESTHESIA POSTPROCEDURE EVALUATION
Anesthesia Post Evaluation    Patient: Jessica Luna    Procedure(s) Performed: Procedure(s) (LRB):  COLONOSCOPY (N/A)    Final Anesthesia Type: general  Patient location during evaluation: PACU  Patient participation: Yes- Able to Participate  Level of consciousness: awake and alert  Post-procedure vital signs: reviewed and stable  Pain management: adequate  Airway patency: patent  PONV status at discharge: No PONV  Anesthetic complications: no      Cardiovascular status: blood pressure returned to baseline  Respiratory status: unassisted  Hydration status: euvolemic  Follow-up not needed.          Vitals Value Taken Time   /68 10/3/2019 10:34 AM   Temp 36.7 °C (98 °F) 10/3/2019 10:10 AM   Pulse 72 10/3/2019 10:37 AM   Resp 17 10/3/2019 10:30 AM   SpO2 100 % 10/3/2019 10:37 AM   Vitals shown include unvalidated device data.      Event Time     Out of Recovery 10:56:08          Pain/Gisel Score: Pain Rating Prior to Med Admin: 0 (10/3/2019 10:30 AM)  Gisel Score: 10 (10/3/2019 10:30 AM)

## 2019-10-03 NOTE — INTERVAL H&P NOTE
The patient has been examined and the H&P has been reviewed:    I concur with the findings and no changes have occurred since H&P was written.    Anesthesia/Surgery risks, benefits and alternative options discussed and understood by patient/family.          Active Hospital Problems    Diagnosis  POA    Diarrhea, unspecified [R19.7]  Yes      Resolved Hospital Problems   No resolved problems to display.

## 2019-10-03 NOTE — DISCHARGE INSTRUCTIONS
Hemorrhoids    Hemorrhoids are swollen and inflamed veins inside the rectum and near the anus. The rectum is the last several inches of the colon. The anus is the passage between the rectum and the outside of the body.  Causes  The veins can become swollen due to increased pressure in them. This is most often caused by:  · Chronic constipation or diarrhea  · Straining when having a bowel movement  · Sitting too long on the toilet  · A low-fiber diet  · Pregnancy  Symptoms  · Bleeding from the rectum (this may be noticeable after bowel movements)  · Lump near the anus  · Itching around the anus  · Pain around the anus  There are different types of hemorrhoids. Depending on the type you have and the severity, you may be able to treat yourself at home. In some cases, a procedure may be the best treatment option. Your healthcare provider can tell you more about this, if needed.  Home care  General care  · To get relief from pain or itching, try:  ¨ Topical products. Your healthcare provider may prescribe or recommend creams, ointments, or pads that can be applied to the hemorrhoid. Use these exactly as directed.  ¨ Medicines. Your healthcare provider may recommend stool softeners, suppositories, or laxatives to help manage constipation. Use these exactly as directed.  ¨ Sitz baths. A sitz bath involves sitting in a few inches of warm bath water. Be careful not to make the water so hot that you burn yourself--test it before sitting in it. Soak for about 10 to 15 minutes a few times a day. This may help relieve pain.  Tips to help prevent hemorrhoids  · Eat more fiber. Fiber adds bulk to stool and absorbs water as it moves through your colon. This makes stool softer and easier to pass.  ¨ Increase the fiber in your diet with more fiber-rich foods. These include fresh fruit, vegetables, and whole grains.  ¨ Take a fiber supplement or bulking agent, if advised to by your provider. These include products such as psyllium  or methylcellulose.  · Drink plenty of water, if directed to by your provider. This can help keep stool soft.  · Be more active. Frequent exercise aids digestion and helps prevent constipation. It may also help make bowel movements more regular.  · Dont strain during bowel movements. This can make hemorrhoids more likely. Also, dont sit on the toilet for long periods of time.  Follow-up care  Follow up with your healthcare provider, or as advised. If a culture or imaging tests were done, you will be notified of the results when they are ready. This may take a few days or longer.  When to seek medical advice  Call your healthcare provider right away if any of these occur:  · Increased bleeding from the rectum  · Increased pain around the rectum or anus  · Weakness or dizziness  Call 911  Call 911 or return to the emergency department right away if any of these occur:  · Trouble breathing or swallowing  · Fainting or loss of consciousness  · Unusually fast heart rate  · Vomiting blood  · Large amounts of blood in stool  Date Last Reviewed: 6/22/2015 © 2000-2017 [employer]. [address] [place] [address]. All rights reserved. This information is not intended as a substitute for professional medical care. Always follow your healthcare professional's instructions.        Diverticulosis    Diverticulosis means that small pouches have formed in the wall of your large intestine (colon). Most often, this problem causes no symptoms and is common as people age. But the pouches in the colon are at risk of becoming infected. When this happens, the condition is called diverticulitis. Although most people with diverticulosis never develop diverticulitis, it is still not uncommon. Rectal bleeding can also occur and in less common situations, a type of colon inflammation called colitis.  While most people do not have symptoms, some people with diverticulosis may have:  · Abdominal cramps and  pain  · Bloating  · Constipation  · Change in bowel habits  Causes  The exact cause of diverticulosis (and diverticulitis) has not been proved, but a few things are associated with the condition:  · Low-fiber diet  · Constipation  · Lack of exercise  Your healthcare provider will talk with you about how to manage your condition. Diet changes may be all that are needed to help control diverticulosis and prevent progression to diverticulitis. If you develop diverticulitis, you will likely need other treatments.  Home care  You may be told to take fiber supplements daily. Fiber adds bulk to the stool so that it passes through the colon more easily. Stool softeners may be recommended. You may also be given medications for pain relief. Be sure to take all medications as directed.  In the past, people were told to avoid corn, nuts, and seeds. This is no longer necessary.  Follow these guidelines when caring for yourself at home:  · Eat unprocessed foods that are high in fiber. Whole grains, fruits, and vegetables are good choices.  · Drink 6 to 8 glasses of water every day unless your healthcare provider has you limit how much fluid you should have.  · Watch for changes in your bowel movements. Tell your provider if you notice any changes.  · Begin an exercise program. Ask your provider how to get started. Generally, walking is the best.  · Get plenty of rest and sleep.  Follow-up care  Follow up with your healthcare provider, or as advised. Regular visits may be needed to check on your health. Sometimes special procedures such as colonoscopy, are needed after an episode of diverticulitis or blooding. Be sure to keep all your appointments.  If a stool sample was taken, or cultures were done, you should be told if they are positive, or if your treatment needs to be changed. You can call as directed for the results.  If X-rays were done, a radiologist will look at them. You will be told if there is a change in your  treatment.  If antibiotics were prescribed, be sure to finish them all.  When to seek medical advice  Call your healthcare provider right away if any of these occur:  · Fever of 100.4°F (38°C) or higher, or as directed by your healthcare provider  · Severe cramps in the lower left side of the abdomen or pain that is getting worse  · Tenderness in the lower left side of the abdomen or worsening pain throughout the abdomen  · Diarrhea or constipation that doesn't get better within 24 hours  · Nausea and vomiting  · Bleeding from the rectum  Call 911  Call emergency services if any of the following occur:  · Trouble breathing  · Confusion  · Very drowsy or trouble awakening  · Fainting or loss of consciousness  · Rapid heart rate  · Chest pain  Date Last Reviewed: 12/30/2015 © 2000-2017 [employer]. [address] [place] [address]. All rights reserved. This information is not intended as a substitute for professional medical care. Always follow your healthcare professional's instructions.        Colonoscopy     A camera attached to a flexible tube with a viewing lens is used to take video pictures.     Colonoscopy is a test to view the inside of your lower digestive tract (colon and rectum). Sometimes it can show the last part of the small intestine (ileum). During the test, small pieces of tissue may be removed for testing. This is called a biopsy. Small growths, such as polyps, may also be removed.   Why is colonoscopy done?  The test is done to help look for colon cancer. And it can help find the source of abdominal pain, bleeding, and changes in bowel habits. It may be needed once a year, depending on factors such as your:  · Age  · Health history  · Family health history  · Symptoms  · Results from any prior colonoscopy  Risks and possible complications  These include:  · Bleeding               · A puncture or tear in the colon   · Risks of anesthesia  · A cancer lesion not being  seen  Getting ready   To prepare for the test:  · Talk with your healthcare provider about the risks of the test (see below). Also ask your healthcare provider about alternatives to the test.  · Tell your healthcare provider about any medicines you take. Also tell him or her about any health conditions you may have.  · Make sure your rectum and colon are empty for the test. Follow the diet and bowel prep instructions exactly. If you dont, the test may need to be rescheduled.  · Plan for a friend or family member to drive you home after the test.     Colonoscopy provides an inside view of the entire colon.     You may discuss the results with your doctor right away or at a future visit.  During the test   The test is usually done in the hospital on an outpatient basis. This means you go home the same day. The procedure takes about 30 minutes. During that time:  · You are given relaxing (sedating) medicine through an IV line. You may be drowsy, or fully asleep.  · The healthcare provider will first give you a physical exam to check for anal and rectal problems.  · Then the anus is lubricated and the scope inserted.  · If you are awake, you may have a feeling similar to needing to have a bowel movement. You may also feel pressure as air is pumped into the colon. Its OK to pass gas during the procedure.  · Biopsy, polyp removal, or other treatments may be done during the test.  After the test   You may have gas right after the test. It can help to try to pass it to help prevent later bloating. Your healthcare provider may discuss the results with you right away. Or you may need to schedule a follow-up visit to talk about the results. After the test, you can go back to your normal eating and other activities. You may be tired from the sedation and need to rest for a few hours.  Date Last Reviewed: 11/1/2016  © 3812-6827 [employer]. [address] [place] [address], [place] [address]. All rights reserved. This  "information is not intended as a substitute for professional medical care. Always follow your healthcare professional's instructions.      Discharge Instructions: After Your Surgery/Procedure  Youve just had surgery. During surgery you were given medicine called anesthesia to keep you relaxed and free of pain. After surgery you may have some pain or nausea. This is common. Here are some tips for feeling better and getting well after surgery.     Stay on schedule with your medication.   Going home  Your doctor or nurse will show you how to take care of yourself when you go home. He or she will also answer your questions. Have an adult family member or friend drive you home.      For your safety we recommend these precaution for the first 24 hours after your procedure:  · Do not drive or use heavy equipment.  · Do not make important decisions or sign legal papers.  · Do not drink alcohol.  · Have someone stay with you, if needed. He or she can watch for problems and help keep you safe.  · Your concentration, balance, coordination, and judgement may be impaired for many hours after anesthesia.  Use caution when ambulating or standing up.     · You may feel weak and "washed out" after anesthesia and surgery.      Subtle residual effects of general anesthesia or sedation with regional / local anesthesia can last more than 24 hours.  Rest for the remainder of the day or longer if your Doctor/Surgeon has advised you to do so.  Although you may feel normal within the first 24 hours, your reflexes and mental ability may be impaired without you realizing it.  You may feel dizzy, lightheaded or sleepy for 24 hours or longer.      Be sure to go to all follow-up visits with your doctor. And rest after your surgery for as long as your doctor tells you to.  Coping with pain  If you have pain after surgery, pain medicine will help you feel better. Take it as told, before pain becomes severe. Also, ask your doctor or pharmacist " about other ways to control pain. This might be with heat, ice, or relaxation. And follow any other instructions your surgeon or nurse gives you.  Tips for taking pain medicine  To get the best relief possible, remember these points:  · Pain medicines can upset your stomach. Taking them with a little food may help.  · Most pain relievers taken by mouth need at least 20 to 30 minutes to start to work.  · Taking medicine on a schedule can help you remember to take it. Try to time your medicine so that you can take it before starting an activity. This might be before you get dressed, go for a walk, or sit down for dinner.  · Constipation is a common side effect of pain medicines. Call your doctor before taking any medicines such as laxatives or stool softeners to help ease constipation. Also ask if you should skip any foods. Drinking lots of fluids and eating foods such as fruits and vegetables that are high in fiber can also help. Remember, do not take laxatives unless your surgeon has prescribed them.  · Drinking alcohol and taking pain medicine can cause dizziness and slow your breathing. It can even be deadly. Do not drink alcohol while taking pain medicine.  · Pain medicine can make you react more slowly to things. Do not drive or run machinery while taking pain medicine.  Your health care provider may tell you to take acetaminophen to help ease your pain. Ask him or her how much you are supposed to take each day. Acetaminophen or other pain relievers may interact with your prescription medicines or other over-the-counter (OTC) drugs. Some prescription medicines have acetaminophen and other ingredients. Using both prescription and OTC acetaminophen for pain can cause you to overdose. Read the labels on your OTC medicines with care. This will help you to clearly know the list of ingredients, how much to take, and any warnings. It may also help you not take too much acetaminophen. If you have questions or do not  understand the information, ask your pharmacist or health care provider to explain it to you before you take the OTC medicine.  Managing nausea  Some people have an upset stomach after surgery. This is often because of anesthesia, pain, or pain medicine, or the stress of surgery. These tips will help you handle nausea and eat healthy foods as you get better. If you were on a special food plan before surgery, ask your doctor if you should follow it while you get better. These tips may help:  · Do not push yourself to eat. Your body will tell you when to eat and how much.  · Start off with clear liquids and soup. They are easier to digest.  · Next try semi-solid foods, such as mashed potatoes, applesauce, and gelatin, as you feel ready.  · Slowly move to solid foods. Dont eat fatty, rich, or spicy foods at first.  · Do not force yourself to have 3 large meals a day. Instead eat smaller amounts more often.  · Take pain medicines with a small amount of solid food, such as crackers or toast, to avoid nausea.     Call your surgeon if  · You still have pain an hour after taking medicine. The medicine may not be strong enough.  · You feel too sleepy, dizzy, or groggy. The medicine may be too strong.  · You have side effects like nausea, vomiting, or skin changes, such as rash, itching, or hives.       If you have obstructive sleep apnea  You were given anesthesia medicine during surgery to keep you comfortable and free of pain. After surgery, you may have more apnea spells because of this medicine and other medicines you were given. The spells may last longer than usual.   At home:  · Keep using the continuous positive airway pressure (CPAP) device when you sleep. Unless your health care provider tells you not to, use it when you sleep, day or night. CPAP is a common device used to treat obstructive sleep apnea.  · Talk with your provider before taking any pain medicine, muscle relaxants, or sedatives. Your provider will  "tell you about the possible dangers of taking these medicines.  © 0567-5428 [employer]. [address] [place] [address], [place] [address]. All rights reserved. This information is not intended as a substitute for professional medical care. Always follow your healthcare professional's instructions.  Discharge Instructions: After Your Surgery/Procedure  Youve just had surgery. During surgery you were given medicine called anesthesia to keep you relaxed and free of pain. After surgery you may have some pain or nausea. This is common. Here are some tips for feeling better and getting well after surgery.     Stay on schedule with your medication.   Going home  Your doctor or nurse will show you how to take care of yourself when you go home. He or she will also answer your questions. Have an adult family member or friend drive you home.      For your safety we recommend these precaution for the first 24 hours after your procedure:  · Do not drive or use heavy equipment.  · Do not make important decisions or sign legal papers.  · Do not drink alcohol.  · Have someone stay with you, if needed. He or she can watch for problems and help keep you safe.  · Your concentration, balance, coordination, and judgement may be impaired for many hours after anesthesia.  Use caution when ambulating or standing up.     · You may feel weak and "washed out" after anesthesia and surgery.      Subtle residual effects of general anesthesia or sedation with regional / local anesthesia can last more than 24 hours.  Rest for the remainder of the day or longer if your Doctor/Surgeon has advised you to do so.  Although you may feel normal within the first 24 hours, your reflexes and mental ability may be impaired without you realizing it.  You may feel dizzy, lightheaded or sleepy for 24 hours or longer.      Be sure to go to all follow-up visits with your doctor. And rest after your surgery for as long as your doctor tells you to.  Coping " with pain  If you have pain after surgery, pain medicine will help you feel better. Take it as told, before pain becomes severe. Also, ask your doctor or pharmacist about other ways to control pain. This might be with heat, ice, or relaxation. And follow any other instructions your surgeon or nurse gives you.  Tips for taking pain medicine  To get the best relief possible, remember these points:  · Pain medicines can upset your stomach. Taking them with a little food may help.  · Most pain relievers taken by mouth need at least 20 to 30 minutes to start to work.  · Taking medicine on a schedule can help you remember to take it. Try to time your medicine so that you can take it before starting an activity. This might be before you get dressed, go for a walk, or sit down for dinner.  · Constipation is a common side effect of pain medicines. Call your doctor before taking any medicines such as laxatives or stool softeners to help ease constipation. Also ask if you should skip any foods. Drinking lots of fluids and eating foods such as fruits and vegetables that are high in fiber can also help. Remember, do not take laxatives unless your surgeon has prescribed them.  · Drinking alcohol and taking pain medicine can cause dizziness and slow your breathing. It can even be deadly. Do not drink alcohol while taking pain medicine.  · Pain medicine can make you react more slowly to things. Do not drive or run machinery while taking pain medicine.  Your health care provider may tell you to take acetaminophen to help ease your pain. Ask him or her how much you are supposed to take each day. Acetaminophen or other pain relievers may interact with your prescription medicines or other over-the-counter (OTC) drugs. Some prescription medicines have acetaminophen and other ingredients. Using both prescription and OTC acetaminophen for pain can cause you to overdose. Read the labels on your OTC medicines with care. This will help  you to clearly know the list of ingredients, how much to take, and any warnings. It may also help you not take too much acetaminophen. If you have questions or do not understand the information, ask your pharmacist or health care provider to explain it to you before you take the OTC medicine.  Managing nausea  Some people have an upset stomach after surgery. This is often because of anesthesia, pain, or pain medicine, or the stress of surgery. These tips will help you handle nausea and eat healthy foods as you get better. If you were on a special food plan before surgery, ask your doctor if you should follow it while you get better. These tips may help:  · Do not push yourself to eat. Your body will tell you when to eat and how much.  · Start off with clear liquids and soup. They are easier to digest.  · Next try semi-solid foods, such as mashed potatoes, applesauce, and gelatin, as you feel ready.  · Slowly move to solid foods. Dont eat fatty, rich, or spicy foods at first.  · Do not force yourself to have 3 large meals a day. Instead eat smaller amounts more often.  · Take pain medicines with a small amount of solid food, such as crackers or toast, to avoid nausea.     Call your surgeon if  · You still have pain an hour after taking medicine. The medicine may not be strong enough.  · You feel too sleepy, dizzy, or groggy. The medicine may be too strong.  · You have side effects like nausea, vomiting, or skin changes, such as rash, itching, or hives.       If you have obstructive sleep apnea  You were given anesthesia medicine during surgery to keep you comfortable and free of pain. After surgery, you may have more apnea spells because of this medicine and other medicines you were given. The spells may last longer than usual.   At home:  · Keep using the continuous positive airway pressure (CPAP) device when you sleep. Unless your health care provider tells you not to, use it when you sleep, day or night. CPAP is  a common device used to treat obstructive sleep apnea.  · Talk with your provider before taking any pain medicine, muscle relaxants, or sedatives. Your provider will tell you about the possible dangers of taking these medicines.  © 2719-0000 [employer]. [address] [place] [address], [place] [address]. All rights reserved. This information is not intended as a substitute for professional medical care. Always follow your healthcare professional's instructions.

## 2019-10-03 NOTE — OR NURSING
Have you had a colonoscopy LESS THAN 3 years ago?   * If YES, answer these questions*: No Last colonoscopy in 2015    1. Did patient have a prior colonic polyp in a previous surveillance/diagnostic colonoscopy and is 18 years or older on date of encounter?  2. Documentation of < 3 year interval since the patients last colonoscopy due to medical reasons (eg., last colonoscopy incomplete, last colonoscopy had inadequate prep, piecemeal removal of adenomas, or last colonoscopy found > 10 adenomas) ?

## 2019-10-03 NOTE — [employer]OVATION PATIENT INSTRUCTIONS
Discharge Summary/Instructions after an Endoscopic Procedure  Patient Name: Jessica Easley  Patient MRN: [id number]  Patient YOB: 1950  Thursday, October 03, 2019  Viviane Simeon MD  RESTRICTIONS:  During your procedure today, you received medications for sedation.  These   medications may affect your judgment, balance and coordination.  Therefore,   for 24 hours, you have the following restrictions:   - DO NOT drive a car, operate machinery, make legal/financial decisions,   sign important papers or drink alcohol.    ACTIVITY:  Today: no heavy lifting, straining or running due to procedural   sedation/anesthesia.  The following day: return to full activity including work.  DIET:  Eat and drink normally unless instructed otherwise.     TREATMENT FOR COMMON SIDE EFFECTS:  - Mild abdominal pain, nausea, belching, bloating or excessive gas:  rest,   eat lightly and use a heating pad.  - Sore Throat: treat with throat lozenges and/or gargle with warm salt   water.  - Because air was used during the procedure, expelling large amounts of air   from your rectum or belching is normal.  - If a bowel prep was taken, you may not have a bowel movement for 1-3 days.    This is normal.  SYMPTOMS TO WATCH FOR AND REPORT TO YOUR PHYSICIAN:  1. Abdominal pain or bloating, other than gas cramps.  2. Chest pain.  3. Back pain.  4. Signs of infection such as: chills or fever occurring within 24 hours   after the procedure.  5. Rectal bleeding, which would show as bright red, maroon, or black stools.   (A tablespoon of blood from the rectum is not serious, especially if   hemorrhoids are present.)  6. Vomiting.  7. Weakness or dizziness.  GO DIRECTLY TO THE NEAREST EMERGENCY ROOM IF YOU HAVE ANY OF THE FOLLOWING:      Difficulty breathing              Chills and/or fever over 101 F   Persistent vomiting and/or vomiting blood   Severe abdominal pain   Severe chest pain   Black, tarry stools   Bleeding- more  than one tablespoon   Any other symptom or condition that you feel may need urgent attention  Your doctor recommends these additional instructions:  If any biopsies were taken, your doctors clinic will contact you in 1 to 2   weeks with any results.  - Discharge patient to home (with escort).   - Patient has a contact number available for emergencies.  The signs and   symptoms of potential delayed complications were discussed with the   patient.  Return to normal activities tomorrow.  Written discharge   instructions were provided to the patient.   - Resume previous diet.   - Continue present medications.   - Await pathology results.   -Submit stool studies  -Consider trial of Cholestyramine   - Repeat colonoscopy date to be determined after pending pathology results   are reviewed for surveillance based on pathology results.   - Return to my office PRN.  For questions, problems or results please call your physician - Viviane Simeon MD at Work:  [phone].  [facility], EMERGENCY ROOM PHONE NUMBER: [phone]  IF A COMPLICATION OR EMERGENCY SITUATION ARISES AND YOU ARE UNABLE TO REACH   YOUR PHYSICIAN - GO DIRECTLY TO THE EMERGENCY ROOM.  Viviane Simeon MD  10/3/2019 10:09:52 AM  This report has been verified and signed electronically.  [employer]

## 2019-10-04 ENCOUNTER — PATIENT MESSAGE (OUTPATIENT)
Dept: GASTROENTEROLOGY | Facility: CLINIC | Age: 69
End: 2019-10-04

## 2019-10-04 VITALS
SYSTOLIC BLOOD PRESSURE: 124 MMHG | TEMPERATURE: 98 F | HEART RATE: 74 BPM | DIASTOLIC BLOOD PRESSURE: 63 MMHG | RESPIRATION RATE: 17 BRPM | OXYGEN SATURATION: 97 %

## 2019-10-18 ENCOUNTER — PATIENT MESSAGE (OUTPATIENT)
Dept: GASTROENTEROLOGY | Facility: CLINIC | Age: 69
End: 2019-10-18

## 2019-10-30 ENCOUNTER — TELEPHONE (OUTPATIENT)
Dept: FAMILY MEDICINE | Facility: CLINIC | Age: 69
End: 2019-10-30

## 2019-10-30 DIAGNOSIS — Z12.31 ENCOUNTER FOR SCREENING MAMMOGRAM FOR MALIGNANT NEOPLASM OF BREAST: ICD-10-CM

## 2019-10-30 DIAGNOSIS — Z12.39 SCREENING FOR BREAST CANCER: Primary | ICD-10-CM

## 2019-10-30 NOTE — TELEPHONE ENCOUNTER
----- Message from Bj Artis sent at 10/30/2019 10:53 AM CDT -----  Contact: Ptnt [phone]  Type: Needs Medical Advice    Who Called:  Ptnt [phone]    Additional Information:       Advised needs order issued for a MAMMO with CAD MAMMO SCREENING W CAD. Would like to have MAMMO tomorrow in [place] when there for other tests labs.  Please advise when the order has been issued.

## 2019-10-31 ENCOUNTER — LAB VISIT (OUTPATIENT)
Dept: LAB | Facility: HOSPITAL | Age: 69
End: 2019-10-31
Attending: INTERNAL MEDICINE
Payer: MEDICARE

## 2019-10-31 DIAGNOSIS — E78.5 HYPERLIPEMIA: Primary | ICD-10-CM

## 2019-10-31 DIAGNOSIS — I25.10 CORONARY ATHEROSCLEROSIS OF NATIVE CORONARY ARTERY: ICD-10-CM

## 2019-10-31 DIAGNOSIS — E83.42 HYPOMAGNESEMIA: ICD-10-CM

## 2019-10-31 LAB
ALBUMIN SERPL BCP-MCNC: 4.1 G/DL (ref 3.5–5.2)
ALP SERPL-CCNC: 113 U/L (ref 55–135)
ALT SERPL W/O P-5'-P-CCNC: 17 U/L (ref 10–44)
ANION GAP SERPL CALC-SCNC: 5 MMOL/L (ref 8–16)
AST SERPL-CCNC: 15 U/L (ref 10–40)
BASOPHILS # BLD AUTO: 0.04 K/UL (ref 0–0.2)
BASOPHILS NFR BLD: 0.5 % (ref 0–1.9)
BILIRUB SERPL-MCNC: 0.7 MG/DL (ref 0.1–1)
BUN SERPL-MCNC: 16 MG/DL (ref 8–23)
CALCIUM SERPL-MCNC: 10.3 MG/DL (ref 8.7–10.5)
CHLORIDE SERPL-SCNC: 108 MMOL/L (ref 95–110)
CHOLEST SERPL-MCNC: 151 MG/DL (ref 120–199)
CHOLEST/HDLC SERPL: 3.4 {RATIO} (ref 2–5)
CO2 SERPL-SCNC: 27 MMOL/L (ref 23–29)
CREAT SERPL-MCNC: 0.8 MG/DL (ref 0.5–1.4)
DIFFERENTIAL METHOD: NORMAL
EOSINOPHIL # BLD AUTO: 0.2 K/UL (ref 0–0.5)
EOSINOPHIL NFR BLD: 2.6 % (ref 0–8)
ERYTHROCYTE [DISTWIDTH] IN BLOOD BY AUTOMATED COUNT: 13.2 % (ref 11.5–14.5)
EST. GFR  (AFRICAN AMERICAN): >60 ML/MIN/1.73 M^2
EST. GFR  (NON AFRICAN AMERICAN): >60 ML/MIN/1.73 M^2
GLUCOSE SERPL-MCNC: 99 MG/DL (ref 70–110)
HCT VFR BLD AUTO: 41 % (ref 37–48.5)
HDLC SERPL-MCNC: 44 MG/DL (ref 40–75)
HDLC SERPL: 29.1 % (ref 20–50)
HGB BLD-MCNC: 13.1 G/DL (ref 12–16)
IMM GRANULOCYTES # BLD AUTO: 0.03 K/UL (ref 0–0.04)
LDLC SERPL CALC-MCNC: 76.8 MG/DL (ref 63–159)
LYMPHOCYTES # BLD AUTO: 2.1 K/UL (ref 1–4.8)
LYMPHOCYTES NFR BLD: 24.3 % (ref 18–48)
MAGNESIUM SERPL-MCNC: 1.5 MG/DL (ref 1.6–2.6)
MCH RBC QN AUTO: 28.4 PG (ref 27–31)
MCHC RBC AUTO-ENTMCNC: 32 G/DL (ref 32–36)
MCV RBC AUTO: 89 FL (ref 82–98)
MONOCYTES # BLD AUTO: 0.6 K/UL (ref 0.3–1)
MONOCYTES NFR BLD: 6.5 % (ref 4–15)
NEUTROPHILS # BLD AUTO: 5.6 K/UL (ref 1.8–7.7)
NEUTROPHILS NFR BLD: 65.7 % (ref 38–73)
NONHDLC SERPL-MCNC: 107 MG/DL
NRBC BLD-RTO: 0 /100 WBC
PLATELET # BLD AUTO: 243 K/UL (ref 150–350)
PMV BLD AUTO: 11.3 FL (ref 9.2–12.9)
POTASSIUM SERPL-SCNC: 4.5 MMOL/L (ref 3.5–5.1)
PROT SERPL-MCNC: 7.4 G/DL (ref 6–8.4)
RBC # BLD AUTO: 4.62 M/UL (ref 4–5.4)
SODIUM SERPL-SCNC: 140 MMOL/L (ref 136–145)
TRIGL SERPL-MCNC: 151 MG/DL (ref 30–150)
WBC # BLD AUTO: 8.48 K/UL (ref 3.9–12.7)

## 2019-10-31 PROCEDURE — 36415 COLL VENOUS BLD VENIPUNCTURE: CPT

## 2019-10-31 PROCEDURE — 80061 LIPID PANEL: CPT

## 2019-10-31 PROCEDURE — 83735 ASSAY OF MAGNESIUM: CPT

## 2019-10-31 PROCEDURE — 80053 COMPREHEN METABOLIC PANEL: CPT

## 2019-10-31 PROCEDURE — 85025 COMPLETE CBC W/AUTO DIFF WBC: CPT

## 2019-11-04 ENCOUNTER — HOSPITAL ENCOUNTER (OUTPATIENT)
Dept: RADIOLOGY | Facility: CLINIC | Age: 69
Discharge: HOME | End: 2019-11-04
Attending: FAMILY MEDICINE
Payer: MEDICARE

## 2019-11-04 DIAGNOSIS — Z12.31 ENCOUNTER FOR SCREENING MAMMOGRAM FOR MALIGNANT NEOPLASM OF BREAST: ICD-10-CM

## 2019-11-04 PROCEDURE — 77063 BREAST TOMOSYNTHESIS BI: CPT | Mod: TC,PO

## 2019-11-04 PROCEDURE — 77063 BREAST TOMOSYNTHESIS BI: CPT | Mod: 26,,, | Performed by: RADIOLOGY

## 2019-11-04 PROCEDURE — 77063 MAMMO DIGITAL SCREENING BILAT WITH TOMOSYNTHESIS_CAD: ICD-10-PCS | Mod: 26,,, | Performed by: RADIOLOGY

## 2019-11-04 PROCEDURE — 77067 MAMMO DIGITAL SCREENING BILAT WITH TOMOSYNTHESIS_CAD: ICD-10-PCS | Mod: 26,,, | Performed by: RADIOLOGY

## 2019-11-04 PROCEDURE — 77067 SCR MAMMO BI INCL CAD: CPT | Mod: 26,,, | Performed by: RADIOLOGY

## 2019-11-15 DIAGNOSIS — E11.9 TYPE 2 DIABETES MELLITUS WITHOUT COMPLICATION, WITHOUT LONG-TERM CURRENT USE OF INSULIN: Primary | ICD-10-CM

## 2019-11-15 RX ORDER — METFORMIN HYDROCHLORIDE 1000 MG/1
1000 TABLET ORAL 2 TIMES DAILY WITH MEALS
Qty: 180 TABLET | Refills: 3 | Status: SHIPPED | OUTPATIENT
Start: 2019-11-15 | End: 2020-12-04

## 2019-11-15 NOTE — TELEPHONE ENCOUNTER
----- Message from Armando Lepe sent at 11/15/2019  8:08 AM CST -----  Contact: pt  Type:  RX Refill Request    Who Called:  pt  Refill or New Rx:  refill  RX Name and Strength:  metFORMIN (GLUCOPHAGE) 1000 MG tablet  How is the patient currently taking it? (ex. 1XDay):  2x per day  Is this a 30 day or 90 day RX:  90  Preferred Pharmacy with phone number:    [id number] - [place] - [address]  [place] [address]  Phone: [phone] Fax: [phone]    Local or Mail Order:  local  Ordering Provider:    Best Call Back Number:  [phone]  Additional Information:  Pt only has one dose left to take this morning. Will not have her evening dose. Please call to advise when script has been sent to pharm so pt can follow up with pharmacy.

## 2020-01-10 RX ORDER — GLIMEPIRIDE 4 MG/1
TABLET ORAL
Qty: 90 TABLET | Refills: 1 | Status: SHIPPED | OUTPATIENT
Start: 2020-01-10 | End: 2020-04-02 | Stop reason: SDUPTHER

## 2020-01-10 RX ORDER — GLIMEPIRIDE 4 MG/1
TABLET ORAL
Qty: 180 TABLET | Refills: 0 | OUTPATIENT
Start: 2020-01-10

## 2020-01-10 NOTE — TELEPHONE ENCOUNTER
----- Message from Juventino Calhoun sent at 1/10/2020  1:40 PM CST -----  Type: Needs Medical Advice    Who Called:  Patient    Pharmacy name and phone #:    [id number] - [place] - [address]  [facility] [place] [address]  Phone: [phone] Fax: [phone]      Best Call Back Number: [phone]  Additional Information: Patient states that her prescription for glimepiride (AMARYL) 4 MG tablet is not at the pharmacy yet.    Please call to advise.  Patient states that she has been out for 2 days

## 2020-01-17 DIAGNOSIS — E11.9 TYPE 2 DIABETES MELLITUS WITHOUT COMPLICATION: ICD-10-CM

## 2020-01-30 RX ORDER — POTASSIUM CITRATE 15 MEQ/1
1 TABLET, EXTENDED RELEASE ORAL 2 TIMES DAILY
Qty: 60 TABLET | Refills: 11 | Status: SHIPPED | OUTPATIENT
Start: 2020-01-30 | End: 2021-02-05

## 2020-01-31 RX ORDER — POTASSIUM CITRATE 15 MEQ/1
TABLET, EXTENDED RELEASE ORAL
Refills: 0 | OUTPATIENT
Start: 2020-01-31

## 2020-02-07 ENCOUNTER — LAB VISIT (OUTPATIENT)
Dept: LAB | Facility: HOSPITAL | Age: 70
End: 2020-02-07
Attending: INTERNAL MEDICINE
Payer: MEDICARE

## 2020-02-07 DIAGNOSIS — I25.10 CORONARY ATHEROSCLEROSIS OF NATIVE CORONARY ARTERY: ICD-10-CM

## 2020-02-07 DIAGNOSIS — E78.5 HYPERLIPEMIA: Primary | ICD-10-CM

## 2020-02-07 LAB — MAGNESIUM SERPL-MCNC: 1.4 MG/DL (ref 1.6–2.6)

## 2020-02-07 PROCEDURE — 83735 ASSAY OF MAGNESIUM: CPT

## 2020-02-07 PROCEDURE — 36415 COLL VENOUS BLD VENIPUNCTURE: CPT

## 2020-02-25 ENCOUNTER — TELEPHONE (OUTPATIENT)
Dept: UROLOGY | Facility: CLINIC | Age: 70
End: 2020-02-25

## 2020-02-28 DIAGNOSIS — E11.9 TYPE 2 DIABETES MELLITUS WITHOUT COMPLICATION, UNSPECIFIED WHETHER LONG TERM INSULIN USE: ICD-10-CM

## 2020-03-24 ENCOUNTER — TELEPHONE (OUTPATIENT)
Dept: UROLOGY | Facility: CLINIC | Age: 70
End: 2020-03-24

## 2020-03-24 ENCOUNTER — TELEPHONE (OUTPATIENT)
Dept: RADIOLOGY | Facility: HOSPITAL | Age: 70
End: 2020-03-24

## 2020-03-24 NOTE — TELEPHONE ENCOUNTER
----- Message from Ubaldo Cho MD sent at 3/24/2020  1:32 PM CDT -----  Ok to postpone  ----- Message -----  From: Gilma Evans  Sent: 3/24/2020   1:17 PM CDT  To: Ubaldo Cho MD        ----- Message -----  From: Xiomara Sarah  Sent: 3/24/2020  12:53 PM CDT  To: Adrian Cardona, RT, Jaqui Sommer, RN, #    Due to concerns associated with Covid19 the radiology team is seeking to reschedule outpatient diagnostic exams between 3/21  4/21 that have been ordered prior to 3/19.  Pt above is scheduled for US on 04/06/20 at [facility].  Please respond to this message if you believe it is safe to postpone this exam and the radiology team will reschedule and update you on the patients response.  If you have concerns that postponement is not safe (urgent or time sensitive diagnostic study), then reply and it will be performed as ordered.   If further discussion needed, please provide a contact number and a Radiologist will reach out to you shortly.

## 2020-04-02 RX ORDER — GLIMEPIRIDE 4 MG/1
8 TABLET ORAL
Qty: 60 TABLET | Refills: 0 | Status: SHIPPED | OUTPATIENT
Start: 2020-04-02 | End: 2020-04-06 | Stop reason: SDUPTHER

## 2020-04-02 NOTE — TELEPHONE ENCOUNTER
----- Message from Madhavi Desai sent at 4/2/2020  9:58 AM CDT -----  Type:  RX Refill Request    Who Called:  Patient  Refill or New Rx:  Refill  RX Name and Strength:  glimepiride (AMARYL) 4 MG tablet   How is the patient currently taking it? (ex. 1XDay):  2xday  Is this a 30 day or 90 day RX:  90 pills  Preferred Pharmacy with phone number:    [id number] - [place] - [address]  [place] [address]  Phone: [phone] Fax: [phone]    Local or Mail Order:  local  Ordering Provider:  same  Best Call Back Number:  [phone]  Additional Information:  States she only has two left. The pharmacy states that they sent in refill requests. Please call to advise when completed.

## 2020-04-02 NOTE — TELEPHONE ENCOUNTER
Prescription refill request.   LOV: 09/26/2019  NOV: 05/28/2020  LDR: 01/10/2020  Last Labs: 10/31/2019    Pt has 2 pills left.

## 2020-04-06 RX ORDER — GLIMEPIRIDE 4 MG/1
TABLET ORAL
Qty: 180 TABLET | Refills: 1 | Status: SHIPPED | OUTPATIENT
Start: 2020-04-06 | End: 2020-05-04

## 2020-05-04 RX ORDER — GLIMEPIRIDE 4 MG/1
TABLET ORAL
Qty: 60 TABLET | Refills: 0 | Status: SHIPPED | OUTPATIENT
Start: 2020-05-04 | End: 2020-05-28 | Stop reason: SDUPTHER

## 2020-05-05 ENCOUNTER — PATIENT MESSAGE (OUTPATIENT)
Dept: ADMINISTRATIVE | Facility: HOSPITAL | Age: 70
End: 2020-05-05

## 2020-05-06 LAB
LEFT EYE DM RETINOPATHY: NEGATIVE
RIGHT EYE DM RETINOPATHY: NEGATIVE

## 2020-05-13 ENCOUNTER — HOSPITAL ENCOUNTER (OUTPATIENT)
Dept: RADIOLOGY | Facility: HOSPITAL | Age: 70
Discharge: HOME | End: 2020-05-13
Attending: UROLOGY
Payer: MEDICARE

## 2020-05-13 DIAGNOSIS — N20.0 RENAL CALCULUS, LEFT: ICD-10-CM

## 2020-05-13 PROCEDURE — 76770 US EXAM ABDO BACK WALL COMP: CPT | Mod: 26,,, | Performed by: RADIOLOGY

## 2020-05-13 PROCEDURE — 76770 US EXAM ABDO BACK WALL COMP: CPT | Mod: TC

## 2020-05-13 PROCEDURE — 76770 US RETROPERITONEAL COMPLETE: ICD-10-PCS | Mod: 26,,, | Performed by: RADIOLOGY

## 2020-05-19 ENCOUNTER — PATIENT OUTREACH (OUTPATIENT)
Dept: ADMINISTRATIVE | Facility: OTHER | Age: 70
End: 2020-05-19

## 2020-05-20 ENCOUNTER — OFFICE VISIT (OUTPATIENT)
Dept: UROLOGY | Facility: CLINIC | Age: 70
End: 2020-05-20
Payer: MEDICARE

## 2020-05-20 VITALS
HEIGHT: 61 IN | HEART RATE: 59 BPM | RESPIRATION RATE: 18 BRPM | DIASTOLIC BLOOD PRESSURE: 70 MMHG | SYSTOLIC BLOOD PRESSURE: 130 MMHG | TEMPERATURE: 98 F | WEIGHT: 177 LBS | BODY MASS INDEX: 33.42 KG/M2

## 2020-05-20 DIAGNOSIS — N20.0 URIC ACID RENAL CALCULUS: ICD-10-CM

## 2020-05-20 DIAGNOSIS — N20.0 NEPHROLITHIASIS: Primary | ICD-10-CM

## 2020-05-20 LAB
BILIRUB SERPL-MCNC: ABNORMAL MG/DL
BLOOD URINE, POC: ABNORMAL
COLOR, POC UA: ABNORMAL
GLUCOSE UR QL STRIP: ABNORMAL
KETONES UR QL STRIP: ABNORMAL
LEUKOCYTE ESTERASE URINE, POC: ABNORMAL
NITRITE, POC UA: ABNORMAL
PH, POC UA: 5.5
PROTEIN, POC: ABNORMAL
SPECIFIC GRAVITY, POC UA: 1.02
UROBILINOGEN, POC UA: 0.2

## 2020-05-20 PROCEDURE — 99215 OFFICE O/P EST HI 40 MIN: CPT | Mod: PBBFAC,PN | Performed by: NURSE PRACTITIONER

## 2020-05-20 PROCEDURE — 99999 PR PBB SHADOW E&M-EST. PATIENT-LVL V: ICD-10-PCS | Mod: PBBFAC,,, | Performed by: NURSE PRACTITIONER

## 2020-05-20 PROCEDURE — 99999 PR PBB SHADOW E&M-EST. PATIENT-LVL V: CPT | Mod: PBBFAC,,, | Performed by: NURSE PRACTITIONER

## 2020-05-20 PROCEDURE — 99214 OFFICE O/P EST MOD 30 MIN: CPT | Mod: S$PBB,,, | Performed by: NURSE PRACTITIONER

## 2020-05-20 PROCEDURE — 99214 PR OFFICE/OUTPT VISIT, EST, LEVL IV, 30-39 MIN: ICD-10-PCS | Mod: S$PBB,,, | Performed by: NURSE PRACTITIONER

## 2020-05-20 PROCEDURE — 81002 URINALYSIS NONAUTO W/O SCOPE: CPT | Mod: PBBFAC,PN | Performed by: NURSE PRACTITIONER

## 2020-05-20 RX ORDER — PREDNISOLONE ACETATE 10 MG/ML
SUSPENSION/ DROPS OPHTHALMIC
COMMUNITY
Start: 2020-05-06 | End: 2021-04-05

## 2020-05-20 NOTE — PROGRESS NOTES
[facility] Note  Staff: PING Weiss    PCP: Dr. Tess Martinez    Chief Complaint: F/UP-Ureteral stones    Subjective:        HPI: Jessica Luna is a 69 y.o. female presents today for routine recheck.    68 yo F with history of uric acid stones found to have L renal pelvic stone on recent GH workup had cyto/RPGs (with possible subclinical bilateral obstruction), and L URS/HLL (with patent UPJ accomodating scope) on 9/21/19 to remove left stone burden.      10/01/2019:  Left ureteral stent removal, s/p left ureteroscopy on 09/21/19 performed by Dr. Cho.    RBUS performed on 05/13/2020:  FINDINGS:  The right kidney measures 10.5 cm in length and the left 12.2 cm.  The resistive indices are mildly elevated at 0.78 on the right and 0.75 on the left.  Moderate cortical thinning is present bilaterally.  There is no stone, mass, or hydronephrosis.  The urinary bladder is unremarkable.      Impression       Features of intrinsic renal disease.     TODAY:  Since last ov with Dr. Cho and procedures, pt has been doing very well with no new stone burden noted at this time.  She denies gross hematuria, dysuria, flank pain as of today's office visit.  Recent lab work done on 05/13/2020:  BMP-Glucose 127 H  Uric Acid-has decreased to 5.3  RBUS showed normal findings.    REVIEW OF SYSTEMS:  A comprehensive 10 system review was performed and is negative except as noted above in HPI    PMHx:  Past Medical History:   Diagnosis Date    Anticoagulant long-term use     Arthritis     CAD (coronary artery disease)     Diabetes mellitus     Diabetes mellitus type II     Diverticulosis     Hyperlipidemia     Hypertension     Low magnesium levels     Myocardial infarction     Obstructive uropathy 10/16/2015    Renal stone     Status post cystoscopy - Left laser percutaneous nephrolithotripsy 6/29/2016    Ureterolithiasis Obstructive-left 1/3/2015    Wears dentures     FULL UPPER -  PARTIAL BOTTOM    Wears glasses     Wears glasses      PSHx:  Past Surgical History:   Procedure Laterality Date     SECTION      CHOLECYSTECTOMY      COLONOSCOPY N/A 10/7/2015    Procedure: COLONOSCOPY;  Surgeon: Washington Rodriguez MD;  Location: [facility] ENDO;  Service: Endoscopy;  Laterality: N/A;    COLONOSCOPY N/A 10/3/2019    Procedure: COLONOSCOPY;  Surgeon: Viviane Simeon MD;  Location: [facility] ENDO;  Service: Endoscopy;  Laterality: N/A;    CORONARY STENT PLACEMENT      2 vessels    CYSTOSCOPY      CYSTOSCOPY W/ LASER LITHOTRIPSY  12/15/15    CYSTOSCOPY W/ RETROGRADES Bilateral 2019    Procedure: CYSTOSCOPY, WITH RETROGRADE PYELOGRAM;  Surgeon: Ubaldo Cho MD;  Location: [facility] OR;  Service: Urology;  Laterality: Bilateral;    CYSTOSCOPY W/ URETERAL STENT REMOVAL Left 10/1/2019    Procedure: CYSTOSCOPY, WITH URETERAL STENT REMOVAL;  Surgeon: Ubaldo Cho MD;  Location: [facility] OR;  Service: Urology;  Laterality: Left;    HYSTERECTOMY      GRACE/BSO, DUB    LASER LITHOTRIPSY Left 2019    Procedure: LITHOTRIPSY, USING LASER;  Surgeon: Ubaldo Cho MD;  Location: [facility] OR;  Service: Urology;  Laterality: Left;    OOPHORECTOMY      PERCUTANEOUS NEPHROLITHOTRIPSY  2016    ROTATOR CUFF REPAIR      left    URETERAL STENT PLACEMENT Left 2019    Procedure: INSERTION, STENT, URETER;  Surgeon: Ubaldo Cho MD;  Location: [facility] OR;  Service: Urology;  Laterality: Left;    URETEROSCOPIC REMOVAL OF URETERIC CALCULUS Left 2019    Procedure: REMOVAL, CALCULUS, URETER, URETEROSCOPIC;  Surgeon: Ubaldo Cho MD;  Location: [facility] OR;  Service: Urology;  Laterality: Left;    ureteroscopy  12/15/15     Allergies:  No known drug allergies    Medications: reviewed   Objective:     Vitals:    20 0837   BP: 130/70   Pulse: (!) 59   Resp: 18   Temp: 98.1 °F (36.7 °C)     Physical Exam   Vitals reviewed.  Constitutional: She is oriented to person, place,  and time. She appears well-developed and well-nourished.   HENT:   Head: Normocephalic and atraumatic.   Eyes: Conjunctivae and EOM are normal. Pupils are equal, round, and reactive to light.   Neck: Normal range of motion. Neck supple.   Cardiovascular: Normal rate, regular rhythm, normal heart sounds and intact distal pulses.    Pulmonary/Chest: Effort normal and breath sounds normal.   Abdominal: Soft. Bowel sounds are normal.   Musculoskeletal: Normal range of motion.   Neurological: She is alert and oriented to person, place, and time. She has normal reflexes.   Skin: Skin is warm and dry.     Psychiatric: She has a normal mood and affect. Her behavior is normal. Judgment and thought content normal.     LABS REVIEW:  UA today:   Color:Clear, Yellow  Spec. Grav.  1.020  PH  5.5  Small amt leukocytes  Negative for nitrates, protein, glucose, ketones, urobili, bili, and blood.    Assessment:       1. Nephrolithiasis    2. Uric acid renal calculus          Plan:   Hx of Uric acid stones:    1. Uric acid and BMP in six months  2. RBUS to be done in six months  3. F/up with Dr. Cho in six months after lab/imaging is completed.  4.   Pt was instructed to continue her     [facility]: Active    PING Burk

## 2020-05-20 NOTE — PATIENT INSTRUCTIONS
Kidney Stone (Urine)  Does this test have other names?  Urine stone risk profile, 24-hour collection  What is this test?  This test checks your urine for chemicals that might cause your body to form kidney stones. The test also looks for blood in your urine, which can be a symptom of kidney stones.  Kidney stones are hard masses of minerals and salts that can form in your kidneys. They can be as small as a grain of sand or more than an inch in diameter. Usually theses stones or crystals pass through your body when you urinate. But sometimes they can get stuck in your urinary tract and cause a lot of pain.  Why do I need this test?  You may need this test if your healthcare provider suspects that you have kidney stones. Symptoms of kidney stones include:  · Pain in your lower belly (abdomen) or side  · Nausea and vomiting  · Sudden, strong urge to urinate  · Pain when urinating  · Blood in your urine  You may also have this test if you had a kidney stone or you are being treated for kidney stones. If you have had a kidney stone or any treatments for a kidney stone, you should wait 1 to 2 months, or until you have completely recovered, before having this test.  You will need to repeat the test at least twice so your healthcare provider can compare the results.  What other tests might I have along with this test?  Your healthcare provider may also order imaging tests. These include an ultrasound, CT scan and, a special type of X-ray (pyelogram) that uses a dye to look for kidney stones.  Your provider is also likely to order blood tests, to look for calcium, phosphate, uric acid, oxalate, and citrate. These are some of the chemicals that are most likely to cause your body to form kidney stones.  What do my test results mean?  Many things may affect your lab test results. These include the method each lab uses to do the test. Even if your test results are different from the normal value, you may not have a problem. To  learn what the results mean for you, talk with your healthcare provider.  The results will show whether your urine has high or low levels of the chemicals that are most likely to cause stones to form. These chemicals are calcium, phosphate, uric acid, oxalate, and citrate.  If your levels are not normal, it may mean that you have a kidney stone or stones.  Abnormal levels may also mean that you have another kidney disorder, such as a urinary tract infection.  How is this test done?  This test requires a 24-hour urine sample. For this sample, you must collect all of your urine for 24 hours. Empty your bladder completely first in the morning without collecting it and note the time. Then collect your urine every time you go to the bathroom over the next 24 hours. As you collect the urine, store it in the refrigerator.  Does this test pose any risks?  This test does not pose any known risks.  What might affect my test results?  Having this test too soon after treatment for a previous kidney stone can affect your results. You should wait several months after treatment before having this test.  How do I get ready for this test?  You don't need to prepare for this test.    © 4124-9124 [employer] [address] [place] [address], [place] [address]. All rights reserved. This information is not intended as a substitute for professional medical care. Always follow your healthcare professional's instructions.

## 2020-05-27 ENCOUNTER — TELEPHONE (OUTPATIENT)
Dept: FAMILY MEDICINE | Facility: CLINIC | Age: 70
End: 2020-05-27

## 2020-05-28 ENCOUNTER — PATIENT OUTREACH (OUTPATIENT)
Dept: ADMINISTRATIVE | Facility: HOSPITAL | Age: 70
End: 2020-05-28

## 2020-05-28 ENCOUNTER — OFFICE VISIT (OUTPATIENT)
Dept: FAMILY MEDICINE | Facility: CLINIC | Age: 70
End: 2020-05-28
Payer: MEDICARE

## 2020-05-28 VITALS
HEART RATE: 73 BPM | TEMPERATURE: 98 F | SYSTOLIC BLOOD PRESSURE: 138 MMHG | OXYGEN SATURATION: 96 % | WEIGHT: 177 LBS | DIASTOLIC BLOOD PRESSURE: 80 MMHG | HEIGHT: 61 IN | BODY MASS INDEX: 33.42 KG/M2

## 2020-05-28 DIAGNOSIS — E11.69 TYPE 2 DIABETES MELLITUS WITH HYPERLIPIDEMIA: ICD-10-CM

## 2020-05-28 DIAGNOSIS — Z76.0 MEDICATION REFILL: ICD-10-CM

## 2020-05-28 DIAGNOSIS — Z00.00 WELLNESS EXAMINATION: Primary | ICD-10-CM

## 2020-05-28 DIAGNOSIS — Z78.0 POST-MENOPAUSE: ICD-10-CM

## 2020-05-28 DIAGNOSIS — Z79.899 ENCOUNTER FOR MONITORING STATIN THERAPY: ICD-10-CM

## 2020-05-28 DIAGNOSIS — E78.5 TYPE 2 DIABETES MELLITUS WITH HYPERLIPIDEMIA: ICD-10-CM

## 2020-05-28 DIAGNOSIS — Z51.81 ENCOUNTER FOR MONITORING STATIN THERAPY: ICD-10-CM

## 2020-05-28 PROCEDURE — 99214 OFFICE O/P EST MOD 30 MIN: CPT | Mod: S$PBB,ICN,, | Performed by: FAMILY MEDICINE

## 2020-05-28 PROCEDURE — 99999 PR PBB SHADOW E&M-EST. PATIENT-LVL IV: ICD-10-PCS | Mod: PBBFAC,,, | Performed by: FAMILY MEDICINE

## 2020-05-28 PROCEDURE — 99214 PR OFFICE/OUTPT VISIT, EST, LEVL IV, 30-39 MIN: ICD-10-PCS | Mod: S$PBB,ICN,, | Performed by: FAMILY MEDICINE

## 2020-05-28 PROCEDURE — 99214 OFFICE O/P EST MOD 30 MIN: CPT | Mod: PBBFAC,PO | Performed by: FAMILY MEDICINE

## 2020-05-28 PROCEDURE — 99999 PR PBB SHADOW E&M-EST. PATIENT-LVL IV: CPT | Mod: PBBFAC,,, | Performed by: FAMILY MEDICINE

## 2020-05-28 RX ORDER — NAPROXEN SODIUM 220 MG/1
81 TABLET, FILM COATED ORAL DAILY
Qty: 90 TABLET | Refills: 1 | Status: SHIPPED | OUTPATIENT
Start: 2020-05-28 | End: 2023-08-30

## 2020-05-28 RX ORDER — GLIMEPIRIDE 4 MG/1
TABLET ORAL
Qty: 180 TABLET | Refills: 1 | Status: SHIPPED | OUTPATIENT
Start: 2020-05-28 | End: 2020-12-04

## 2020-05-28 NOTE — PROGRESS NOTES
Subjective:       Patient ID: Jessica Luna is a 69 y.o. female.    Chief Complaint: Annual Exam    HPI     Presents to clinic for her wellness. Currently has no complaints.     Exercise: Walks up her steps. Does some yard work.     Diet: Tries to eat veggies. Admits she likes sweets.     Cardiologist is Dr. Jeronimo.     Sees Dr. Coburn    Sees Dr. Cho for kideny stones.     Past Medical History:   Diagnosis Date    Anticoagulant long-term use     Arthritis     CAD (coronary artery disease)     Diabetes mellitus     Diabetes mellitus type II     Diverticulosis     Hyperlipidemia     Hypertension     Low magnesium levels     Myocardial infarction     Obstructive uropathy 10/16/2015    Renal stone     Status post cystoscopy - Left laser percutaneous nephrolithotripsy 2016    Ureterolithiasis Obstructive-left 1/3/2015    Wears dentures     FULL UPPER - PARTIAL BOTTOM    Wears glasses     Wears glasses        Past Surgical History:   Procedure Laterality Date     SECTION      CHOLECYSTECTOMY      COLONOSCOPY N/A 10/7/2015    Procedure: COLONOSCOPY;  Surgeon: Washington Rodriguez MD;  Location: [facility] ENDO;  Service: Endoscopy;  Laterality: N/A;    COLONOSCOPY N/A 10/3/2019    Procedure: COLONOSCOPY;  Surgeon: Viviane Simeon MD;  Location: [facility];  Service: Endoscopy;  Laterality: N/A;    CORONARY STENT PLACEMENT      2 vessels    CYSTOSCOPY      CYSTOSCOPY W/ LASER LITHOTRIPSY  12/15/15    CYSTOSCOPY W/ RETROGRADES Bilateral 2019    Procedure: CYSTOSCOPY, WITH RETROGRADE PYELOGRAM;  Surgeon: Ubaldo Cho MD;  Location: [facility] OR;  Service: Urology;  Laterality: Bilateral;    CYSTOSCOPY W/ URETERAL STENT REMOVAL Left 10/1/2019    Procedure: CYSTOSCOPY, WITH URETERAL STENT REMOVAL;  Surgeon: Ubaldo Cho MD;  Location: [facility] OR;  Service: Urology;  Laterality: Left;    HYSTERECTOMY      GRACE/BSO, DUB    LASER LITHOTRIPSY Left 2019    Procedure:  LITHOTRIPSY, USING LASER;  Surgeon: Ubaldo Cho MD;  Location: [facility] OR;  Service: Urology;  Laterality: Left;    OOPHORECTOMY      PERCUTANEOUS NEPHROLITHOTRIPSY  06/28/2016    ROTATOR CUFF REPAIR      left    URETERAL STENT PLACEMENT Left 9/19/2019    Procedure: INSERTION, STENT, URETER;  Surgeon: Ubaldo Cho MD;  Location: [facility] OR;  Service: Urology;  Laterality: Left;    URETEROSCOPIC REMOVAL OF URETERIC CALCULUS Left 9/19/2019    Procedure: REMOVAL, CALCULUS, URETER, URETEROSCOPIC;  Surgeon: Ubaldo Cho MD;  Location: [facility] OR;  Service: Urology;  Laterality: Left;    ureteroscopy  12/15/15       Family History   Problem Relation Age of Onset    Heart disease Father 90    Cancer Mother         breast cancer    Breast cancer Mother 60    Diabetes Brother     Kidney disease Brother         nephrectomy due to cancer    Spina bifida Brother     Cancer Sister         breast cancer    Breast cancer Sister 59       Social History     Tobacco Use    Smoking status: Never Smoker    Smokeless tobacco: Never Used   Substance Use Topics    Alcohol use: No     Frequency: Never     Drinks per session: 1 or 2     Binge frequency: Never    Drug use: No       Social History     Substance and Sexual Activity   Sexual Activity Yes    Partners: Male          Current Outpatient Medications:     acetaminophen (TYLENOL) 500 MG tablet, Take 500 mg by mouth every 6 (six) hours as needed for Pain., Disp: , Rfl:     allopurinol (ZYLOPRIM) 100 MG tablet, Take 1 tablet (100 mg total) by mouth once daily., Disp: 30 tablet, Rfl: 11    atorvastatin (LIPITOR) 40 MG tablet, Take 40 mg by mouth once daily. , Disp: , Rfl:     ciprofloxacin HCl (CIPRO) 500 MG tablet, Take 1 tablet (500 mg total) by mouth 2 (two) times daily., Disp: 4 tablet, Rfl: 0    glimepiride (AMARYL) 4 MG tablet, TAKE 2 TABLETS BY MOUTH ONCE DAILY WITH BREAKFAST, Disp: 60 tablet, Rfl: 0    losartan (COZAAR) 100 MG tablet, Take 1  tablet (100 mg total) by mouth once daily., Disp: 90 tablet, Rfl: 3    magnesium oxide (MAG-OX) 250 mg magnesium Tab, Take 0.5 tablets by mouth once daily., Disp: , Rfl:     metFORMIN (GLUCOPHAGE) 1000 MG tablet, Take 1 tablet (1,000 mg total) by mouth 2 (two) times daily with meals., Disp: 180 tablet, Rfl: 3    metoprolol tartrate (LOPRESSOR) 25 MG tablet, Take 25 mg by mouth 2 (two) times daily., Disp: , Rfl:     omega-3 fatty acids-vitamin E (OMEGA-3 FISH OIL) 1,000-5 mg-unit Cap, Take 1 capsule by mouth 2 (two) times daily. Two times a day, Disp: , Rfl:     potassium citrate (UROCIT-K 15) 15 mEq TbSR, Take 1 tablet by mouth 2 (two) times daily., Disp: 60 tablet, Rfl: 11    prednisoLONE acetate (PRED FORTE) 1 % DrpS, INSTILL 1 DROP INTO LEFT EYE 4 TIMES DAILY, Disp: , Rfl:     SITagliptin (JANUVIA) 100 MG Tab, Take 1 tablet (100 mg total) by mouth once daily., Disp: 90 tablet, Rfl: 3    aspirin 81 MG Chew, Take 1 tablet (81 mg total) by mouth once daily., Disp: , Rfl: 0    Current Facility-Administered Medications:     magnesium sulfate 3 g in dextrose 5 % 250 mL IVPB, 3 g, Intravenous, Once, [place] GLORIA Jeronimo MD, Stopped at 08/20/19 1353     Review of patient's allergies indicates:   Allergen Reactions    No known drug allergies             Review of Systems   Constitutional: Negative for activity change and unexpected weight change.   HENT: Negative for hearing loss, rhinorrhea and trouble swallowing.    Eyes: Negative for discharge and visual disturbance.   Respiratory: Negative for chest tightness and wheezing.    Cardiovascular: Negative for chest pain and palpitations.   Gastrointestinal: Negative for blood in stool, constipation, diarrhea and vomiting.   Endocrine: Negative for polydipsia and polyuria.   Genitourinary: Negative for difficulty urinating, dysuria, hematuria and menstrual problem.   Musculoskeletal: Negative for arthralgias, joint swelling and neck pain.   Neurological:  "Negative for weakness and headaches.   Psychiatric/Behavioral: Negative for confusion and dysphoric mood.           Objective:          Vitals:    05/28/20 1347   BP: 138/80   Pulse: 73   Temp: 98.4 °F (36.9 °C)   SpO2: 96%   Weight: 80.3 kg (177 lb 0.5 oz)   Height: 5' 1" (1.549 m)       Physical Exam   Constitutional: She appears well-developed and well-nourished.   HENT:   Head: Normocephalic and atraumatic.   Right Ear: External ear normal.   Left Ear: External ear normal.   Eyes: Conjunctivae are normal.   Neck: Normal range of motion.   Cardiovascular: Normal rate, regular rhythm and normal heart sounds.   Pulses:       Dorsalis pedis pulses are 2+ on the right side, and 2+ on the left side.   Pulmonary/Chest: Effort normal and breath sounds normal.   Abdominal: Soft. Bowel sounds are normal. There is no tenderness.   Musculoskeletal: Normal range of motion.   Feet:   Right Foot:   Protective Sensation: 4 sites tested. 4 sites sensed.   Skin Integrity: Negative for skin breakdown.   Left Foot:   Protective Sensation: 4 sites tested. 4 sites sensed.   Skin Integrity: Negative for skin breakdown.   Neurological: She is alert.   Skin: Skin is warm.   Psychiatric: She has a normal mood and affect. Her behavior is normal.   Vitals reviewed.    Vibratory sensation in feet normal.           Assessment/Plan     Jessica Rosa was seen today for annual exam.    Diagnoses and all orders for this visit:    Wellness examination    Type 2 diabetes mellitus with hyperlipidemia  -     Hemoglobin A1C; Future  -     Lipid Panel; Future  -     CBC auto differential; Future  -     Refill glimepiride (AMARYL) 4 MG tablet; TAKE 2 TABLETS BY MOUTH ONCE DAILY WITH BREAKFAST    Encounter for monitoring statin therapy  -     Hepatic function panel; Future    Post-menopause  -     DXA Bone Density Spine And Hip; Future    Medication refill  -     aspirin 81 MG Chew; Take 1 tablet (81 mg total) by mouth once daily.      Follow up in about 6 " months (around 11/28/2020) for check up.    Future Appointments   Date Time Provider Department Center   6/5/2020  9:40 AM LAB, [facility] [place]   6/5/2020 11:00 AM [facility] DEXA1 [facility] BMD [place]   11/13/2020  8:00 AM [facility] US1 [facility] ULSOUND [place]   11/20/2020  8:30 AM Ubaldo Cho MD [facility] UROLOGY [place]   12/1/2020  1:40 PM Tess Martinez MD [facility] [place]           Tess Martinez MD  [facility]

## 2020-06-05 ENCOUNTER — HOSPITAL ENCOUNTER (OUTPATIENT)
Dept: RADIOLOGY | Facility: CLINIC | Age: 70
Discharge: HOME | End: 2020-06-05
Attending: FAMILY MEDICINE
Payer: MEDICARE

## 2020-06-05 DIAGNOSIS — Z78.0 POST-MENOPAUSE: ICD-10-CM

## 2020-06-05 PROCEDURE — 77080 DEXA BONE DENSITY SPINE HIP: ICD-10-PCS | Mod: 26,,, | Performed by: RADIOLOGY

## 2020-06-05 PROCEDURE — 77080 DXA BONE DENSITY AXIAL: CPT | Mod: 26,,, | Performed by: RADIOLOGY

## 2020-06-05 PROCEDURE — 77080 DXA BONE DENSITY AXIAL: CPT | Mod: TC,PO

## 2020-06-07 PROBLEM — M85.80 OSTEOPENIA: Status: ACTIVE | Noted: 2020-06-07

## 2020-06-08 NOTE — PROGRESS NOTES
Spoke with patient regarding her results and dr. Martinez's suggestions, patient expressed understanding

## 2020-08-04 ENCOUNTER — LAB VISIT (OUTPATIENT)
Dept: LAB | Facility: HOSPITAL | Age: 70
End: 2020-08-04
Attending: INTERNAL MEDICINE
Payer: MEDICARE

## 2020-08-04 DIAGNOSIS — E11.9 DIABETES MELLITUS WITHOUT COMPLICATION: Primary | ICD-10-CM

## 2020-08-04 LAB — MAGNESIUM SERPL-MCNC: 1.7 MG/DL (ref 1.6–2.6)

## 2020-08-04 PROCEDURE — 83735 ASSAY OF MAGNESIUM: CPT

## 2020-08-04 PROCEDURE — 36415 COLL VENOUS BLD VENIPUNCTURE: CPT

## 2020-08-18 DIAGNOSIS — E11.9 TYPE 2 DIABETES MELLITUS WITHOUT COMPLICATION, WITHOUT LONG-TERM CURRENT USE OF INSULIN: Primary | ICD-10-CM

## 2020-08-21 DIAGNOSIS — E11.9 TYPE 2 DIABETES MELLITUS WITHOUT COMPLICATION, WITHOUT LONG-TERM CURRENT USE OF INSULIN: ICD-10-CM

## 2020-08-21 NOTE — TELEPHONE ENCOUNTER
Please resend Januvia rx to [facility] on [address] for 30 day supply with refills. [facility] is no longer used. Preferred pharmacy has been updated.

## 2020-08-21 NOTE — TELEPHONE ENCOUNTER
----- Message from Bonita Dario sent at 8/20/2020 10:38 AM CDT -----  Regarding: medication refill sent to wrong pharmacy  Contact: patient  Type:  RX Refill Request  Who Called:  patient  Refill or New Rx:  Refill  RX Name and Strength:  SITagliptin (JANUVIA) 100 MG Tab  How is the patient currently taking it? (ex. 1XDay):  1 x day  Is this a 30 day or 90 day RX:  30  Preferred Pharmacy with phone number:    [id number] - [place] [address] [place] [address]  Phone: [phone] Fax: [phone]  Local or Mail Order:  local  Ordering Provider:  brodie Bridges Call Back Number:  [phone]  Additional Information:  Patient states that [facility] is not the correct pharmacy she would like medication to go.  Patient has enough for the week. Please advise.  Thanks!

## 2020-09-14 ENCOUNTER — TELEPHONE (OUTPATIENT)
Dept: CARDIOLOGY | Facility: CLINIC | Age: 70
End: 2020-09-14

## 2020-09-18 DIAGNOSIS — E11.9 TYPE 2 DIABETES MELLITUS WITHOUT COMPLICATION: ICD-10-CM

## 2020-09-18 DIAGNOSIS — E11.9 TYPE 2 DIABETES MELLITUS WITHOUT COMPLICATION, WITHOUT LONG-TERM CURRENT USE OF INSULIN: ICD-10-CM

## 2020-09-28 RX ORDER — ATORVASTATIN CALCIUM 40 MG/1
40 TABLET, FILM COATED ORAL DAILY
Qty: 90 TABLET | Refills: 3 | Status: SHIPPED | OUTPATIENT
Start: 2020-09-28 | End: 2021-11-15

## 2020-09-28 RX ORDER — METOPROLOL TARTRATE 25 MG/1
25 TABLET, FILM COATED ORAL 2 TIMES DAILY
Qty: 180 TABLET | Refills: 3 | Status: SHIPPED | OUTPATIENT
Start: 2020-09-28 | End: 2020-10-05 | Stop reason: SDUPTHER

## 2020-09-28 NOTE — TELEPHONE ENCOUNTER
----- Message from Lianna Alejo sent at 9/28/2020 10:29 AM CDT -----  Regarding: medication refill  Please call patient at  [phone] for medication refill she only have 4 days atorvastatin and metoprolol

## 2020-09-28 NOTE — TELEPHONE ENCOUNTER
----- Message from Lianna Alejo sent at 9/28/2020 10:29 AM CDT -----  Regarding: medication refill  Please call patient at  [phone] for medication refill she only have 4 days atorvastatin and metoprolol

## 2020-10-01 ENCOUNTER — PATIENT MESSAGE (OUTPATIENT)
Dept: OTHER | Facility: OTHER | Age: 70
End: 2020-10-01

## 2020-10-05 ENCOUNTER — PATIENT MESSAGE (OUTPATIENT)
Dept: ADMINISTRATIVE | Facility: HOSPITAL | Age: 70
End: 2020-10-05

## 2020-10-05 RX ORDER — METOPROLOL TARTRATE 25 MG/1
25 TABLET, FILM COATED ORAL 2 TIMES DAILY
Qty: 180 TABLET | Refills: 3 | Status: SHIPPED | OUTPATIENT
Start: 2020-10-05 | End: 2022-04-18 | Stop reason: SDUPTHER

## 2020-11-09 ENCOUNTER — TELEPHONE (OUTPATIENT)
Dept: FAMILY MEDICINE | Facility: CLINIC | Age: 70
End: 2020-11-09

## 2020-11-09 DIAGNOSIS — Z12.31 ENCOUNTER FOR SCREENING MAMMOGRAM FOR BREAST CANCER: Primary | ICD-10-CM

## 2020-11-09 NOTE — TELEPHONE ENCOUNTER
----- Message from Estefania Calix sent at 11/9/2020 11:22 AM CST -----  Contact: patient  Type: Needs Medical Advice  Who Called:  patient    Best Call Back Number: [phone] (home)     Additional Information: would like an order put in for a mammo

## 2020-12-02 ENCOUNTER — HOSPITAL ENCOUNTER (OUTPATIENT)
Dept: RADIOLOGY | Facility: HOSPITAL | Age: 70
Discharge: HOME | End: 2020-12-02
Attending: FAMILY MEDICINE
Payer: MEDICARE

## 2020-12-02 DIAGNOSIS — Z12.31 ENCOUNTER FOR SCREENING MAMMOGRAM FOR BREAST CANCER: ICD-10-CM

## 2020-12-02 PROCEDURE — 77067 SCR MAMMO BI INCL CAD: CPT | Mod: TC

## 2020-12-02 PROCEDURE — 77063 BREAST TOMOSYNTHESIS BI: CPT | Mod: 26,,, | Performed by: RADIOLOGY

## 2020-12-02 PROCEDURE — 77063 MAMMO DIGITAL SCREENING BILAT WITH TOMO: ICD-10-PCS | Mod: 26,,, | Performed by: RADIOLOGY

## 2020-12-02 PROCEDURE — 77067 MAMMO DIGITAL SCREENING BILAT WITH TOMO: ICD-10-PCS | Mod: 26,,, | Performed by: RADIOLOGY

## 2020-12-02 PROCEDURE — 77067 SCR MAMMO BI INCL CAD: CPT | Mod: 26,,, | Performed by: RADIOLOGY

## 2020-12-10 ENCOUNTER — CLINICAL SUPPORT (OUTPATIENT)
Dept: FAMILY MEDICINE | Facility: CLINIC | Age: 70
End: 2020-12-10
Payer: MEDICARE

## 2020-12-10 VITALS
DIASTOLIC BLOOD PRESSURE: 58 MMHG | WEIGHT: 177 LBS | HEART RATE: 63 BPM | HEIGHT: 61 IN | RESPIRATION RATE: 16 BRPM | SYSTOLIC BLOOD PRESSURE: 102 MMHG | TEMPERATURE: 98 F | BODY MASS INDEX: 33.42 KG/M2 | OXYGEN SATURATION: 97 %

## 2020-12-10 DIAGNOSIS — Z23 IMMUNIZATION DUE: Primary | ICD-10-CM

## 2020-12-10 PROCEDURE — 90694 VACC AIIV4 NO PRSRV 0.5ML IM: CPT | Mod: PBBFAC,PO

## 2020-12-10 PROCEDURE — 99999 PR PBB SHADOW E&M-EST. PATIENT-LVL III: ICD-10-PCS | Mod: PBBFAC,,,

## 2020-12-10 PROCEDURE — 99213 OFFICE O/P EST LOW 20 MIN: CPT | Mod: PBBFAC,PO,25

## 2020-12-10 PROCEDURE — 99999 PR PBB SHADOW E&M-EST. PATIENT-LVL III: CPT | Mod: PBBFAC,,,

## 2020-12-10 PROCEDURE — G0008 ADMIN INFLUENZA VIRUS VAC: HCPCS | Mod: PBBFAC,PO

## 2020-12-10 NOTE — PROGRESS NOTES
Identified patient's name and . Administered HD flu  vaccine, IM. Patient tolerated well, aseptic technique maintained. Pain scale 0 out of 10 with injection. Instructed patient to wait in clinic for 15 minutes after injection was given.

## 2020-12-11 ENCOUNTER — PATIENT MESSAGE (OUTPATIENT)
Dept: OTHER | Facility: OTHER | Age: 70
End: 2020-12-11

## 2020-12-15 RX ORDER — LOSARTAN POTASSIUM 100 MG/1
TABLET ORAL
Qty: 90 TABLET | Refills: 0 | Status: SHIPPED | OUTPATIENT
Start: 2020-12-15 | End: 2020-12-15 | Stop reason: SDUPTHER

## 2020-12-16 RX ORDER — LOSARTAN POTASSIUM 100 MG/1
100 TABLET ORAL DAILY
Qty: 90 TABLET | Refills: 2 | Status: SHIPPED | OUTPATIENT
Start: 2020-12-16 | End: 2022-01-24

## 2020-12-22 NOTE — TELEPHONE ENCOUNTER
Colon and Rectal Surgery  Daily Progress Note      Chief Complaint:  Exploratory laparotomy with sigmoid colon and partial rectal resection, transverse loop colostomy, omentectomy, extensive lysis of adhesion and abdominal wash out 12/16/20 with Dr Wagner.    Subjective:  No major overnight events.  Patient seen in conjunction with wound care.  Stoma bolster remains in place with no leakage from appliance.  Patient states pain is adequately controlled with PCA.  Denies nausea or vomiting.     Intake/Output:  Last Stool Occurrence:     I/O last 3 completed shifts:  In: 2884.6 [P.O.:1160; I.V.:934.6; IV Piggyback:216]  Out: 1600 [Urine:1425; Drains:25; Stool:150]    Medications/Infusions:  Scheduled:    • enoxaparin  80 mg Subcutaneous Q12H   • famotidine (PEPCID) injection  20 mg Intravenous 2 times per day   • nystatin   Topical 2 times per day   • Potassium Standard Replacement Protocol   Does not apply See Admin Instructions   • Magnesium Standard Replacement Protocol   Does not apply See Admin Instructions   • TPN - DIETICIAN/PHARMACIST managed   Does not apply See Admin Instructions   • piperacillin-tazobactam (ZOSYN) extended interval IVPB  3.375 g Intravenous Q8H   • sodium chloride (PF)  2 mL Intracatheter 2 times per day     Continuous Infusions:    • dextrose 10 % infusion     • parenteral nutrition adult custom central 83 mL/hr at 12/21/20 2207   • sodium chloride 0.9% infusion 25 mL/hr at 12/22/20 0343   • HYDROmorphone (DILAUDID) 0.2 mg/mL in sodium chl 0.9% 30 mL PCA infusion         Physical Exam:  Vitals: Blood pressure 133/84, pulse (!) 101, temperature 97.5 °F (36.4 °C), temperature source Oral, resp. rate 18, height 5' 6\" (1.676 m), weight (!) 159.5 kg, SpO2 98 %.    General Appearance: awake, alert, cooperative, in no acute distress  Head: normocephalic, atraumatic  Eyes: sclera non-icteric  Respiratory: on room air with no increased work of breathing or accessory muscle use  Heart: normal rate,  Patient scheduled for screening mammo on 12/2/20 at 8:15AM. Patient verbalized understanding.    regular  Abdomen: soft, appropriately tender to palpation zaira-incisionally with no rebound tenderness or guarding; Midline incision with exposed fascia and viable wound bed, new area of desiccation to superior left incision edge; drain with purulent drainage output; stoma retracted, soft red rubber to prevent further retraction in place.  (SEE photos under media tab)  : Owens intact and draining clear yellow urine  Extremities: warm, well perfused; no calf tenderness bilaterally  Neurologic: cranial nerves grossly intact, appropriate affect, alert and oriented x 4    Laboratory Results:    Lab Results   Component Value Date    WBC 15.7 (H) 12/22/2020    HCT 30.3 (L) 12/22/2020    HGB 9.4 (L) 12/22/2020    PLT 53 (L) 12/22/2020    SODIUM 140 12/22/2020    POTASSIUM 4.5 12/22/2020    BUN 36 (H) 12/22/2020    CREATININE 1.04 (H) 12/22/2020    AST 19 12/20/2020    BILIRUBIN 2.4 (H) 12/20/2020    PT 18.8 (H) 12/04/2020    INR 1.8 12/04/2020    PTT 41 (H) 12/04/2020       Assessment:  Exploratory laparotomy with sigmoid colon and partial rectal resection, transverse loop colostomy, omentectomy, extensive lysis of adhesion and abdominal wash out 12/16/20 with Dr Wagner.    Plan:  - Continue current multimodal pain management strategy  - Continue TPN, do not advance past sips of clears  - Ostomy RN and wound care following, continue TID dressing changes with VAshe moistened Kerlex lightly packed all the way down to the base of wound. Add wound gel to skin edge to add moisture to wound.  Monitory ostomy appliance for leakage into wound  - Patient at a very high risk of intraabdominal infection, recommend repeating abdominal CT tomorrow or Thursday  - Lovenox for chemical DVT prophylaxis; SCDs for mechanical DVT prophylaxis  - Encourage ambulation, PT/OT  - Incentive spirometry  - Medical management per attending     MD Vickie Espana, APNP [phone]  After 5 pm please page Dr Snow with any emergent  concerns.

## 2021-01-04 ENCOUNTER — PATIENT MESSAGE (OUTPATIENT)
Dept: ADMINISTRATIVE | Facility: HOSPITAL | Age: 71
End: 2021-01-04

## 2021-01-11 ENCOUNTER — OFFICE VISIT (OUTPATIENT)
Dept: FAMILY MEDICINE | Facility: CLINIC | Age: 71
End: 2021-01-11
Payer: MEDICARE

## 2021-01-11 VITALS
TEMPERATURE: 98 F | RESPIRATION RATE: 16 BRPM | SYSTOLIC BLOOD PRESSURE: 142 MMHG | BODY MASS INDEX: 33.76 KG/M2 | WEIGHT: 178.81 LBS | HEIGHT: 61 IN | DIASTOLIC BLOOD PRESSURE: 68 MMHG

## 2021-01-11 DIAGNOSIS — E78.5 TYPE 2 DIABETES MELLITUS WITH HYPERLIPIDEMIA: ICD-10-CM

## 2021-01-11 DIAGNOSIS — E11.59 HYPERTENSION ASSOCIATED WITH DIABETES: ICD-10-CM

## 2021-01-11 DIAGNOSIS — I15.2 HYPERTENSION ASSOCIATED WITH DIABETES: ICD-10-CM

## 2021-01-11 DIAGNOSIS — E11.9 TYPE 2 DIABETES MELLITUS WITHOUT COMPLICATION, WITHOUT LONG-TERM CURRENT USE OF INSULIN: Primary | ICD-10-CM

## 2021-01-11 DIAGNOSIS — E66.9 DIABETES MELLITUS TYPE 2 IN OBESE: ICD-10-CM

## 2021-01-11 DIAGNOSIS — E11.69 TYPE 2 DIABETES MELLITUS WITH HYPERLIPIDEMIA: ICD-10-CM

## 2021-01-11 DIAGNOSIS — I25.10 CORONARY ARTERY DISEASE INVOLVING NATIVE CORONARY ARTERY WITHOUT ANGINA PECTORIS, UNSPECIFIED WHETHER NATIVE OR TRANSPLANTED HEART: ICD-10-CM

## 2021-01-11 DIAGNOSIS — E11.69 DIABETES MELLITUS TYPE 2 IN OBESE: ICD-10-CM

## 2021-01-11 PROCEDURE — 99999 PR PBB SHADOW E&M-EST. PATIENT-LVL IV: ICD-10-PCS | Mod: PBBFAC,,, | Performed by: NURSE PRACTITIONER

## 2021-01-11 PROCEDURE — 99999 PR PBB SHADOW E&M-EST. PATIENT-LVL IV: CPT | Mod: PBBFAC,,, | Performed by: NURSE PRACTITIONER

## 2021-01-11 PROCEDURE — 99214 OFFICE O/P EST MOD 30 MIN: CPT | Mod: S$PBB,,, | Performed by: NURSE PRACTITIONER

## 2021-01-11 PROCEDURE — 99214 PR OFFICE/OUTPT VISIT, EST, LEVL IV, 30-39 MIN: ICD-10-PCS | Mod: S$PBB,,, | Performed by: NURSE PRACTITIONER

## 2021-01-11 PROCEDURE — 99214 OFFICE O/P EST MOD 30 MIN: CPT | Mod: PBBFAC,PO | Performed by: NURSE PRACTITIONER

## 2021-01-19 ENCOUNTER — LAB VISIT (OUTPATIENT)
Dept: LAB | Facility: HOSPITAL | Age: 71
End: 2021-01-19
Attending: NURSE PRACTITIONER
Payer: MEDICARE

## 2021-01-19 DIAGNOSIS — E11.69 TYPE 2 DIABETES MELLITUS WITH HYPERLIPIDEMIA: ICD-10-CM

## 2021-01-19 DIAGNOSIS — E78.5 TYPE 2 DIABETES MELLITUS WITH HYPERLIPIDEMIA: ICD-10-CM

## 2021-01-19 LAB
ALBUMIN SERPL BCP-MCNC: 4 G/DL (ref 3.5–5.2)
ALP SERPL-CCNC: 126 U/L (ref 55–135)
ALT SERPL W/O P-5'-P-CCNC: 11 U/L (ref 10–44)
ANION GAP SERPL CALC-SCNC: 12 MMOL/L (ref 8–16)
AST SERPL-CCNC: 13 U/L (ref 10–40)
BASOPHILS # BLD AUTO: 0.04 K/UL (ref 0–0.2)
BASOPHILS NFR BLD: 0.4 % (ref 0–1.9)
BILIRUB SERPL-MCNC: 0.6 MG/DL (ref 0.1–1)
BUN SERPL-MCNC: 21 MG/DL (ref 8–23)
CALCIUM SERPL-MCNC: 10.3 MG/DL (ref 8.7–10.5)
CHLORIDE SERPL-SCNC: 103 MMOL/L (ref 95–110)
CHOLEST SERPL-MCNC: 159 MG/DL (ref 120–199)
CHOLEST/HDLC SERPL: 3.6 {RATIO} (ref 2–5)
CO2 SERPL-SCNC: 25 MMOL/L (ref 23–29)
CREAT SERPL-MCNC: 0.9 MG/DL (ref 0.5–1.4)
DIFFERENTIAL METHOD: ABNORMAL
EOSINOPHIL # BLD AUTO: 0.2 K/UL (ref 0–0.5)
EOSINOPHIL NFR BLD: 2.3 % (ref 0–8)
ERYTHROCYTE [DISTWIDTH] IN BLOOD BY AUTOMATED COUNT: 12.9 % (ref 11.5–14.5)
EST. GFR  (AFRICAN AMERICAN): >60 ML/MIN/1.73 M^2
EST. GFR  (NON AFRICAN AMERICAN): >60 ML/MIN/1.73 M^2
ESTIMATED AVG GLUCOSE: 174 MG/DL (ref 68–131)
GLUCOSE SERPL-MCNC: 135 MG/DL (ref 70–110)
HBA1C MFR BLD HPLC: 7.7 % (ref 4–5.6)
HCT VFR BLD AUTO: 40.9 % (ref 37–48.5)
HDLC SERPL-MCNC: 44 MG/DL (ref 40–75)
HDLC SERPL: 27.7 % (ref 20–50)
HGB BLD-MCNC: 13.2 G/DL (ref 12–16)
IMM GRANULOCYTES # BLD AUTO: 0.05 K/UL (ref 0–0.04)
IMM GRANULOCYTES NFR BLD AUTO: 0.5 % (ref 0–0.5)
LDLC SERPL CALC-MCNC: 83.6 MG/DL (ref 63–159)
LYMPHOCYTES # BLD AUTO: 2 K/UL (ref 1–4.8)
LYMPHOCYTES NFR BLD: 22 % (ref 18–48)
MAGNESIUM SERPL-MCNC: 1.6 MG/DL (ref 1.6–2.6)
MCH RBC QN AUTO: 28 PG (ref 27–31)
MCHC RBC AUTO-ENTMCNC: 32.3 G/DL (ref 32–36)
MCV RBC AUTO: 87 FL (ref 82–98)
MONOCYTES # BLD AUTO: 0.5 K/UL (ref 0.3–1)
MONOCYTES NFR BLD: 4.9 % (ref 4–15)
NEUTROPHILS # BLD AUTO: 6.4 K/UL (ref 1.8–7.7)
NEUTROPHILS NFR BLD: 69.9 % (ref 38–73)
NONHDLC SERPL-MCNC: 115 MG/DL
NRBC BLD-RTO: 0 /100 WBC
PLATELET # BLD AUTO: 262 K/UL (ref 150–350)
PMV BLD AUTO: 11.1 FL (ref 9.2–12.9)
POTASSIUM SERPL-SCNC: 4.6 MMOL/L (ref 3.5–5.1)
PROT SERPL-MCNC: 7.8 G/DL (ref 6–8.4)
RBC # BLD AUTO: 4.71 M/UL (ref 4–5.4)
SODIUM SERPL-SCNC: 140 MMOL/L (ref 136–145)
TRIGL SERPL-MCNC: 157 MG/DL (ref 30–150)
WBC # BLD AUTO: 9.19 K/UL (ref 3.9–12.7)

## 2021-01-19 PROCEDURE — 85025 COMPLETE CBC W/AUTO DIFF WBC: CPT

## 2021-01-19 PROCEDURE — 80061 LIPID PANEL: CPT

## 2021-01-19 PROCEDURE — 83735 ASSAY OF MAGNESIUM: CPT

## 2021-01-19 PROCEDURE — 36415 COLL VENOUS BLD VENIPUNCTURE: CPT

## 2021-01-19 PROCEDURE — 83036 HEMOGLOBIN GLYCOSYLATED A1C: CPT

## 2021-01-19 PROCEDURE — 80053 COMPREHEN METABOLIC PANEL: CPT

## 2021-01-22 ENCOUNTER — PATIENT MESSAGE (OUTPATIENT)
Dept: ADMINISTRATIVE | Facility: OTHER | Age: 71
End: 2021-01-22

## 2021-01-25 DIAGNOSIS — E11.9 TYPE 2 DIABETES MELLITUS WITHOUT COMPLICATION, WITHOUT LONG-TERM CURRENT USE OF INSULIN: ICD-10-CM

## 2021-02-08 ENCOUNTER — OFFICE VISIT (OUTPATIENT)
Dept: CARDIOLOGY | Facility: CLINIC | Age: 71
End: 2021-02-08
Payer: MEDICARE

## 2021-02-08 VITALS
RESPIRATION RATE: 16 BRPM | SYSTOLIC BLOOD PRESSURE: 142 MMHG | WEIGHT: 180 LBS | OXYGEN SATURATION: 98 % | DIASTOLIC BLOOD PRESSURE: 88 MMHG | HEART RATE: 75 BPM | HEIGHT: 61 IN | BODY MASS INDEX: 33.99 KG/M2

## 2021-02-08 DIAGNOSIS — E11.59 HYPERTENSION ASSOCIATED WITH DIABETES: ICD-10-CM

## 2021-02-08 DIAGNOSIS — E11.69 HYPERLIPIDEMIA ASSOCIATED WITH TYPE 2 DIABETES MELLITUS: ICD-10-CM

## 2021-02-08 DIAGNOSIS — E66.9 OBESITY, CLASS I, BMI 30-34.9: ICD-10-CM

## 2021-02-08 DIAGNOSIS — Z00.00 ANNUAL PHYSICAL EXAM: Primary | ICD-10-CM

## 2021-02-08 DIAGNOSIS — I25.10 CORONARY ARTERY DISEASE INVOLVING NATIVE CORONARY ARTERY OF NATIVE HEART WITHOUT ANGINA PECTORIS: ICD-10-CM

## 2021-02-08 DIAGNOSIS — E78.5 HYPERLIPIDEMIA ASSOCIATED WITH TYPE 2 DIABETES MELLITUS: ICD-10-CM

## 2021-02-08 DIAGNOSIS — E08.65 DIABETES MELLITUS DUE TO UNDERLYING CONDITION WITH HYPERGLYCEMIA, WITHOUT LONG-TERM CURRENT USE OF INSULIN: ICD-10-CM

## 2021-02-08 DIAGNOSIS — I15.2 HYPERTENSION ASSOCIATED WITH DIABETES: ICD-10-CM

## 2021-02-08 DIAGNOSIS — I25.2 H/O MYOCARDIAL INFARCTION, GREATER THAN 8 WEEKS: ICD-10-CM

## 2021-02-08 PROCEDURE — 99214 PR OFFICE/OUTPT VISIT, EST, LEVL IV, 30-39 MIN: ICD-10-PCS | Mod: S$GLB,,, | Performed by: INTERNAL MEDICINE

## 2021-02-08 PROCEDURE — 93000 EKG 12-LEAD: ICD-10-PCS | Mod: S$GLB,,, | Performed by: SPECIALIST

## 2021-02-08 PROCEDURE — 99214 OFFICE O/P EST MOD 30 MIN: CPT | Mod: S$GLB,,, | Performed by: INTERNAL MEDICINE

## 2021-02-08 PROCEDURE — 93000 ELECTROCARDIOGRAM COMPLETE: CPT | Mod: S$GLB,,, | Performed by: SPECIALIST

## 2021-02-09 ENCOUNTER — IMMUNIZATION (OUTPATIENT)
Dept: PRIMARY CARE CLINIC | Facility: CLINIC | Age: 71
End: 2021-02-09
Payer: MEDICARE

## 2021-02-09 DIAGNOSIS — Z23 NEED FOR VACCINATION: Primary | ICD-10-CM

## 2021-02-09 PROCEDURE — 0001A COVID-19, MRNA, LNP-S, PF, 30 MCG/0.3 ML DOSE VACCINE: ICD-10-PCS | Mod: S$GLB,,, | Performed by: FAMILY MEDICINE

## 2021-02-09 PROCEDURE — 0001A COVID-19, MRNA, LNP-S, PF, 30 MCG/0.3 ML DOSE VACCINE: CPT | Mod: S$GLB,,, | Performed by: FAMILY MEDICINE

## 2021-02-09 PROCEDURE — 91300 COVID-19, MRNA, LNP-S, PF, 30 MCG/0.3 ML DOSE VACCINE: CPT | Mod: S$GLB,,, | Performed by: FAMILY MEDICINE

## 2021-02-09 PROCEDURE — 91300 COVID-19, MRNA, LNP-S, PF, 30 MCG/0.3 ML DOSE VACCINE: ICD-10-PCS | Mod: S$GLB,,, | Performed by: FAMILY MEDICINE

## 2021-03-03 ENCOUNTER — IMMUNIZATION (OUTPATIENT)
Dept: PRIMARY CARE CLINIC | Facility: CLINIC | Age: 71
End: 2021-03-03
Payer: MEDICARE

## 2021-03-03 DIAGNOSIS — Z23 NEED FOR VACCINATION: Primary | ICD-10-CM

## 2021-03-03 PROCEDURE — 0002A COVID-19, MRNA, LNP-S, PF, 30 MCG/0.3 ML DOSE VACCINE: ICD-10-PCS | Mod: CV19,S$GLB,, | Performed by: FAMILY MEDICINE

## 2021-03-03 PROCEDURE — 91300 COVID-19, MRNA, LNP-S, PF, 30 MCG/0.3 ML DOSE VACCINE: ICD-10-PCS | Mod: S$GLB,,, | Performed by: FAMILY MEDICINE

## 2021-03-03 PROCEDURE — 91300 COVID-19, MRNA, LNP-S, PF, 30 MCG/0.3 ML DOSE VACCINE: CPT | Mod: S$GLB,,, | Performed by: FAMILY MEDICINE

## 2021-03-03 PROCEDURE — 0002A COVID-19, MRNA, LNP-S, PF, 30 MCG/0.3 ML DOSE VACCINE: CPT | Mod: CV19,S$GLB,, | Performed by: FAMILY MEDICINE

## 2021-03-07 ENCOUNTER — HOSPITAL ENCOUNTER (EMERGENCY)
Facility: HOSPITAL | Age: 71
Discharge: HOME | End: 2021-03-07
Attending: EMERGENCY MEDICINE
Payer: MEDICARE

## 2021-03-07 VITALS
RESPIRATION RATE: 17 BRPM | DIASTOLIC BLOOD PRESSURE: 70 MMHG | SYSTOLIC BLOOD PRESSURE: 136 MMHG | HEIGHT: 62 IN | OXYGEN SATURATION: 96 % | BODY MASS INDEX: 32.39 KG/M2 | TEMPERATURE: 98 F | HEART RATE: 70 BPM | WEIGHT: 176 LBS

## 2021-03-07 DIAGNOSIS — M19.90 ARTHRITIS: Primary | ICD-10-CM

## 2021-03-07 DIAGNOSIS — R52 PAIN: ICD-10-CM

## 2021-03-07 PROCEDURE — 99283 EMERGENCY DEPT VISIT LOW MDM: CPT | Mod: 25

## 2021-03-07 PROCEDURE — 25000003 PHARM REV CODE 250: Performed by: NURSE PRACTITIONER

## 2021-03-07 RX ORDER — HYDROCODONE BITARTRATE AND ACETAMINOPHEN 5; 325 MG/1; MG/1
1 TABLET ORAL EVERY 6 HOURS PRN
Qty: 18 TABLET | Refills: 0 | Status: SHIPPED | OUTPATIENT
Start: 2021-03-07 | End: 2021-03-10

## 2021-03-07 RX ORDER — HYDROCODONE BITARTRATE AND ACETAMINOPHEN 5; 325 MG/1; MG/1
1 TABLET ORAL
Status: COMPLETED | OUTPATIENT
Start: 2021-03-07 | End: 2021-03-07

## 2021-03-07 RX ADMIN — HYDROCODONE BITARTRATE AND ACETAMINOPHEN 1 TABLET: 5; 325 TABLET ORAL at 05:03

## 2021-03-08 ENCOUNTER — OFFICE VISIT (OUTPATIENT)
Dept: ORTHOPEDICS | Facility: CLINIC | Age: 71
End: 2021-03-08
Payer: MEDICARE

## 2021-03-08 VITALS — HEIGHT: 62 IN | BODY MASS INDEX: 32.39 KG/M2 | WEIGHT: 176 LBS | RESPIRATION RATE: 18 BRPM

## 2021-03-08 DIAGNOSIS — M25.511 ACUTE PAIN OF RIGHT SHOULDER: Primary | ICD-10-CM

## 2021-03-08 PROCEDURE — 99214 OFFICE O/P EST MOD 30 MIN: CPT | Mod: 25,S$PBB,, | Performed by: ORTHOPAEDIC SURGERY

## 2021-03-08 PROCEDURE — 99999 PR PBB SHADOW E&M-EST. PATIENT-LVL III: ICD-10-PCS | Mod: PBBFAC,,, | Performed by: ORTHOPAEDIC SURGERY

## 2021-03-08 PROCEDURE — 20610 LARGE JOINT ASPIRATION/INJECTION: R SUBACROMIAL BURSA: ICD-10-PCS | Mod: S$PBB,RT,, | Performed by: ORTHOPAEDIC SURGERY

## 2021-03-08 PROCEDURE — 20610 DRAIN/INJ JOINT/BURSA W/O US: CPT | Mod: PBBFAC,PN | Performed by: ORTHOPAEDIC SURGERY

## 2021-03-08 PROCEDURE — 99999 PR PBB SHADOW E&M-EST. PATIENT-LVL III: CPT | Mod: PBBFAC,,, | Performed by: ORTHOPAEDIC SURGERY

## 2021-03-08 PROCEDURE — 99213 OFFICE O/P EST LOW 20 MIN: CPT | Mod: PBBFAC,PN,25 | Performed by: ORTHOPAEDIC SURGERY

## 2021-03-08 PROCEDURE — 99214 PR OFFICE/OUTPT VISIT, EST, LEVL IV, 30-39 MIN: ICD-10-PCS | Mod: 25,S$PBB,, | Performed by: ORTHOPAEDIC SURGERY

## 2021-03-08 RX ADMIN — TRIAMCINOLONE ACETONIDE 40 MG: 40 INJECTION, SUSPENSION INTRA-ARTICULAR; INTRAMUSCULAR at 01:03

## 2021-03-09 RX ORDER — TRIAMCINOLONE ACETONIDE 40 MG/ML
40 INJECTION, SUSPENSION INTRA-ARTICULAR; INTRAMUSCULAR
Status: DISCONTINUED | OUTPATIENT
Start: 2021-03-08 | End: 2021-03-09 | Stop reason: HOSPADM

## 2021-03-10 ENCOUNTER — TELEPHONE (OUTPATIENT)
Dept: CARDIOLOGY | Facility: CLINIC | Age: 71
End: 2021-03-10

## 2021-03-11 ENCOUNTER — TELEPHONE (OUTPATIENT)
Dept: ORTHOPEDICS | Facility: CLINIC | Age: 71
End: 2021-03-11

## 2021-03-11 DIAGNOSIS — M25.511 RIGHT SHOULDER PAIN, UNSPECIFIED CHRONICITY: Primary | ICD-10-CM

## 2021-03-13 ENCOUNTER — HOSPITAL ENCOUNTER (OUTPATIENT)
Dept: RADIOLOGY | Facility: HOSPITAL | Age: 71
Discharge: HOME | End: 2021-03-13
Attending: ORTHOPAEDIC SURGERY
Payer: MEDICARE

## 2021-03-13 DIAGNOSIS — M25.511 RIGHT SHOULDER PAIN, UNSPECIFIED CHRONICITY: ICD-10-CM

## 2021-03-13 PROCEDURE — 73221 MRI SHOULDER WITHOUT CONTRAST RIGHT: ICD-10-PCS | Mod: 26,RT,, | Performed by: RADIOLOGY

## 2021-03-13 PROCEDURE — 73221 MRI JOINT UPR EXTREM W/O DYE: CPT | Mod: 26,RT,, | Performed by: RADIOLOGY

## 2021-03-13 PROCEDURE — 73221 MRI JOINT UPR EXTREM W/O DYE: CPT | Mod: TC,RT

## 2021-03-16 ENCOUNTER — OFFICE VISIT (OUTPATIENT)
Dept: ORTHOPEDICS | Facility: CLINIC | Age: 71
End: 2021-03-16
Payer: MEDICARE

## 2021-03-16 VITALS — WEIGHT: 176 LBS | RESPIRATION RATE: 16 BRPM | BODY MASS INDEX: 32.39 KG/M2 | HEIGHT: 62 IN

## 2021-03-16 DIAGNOSIS — M12.811 RIGHT ROTATOR CUFF TEAR ARTHROPATHY: ICD-10-CM

## 2021-03-16 DIAGNOSIS — Z01.818 PRE-OP TESTING: ICD-10-CM

## 2021-03-16 DIAGNOSIS — M25.511 ACUTE PAIN OF RIGHT SHOULDER: Primary | ICD-10-CM

## 2021-03-16 DIAGNOSIS — M75.101 RIGHT ROTATOR CUFF TEAR ARTHROPATHY: ICD-10-CM

## 2021-03-16 PROCEDURE — 99999 PR PBB SHADOW E&M-EST. PATIENT-LVL III: ICD-10-PCS | Mod: PBBFAC,,, | Performed by: ORTHOPAEDIC SURGERY

## 2021-03-16 PROCEDURE — 99213 OFFICE O/P EST LOW 20 MIN: CPT | Mod: PBBFAC,PN | Performed by: ORTHOPAEDIC SURGERY

## 2021-03-16 PROCEDURE — 99999 PR PBB SHADOW E&M-EST. PATIENT-LVL III: CPT | Mod: PBBFAC,,, | Performed by: ORTHOPAEDIC SURGERY

## 2021-03-16 PROCEDURE — 99214 OFFICE O/P EST MOD 30 MIN: CPT | Mod: S$PBB,,, | Performed by: ORTHOPAEDIC SURGERY

## 2021-03-16 PROCEDURE — 99214 PR OFFICE/OUTPT VISIT, EST, LEVL IV, 30-39 MIN: ICD-10-PCS | Mod: S$PBB,,, | Performed by: ORTHOPAEDIC SURGERY

## 2021-03-17 ENCOUNTER — TELEPHONE (OUTPATIENT)
Dept: FAMILY MEDICINE | Facility: CLINIC | Age: 71
End: 2021-03-17

## 2021-03-17 ENCOUNTER — TELEPHONE (OUTPATIENT)
Dept: CARDIOLOGY | Facility: CLINIC | Age: 71
End: 2021-03-17

## 2021-03-17 DIAGNOSIS — M25.511 RIGHT SHOULDER PAIN, UNSPECIFIED CHRONICITY: Primary | ICD-10-CM

## 2021-03-17 DIAGNOSIS — Z01.818 PRE-OP TESTING: ICD-10-CM

## 2021-03-17 PROBLEM — M75.101 RIGHT ROTATOR CUFF TEAR ARTHROPATHY: Status: ACTIVE | Noted: 2021-03-17

## 2021-03-17 PROBLEM — M12.811 RIGHT ROTATOR CUFF TEAR ARTHROPATHY: Status: ACTIVE | Noted: 2021-03-17

## 2021-03-17 RX ORDER — CEFAZOLIN SODIUM 2 G/50ML
2 SOLUTION INTRAVENOUS
Status: CANCELLED | OUTPATIENT
Start: 2021-03-17

## 2021-03-17 RX ORDER — TRAMADOL HYDROCHLORIDE 50 MG/1
50 TABLET ORAL EVERY 6 HOURS PRN
Qty: 30 TABLET | Refills: 0 | Status: SHIPPED | OUTPATIENT
Start: 2021-03-17 | End: 2021-03-30 | Stop reason: SDUPTHER

## 2021-03-17 RX ORDER — HYDROCODONE BITARTRATE AND ACETAMINOPHEN 5; 325 MG/1; MG/1
1 TABLET ORAL EVERY 6 HOURS PRN
Qty: 28 TABLET | Refills: 0 | Status: SHIPPED | OUTPATIENT
Start: 2021-03-17 | End: 2021-03-30 | Stop reason: SDUPTHER

## 2021-03-17 RX ORDER — MUPIROCIN 20 MG/G
OINTMENT TOPICAL
Status: CANCELLED | OUTPATIENT
Start: 2021-03-17

## 2021-03-18 ENCOUNTER — TELEPHONE (OUTPATIENT)
Dept: CARDIOLOGY | Facility: CLINIC | Age: 71
End: 2021-03-18

## 2021-03-18 DIAGNOSIS — Z01.818 ENCOUNTER FOR OTHER PREPROCEDURAL EXAMINATION: ICD-10-CM

## 2021-03-18 DIAGNOSIS — I25.10 ATHEROSCLEROSIS OF NATIVE CORONARY ARTERY OF NATIVE HEART WITHOUT ANGINA PECTORIS: ICD-10-CM

## 2021-03-24 ENCOUNTER — TELEPHONE (OUTPATIENT)
Dept: ORTHOPEDICS | Facility: CLINIC | Age: 71
End: 2021-03-24

## 2021-03-24 ENCOUNTER — TELEPHONE (OUTPATIENT)
Dept: CARDIOLOGY | Facility: CLINIC | Age: 71
End: 2021-03-24

## 2021-03-25 ENCOUNTER — OFFICE VISIT (OUTPATIENT)
Dept: FAMILY MEDICINE | Facility: CLINIC | Age: 71
End: 2021-03-25
Payer: MEDICARE

## 2021-03-25 VITALS
DIASTOLIC BLOOD PRESSURE: 72 MMHG | HEART RATE: 77 BPM | RESPIRATION RATE: 18 BRPM | HEIGHT: 62 IN | TEMPERATURE: 98 F | OXYGEN SATURATION: 96 % | BODY MASS INDEX: 30.71 KG/M2 | SYSTOLIC BLOOD PRESSURE: 118 MMHG | WEIGHT: 166.88 LBS

## 2021-03-25 DIAGNOSIS — Z01.818 PREOPERATIVE CLEARANCE: Primary | ICD-10-CM

## 2021-03-25 PROCEDURE — 99212 OFFICE O/P EST SF 10 MIN: CPT | Mod: S$PBB,,, | Performed by: FAMILY MEDICINE

## 2021-03-25 PROCEDURE — 99999 PR PBB SHADOW E&M-EST. PATIENT-LVL III: ICD-10-PCS | Mod: PBBFAC,,, | Performed by: FAMILY MEDICINE

## 2021-03-25 PROCEDURE — 99212 PR OFFICE/OUTPT VISIT, EST, LEVL II, 10-19 MIN: ICD-10-PCS | Mod: S$PBB,,, | Performed by: FAMILY MEDICINE

## 2021-03-25 PROCEDURE — 99999 PR PBB SHADOW E&M-EST. PATIENT-LVL III: CPT | Mod: PBBFAC,,, | Performed by: FAMILY MEDICINE

## 2021-03-25 PROCEDURE — 99213 OFFICE O/P EST LOW 20 MIN: CPT | Mod: PBBFAC,PO | Performed by: FAMILY MEDICINE

## 2021-03-30 DIAGNOSIS — M75.101 RIGHT ROTATOR CUFF TEAR ARTHROPATHY: Primary | ICD-10-CM

## 2021-03-30 DIAGNOSIS — M25.511 RIGHT SHOULDER PAIN, UNSPECIFIED CHRONICITY: ICD-10-CM

## 2021-03-30 DIAGNOSIS — M12.811 RIGHT ROTATOR CUFF TEAR ARTHROPATHY: Primary | ICD-10-CM

## 2021-03-30 RX ORDER — TRAMADOL HYDROCHLORIDE 50 MG/1
50 TABLET ORAL EVERY 6 HOURS PRN
Qty: 30 TABLET | Refills: 0 | Status: SHIPPED | OUTPATIENT
Start: 2021-03-30 | End: 2021-05-21

## 2021-03-30 RX ORDER — HYDROCODONE BITARTRATE AND ACETAMINOPHEN 5; 325 MG/1; MG/1
1-2 TABLET ORAL EVERY 6 HOURS PRN
Qty: 30 TABLET | Refills: 0 | Status: ON HOLD | OUTPATIENT
Start: 2021-03-30 | End: 2021-04-08 | Stop reason: HOSPADM

## 2021-04-05 ENCOUNTER — HOSPITAL ENCOUNTER (OUTPATIENT)
Dept: PREADMISSION TESTING | Facility: HOSPITAL | Age: 71
Discharge: HOME | End: 2021-04-05
Attending: ORTHOPAEDIC SURGERY
Payer: MEDICARE

## 2021-04-05 ENCOUNTER — TELEPHONE (OUTPATIENT)
Dept: CARDIOLOGY | Facility: HOSPITAL | Age: 71
End: 2021-04-05

## 2021-04-05 ENCOUNTER — HOSPITAL ENCOUNTER (OUTPATIENT)
Dept: RADIOLOGY | Facility: HOSPITAL | Age: 71
Discharge: HOME | End: 2021-04-05
Attending: ORTHOPAEDIC SURGERY
Payer: MEDICARE

## 2021-04-05 ENCOUNTER — LAB VISIT (OUTPATIENT)
Dept: PRIMARY CARE CLINIC | Facility: CLINIC | Age: 71
End: 2021-04-05
Payer: MEDICARE

## 2021-04-05 VITALS — HEIGHT: 62 IN | BODY MASS INDEX: 30.55 KG/M2 | WEIGHT: 166 LBS

## 2021-04-05 DIAGNOSIS — M75.101 RIGHT ROTATOR CUFF TEAR ARTHROPATHY: ICD-10-CM

## 2021-04-05 DIAGNOSIS — M12.811 RIGHT ROTATOR CUFF TEAR ARTHROPATHY: ICD-10-CM

## 2021-04-05 DIAGNOSIS — Z01.818 PRE-OP TESTING: ICD-10-CM

## 2021-04-05 LAB
ANION GAP SERPL CALC-SCNC: 11 MMOL/L (ref 8–16)
BASOPHILS # BLD AUTO: 0.04 K/UL (ref 0–0.2)
BASOPHILS NFR BLD: 0.4 % (ref 0–1.9)
BUN SERPL-MCNC: 20 MG/DL (ref 8–23)
CALCIUM SERPL-MCNC: 10.2 MG/DL (ref 8.7–10.5)
CHLORIDE SERPL-SCNC: 100 MMOL/L (ref 95–110)
CO2 SERPL-SCNC: 24 MMOL/L (ref 23–29)
CREAT SERPL-MCNC: 0.8 MG/DL (ref 0.5–1.4)
DIFFERENTIAL METHOD: NORMAL
EOSINOPHIL # BLD AUTO: 0.1 K/UL (ref 0–0.5)
EOSINOPHIL NFR BLD: 1.1 % (ref 0–8)
ERYTHROCYTE [DISTWIDTH] IN BLOOD BY AUTOMATED COUNT: 13.8 % (ref 11.5–14.5)
EST. GFR  (AFRICAN AMERICAN): >60 ML/MIN/1.73 M^2
EST. GFR  (NON AFRICAN AMERICAN): >60 ML/MIN/1.73 M^2
GLUCOSE SERPL-MCNC: 170 MG/DL (ref 70–110)
HCT VFR BLD AUTO: 40.8 % (ref 37–48.5)
HGB BLD-MCNC: 13.6 G/DL (ref 12–16)
IMM GRANULOCYTES # BLD AUTO: 0.04 K/UL (ref 0–0.04)
IMM GRANULOCYTES NFR BLD AUTO: 0.4 % (ref 0–0.5)
LYMPHOCYTES # BLD AUTO: 2.1 K/UL (ref 1–4.8)
LYMPHOCYTES NFR BLD: 21.4 % (ref 18–48)
MCH RBC QN AUTO: 28.5 PG (ref 27–31)
MCHC RBC AUTO-ENTMCNC: 33.3 G/DL (ref 32–36)
MCV RBC AUTO: 86 FL (ref 82–98)
MONOCYTES # BLD AUTO: 0.7 K/UL (ref 0.3–1)
MONOCYTES NFR BLD: 7.4 % (ref 4–15)
NEUTROPHILS # BLD AUTO: 6.7 K/UL (ref 1.8–7.7)
NEUTROPHILS NFR BLD: 69.3 % (ref 38–73)
NRBC BLD-RTO: 0 /100 WBC
PLATELET # BLD AUTO: 286 K/UL (ref 150–450)
PMV BLD AUTO: 9.6 FL (ref 9.2–12.9)
POTASSIUM SERPL-SCNC: 4.2 MMOL/L (ref 3.5–5.1)
RBC # BLD AUTO: 4.77 M/UL (ref 4–5.4)
SODIUM SERPL-SCNC: 135 MMOL/L (ref 136–145)
WBC # BLD AUTO: 9.7 K/UL (ref 3.9–12.7)

## 2021-04-05 PROCEDURE — U0003 INFECTIOUS AGENT DETECTION BY NUCLEIC ACID (DNA OR RNA); SEVERE ACUTE RESPIRATORY SYNDROME CORONAVIRUS 2 (SARS-COV-2) (CORONAVIRUS DISEASE [COVID-19]), AMPLIFIED PROBE TECHNIQUE, MAKING USE OF HIGH THROUGHPUT TECHNOLOGIES AS DESCRIBED BY CMS-2020-01-R: HCPCS | Performed by: ORTHOPAEDIC SURGERY

## 2021-04-05 PROCEDURE — 36415 COLL VENOUS BLD VENIPUNCTURE: CPT | Performed by: ORTHOPAEDIC SURGERY

## 2021-04-05 PROCEDURE — 71046 XR CHEST PA AND LATERAL: ICD-10-PCS | Mod: 26,,, | Performed by: RADIOLOGY

## 2021-04-05 PROCEDURE — 85025 COMPLETE CBC W/AUTO DIFF WBC: CPT | Performed by: ORTHOPAEDIC SURGERY

## 2021-04-05 PROCEDURE — U0005 INFEC AGEN DETEC AMPLI PROBE: HCPCS | Performed by: ORTHOPAEDIC SURGERY

## 2021-04-05 PROCEDURE — 71046 X-RAY EXAM CHEST 2 VIEWS: CPT | Mod: 26,,, | Performed by: RADIOLOGY

## 2021-04-05 PROCEDURE — 80048 BASIC METABOLIC PNL TOTAL CA: CPT | Performed by: ORTHOPAEDIC SURGERY

## 2021-04-05 PROCEDURE — 71046 X-RAY EXAM CHEST 2 VIEWS: CPT | Mod: TC,FY

## 2021-04-06 ENCOUNTER — HOSPITAL ENCOUNTER (OUTPATIENT)
Dept: RADIOLOGY | Facility: HOSPITAL | Age: 71
Discharge: HOME | End: 2021-04-06
Attending: NURSE PRACTITIONER
Payer: MEDICARE

## 2021-04-06 ENCOUNTER — CLINICAL SUPPORT (OUTPATIENT)
Dept: CARDIOLOGY | Facility: HOSPITAL | Age: 71
End: 2021-04-06
Attending: NURSE PRACTITIONER
Payer: MEDICARE

## 2021-04-06 VITALS
BODY MASS INDEX: 30.26 KG/M2 | WEIGHT: 165.38 LBS | WEIGHT: 164.44 LBS | HEIGHT: 62 IN | BODY MASS INDEX: 30.44 KG/M2 | HEIGHT: 62 IN

## 2021-04-06 DIAGNOSIS — Z01.818 ENCOUNTER FOR OTHER PREPROCEDURAL EXAMINATION: ICD-10-CM

## 2021-04-06 DIAGNOSIS — I25.10 ATHEROSCLEROSIS OF NATIVE CORONARY ARTERY OF NATIVE HEART WITHOUT ANGINA PECTORIS: ICD-10-CM

## 2021-04-06 LAB
AORTIC ROOT ANNULUS: 2.92 CM
AORTIC VALVE CUSP SEPERATION: 1.78 CM
AV INDEX (PROSTH): 1.1
AV MEAN GRADIENT: 5 MMHG
AV PEAK GRADIENT: 9 MMHG
AV VALVE AREA: 4.41 CM2
AV VELOCITY RATIO: 98.75
BSA FOR ECHO PROCEDURE: 1.81 M2
CV ECHO LV RWT: 0.7 CM
DOP CALC AO PEAK VEL: 1.5 M/S
DOP CALC AO VTI: 27.16 CM
DOP CALC LVOT AREA: 4 CM2
DOP CALC LVOT DIAMETER: 2.26 CM
DOP CALC LVOT PEAK VEL: 148.13 M/S
DOP CALC LVOT STROKE VOLUME: 119.8 CM3
DOP CALCLVOT PEAK VEL VTI: 29.88 CM
E WAVE DECELERATION TIME: 277.64 MSEC
E/A RATIO: 0.67
E/E' RATIO: 11.33 M/S
ECHO LV POSTERIOR WALL: 1.52 CM (ref 0.6–1.1)
EJECTION FRACTION: 70 %
FRACTIONAL SHORTENING: 32 % (ref 28–44)
INTERVENTRICULAR SEPTUM: 1.53 CM (ref 0.6–1.1)
IVRT: 116.03 MSEC
LEFT ATRIUM SIZE: 4.59 CM
LEFT INTERNAL DIMENSION IN SYSTOLE: 2.98 CM (ref 2.1–4)
LEFT VENTRICLE MASS INDEX: 154 G/M2
LEFT VENTRICULAR INTERNAL DIMENSION IN DIASTOLE: 4.37 CM (ref 3.5–6)
LEFT VENTRICULAR MASS: 271.05 G
LV LATERAL E/E' RATIO: 9.71 M/S
LV SEPTAL E/E' RATIO: 13.6 M/S
MV PEAK A VEL: 1.01 M/S
MV PEAK E VEL: 0.68 M/S
PISA TR MAX VEL: 2.64 M/S
PV PEAK VELOCITY: 82.3 CM/S
RA PRESSURE: 3 MMHG
RIGHT VENTRICULAR END-DIASTOLIC DIMENSION: 199 CM
SARS-COV-2 RNA RESP QL NAA+PROBE: NOT DETECTED
TDI LATERAL: 0.07 M/S
TDI SEPTAL: 0.05 M/S
TDI: 0.06 M/S
TR MAX PG: 28 MMHG
TV REST PULMONARY ARTERY PRESSURE: 31 MMHG

## 2021-04-06 PROCEDURE — A9502 TC99M TETROFOSMIN: HCPCS

## 2021-04-06 PROCEDURE — 93018 NUCLEAR STRESS TEST (CUPID ONLY): ICD-10-PCS | Mod: ,,, | Performed by: INTERNAL MEDICINE

## 2021-04-06 PROCEDURE — 93306 TTE W/DOPPLER COMPLETE: CPT | Mod: 26,,, | Performed by: INTERNAL MEDICINE

## 2021-04-06 PROCEDURE — 93016 NUCLEAR STRESS TEST (CUPID ONLY): ICD-10-PCS | Mod: ,,, | Performed by: INTERNAL MEDICINE

## 2021-04-06 PROCEDURE — 93306 TTE W/DOPPLER COMPLETE: CPT

## 2021-04-06 PROCEDURE — 78452 HT MUSCLE IMAGE SPECT MULT: CPT | Mod: TC

## 2021-04-06 PROCEDURE — 93016 CV STRESS TEST SUPVJ ONLY: CPT | Mod: ,,, | Performed by: INTERNAL MEDICINE

## 2021-04-06 PROCEDURE — 93306 ECHO (CUPID ONLY): ICD-10-PCS | Mod: 26,,, | Performed by: INTERNAL MEDICINE

## 2021-04-06 PROCEDURE — 93018 CV STRESS TEST I&R ONLY: CPT | Mod: ,,, | Performed by: INTERNAL MEDICINE

## 2021-04-06 PROCEDURE — 93017 CV STRESS TEST TRACING ONLY: CPT

## 2021-04-06 PROCEDURE — 63600175 PHARM REV CODE 636 W HCPCS: Performed by: NURSE PRACTITIONER

## 2021-04-06 RX ORDER — REGADENOSON 0.08 MG/ML
0.4 INJECTION, SOLUTION INTRAVENOUS ONCE
Status: COMPLETED | OUTPATIENT
Start: 2021-04-06 | End: 2021-04-06

## 2021-04-06 RX ADMIN — REGADENOSON 0.4 MG: 0.08 INJECTION, SOLUTION INTRAVENOUS at 09:04

## 2021-04-07 ENCOUNTER — ANESTHESIA EVENT (OUTPATIENT)
Dept: SURGERY | Facility: HOSPITAL | Age: 71
End: 2021-04-07
Payer: MEDICARE

## 2021-04-08 ENCOUNTER — ANESTHESIA (OUTPATIENT)
Dept: SURGERY | Facility: HOSPITAL | Age: 71
End: 2021-04-08
Payer: MEDICARE

## 2021-04-08 ENCOUNTER — HOSPITAL ENCOUNTER (OUTPATIENT)
Facility: HOSPITAL | Age: 71
Discharge: HOME | End: 2021-04-08
Attending: ORTHOPAEDIC SURGERY | Admitting: ORTHOPAEDIC SURGERY
Payer: MEDICARE

## 2021-04-08 DIAGNOSIS — M75.101 RIGHT ROTATOR CUFF TEAR ARTHROPATHY: Primary | ICD-10-CM

## 2021-04-08 DIAGNOSIS — M12.811 RIGHT ROTATOR CUFF TEAR ARTHROPATHY: Primary | ICD-10-CM

## 2021-04-08 PROCEDURE — D9220A PRA ANESTHESIA: Mod: ANES,,, | Performed by: ANESTHESIOLOGY

## 2021-04-08 PROCEDURE — C1713 ANCHOR/SCREW BN/BN,TIS/BN: HCPCS | Performed by: ORTHOPAEDIC SURGERY

## 2021-04-08 PROCEDURE — 76942 INTERSCALENE BRACHIAL PLEXUS SINGLE INJECTION BLOCK: ICD-10-PCS | Mod: 26,,, | Performed by: ANESTHESIOLOGY

## 2021-04-08 PROCEDURE — 36000711: Performed by: ORTHOPAEDIC SURGERY

## 2021-04-08 PROCEDURE — 25000003 PHARM REV CODE 250: Performed by: ANESTHESIOLOGY

## 2021-04-08 PROCEDURE — 25000003 PHARM REV CODE 250: Performed by: NURSE ANESTHETIST, CERTIFIED REGISTERED

## 2021-04-08 PROCEDURE — C9290 INJ, BUPIVACAINE LIPOSOME: HCPCS | Performed by: ANESTHESIOLOGY

## 2021-04-08 PROCEDURE — 63600175 PHARM REV CODE 636 W HCPCS: Performed by: ANESTHESIOLOGY

## 2021-04-08 PROCEDURE — 37000008 HC ANESTHESIA 1ST 15 MINUTES: Performed by: ORTHOPAEDIC SURGERY

## 2021-04-08 PROCEDURE — 71000039 HC RECOVERY, EACH ADD'L HOUR: Performed by: ORTHOPAEDIC SURGERY

## 2021-04-08 PROCEDURE — 29828 PR ARTHROSCOPY SHOULDER SURGICAL BICEPS TENODESIS: ICD-10-PCS | Mod: 51,RT,, | Performed by: ORTHOPAEDIC SURGERY

## 2021-04-08 PROCEDURE — 25000003 PHARM REV CODE 250: Performed by: ORTHOPAEDIC SURGERY

## 2021-04-08 PROCEDURE — D9220A PRA ANESTHESIA: ICD-10-PCS | Mod: CRNA,,, | Performed by: NURSE ANESTHETIST, CERTIFIED REGISTERED

## 2021-04-08 PROCEDURE — 36000710: Performed by: ORTHOPAEDIC SURGERY

## 2021-04-08 PROCEDURE — 71000033 HC RECOVERY, INTIAL HOUR: Performed by: ORTHOPAEDIC SURGERY

## 2021-04-08 PROCEDURE — 63600175 PHARM REV CODE 636 W HCPCS: Performed by: ORTHOPAEDIC SURGERY

## 2021-04-08 PROCEDURE — 29827 SHO ARTHRS SRG RT8TR CUF RPR: CPT | Mod: RT,,, | Performed by: ORTHOPAEDIC SURGERY

## 2021-04-08 PROCEDURE — D9220A PRA ANESTHESIA: Mod: CRNA,,, | Performed by: NURSE ANESTHETIST, CERTIFIED REGISTERED

## 2021-04-08 PROCEDURE — 76942 ECHO GUIDE FOR BIOPSY: CPT | Mod: 26,,, | Performed by: ANESTHESIOLOGY

## 2021-04-08 PROCEDURE — D9220A PRA ANESTHESIA: ICD-10-PCS | Mod: ANES,,, | Performed by: ANESTHESIOLOGY

## 2021-04-08 PROCEDURE — 63600175 PHARM REV CODE 636 W HCPCS: Performed by: NURSE ANESTHETIST, CERTIFIED REGISTERED

## 2021-04-08 PROCEDURE — 29827 PR SHLDR ARTHROSCOP,SURG,W/ROTAT CUFF REPR: ICD-10-PCS | Mod: RT,,, | Performed by: ORTHOPAEDIC SURGERY

## 2021-04-08 PROCEDURE — 99900103 DSU ONLY-NO CHARGE-INITIAL HR (STAT): Performed by: ORTHOPAEDIC SURGERY

## 2021-04-08 PROCEDURE — 71000015 HC POSTOP RECOV 1ST HR: Performed by: ORTHOPAEDIC SURGERY

## 2021-04-08 PROCEDURE — 99900104 DSU ONLY-NO CHARGE-EA ADD'L HR (STAT): Performed by: ORTHOPAEDIC SURGERY

## 2021-04-08 PROCEDURE — 27200750 HC INSULATED NEEDLE/ STIMUPLEX: Performed by: ANESTHESIOLOGY

## 2021-04-08 PROCEDURE — 64415 NJX AA&/STRD BRCH PLXS IMG: CPT | Performed by: ANESTHESIOLOGY

## 2021-04-08 PROCEDURE — 29828 SHO ARTHRS SRG BICP TENODSIS: CPT | Mod: 51,RT,, | Performed by: ORTHOPAEDIC SURGERY

## 2021-04-08 PROCEDURE — 64415 NJX AA&/STRD BRCH PLXS IMG: CPT | Mod: 59,RT,, | Performed by: ANESTHESIOLOGY

## 2021-04-08 PROCEDURE — 37000009 HC ANESTHESIA EA ADD 15 MINS: Performed by: ORTHOPAEDIC SURGERY

## 2021-04-08 PROCEDURE — 64415 INTERSCALENE BRACHIAL PLEXUS SINGLE INJECTION BLOCK: ICD-10-PCS | Mod: 59,RT,, | Performed by: ANESTHESIOLOGY

## 2021-04-08 PROCEDURE — 27201423 OPTIME MED/SURG SUP & DEVICES STERILE SUPPLY: Performed by: ORTHOPAEDIC SURGERY

## 2021-04-08 PROCEDURE — 76942 ECHO GUIDE FOR BIOPSY: CPT | Performed by: ANESTHESIOLOGY

## 2021-04-08 PROCEDURE — 63600175 PHARM REV CODE 636 W HCPCS

## 2021-04-08 DEVICE — ANCHOR 4.5 HELIX ADV ORTHOCORD: Type: IMPLANTABLE DEVICE | Site: SHOULDER | Status: FUNCTIONAL

## 2021-04-08 DEVICE — VERSALOK ANCHOR W/ORTHOCORD PEEK & TITANIUM ANCHOR (1) VIOLET (1) BLUE STRAND, SIZE 2 (5 METRIC) ORTHOCORD BRAIDED COMPOSITE SUTURE, 36 INCHES (91CM)
Type: IMPLANTABLE DEVICE | Site: SHOULDER | Status: FUNCTIONAL
Brand: VERSALOK ORTHOCORD

## 2021-04-08 DEVICE — ANCHOR HEALIX 5.5 ADV BR3: Type: IMPLANTABLE DEVICE | Site: SHOULDER | Status: FUNCTIONAL

## 2021-04-08 RX ORDER — FENTANYL CITRATE 50 UG/ML
INJECTION, SOLUTION INTRAMUSCULAR; INTRAVENOUS
Status: DISCONTINUED | OUTPATIENT
Start: 2021-04-08 | End: 2021-04-08

## 2021-04-08 RX ORDER — LIDOCAINE HCL/PF 100 MG/5ML
SYRINGE (ML) INTRAVENOUS
Status: DISCONTINUED | OUTPATIENT
Start: 2021-04-08 | End: 2021-04-08

## 2021-04-08 RX ORDER — OXYCODONE AND ACETAMINOPHEN 5; 325 MG/1; MG/1
1-2 TABLET ORAL EVERY 6 HOURS PRN
Qty: 28 TABLET | Refills: 0 | Status: SHIPPED | OUTPATIENT
Start: 2021-04-08 | End: 2021-04-23 | Stop reason: SDUPTHER

## 2021-04-08 RX ORDER — SUCCINYLCHOLINE CHLORIDE 20 MG/ML
INJECTION INTRAMUSCULAR; INTRAVENOUS
Status: DISCONTINUED | OUTPATIENT
Start: 2021-04-08 | End: 2021-04-08

## 2021-04-08 RX ORDER — BUPIVACAINE HYDROCHLORIDE 5 MG/ML
INJECTION, SOLUTION EPIDURAL; INTRACAUDAL
Status: COMPLETED | OUTPATIENT
Start: 2021-04-08 | End: 2021-04-08

## 2021-04-08 RX ORDER — LIDOCAINE HYDROCHLORIDE 10 MG/ML
1 INJECTION, SOLUTION EPIDURAL; INFILTRATION; INTRACAUDAL; PERINEURAL ONCE
Status: DISCONTINUED | OUTPATIENT
Start: 2021-04-08 | End: 2021-04-08 | Stop reason: HOSPADM

## 2021-04-08 RX ORDER — PROPOFOL 10 MG/ML
VIAL (ML) INTRAVENOUS
Status: DISCONTINUED | OUTPATIENT
Start: 2021-04-08 | End: 2021-04-08

## 2021-04-08 RX ORDER — IBUPROFEN 800 MG/1
800 TABLET ORAL 3 TIMES DAILY
Qty: 60 TABLET | Refills: 0 | Status: SHIPPED | OUTPATIENT
Start: 2021-04-08 | End: 2021-04-23 | Stop reason: SDUPTHER

## 2021-04-08 RX ORDER — HYDROCODONE BITARTRATE AND ACETAMINOPHEN 10; 325 MG/1; MG/1
1 TABLET ORAL EVERY 4 HOURS PRN
Status: CANCELLED | OUTPATIENT
Start: 2021-04-08

## 2021-04-08 RX ORDER — ONDANSETRON HYDROCHLORIDE 2 MG/ML
INJECTION, SOLUTION INTRAMUSCULAR; INTRAVENOUS
Status: DISCONTINUED | OUTPATIENT
Start: 2021-04-08 | End: 2021-04-08

## 2021-04-08 RX ORDER — ROCURONIUM BROMIDE 10 MG/ML
INJECTION, SOLUTION INTRAVENOUS
Status: DISCONTINUED | OUTPATIENT
Start: 2021-04-08 | End: 2021-04-08

## 2021-04-08 RX ORDER — HYDROCODONE BITARTRATE AND ACETAMINOPHEN 5; 325 MG/1; MG/1
1 TABLET ORAL EVERY 4 HOURS PRN
Status: CANCELLED | OUTPATIENT
Start: 2021-04-08

## 2021-04-08 RX ORDER — IBUPROFEN 400 MG/1
800 TABLET ORAL 3 TIMES DAILY
Status: CANCELLED | OUTPATIENT
Start: 2021-04-08

## 2021-04-08 RX ORDER — MUPIROCIN 20 MG/G
OINTMENT TOPICAL
Status: DISCONTINUED | OUTPATIENT
Start: 2021-04-08 | End: 2021-04-08 | Stop reason: HOSPADM

## 2021-04-08 RX ORDER — FENTANYL CITRATE 50 UG/ML
25 INJECTION, SOLUTION INTRAMUSCULAR; INTRAVENOUS EVERY 5 MIN PRN
Status: DISCONTINUED | OUTPATIENT
Start: 2021-04-08 | End: 2021-04-08 | Stop reason: HOSPADM

## 2021-04-08 RX ORDER — CEFAZOLIN SODIUM 2 G/50ML
2 SOLUTION INTRAVENOUS
Status: COMPLETED | OUTPATIENT
Start: 2021-04-08 | End: 2021-04-08

## 2021-04-08 RX ORDER — SODIUM CHLORIDE, SODIUM LACTATE, POTASSIUM CHLORIDE, CALCIUM CHLORIDE 600; 310; 30; 20 MG/100ML; MG/100ML; MG/100ML; MG/100ML
INJECTION, SOLUTION INTRAVENOUS CONTINUOUS
Status: DISCONTINUED | OUTPATIENT
Start: 2021-04-08 | End: 2021-04-08 | Stop reason: HOSPADM

## 2021-04-08 RX ORDER — DEXAMETHASONE SODIUM PHOSPHATE 4 MG/ML
INJECTION, SOLUTION INTRA-ARTICULAR; INTRALESIONAL; INTRAMUSCULAR; INTRAVENOUS; SOFT TISSUE
Status: DISCONTINUED | OUTPATIENT
Start: 2021-04-08 | End: 2021-04-08

## 2021-04-08 RX ORDER — OXYCODONE HYDROCHLORIDE 5 MG/1
5 TABLET ORAL ONCE AS NEEDED
Status: COMPLETED | OUTPATIENT
Start: 2021-04-08 | End: 2021-04-08

## 2021-04-08 RX ORDER — EPINEPHRINE 1 MG/ML
INJECTION, SOLUTION INTRACARDIAC; INTRAMUSCULAR; INTRAVENOUS; SUBCUTANEOUS
Status: DISCONTINUED | OUTPATIENT
Start: 2021-04-08 | End: 2021-04-08 | Stop reason: HOSPADM

## 2021-04-08 RX ORDER — MIDAZOLAM HYDROCHLORIDE 1 MG/ML
INJECTION, SOLUTION INTRAMUSCULAR; INTRAVENOUS
Status: DISCONTINUED | OUTPATIENT
Start: 2021-04-08 | End: 2021-04-08

## 2021-04-08 RX ADMIN — SODIUM CHLORIDE, SODIUM GLUCONATE, SODIUM ACETATE, POTASSIUM CHLORIDE, MAGNESIUM CHLORIDE, SODIUM PHOSPHATE, DIBASIC, AND POTASSIUM PHOSPHATE: .53; .5; .37; .037; .03; .012; .00082 INJECTION, SOLUTION INTRAVENOUS at 08:04

## 2021-04-08 RX ADMIN — FENTANYL CITRATE 25 MCG: 50 INJECTION INTRAMUSCULAR; INTRAVENOUS at 01:04

## 2021-04-08 RX ADMIN — SODIUM CHLORIDE, SODIUM GLUCONATE, SODIUM ACETATE, POTASSIUM CHLORIDE, MAGNESIUM CHLORIDE, SODIUM PHOSPHATE, DIBASIC, AND POTASSIUM PHOSPHATE: .53; .5; .37; .037; .03; .012; .00082 INJECTION, SOLUTION INTRAVENOUS at 10:04

## 2021-04-08 RX ADMIN — BUPIVACAINE 10 ML: 13.3 INJECTION, SUSPENSION, LIPOSOMAL INFILTRATION at 08:04

## 2021-04-08 RX ADMIN — SUCCINYLCHOLINE CHLORIDE 140 MG: 20 INJECTION, SOLUTION INTRAMUSCULAR; INTRAVENOUS; PARENTERAL at 10:04

## 2021-04-08 RX ADMIN — FENTANYL CITRATE 50 MCG: 50 INJECTION, SOLUTION INTRAMUSCULAR; INTRAVENOUS at 08:04

## 2021-04-08 RX ADMIN — CEFAZOLIN SODIUM 2 G: 2 SOLUTION INTRAVENOUS at 10:04

## 2021-04-08 RX ADMIN — BUPIVACAINE HYDROCHLORIDE 5 ML: 5 INJECTION, SOLUTION EPIDURAL; INTRACAUDAL; PERINEURAL at 08:04

## 2021-04-08 RX ADMIN — LIDOCAINE HYDROCHLORIDE 80 MG: 20 INJECTION, SOLUTION INTRAVENOUS at 10:04

## 2021-04-08 RX ADMIN — DEXAMETHASONE SODIUM PHOSPHATE 4 MG: 4 INJECTION, SOLUTION INTRA-ARTICULAR; INTRALESIONAL; INTRAMUSCULAR; INTRAVENOUS; SOFT TISSUE at 10:04

## 2021-04-08 RX ADMIN — ONDANSETRON 4 MG: 2 INJECTION, SOLUTION INTRAMUSCULAR; INTRAVENOUS at 10:04

## 2021-04-08 RX ADMIN — MUPIROCIN: 20 OINTMENT TOPICAL at 08:04

## 2021-04-08 RX ADMIN — FENTANYL CITRATE 50 MCG: 50 INJECTION, SOLUTION INTRAMUSCULAR; INTRAVENOUS at 11:04

## 2021-04-08 RX ADMIN — PROPOFOL 130 MG: 10 INJECTION, EMULSION INTRAVENOUS at 10:04

## 2021-04-08 RX ADMIN — ONDANSETRON 4 MG: 2 INJECTION, SOLUTION INTRAMUSCULAR; INTRAVENOUS at 11:04

## 2021-04-08 RX ADMIN — GLYCOPYRROLATE 0.2 MG: 0.2 INJECTION, SOLUTION INTRAMUSCULAR; INTRAVITREAL at 10:04

## 2021-04-08 RX ADMIN — ROCURONIUM BROMIDE 5 MG: 10 INJECTION, SOLUTION INTRAVENOUS at 10:04

## 2021-04-08 RX ADMIN — FENTANYL CITRATE 50 MCG: 50 INJECTION, SOLUTION INTRAMUSCULAR; INTRAVENOUS at 12:04

## 2021-04-08 RX ADMIN — MEPERIDINE HYDROCHLORIDE 12.5 MG: 100 INJECTION, SOLUTION INTRAMUSCULAR; INTRAVENOUS; SUBCUTANEOUS at 01:04

## 2021-04-08 RX ADMIN — MIDAZOLAM 1 MG: 1 INJECTION INTRAMUSCULAR; INTRAVENOUS at 08:04

## 2021-04-08 RX ADMIN — MIDAZOLAM 1 MG: 1 INJECTION INTRAMUSCULAR; INTRAVENOUS at 09:04

## 2021-04-08 RX ADMIN — OXYCODONE HYDROCHLORIDE 5 MG: 5 TABLET ORAL at 01:04

## 2021-04-08 RX ADMIN — FENTANYL CITRATE 50 MCG: 50 INJECTION, SOLUTION INTRAMUSCULAR; INTRAVENOUS at 10:04

## 2021-04-09 ENCOUNTER — NURSE TRIAGE (OUTPATIENT)
Dept: ADMINISTRATIVE | Facility: CLINIC | Age: 71
End: 2021-04-09

## 2021-04-09 VITALS
HEIGHT: 62 IN | OXYGEN SATURATION: 99 % | TEMPERATURE: 98 F | HEART RATE: 95 BPM | WEIGHT: 164 LBS | DIASTOLIC BLOOD PRESSURE: 59 MMHG | RESPIRATION RATE: 16 BRPM | BODY MASS INDEX: 30.18 KG/M2 | SYSTOLIC BLOOD PRESSURE: 131 MMHG

## 2021-04-10 ENCOUNTER — NURSE TRIAGE (OUTPATIENT)
Dept: ADMINISTRATIVE | Facility: CLINIC | Age: 71
End: 2021-04-10

## 2021-04-10 LAB
CV PHARM DOSE: 0.4 MG
CV STRESS BASE HR: 73 BPM
DIASTOLIC BLOOD PRESSURE: 75 MMHG
OHS CV CPX 85 PERCENT MAX PREDICTED HEART RATE MALE: 123
OHS CV CPX MAX PREDICTED HEART RATE: 144
OHS CV CPX PATIENT IS FEMALE: 1
OHS CV CPX PATIENT IS MALE: 0
OHS CV CPX PEAK DIASTOLIC BLOOD PRESSURE: 65 MMHG
OHS CV CPX PEAK HEAR RATE: 103 BPM
OHS CV CPX PEAK RATE PRESSURE PRODUCT: NORMAL
OHS CV CPX PEAK SYSTOLIC BLOOD PRESSURE: 111 MMHG
OHS CV CPX PERCENT MAX PREDICTED HEART RATE ACHIEVED: 71
OHS CV CPX RATE PRESSURE PRODUCT PRESENTING: 8468
SYSTOLIC BLOOD PRESSURE: 116 MMHG

## 2021-04-20 DIAGNOSIS — M25.511 RIGHT SHOULDER PAIN, UNSPECIFIED CHRONICITY: Primary | ICD-10-CM

## 2021-04-23 ENCOUNTER — HOSPITAL ENCOUNTER (OUTPATIENT)
Dept: RADIOLOGY | Facility: HOSPITAL | Age: 71
Discharge: HOME | End: 2021-04-23
Attending: ORTHOPAEDIC SURGERY
Payer: MEDICARE

## 2021-04-23 ENCOUNTER — OFFICE VISIT (OUTPATIENT)
Dept: ORTHOPEDICS | Facility: CLINIC | Age: 71
End: 2021-04-23
Payer: MEDICARE

## 2021-04-23 VITALS — HEIGHT: 62 IN | WEIGHT: 164 LBS | BODY MASS INDEX: 30.18 KG/M2 | RESPIRATION RATE: 20 BRPM

## 2021-04-23 DIAGNOSIS — M25.511 RIGHT SHOULDER PAIN, UNSPECIFIED CHRONICITY: ICD-10-CM

## 2021-04-23 DIAGNOSIS — Z98.890 STATUS POST RIGHT ROTATOR CUFF REPAIR: Primary | ICD-10-CM

## 2021-04-23 DIAGNOSIS — Z98.890 S/P RIGHT ROTATOR CUFF REPAIR: Primary | ICD-10-CM

## 2021-04-23 PROCEDURE — 99999 PR PBB SHADOW E&M-EST. PATIENT-LVL IV: ICD-10-PCS | Mod: PBBFAC,,, | Performed by: ORTHOPAEDIC SURGERY

## 2021-04-23 PROCEDURE — 99024 POSTOP FOLLOW-UP VISIT: CPT | Mod: POP,,, | Performed by: ORTHOPAEDIC SURGERY

## 2021-04-23 PROCEDURE — 73030 X-RAY EXAM OF SHOULDER: CPT | Mod: 26,RT,, | Performed by: RADIOLOGY

## 2021-04-23 PROCEDURE — 73030 X-RAY EXAM OF SHOULDER: CPT | Mod: TC,PN,RT

## 2021-04-23 PROCEDURE — 99999 PR PBB SHADOW E&M-EST. PATIENT-LVL IV: CPT | Mod: PBBFAC,,, | Performed by: ORTHOPAEDIC SURGERY

## 2021-04-23 PROCEDURE — 99214 OFFICE O/P EST MOD 30 MIN: CPT | Mod: PBBFAC,25,PN | Performed by: ORTHOPAEDIC SURGERY

## 2021-04-23 PROCEDURE — 99024 PR POST-OP FOLLOW-UP VISIT: ICD-10-PCS | Mod: POP,,, | Performed by: ORTHOPAEDIC SURGERY

## 2021-04-23 PROCEDURE — 73030 XR SHOULDER COMPLETE 2 OR MORE VIEWS RIGHT: ICD-10-PCS | Mod: 26,RT,, | Performed by: RADIOLOGY

## 2021-04-23 RX ORDER — IBUPROFEN 800 MG/1
800 TABLET ORAL 3 TIMES DAILY
Qty: 60 TABLET | Refills: 0 | Status: SHIPPED | OUTPATIENT
Start: 2021-04-23 | End: 2022-03-29

## 2021-04-23 RX ORDER — OXYCODONE AND ACETAMINOPHEN 5; 325 MG/1; MG/1
1-2 TABLET ORAL EVERY 6 HOURS PRN
Qty: 28 TABLET | Refills: 0 | Status: SHIPPED | OUTPATIENT
Start: 2021-04-23 | End: 2021-06-04 | Stop reason: SDUPTHER

## 2021-04-30 ENCOUNTER — TELEPHONE (OUTPATIENT)
Dept: ORTHOPEDICS | Facility: CLINIC | Age: 71
End: 2021-04-30

## 2021-05-05 ENCOUNTER — PES CALL (OUTPATIENT)
Dept: ADMINISTRATIVE | Facility: CLINIC | Age: 71
End: 2021-05-05

## 2021-05-05 DIAGNOSIS — E11.9 TYPE 2 DIABETES MELLITUS WITHOUT COMPLICATION: ICD-10-CM

## 2021-05-09 ENCOUNTER — PATIENT OUTREACH (OUTPATIENT)
Dept: ADMINISTRATIVE | Facility: OTHER | Age: 71
End: 2021-05-09

## 2021-05-10 ENCOUNTER — OFFICE VISIT (OUTPATIENT)
Dept: PHYSICAL MEDICINE AND REHAB | Facility: CLINIC | Age: 71
End: 2021-05-10
Payer: MEDICARE

## 2021-05-10 VITALS — RESPIRATION RATE: 20 BRPM | BODY MASS INDEX: 30.18 KG/M2 | HEIGHT: 62 IN | WEIGHT: 164 LBS

## 2021-05-10 DIAGNOSIS — Z96.611 HISTORY OF ARTHROPLASTY OF RIGHT SHOULDER: ICD-10-CM

## 2021-05-10 DIAGNOSIS — G54.0 BRACHIAL PLEXOPATHY: ICD-10-CM

## 2021-05-10 DIAGNOSIS — R20.2 PARESTHESIA: ICD-10-CM

## 2021-05-10 DIAGNOSIS — M54.2 CERVICALGIA: ICD-10-CM

## 2021-05-10 DIAGNOSIS — M54.12 CERVICAL RADICULITIS: Primary | ICD-10-CM

## 2021-05-10 PROCEDURE — 99204 OFFICE O/P NEW MOD 45 MIN: CPT | Mod: S$PBB,,, | Performed by: PHYSICAL MEDICINE & REHABILITATION

## 2021-05-10 PROCEDURE — 99213 OFFICE O/P EST LOW 20 MIN: CPT | Mod: PBBFAC,PN | Performed by: PHYSICAL MEDICINE & REHABILITATION

## 2021-05-10 PROCEDURE — 99204 PR OFFICE/OUTPT VISIT, NEW, LEVL IV, 45-59 MIN: ICD-10-PCS | Mod: S$PBB,,, | Performed by: PHYSICAL MEDICINE & REHABILITATION

## 2021-05-10 PROCEDURE — 99999 PR PBB SHADOW E&M-EST. PATIENT-LVL III: CPT | Mod: PBBFAC,,, | Performed by: PHYSICAL MEDICINE & REHABILITATION

## 2021-05-10 PROCEDURE — 99999 PR PBB SHADOW E&M-EST. PATIENT-LVL III: ICD-10-PCS | Mod: PBBFAC,,, | Performed by: PHYSICAL MEDICINE & REHABILITATION

## 2021-05-11 ENCOUNTER — OFFICE VISIT (OUTPATIENT)
Dept: PHYSICAL MEDICINE AND REHAB | Facility: CLINIC | Age: 71
End: 2021-05-11
Payer: MEDICARE

## 2021-05-11 DIAGNOSIS — M54.12 CERVICAL RADICULITIS: ICD-10-CM

## 2021-05-11 DIAGNOSIS — M54.12 RADICULOPATHY, CERVICAL REGION: ICD-10-CM

## 2021-05-11 PROCEDURE — 99999 PR PBB SHADOW E&M-EST. PATIENT-LVL I: CPT | Mod: PBBFAC,,, | Performed by: PHYSICAL MEDICINE & REHABILITATION

## 2021-05-11 PROCEDURE — 95886 MUSC TEST DONE W/N TEST COMP: CPT | Mod: 26,S$PBB,, | Performed by: PHYSICAL MEDICINE & REHABILITATION

## 2021-05-11 PROCEDURE — 95886 PR EMG COMPLETE, W/ NERVE CONDUCTION STUDIES, 5+ MUSCLES: ICD-10-PCS | Mod: 26,S$PBB,, | Performed by: PHYSICAL MEDICINE & REHABILITATION

## 2021-05-11 PROCEDURE — 95911 NRV CNDJ TEST 9-10 STUDIES: CPT | Mod: 26,S$PBB,, | Performed by: PHYSICAL MEDICINE & REHABILITATION

## 2021-05-11 PROCEDURE — 99499 UNLISTED E&M SERVICE: CPT | Mod: S$PBB,,, | Performed by: PHYSICAL MEDICINE & REHABILITATION

## 2021-05-11 PROCEDURE — 95886 MUSC TEST DONE W/N TEST COMP: CPT | Mod: PBBFAC,PN | Performed by: PHYSICAL MEDICINE & REHABILITATION

## 2021-05-11 PROCEDURE — 95911 PR NERVE CONDUCTION STUDY; 9-10 STUDIES: ICD-10-PCS | Mod: 26,S$PBB,, | Performed by: PHYSICAL MEDICINE & REHABILITATION

## 2021-05-11 PROCEDURE — 99211 OFF/OP EST MAY X REQ PHY/QHP: CPT | Mod: PBBFAC,PN | Performed by: PHYSICAL MEDICINE & REHABILITATION

## 2021-05-11 PROCEDURE — 99999 PR PBB SHADOW E&M-EST. PATIENT-LVL I: ICD-10-PCS | Mod: PBBFAC,,, | Performed by: PHYSICAL MEDICINE & REHABILITATION

## 2021-05-11 PROCEDURE — 99499 NO LOS: ICD-10-PCS | Mod: S$PBB,,, | Performed by: PHYSICAL MEDICINE & REHABILITATION

## 2021-05-11 PROCEDURE — 95911 NRV CNDJ TEST 9-10 STUDIES: CPT | Mod: PBBFAC,PN | Performed by: PHYSICAL MEDICINE & REHABILITATION

## 2021-05-18 ENCOUNTER — HOSPITAL ENCOUNTER (OUTPATIENT)
Dept: RADIOLOGY | Facility: HOSPITAL | Age: 71
Discharge: HOME | End: 2021-05-18
Attending: PHYSICAL MEDICINE & REHABILITATION
Payer: MEDICARE

## 2021-05-18 DIAGNOSIS — M54.12 RADICULOPATHY, CERVICAL REGION: ICD-10-CM

## 2021-05-18 DIAGNOSIS — M54.12 CERVICAL RADICULITIS: ICD-10-CM

## 2021-05-18 PROCEDURE — 25500020 PHARM REV CODE 255: Performed by: PHYSICAL MEDICINE & REHABILITATION

## 2021-05-18 PROCEDURE — A9585 GADOBUTROL INJECTION: HCPCS | Performed by: PHYSICAL MEDICINE & REHABILITATION

## 2021-05-18 PROCEDURE — 70543 MRI ORBT/FAC/NCK W/O &W/DYE: CPT | Mod: TC

## 2021-05-18 PROCEDURE — 70543 MRI ORBT/FAC/NCK W/O &W/DYE: CPT | Mod: 26,,, | Performed by: RADIOLOGY

## 2021-05-18 PROCEDURE — 72141 MRI NECK SPINE W/O DYE: CPT | Mod: 26,,, | Performed by: RADIOLOGY

## 2021-05-18 PROCEDURE — 72141 MRI NECK SPINE W/O DYE: CPT | Mod: TC

## 2021-05-18 PROCEDURE — 70543 MRI BRACHIAL PLEXUS W WO CONTRAST: ICD-10-PCS | Mod: 26,,, | Performed by: RADIOLOGY

## 2021-05-18 PROCEDURE — 72141 MRI CERVICAL SPINE WITHOUT CONTRAST: ICD-10-PCS | Mod: 26,,, | Performed by: RADIOLOGY

## 2021-05-18 RX ORDER — GADOBUTROL 604.72 MG/ML
7 INJECTION INTRAVENOUS
Status: COMPLETED | OUTPATIENT
Start: 2021-05-18 | End: 2021-05-18

## 2021-05-18 RX ADMIN — GADOBUTROL 7 ML: 604.72 INJECTION INTRAVENOUS at 04:05

## 2021-05-19 ENCOUNTER — TELEPHONE (OUTPATIENT)
Dept: PHYSICAL MEDICINE AND REHAB | Facility: CLINIC | Age: 71
End: 2021-05-19

## 2021-05-21 ENCOUNTER — OFFICE VISIT (OUTPATIENT)
Dept: PHYSICAL MEDICINE AND REHAB | Facility: CLINIC | Age: 71
End: 2021-05-21
Payer: MEDICARE

## 2021-05-21 VITALS — WEIGHT: 164 LBS | BODY MASS INDEX: 30.18 KG/M2 | HEIGHT: 62 IN

## 2021-05-21 DIAGNOSIS — S44.90XA: ICD-10-CM

## 2021-05-21 DIAGNOSIS — G54.0 BRACHIAL PLEXOPATHY: Primary | ICD-10-CM

## 2021-05-21 DIAGNOSIS — Z74.09 IMPAIRED FUNCTIONAL MOBILITY AND ACTIVITY TOLERANCE: ICD-10-CM

## 2021-05-21 DIAGNOSIS — R29.898 RIGHT ARM WEAKNESS: ICD-10-CM

## 2021-05-21 PROCEDURE — 99999 PR PBB SHADOW E&M-EST. PATIENT-LVL III: CPT | Mod: PBBFAC,,, | Performed by: PHYSICAL MEDICINE & REHABILITATION

## 2021-05-21 PROCEDURE — 99213 PR OFFICE/OUTPT VISIT, EST, LEVL III, 20-29 MIN: ICD-10-PCS | Mod: S$PBB,,, | Performed by: PHYSICAL MEDICINE & REHABILITATION

## 2021-05-21 PROCEDURE — 99213 OFFICE O/P EST LOW 20 MIN: CPT | Mod: S$PBB,,, | Performed by: PHYSICAL MEDICINE & REHABILITATION

## 2021-05-21 PROCEDURE — 99999 PR PBB SHADOW E&M-EST. PATIENT-LVL III: ICD-10-PCS | Mod: PBBFAC,,, | Performed by: PHYSICAL MEDICINE & REHABILITATION

## 2021-05-21 PROCEDURE — 99213 OFFICE O/P EST LOW 20 MIN: CPT | Mod: PBBFAC,PN | Performed by: PHYSICAL MEDICINE & REHABILITATION

## 2021-05-21 RX ORDER — HYDROCODONE BITARTRATE AND ACETAMINOPHEN 5; 325 MG/1; MG/1
TABLET ORAL
COMMUNITY
Start: 2021-03-31 | End: 2022-03-29

## 2021-05-25 ENCOUNTER — OFFICE VISIT (OUTPATIENT)
Dept: ORTHOPEDICS | Facility: CLINIC | Age: 71
End: 2021-05-25
Payer: MEDICARE

## 2021-05-25 VITALS — HEIGHT: 62 IN | BODY MASS INDEX: 30.18 KG/M2 | RESPIRATION RATE: 16 BRPM | WEIGHT: 164 LBS

## 2021-05-25 DIAGNOSIS — Z98.890 STATUS POST RIGHT ROTATOR CUFF REPAIR: Primary | ICD-10-CM

## 2021-05-25 PROCEDURE — 99024 POSTOP FOLLOW-UP VISIT: CPT | Mod: POP,,, | Performed by: ORTHOPAEDIC SURGERY

## 2021-05-25 PROCEDURE — 99999 PR PBB SHADOW E&M-EST. PATIENT-LVL III: CPT | Mod: PBBFAC,,, | Performed by: ORTHOPAEDIC SURGERY

## 2021-05-25 PROCEDURE — 99999 PR PBB SHADOW E&M-EST. PATIENT-LVL III: ICD-10-PCS | Mod: PBBFAC,,, | Performed by: ORTHOPAEDIC SURGERY

## 2021-05-25 PROCEDURE — 99024 PR POST-OP FOLLOW-UP VISIT: ICD-10-PCS | Mod: POP,,, | Performed by: ORTHOPAEDIC SURGERY

## 2021-05-25 PROCEDURE — 99213 OFFICE O/P EST LOW 20 MIN: CPT | Mod: PBBFAC,PN | Performed by: ORTHOPAEDIC SURGERY

## 2021-05-26 DIAGNOSIS — G54.0 BRACHIAL PLEXOPATHY: Primary | ICD-10-CM

## 2021-05-27 ENCOUNTER — TELEPHONE (OUTPATIENT)
Dept: PHYSICAL MEDICINE AND REHAB | Facility: CLINIC | Age: 71
End: 2021-05-27

## 2021-06-03 DIAGNOSIS — Z98.890 S/P RIGHT ROTATOR CUFF REPAIR: ICD-10-CM

## 2021-06-03 RX ORDER — HYDROCODONE BITARTRATE AND ACETAMINOPHEN 10; 325 MG/1; MG/1
1 TABLET ORAL EVERY 6 HOURS PRN
Qty: 30 TABLET | Refills: 0 | Status: CANCELLED | OUTPATIENT
Start: 2021-06-03

## 2021-06-04 ENCOUNTER — CLINICAL SUPPORT (OUTPATIENT)
Dept: REHABILITATION | Facility: HOSPITAL | Age: 71
End: 2021-06-04
Attending: PHYSICAL MEDICINE & REHABILITATION
Payer: MEDICARE

## 2021-06-04 DIAGNOSIS — G54.0 BRACHIAL PLEXOPATHY: ICD-10-CM

## 2021-06-04 DIAGNOSIS — M25.541 PAIN IN JOINT OF RIGHT HAND: Primary | ICD-10-CM

## 2021-06-04 DIAGNOSIS — R29.898 RIGHT ARM WEAKNESS: ICD-10-CM

## 2021-06-04 DIAGNOSIS — M25.441 EFFUSION OF JOINT OF RIGHT HAND: ICD-10-CM

## 2021-06-04 DIAGNOSIS — M25.641 STIFFNESS OF RIGHT HAND JOINT: ICD-10-CM

## 2021-06-04 DIAGNOSIS — R29.898 RIGHT HAND WEAKNESS: ICD-10-CM

## 2021-06-04 DIAGNOSIS — S44.90XA: ICD-10-CM

## 2021-06-04 DIAGNOSIS — M25.641 MORNING JOINT STIFFNESS OF RIGHT HAND: ICD-10-CM

## 2021-06-04 PROCEDURE — 97165 OT EVAL LOW COMPLEX 30 MIN: CPT | Mod: PN

## 2021-06-04 PROCEDURE — 97110 THERAPEUTIC EXERCISES: CPT | Mod: PN

## 2021-06-04 RX ORDER — OXYCODONE AND ACETAMINOPHEN 5; 325 MG/1; MG/1
1-2 TABLET ORAL EVERY 6 HOURS PRN
Qty: 28 TABLET | Refills: 0 | Status: SHIPPED | OUTPATIENT
Start: 2021-06-04 | End: 2021-10-15

## 2021-06-09 ENCOUNTER — CLINICAL SUPPORT (OUTPATIENT)
Dept: REHABILITATION | Facility: HOSPITAL | Age: 71
End: 2021-06-09
Attending: PHYSICAL MEDICINE & REHABILITATION
Payer: MEDICARE

## 2021-06-09 DIAGNOSIS — G54.0 BRACHIAL PLEXOPATHY: ICD-10-CM

## 2021-06-09 DIAGNOSIS — R29.898 RIGHT ARM WEAKNESS: ICD-10-CM

## 2021-06-09 DIAGNOSIS — M25.441 EFFUSION OF JOINT OF RIGHT HAND: ICD-10-CM

## 2021-06-09 DIAGNOSIS — S44.90XA: ICD-10-CM

## 2021-06-09 DIAGNOSIS — R29.898 RIGHT HAND WEAKNESS: ICD-10-CM

## 2021-06-09 DIAGNOSIS — M25.641 STIFFNESS OF RIGHT HAND JOINT: ICD-10-CM

## 2021-06-09 PROCEDURE — 97110 THERAPEUTIC EXERCISES: CPT | Mod: PN

## 2021-06-11 ENCOUNTER — CLINICAL SUPPORT (OUTPATIENT)
Dept: REHABILITATION | Facility: HOSPITAL | Age: 71
End: 2021-06-11
Attending: PHYSICAL MEDICINE & REHABILITATION
Payer: MEDICARE

## 2021-06-11 DIAGNOSIS — M25.641 STIFFNESS OF RIGHT HAND JOINT: ICD-10-CM

## 2021-06-11 DIAGNOSIS — R29.898 RIGHT ARM WEAKNESS: Primary | ICD-10-CM

## 2021-06-11 DIAGNOSIS — M25.441 EFFUSION OF JOINT OF RIGHT HAND: ICD-10-CM

## 2021-06-11 DIAGNOSIS — R29.898 RIGHT HAND WEAKNESS: ICD-10-CM

## 2021-06-11 DIAGNOSIS — M25.541 PAIN IN JOINT OF RIGHT HAND: ICD-10-CM

## 2021-06-11 PROCEDURE — 97110 THERAPEUTIC EXERCISES: CPT | Mod: PN

## 2021-06-16 ENCOUNTER — CLINICAL SUPPORT (OUTPATIENT)
Dept: REHABILITATION | Facility: HOSPITAL | Age: 71
End: 2021-06-16
Attending: PHYSICAL MEDICINE & REHABILITATION
Payer: MEDICARE

## 2021-06-16 DIAGNOSIS — R29.898 RIGHT ARM WEAKNESS: ICD-10-CM

## 2021-06-16 DIAGNOSIS — G54.0 BRACHIAL PLEXOPATHY: ICD-10-CM

## 2021-06-16 DIAGNOSIS — S44.90XA: ICD-10-CM

## 2021-06-16 DIAGNOSIS — M25.441 EFFUSION OF JOINT OF RIGHT HAND: ICD-10-CM

## 2021-06-16 DIAGNOSIS — M25.641 STIFFNESS OF RIGHT HAND JOINT: ICD-10-CM

## 2021-06-16 PROCEDURE — 97110 THERAPEUTIC EXERCISES: CPT | Mod: PN

## 2021-06-18 ENCOUNTER — CLINICAL SUPPORT (OUTPATIENT)
Dept: REHABILITATION | Facility: HOSPITAL | Age: 71
End: 2021-06-18
Attending: PHYSICAL MEDICINE & REHABILITATION
Payer: MEDICARE

## 2021-06-18 DIAGNOSIS — R29.898 RIGHT HAND WEAKNESS: ICD-10-CM

## 2021-06-18 DIAGNOSIS — R29.898 RIGHT ARM WEAKNESS: Primary | ICD-10-CM

## 2021-06-18 DIAGNOSIS — M25.441 EFFUSION OF JOINT OF RIGHT HAND: ICD-10-CM

## 2021-06-18 DIAGNOSIS — M25.641 STIFFNESS OF RIGHT HAND JOINT: ICD-10-CM

## 2021-06-18 DIAGNOSIS — M25.541 PAIN IN JOINT OF RIGHT HAND: ICD-10-CM

## 2021-06-18 PROCEDURE — 97110 THERAPEUTIC EXERCISES: CPT | Mod: PN

## 2021-06-29 ENCOUNTER — CLINICAL SUPPORT (OUTPATIENT)
Dept: REHABILITATION | Facility: HOSPITAL | Age: 71
End: 2021-06-29
Attending: PHYSICAL MEDICINE & REHABILITATION
Payer: MEDICARE

## 2021-06-29 DIAGNOSIS — R29.898 RIGHT ARM WEAKNESS: Primary | ICD-10-CM

## 2021-06-29 DIAGNOSIS — M25.441 EFFUSION OF JOINT OF RIGHT HAND: ICD-10-CM

## 2021-06-29 DIAGNOSIS — M25.541 PAIN IN JOINT OF RIGHT HAND: ICD-10-CM

## 2021-06-29 DIAGNOSIS — R29.898 RIGHT HAND WEAKNESS: ICD-10-CM

## 2021-06-29 DIAGNOSIS — M25.641 STIFFNESS OF RIGHT HAND JOINT: ICD-10-CM

## 2021-06-29 PROCEDURE — 97110 THERAPEUTIC EXERCISES: CPT | Mod: PN

## 2021-07-02 ENCOUNTER — CLINICAL SUPPORT (OUTPATIENT)
Dept: REHABILITATION | Facility: HOSPITAL | Age: 71
End: 2021-07-02
Attending: PHYSICAL MEDICINE & REHABILITATION
Payer: MEDICARE

## 2021-07-02 DIAGNOSIS — M25.641 STIFFNESS OF RIGHT HAND JOINT: ICD-10-CM

## 2021-07-02 DIAGNOSIS — R29.898 RIGHT HAND WEAKNESS: ICD-10-CM

## 2021-07-02 DIAGNOSIS — R29.898 RIGHT ARM WEAKNESS: Primary | ICD-10-CM

## 2021-07-02 DIAGNOSIS — M25.441 EFFUSION OF JOINT OF RIGHT HAND: ICD-10-CM

## 2021-07-02 DIAGNOSIS — M25.541 PAIN IN JOINT OF RIGHT HAND: ICD-10-CM

## 2021-07-02 PROCEDURE — 97110 THERAPEUTIC EXERCISES: CPT | Mod: PN

## 2021-07-06 ENCOUNTER — CLINICAL SUPPORT (OUTPATIENT)
Dept: REHABILITATION | Facility: HOSPITAL | Age: 71
End: 2021-07-06
Attending: PHYSICAL MEDICINE & REHABILITATION
Payer: MEDICARE

## 2021-07-06 ENCOUNTER — OFFICE VISIT (OUTPATIENT)
Dept: ORTHOPEDICS | Facility: CLINIC | Age: 71
End: 2021-07-06
Payer: MEDICARE

## 2021-07-06 VITALS — WEIGHT: 164 LBS | BODY MASS INDEX: 30.18 KG/M2 | RESPIRATION RATE: 16 BRPM | HEIGHT: 62 IN

## 2021-07-06 DIAGNOSIS — M25.541 PAIN IN JOINT OF RIGHT HAND: ICD-10-CM

## 2021-07-06 DIAGNOSIS — M25.441 EFFUSION OF JOINT OF RIGHT HAND: ICD-10-CM

## 2021-07-06 DIAGNOSIS — R29.898 RIGHT ARM WEAKNESS: Primary | ICD-10-CM

## 2021-07-06 DIAGNOSIS — M25.641 STIFFNESS OF RIGHT HAND JOINT: ICD-10-CM

## 2021-07-06 DIAGNOSIS — Z98.890 STATUS POST RIGHT ROTATOR CUFF REPAIR: Primary | ICD-10-CM

## 2021-07-06 DIAGNOSIS — R29.898 RIGHT HAND WEAKNESS: ICD-10-CM

## 2021-07-06 PROCEDURE — 99024 POSTOP FOLLOW-UP VISIT: CPT | Mod: POP,,, | Performed by: ORTHOPAEDIC SURGERY

## 2021-07-06 PROCEDURE — 97022 WHIRLPOOL THERAPY: CPT | Mod: PN

## 2021-07-06 PROCEDURE — 99212 OFFICE O/P EST SF 10 MIN: CPT | Mod: PBBFAC,PN | Performed by: ORTHOPAEDIC SURGERY

## 2021-07-06 PROCEDURE — 99024 PR POST-OP FOLLOW-UP VISIT: ICD-10-PCS | Mod: POP,,, | Performed by: ORTHOPAEDIC SURGERY

## 2021-07-06 PROCEDURE — 99999 PR PBB SHADOW E&M-EST. PATIENT-LVL II: ICD-10-PCS | Mod: PBBFAC,,, | Performed by: ORTHOPAEDIC SURGERY

## 2021-07-06 PROCEDURE — 97110 THERAPEUTIC EXERCISES: CPT | Mod: PN

## 2021-07-06 PROCEDURE — 99999 PR PBB SHADOW E&M-EST. PATIENT-LVL II: CPT | Mod: PBBFAC,,, | Performed by: ORTHOPAEDIC SURGERY

## 2021-07-09 ENCOUNTER — CLINICAL SUPPORT (OUTPATIENT)
Dept: REHABILITATION | Facility: HOSPITAL | Age: 71
End: 2021-07-09
Attending: PHYSICAL MEDICINE & REHABILITATION
Payer: MEDICARE

## 2021-07-09 DIAGNOSIS — R29.898 RIGHT HAND WEAKNESS: ICD-10-CM

## 2021-07-09 DIAGNOSIS — M25.541 PAIN IN JOINT OF RIGHT HAND: ICD-10-CM

## 2021-07-09 DIAGNOSIS — M25.441 EFFUSION OF JOINT OF RIGHT HAND: ICD-10-CM

## 2021-07-09 DIAGNOSIS — R29.898 RIGHT ARM WEAKNESS: Primary | ICD-10-CM

## 2021-07-09 DIAGNOSIS — M25.641 STIFFNESS OF RIGHT HAND JOINT: ICD-10-CM

## 2021-07-09 PROCEDURE — 97022 WHIRLPOOL THERAPY: CPT | Mod: PN

## 2021-07-09 PROCEDURE — 97110 THERAPEUTIC EXERCISES: CPT | Mod: PN

## 2021-07-12 ENCOUNTER — PATIENT OUTREACH (OUTPATIENT)
Dept: ADMINISTRATIVE | Facility: OTHER | Age: 71
End: 2021-07-12

## 2021-07-13 ENCOUNTER — OFFICE VISIT (OUTPATIENT)
Dept: PHYSICAL MEDICINE AND REHAB | Facility: CLINIC | Age: 71
End: 2021-07-13
Payer: MEDICARE

## 2021-07-13 DIAGNOSIS — G54.0 BRACHIAL PLEXOPATHY: ICD-10-CM

## 2021-07-13 PROCEDURE — 99999 PR PBB SHADOW E&M-EST. PATIENT-LVL I: CPT | Mod: PBBFAC,,, | Performed by: PHYSICAL MEDICINE & REHABILITATION

## 2021-07-13 PROCEDURE — 95909 NRV CNDJ TST 5-6 STUDIES: CPT | Mod: PBBFAC,PN | Performed by: PHYSICAL MEDICINE & REHABILITATION

## 2021-07-13 PROCEDURE — 99499 UNLISTED E&M SERVICE: CPT | Mod: S$PBB,,, | Performed by: PHYSICAL MEDICINE & REHABILITATION

## 2021-07-13 PROCEDURE — 95886 MUSC TEST DONE W/N TEST COMP: CPT | Mod: 26,S$PBB,RT, | Performed by: PHYSICAL MEDICINE & REHABILITATION

## 2021-07-13 PROCEDURE — 95886 MUSC TEST DONE W/N TEST COMP: CPT | Mod: PBBFAC,PN | Performed by: PHYSICAL MEDICINE & REHABILITATION

## 2021-07-13 PROCEDURE — 99211 OFF/OP EST MAY X REQ PHY/QHP: CPT | Mod: PBBFAC,PN,25 | Performed by: PHYSICAL MEDICINE & REHABILITATION

## 2021-07-13 PROCEDURE — 99999 PR PBB SHADOW E&M-EST. PATIENT-LVL I: ICD-10-PCS | Mod: PBBFAC,,, | Performed by: PHYSICAL MEDICINE & REHABILITATION

## 2021-07-13 PROCEDURE — 95886 PR EMG COMPLETE, W/ NERVE CONDUCTION STUDIES, 5+ MUSCLES: ICD-10-PCS | Mod: 26,S$PBB,RT, | Performed by: PHYSICAL MEDICINE & REHABILITATION

## 2021-07-13 PROCEDURE — 95909 NRV CNDJ TST 5-6 STUDIES: CPT | Mod: 26,S$PBB,RT, | Performed by: PHYSICAL MEDICINE & REHABILITATION

## 2021-07-13 PROCEDURE — 95909 PR NERVE CONDUCTION STUDY; 5-6 STUDIES: ICD-10-PCS | Mod: 26,S$PBB,RT, | Performed by: PHYSICAL MEDICINE & REHABILITATION

## 2021-07-13 PROCEDURE — 99499 NO LOS: ICD-10-PCS | Mod: S$PBB,,, | Performed by: PHYSICAL MEDICINE & REHABILITATION

## 2021-07-14 ENCOUNTER — CLINICAL SUPPORT (OUTPATIENT)
Dept: REHABILITATION | Facility: HOSPITAL | Age: 71
End: 2021-07-14
Attending: PHYSICAL MEDICINE & REHABILITATION
Payer: MEDICARE

## 2021-07-14 DIAGNOSIS — S44.90XA: ICD-10-CM

## 2021-07-14 DIAGNOSIS — G54.0 BRACHIAL PLEXOPATHY: ICD-10-CM

## 2021-07-14 DIAGNOSIS — R29.898 RIGHT ARM WEAKNESS: ICD-10-CM

## 2021-07-14 DIAGNOSIS — M25.641 STIFFNESS OF RIGHT HAND JOINT: ICD-10-CM

## 2021-07-14 PROCEDURE — 97022 WHIRLPOOL THERAPY: CPT | Mod: PN

## 2021-07-14 PROCEDURE — 97110 THERAPEUTIC EXERCISES: CPT | Mod: PN

## 2021-07-16 ENCOUNTER — CLINICAL SUPPORT (OUTPATIENT)
Dept: REHABILITATION | Facility: HOSPITAL | Age: 71
End: 2021-07-16
Attending: PHYSICAL MEDICINE & REHABILITATION
Payer: MEDICARE

## 2021-07-16 DIAGNOSIS — R29.898 RIGHT ARM WEAKNESS: Primary | ICD-10-CM

## 2021-07-16 DIAGNOSIS — M25.441 EFFUSION OF JOINT OF RIGHT HAND: ICD-10-CM

## 2021-07-16 DIAGNOSIS — M25.641 STIFFNESS OF RIGHT HAND JOINT: ICD-10-CM

## 2021-07-16 DIAGNOSIS — R29.898 RIGHT HAND WEAKNESS: ICD-10-CM

## 2021-07-16 DIAGNOSIS — M25.541 PAIN IN JOINT OF RIGHT HAND: ICD-10-CM

## 2021-07-16 PROCEDURE — 97110 THERAPEUTIC EXERCISES: CPT | Mod: PN

## 2021-07-20 ENCOUNTER — CLINICAL SUPPORT (OUTPATIENT)
Dept: REHABILITATION | Facility: HOSPITAL | Age: 71
End: 2021-07-20
Attending: PHYSICAL MEDICINE & REHABILITATION
Payer: MEDICARE

## 2021-07-20 DIAGNOSIS — M25.641 STIFFNESS OF RIGHT HAND JOINT: ICD-10-CM

## 2021-07-20 DIAGNOSIS — M25.541 PAIN IN JOINT OF RIGHT HAND: ICD-10-CM

## 2021-07-20 PROCEDURE — 97110 THERAPEUTIC EXERCISES: CPT | Mod: PN

## 2021-07-20 PROCEDURE — 97022 WHIRLPOOL THERAPY: CPT | Mod: PN

## 2021-07-23 ENCOUNTER — CLINICAL SUPPORT (OUTPATIENT)
Dept: REHABILITATION | Facility: HOSPITAL | Age: 71
End: 2021-07-23
Attending: PHYSICAL MEDICINE & REHABILITATION
Payer: MEDICARE

## 2021-07-23 DIAGNOSIS — M25.441 EFFUSION OF JOINT OF RIGHT HAND: ICD-10-CM

## 2021-07-23 DIAGNOSIS — M25.541 PAIN IN JOINT OF RIGHT HAND: Primary | ICD-10-CM

## 2021-07-23 DIAGNOSIS — R29.898 RIGHT HAND WEAKNESS: ICD-10-CM

## 2021-07-23 DIAGNOSIS — M25.641 STIFFNESS OF RIGHT HAND JOINT: ICD-10-CM

## 2021-07-23 DIAGNOSIS — R29.898 RIGHT ARM WEAKNESS: ICD-10-CM

## 2021-07-23 PROCEDURE — 97022 WHIRLPOOL THERAPY: CPT | Mod: PN

## 2021-07-23 PROCEDURE — 97110 THERAPEUTIC EXERCISES: CPT | Mod: PN

## 2021-07-30 ENCOUNTER — CLINICAL SUPPORT (OUTPATIENT)
Dept: REHABILITATION | Facility: HOSPITAL | Age: 71
End: 2021-07-30
Attending: PHYSICAL MEDICINE & REHABILITATION
Payer: MEDICARE

## 2021-07-30 ENCOUNTER — PATIENT MESSAGE (OUTPATIENT)
Dept: ORTHOPEDICS | Facility: CLINIC | Age: 71
End: 2021-07-30

## 2021-07-30 DIAGNOSIS — M25.521 RIGHT ELBOW PAIN: ICD-10-CM

## 2021-07-30 DIAGNOSIS — M25.541 PAIN IN JOINT OF RIGHT HAND: Primary | ICD-10-CM

## 2021-07-30 DIAGNOSIS — M25.441 EFFUSION OF JOINT OF RIGHT HAND: ICD-10-CM

## 2021-07-30 DIAGNOSIS — R29.898 RIGHT ARM WEAKNESS: ICD-10-CM

## 2021-07-30 DIAGNOSIS — R29.898 RIGHT HAND WEAKNESS: ICD-10-CM

## 2021-07-30 DIAGNOSIS — M25.641 STIFFNESS OF RIGHT HAND JOINT: ICD-10-CM

## 2021-07-30 PROCEDURE — 97110 THERAPEUTIC EXERCISES: CPT | Mod: PN

## 2021-07-31 ENCOUNTER — PATIENT MESSAGE (OUTPATIENT)
Dept: ORTHOPEDICS | Facility: CLINIC | Age: 71
End: 2021-07-31

## 2021-08-04 ENCOUNTER — PATIENT MESSAGE (OUTPATIENT)
Dept: ADMINISTRATIVE | Facility: HOSPITAL | Age: 71
End: 2021-08-04

## 2021-08-05 DIAGNOSIS — Z98.890 STATUS POST RIGHT ROTATOR CUFF REPAIR: Primary | ICD-10-CM

## 2021-08-06 ENCOUNTER — DOCUMENTATION ONLY (OUTPATIENT)
Dept: REHABILITATION | Facility: HOSPITAL | Age: 71
End: 2021-08-06

## 2021-08-06 ENCOUNTER — PATIENT MESSAGE (OUTPATIENT)
Dept: ORTHOPEDICS | Facility: CLINIC | Age: 71
End: 2021-08-06

## 2021-08-10 ENCOUNTER — CLINICAL SUPPORT (OUTPATIENT)
Dept: REHABILITATION | Facility: HOSPITAL | Age: 71
End: 2021-08-10
Attending: ORTHOPAEDIC SURGERY
Payer: MEDICARE

## 2021-08-10 DIAGNOSIS — R29.898 SHOULDER WEAKNESS: ICD-10-CM

## 2021-08-10 DIAGNOSIS — M25.611 STIFFNESS OF RIGHT SHOULDER JOINT: ICD-10-CM

## 2021-08-10 DIAGNOSIS — M25.511 RIGHT SHOULDER PAIN, UNSPECIFIED CHRONICITY: Primary | ICD-10-CM

## 2021-08-10 DIAGNOSIS — Z98.890 STATUS POST RIGHT ROTATOR CUFF REPAIR: ICD-10-CM

## 2021-08-10 PROCEDURE — 97165 OT EVAL LOW COMPLEX 30 MIN: CPT | Mod: PN

## 2021-08-10 PROCEDURE — 97110 THERAPEUTIC EXERCISES: CPT | Mod: PN

## 2021-08-17 ENCOUNTER — OFFICE VISIT (OUTPATIENT)
Dept: ORTHOPEDICS | Facility: CLINIC | Age: 71
End: 2021-08-17
Payer: MEDICARE

## 2021-08-17 ENCOUNTER — CLINICAL SUPPORT (OUTPATIENT)
Dept: REHABILITATION | Facility: HOSPITAL | Age: 71
End: 2021-08-17
Attending: ORTHOPAEDIC SURGERY
Payer: MEDICARE

## 2021-08-17 VITALS — BODY MASS INDEX: 30.18 KG/M2 | WEIGHT: 164 LBS | RESPIRATION RATE: 18 BRPM | HEIGHT: 62 IN

## 2021-08-17 DIAGNOSIS — Z98.890 STATUS POST RIGHT ROTATOR CUFF REPAIR: Primary | ICD-10-CM

## 2021-08-17 DIAGNOSIS — R29.898 SHOULDER WEAKNESS: ICD-10-CM

## 2021-08-17 DIAGNOSIS — M25.511 RIGHT SHOULDER PAIN, UNSPECIFIED CHRONICITY: Primary | ICD-10-CM

## 2021-08-17 DIAGNOSIS — M25.611 STIFFNESS OF RIGHT SHOULDER JOINT: ICD-10-CM

## 2021-08-17 DIAGNOSIS — M25.541 PAIN IN JOINT OF RIGHT HAND: ICD-10-CM

## 2021-08-17 PROCEDURE — 99213 PR OFFICE/OUTPT VISIT, EST, LEVL III, 20-29 MIN: ICD-10-PCS | Mod: S$PBB,,, | Performed by: ORTHOPAEDIC SURGERY

## 2021-08-17 PROCEDURE — 99213 OFFICE O/P EST LOW 20 MIN: CPT | Mod: PBBFAC,PN | Performed by: ORTHOPAEDIC SURGERY

## 2021-08-17 PROCEDURE — 99999 PR PBB SHADOW E&M-EST. PATIENT-LVL III: CPT | Mod: PBBFAC,,, | Performed by: ORTHOPAEDIC SURGERY

## 2021-08-17 PROCEDURE — 99999 PR PBB SHADOW E&M-EST. PATIENT-LVL III: ICD-10-PCS | Mod: PBBFAC,,, | Performed by: ORTHOPAEDIC SURGERY

## 2021-08-17 PROCEDURE — 99213 OFFICE O/P EST LOW 20 MIN: CPT | Mod: S$PBB,,, | Performed by: ORTHOPAEDIC SURGERY

## 2021-08-17 PROCEDURE — 97110 THERAPEUTIC EXERCISES: CPT | Mod: PN

## 2021-08-24 ENCOUNTER — CLINICAL SUPPORT (OUTPATIENT)
Dept: REHABILITATION | Facility: HOSPITAL | Age: 71
End: 2021-08-24
Attending: ORTHOPAEDIC SURGERY
Payer: MEDICARE

## 2021-08-24 DIAGNOSIS — M25.511 RIGHT SHOULDER PAIN, UNSPECIFIED CHRONICITY: Primary | ICD-10-CM

## 2021-08-24 DIAGNOSIS — R29.898 SHOULDER WEAKNESS: ICD-10-CM

## 2021-08-24 DIAGNOSIS — M25.611 STIFFNESS OF RIGHT SHOULDER JOINT: ICD-10-CM

## 2021-08-24 PROCEDURE — 97110 THERAPEUTIC EXERCISES: CPT | Mod: PN

## 2021-09-07 ENCOUNTER — CLINICAL SUPPORT (OUTPATIENT)
Dept: REHABILITATION | Facility: HOSPITAL | Age: 71
End: 2021-09-07
Attending: PHYSICAL MEDICINE & REHABILITATION
Payer: MEDICARE

## 2021-09-07 DIAGNOSIS — M25.511 RIGHT SHOULDER PAIN, UNSPECIFIED CHRONICITY: ICD-10-CM

## 2021-09-07 DIAGNOSIS — R29.898 SHOULDER WEAKNESS: ICD-10-CM

## 2021-09-07 DIAGNOSIS — M25.611 STIFFNESS OF RIGHT SHOULDER JOINT: Primary | ICD-10-CM

## 2021-09-07 PROCEDURE — 97140 MANUAL THERAPY 1/> REGIONS: CPT | Mod: PN

## 2021-09-07 PROCEDURE — 97110 THERAPEUTIC EXERCISES: CPT | Mod: PN

## 2021-09-14 ENCOUNTER — CLINICAL SUPPORT (OUTPATIENT)
Dept: REHABILITATION | Facility: HOSPITAL | Age: 71
End: 2021-09-14
Attending: PHYSICAL MEDICINE & REHABILITATION
Payer: MEDICARE

## 2021-09-14 DIAGNOSIS — M25.511 RIGHT SHOULDER PAIN, UNSPECIFIED CHRONICITY: ICD-10-CM

## 2021-09-14 DIAGNOSIS — M25.611 STIFFNESS OF RIGHT SHOULDER JOINT: ICD-10-CM

## 2021-09-14 PROCEDURE — 97110 THERAPEUTIC EXERCISES: CPT | Mod: PN

## 2021-09-21 ENCOUNTER — CLINICAL SUPPORT (OUTPATIENT)
Dept: REHABILITATION | Facility: HOSPITAL | Age: 71
End: 2021-09-21
Attending: PHYSICAL MEDICINE & REHABILITATION
Payer: MEDICARE

## 2021-09-21 ENCOUNTER — TELEPHONE (OUTPATIENT)
Dept: UROLOGY | Facility: CLINIC | Age: 71
End: 2021-09-21

## 2021-09-21 DIAGNOSIS — M25.611 STIFFNESS OF RIGHT SHOULDER JOINT: ICD-10-CM

## 2021-09-21 DIAGNOSIS — R29.898 SHOULDER WEAKNESS: ICD-10-CM

## 2021-09-21 DIAGNOSIS — M25.511 RIGHT SHOULDER PAIN, UNSPECIFIED CHRONICITY: Primary | ICD-10-CM

## 2021-09-21 PROCEDURE — 97110 THERAPEUTIC EXERCISES: CPT | Mod: PN

## 2021-09-21 PROCEDURE — 97140 MANUAL THERAPY 1/> REGIONS: CPT | Mod: PN

## 2021-09-28 ENCOUNTER — CLINICAL SUPPORT (OUTPATIENT)
Dept: REHABILITATION | Facility: HOSPITAL | Age: 71
End: 2021-09-28
Attending: PHYSICAL MEDICINE & REHABILITATION
Payer: MEDICARE

## 2021-09-28 ENCOUNTER — PATIENT OUTREACH (OUTPATIENT)
Dept: ADMINISTRATIVE | Facility: OTHER | Age: 71
End: 2021-09-28

## 2021-09-28 DIAGNOSIS — M25.611 STIFFNESS OF RIGHT SHOULDER JOINT: ICD-10-CM

## 2021-09-28 DIAGNOSIS — R29.898 SHOULDER WEAKNESS: ICD-10-CM

## 2021-09-28 DIAGNOSIS — E08.65 DIABETES MELLITUS DUE TO UNDERLYING CONDITION WITH HYPERGLYCEMIA, WITHOUT LONG-TERM CURRENT USE OF INSULIN: Primary | ICD-10-CM

## 2021-09-28 DIAGNOSIS — M25.511 RIGHT SHOULDER PAIN, UNSPECIFIED CHRONICITY: Primary | ICD-10-CM

## 2021-09-28 PROCEDURE — 97110 THERAPEUTIC EXERCISES: CPT | Mod: KX,PN

## 2021-09-29 ENCOUNTER — OFFICE VISIT (OUTPATIENT)
Dept: ORTHOPEDICS | Facility: CLINIC | Age: 71
End: 2021-09-29
Payer: MEDICARE

## 2021-09-29 VITALS — RESPIRATION RATE: 18 BRPM | HEIGHT: 62 IN | BODY MASS INDEX: 30.18 KG/M2 | WEIGHT: 164 LBS

## 2021-09-29 DIAGNOSIS — Z98.890 STATUS POST RIGHT ROTATOR CUFF REPAIR: Primary | ICD-10-CM

## 2021-09-29 DIAGNOSIS — N20.0 NEPHROLITHIASIS: Primary | ICD-10-CM

## 2021-09-29 DIAGNOSIS — M25.541 PAIN IN JOINT OF RIGHT HAND: ICD-10-CM

## 2021-09-29 PROCEDURE — 99213 OFFICE O/P EST LOW 20 MIN: CPT | Mod: PBBFAC,PN | Performed by: ORTHOPAEDIC SURGERY

## 2021-09-29 PROCEDURE — 99213 OFFICE O/P EST LOW 20 MIN: CPT | Mod: S$PBB,,, | Performed by: ORTHOPAEDIC SURGERY

## 2021-09-29 PROCEDURE — 99999 PR PBB SHADOW E&M-EST. PATIENT-LVL III: ICD-10-PCS | Mod: PBBFAC,,, | Performed by: ORTHOPAEDIC SURGERY

## 2021-09-29 PROCEDURE — 99999 PR PBB SHADOW E&M-EST. PATIENT-LVL III: CPT | Mod: PBBFAC,,, | Performed by: ORTHOPAEDIC SURGERY

## 2021-09-29 PROCEDURE — 99213 PR OFFICE/OUTPT VISIT, EST, LEVL III, 20-29 MIN: ICD-10-PCS | Mod: S$PBB,,, | Performed by: ORTHOPAEDIC SURGERY

## 2021-10-01 ENCOUNTER — PES CALL (OUTPATIENT)
Dept: ADMINISTRATIVE | Facility: CLINIC | Age: 71
End: 2021-10-01

## 2021-10-05 ENCOUNTER — CLINICAL SUPPORT (OUTPATIENT)
Dept: REHABILITATION | Facility: HOSPITAL | Age: 71
End: 2021-10-05
Attending: PHYSICAL MEDICINE & REHABILITATION
Payer: MEDICARE

## 2021-10-05 DIAGNOSIS — M25.611 STIFFNESS OF RIGHT SHOULDER JOINT: ICD-10-CM

## 2021-10-05 DIAGNOSIS — M25.511 RIGHT SHOULDER PAIN, UNSPECIFIED CHRONICITY: Primary | ICD-10-CM

## 2021-10-05 DIAGNOSIS — R29.898 SHOULDER WEAKNESS: ICD-10-CM

## 2021-10-05 PROCEDURE — 97110 THERAPEUTIC EXERCISES: CPT | Mod: KX,PN

## 2021-10-12 ENCOUNTER — CLINICAL SUPPORT (OUTPATIENT)
Dept: REHABILITATION | Facility: HOSPITAL | Age: 71
End: 2021-10-12
Attending: PHYSICAL MEDICINE & REHABILITATION
Payer: MEDICARE

## 2021-10-12 DIAGNOSIS — M25.641 STIFFNESS OF RIGHT HAND JOINT: ICD-10-CM

## 2021-10-12 DIAGNOSIS — M25.611 STIFFNESS OF RIGHT SHOULDER JOINT: ICD-10-CM

## 2021-10-12 DIAGNOSIS — R29.898 SHOULDER WEAKNESS: ICD-10-CM

## 2021-10-12 DIAGNOSIS — M25.511 RIGHT SHOULDER PAIN, UNSPECIFIED CHRONICITY: Primary | ICD-10-CM

## 2021-10-12 PROCEDURE — 97110 THERAPEUTIC EXERCISES: CPT | Mod: KX,PN

## 2021-10-13 ENCOUNTER — HOSPITAL ENCOUNTER (OUTPATIENT)
Dept: RADIOLOGY | Facility: HOSPITAL | Age: 71
Discharge: HOME | End: 2021-10-13
Attending: UROLOGY
Payer: MEDICARE

## 2021-10-13 ENCOUNTER — TELEPHONE (OUTPATIENT)
Dept: UROLOGY | Facility: CLINIC | Age: 71
End: 2021-10-13

## 2021-10-13 DIAGNOSIS — N20.0 NEPHROLITHIASIS: ICD-10-CM

## 2021-10-13 PROCEDURE — 74018 RADEX ABDOMEN 1 VIEW: CPT | Mod: 26,,, | Performed by: RADIOLOGY

## 2021-10-13 PROCEDURE — 76770 US EXAM ABDO BACK WALL COMP: CPT | Mod: TC

## 2021-10-13 PROCEDURE — 76770 US EXAM ABDO BACK WALL COMP: CPT | Mod: 26,,, | Performed by: RADIOLOGY

## 2021-10-13 PROCEDURE — 74018 XR ABDOMEN AP 1 VIEW: ICD-10-PCS | Mod: 26,,, | Performed by: RADIOLOGY

## 2021-10-13 PROCEDURE — 74018 RADEX ABDOMEN 1 VIEW: CPT | Mod: TC,FY

## 2021-10-13 PROCEDURE — 76770 US RETROPERITONEAL COMPLETE: ICD-10-PCS | Mod: 26,,, | Performed by: RADIOLOGY

## 2021-10-15 ENCOUNTER — OFFICE VISIT (OUTPATIENT)
Dept: UROLOGY | Facility: CLINIC | Age: 71
End: 2021-10-15
Payer: MEDICARE

## 2021-10-15 VITALS
HEIGHT: 62 IN | SYSTOLIC BLOOD PRESSURE: 137 MMHG | RESPIRATION RATE: 18 BRPM | DIASTOLIC BLOOD PRESSURE: 76 MMHG | HEART RATE: 80 BPM | BODY MASS INDEX: 31.6 KG/M2 | WEIGHT: 171.75 LBS

## 2021-10-15 DIAGNOSIS — N20.0 NEPHROLITHIASIS: Primary | ICD-10-CM

## 2021-10-15 DIAGNOSIS — N20.0 URIC ACID RENAL CALCULUS: ICD-10-CM

## 2021-10-15 PROCEDURE — 99999 PR PBB SHADOW E&M-EST. PATIENT-LVL IV: ICD-10-PCS | Mod: PBBFAC,,, | Performed by: NURSE PRACTITIONER

## 2021-10-15 PROCEDURE — 99214 OFFICE O/P EST MOD 30 MIN: CPT | Mod: PBBFAC,PN | Performed by: NURSE PRACTITIONER

## 2021-10-15 PROCEDURE — 99213 PR OFFICE/OUTPT VISIT, EST, LEVL III, 20-29 MIN: ICD-10-PCS | Mod: S$PBB,,, | Performed by: NURSE PRACTITIONER

## 2021-10-15 PROCEDURE — 99213 OFFICE O/P EST LOW 20 MIN: CPT | Mod: S$PBB,,, | Performed by: NURSE PRACTITIONER

## 2021-10-15 PROCEDURE — 99999 PR PBB SHADOW E&M-EST. PATIENT-LVL IV: CPT | Mod: PBBFAC,,, | Performed by: NURSE PRACTITIONER

## 2021-10-15 RX ORDER — ALLOPURINOL 100 MG/1
100 TABLET ORAL DAILY
Qty: 90 TABLET | Refills: 4 | Status: SHIPPED | OUTPATIENT
Start: 2021-10-15 | End: 2022-10-19 | Stop reason: SDUPTHER

## 2021-10-21 ENCOUNTER — CLINICAL SUPPORT (OUTPATIENT)
Dept: REHABILITATION | Facility: HOSPITAL | Age: 71
End: 2021-10-21
Attending: PHYSICAL MEDICINE & REHABILITATION
Payer: MEDICARE

## 2021-10-21 DIAGNOSIS — R29.898 SHOULDER WEAKNESS: ICD-10-CM

## 2021-10-21 DIAGNOSIS — M25.611 STIFFNESS OF RIGHT SHOULDER JOINT: ICD-10-CM

## 2021-10-21 DIAGNOSIS — M25.511 RIGHT SHOULDER PAIN, UNSPECIFIED CHRONICITY: Primary | ICD-10-CM

## 2021-10-21 PROCEDURE — 97110 THERAPEUTIC EXERCISES: CPT | Mod: PN

## 2021-10-25 ENCOUNTER — DOCUMENTATION ONLY (OUTPATIENT)
Dept: REHABILITATION | Facility: HOSPITAL | Age: 71
End: 2021-10-25
Payer: MEDICARE

## 2021-11-01 ENCOUNTER — IMMUNIZATION (OUTPATIENT)
Dept: PRIMARY CARE CLINIC | Facility: CLINIC | Age: 71
End: 2021-11-01
Payer: MEDICARE

## 2021-11-01 DIAGNOSIS — Z23 NEED FOR VACCINATION: Primary | ICD-10-CM

## 2021-11-01 PROCEDURE — 91300 COVID-19, MRNA, LNP-S, PF, 30 MCG/0.3 ML DOSE VACCINE: CPT | Mod: S$GLB,,, | Performed by: FAMILY MEDICINE

## 2021-11-01 PROCEDURE — 91300 COVID-19, MRNA, LNP-S, PF, 30 MCG/0.3 ML DOSE VACCINE: ICD-10-PCS | Mod: S$GLB,,, | Performed by: FAMILY MEDICINE

## 2021-11-01 PROCEDURE — 0004A COVID-19, MRNA, LNP-S, PF, 30 MCG/0.3 ML DOSE VACCINE: ICD-10-PCS | Mod: CV19,S$GLB,, | Performed by: FAMILY MEDICINE

## 2021-11-01 PROCEDURE — 0004A COVID-19, MRNA, LNP-S, PF, 30 MCG/0.3 ML DOSE VACCINE: CPT | Mod: CV19,S$GLB,, | Performed by: FAMILY MEDICINE

## 2021-11-14 ENCOUNTER — PATIENT OUTREACH (OUTPATIENT)
Dept: ADMINISTRATIVE | Facility: OTHER | Age: 71
End: 2021-11-14
Payer: MEDICARE

## 2021-11-15 ENCOUNTER — OFFICE VISIT (OUTPATIENT)
Dept: ORTHOPEDICS | Facility: CLINIC | Age: 71
End: 2021-11-15
Payer: MEDICARE

## 2021-11-15 VITALS — BODY MASS INDEX: 31.47 KG/M2 | WEIGHT: 171 LBS | HEIGHT: 62 IN | RESPIRATION RATE: 16 BRPM

## 2021-11-15 DIAGNOSIS — Z98.890 STATUS POST RIGHT ROTATOR CUFF REPAIR: Primary | ICD-10-CM

## 2021-11-15 PROCEDURE — 99213 OFFICE O/P EST LOW 20 MIN: CPT | Mod: PBBFAC,PN | Performed by: ORTHOPAEDIC SURGERY

## 2021-11-15 PROCEDURE — 99999 PR PBB SHADOW E&M-EST. PATIENT-LVL III: ICD-10-PCS | Mod: PBBFAC,,, | Performed by: ORTHOPAEDIC SURGERY

## 2021-11-15 PROCEDURE — 99213 PR OFFICE/OUTPT VISIT, EST, LEVL III, 20-29 MIN: ICD-10-PCS | Mod: S$PBB,,, | Performed by: ORTHOPAEDIC SURGERY

## 2021-11-15 PROCEDURE — 99213 OFFICE O/P EST LOW 20 MIN: CPT | Mod: S$PBB,,, | Performed by: ORTHOPAEDIC SURGERY

## 2021-11-15 PROCEDURE — 99999 PR PBB SHADOW E&M-EST. PATIENT-LVL III: CPT | Mod: PBBFAC,,, | Performed by: ORTHOPAEDIC SURGERY

## 2022-01-09 ENCOUNTER — LAB VISIT (OUTPATIENT)
Dept: PRIMARY CARE CLINIC | Facility: OTHER | Age: 72
End: 2022-01-09
Attending: INTERNAL MEDICINE
Payer: MEDICARE

## 2022-01-09 DIAGNOSIS — Z20.822 ENCOUNTER FOR LABORATORY TESTING FOR COVID-19 VIRUS: ICD-10-CM

## 2022-01-09 PROCEDURE — U0003 INFECTIOUS AGENT DETECTION BY NUCLEIC ACID (DNA OR RNA); SEVERE ACUTE RESPIRATORY SYNDROME CORONAVIRUS 2 (SARS-COV-2) (CORONAVIRUS DISEASE [COVID-19]), AMPLIFIED PROBE TECHNIQUE, MAKING USE OF HIGH THROUGHPUT TECHNOLOGIES AS DESCRIBED BY CMS-2020-01-R: HCPCS | Performed by: INTERNAL MEDICINE

## 2022-01-12 DIAGNOSIS — E11.69 TYPE 2 DIABETES MELLITUS WITH HYPERLIPIDEMIA: ICD-10-CM

## 2022-01-12 DIAGNOSIS — E78.5 TYPE 2 DIABETES MELLITUS WITH HYPERLIPIDEMIA: ICD-10-CM

## 2022-01-12 LAB — SARS-COV-2 RNA RESP QL NAA+PROBE: NOT DETECTED

## 2022-01-12 RX ORDER — GLIMEPIRIDE 4 MG/1
8 TABLET ORAL
Qty: 180 TABLET | Refills: 0 | Status: SHIPPED | OUTPATIENT
Start: 2022-01-12 | End: 2022-04-18 | Stop reason: SDUPTHER

## 2022-01-12 NOTE — TELEPHONE ENCOUNTER
Care Due:                  Date            Visit Type   Department     Provider  --------------------------------------------------------------------------------                                             [facility]  Last Visit: 03-      None         GAURI Vargas  Next Visit: None Scheduled  None         None Found                                                            Last  Test          Frequency    Reason                     Performed    Due Date  --------------------------------------------------------------------------------    Office Visit  12 months..  glimepiride, metFORMIN...  03- 03-    Cr..........  12 months..  glimepiride, metFORMIN...  04- 03-    HBA1C.......  6 months...  glimepiride, metFORMIN...  01- 07-    Powered by [employer]. Reference number: [id number].   1/12/2022 7:56:48 AM CST

## 2022-01-12 NOTE — TELEPHONE ENCOUNTER
----- Message from Vianey Jackson sent at 1/10/2022  4:40 PM CST -----  Contact: pt  Type:  RX Refill Request    Who Called:  pt    Refill or New Rx:  refill    RX Name and Strength:      glimepiride (AMARYL) 4 MG tablet    How is the patient currently taking it? (ex. 1XDay):  As Directed  Is this a 30 day or 90 day RX:  90    Preferred Pharmacy with phone number:    [id number] - [place] - [address]  [address] [address]  Phone: [phone] Fax: [phone]      Local or Mail Order:  local  Ordering Provider:  Juan Bridges Call Back Number:  [phone]    Additional Information:  pt use to see Tess Martinez will be scheduling an appt w/ Dr Lockett    Please contact pt upon completion-Thank you~

## 2022-01-16 ENCOUNTER — LAB VISIT (OUTPATIENT)
Dept: PRIMARY CARE CLINIC | Facility: OTHER | Age: 72
End: 2022-01-16
Attending: INTERNAL MEDICINE
Payer: MEDICARE

## 2022-01-16 DIAGNOSIS — Z20.822 ENCOUNTER FOR LABORATORY TESTING FOR COVID-19 VIRUS: ICD-10-CM

## 2022-01-16 PROCEDURE — U0003 INFECTIOUS AGENT DETECTION BY NUCLEIC ACID (DNA OR RNA); SEVERE ACUTE RESPIRATORY SYNDROME CORONAVIRUS 2 (SARS-COV-2) (CORONAVIRUS DISEASE [COVID-19]), AMPLIFIED PROBE TECHNIQUE, MAKING USE OF HIGH THROUGHPUT TECHNOLOGIES AS DESCRIBED BY CMS-2020-01-R: HCPCS | Performed by: INTERNAL MEDICINE

## 2022-01-18 DIAGNOSIS — U07.1 COVID-19 VIRUS DETECTED: ICD-10-CM

## 2022-01-18 LAB
SARS-COV-2 RNA RESP QL NAA+PROBE: DETECTED
SARS-COV-2- CYCLE NUMBER: 18

## 2022-01-23 ENCOUNTER — LAB VISIT (OUTPATIENT)
Dept: PRIMARY CARE CLINIC | Facility: OTHER | Age: 72
End: 2022-01-23
Attending: INTERNAL MEDICINE
Payer: MEDICARE

## 2022-01-23 DIAGNOSIS — Z20.822 ENCOUNTER FOR LABORATORY TESTING FOR COVID-19 VIRUS: ICD-10-CM

## 2022-01-23 PROCEDURE — U0003 INFECTIOUS AGENT DETECTION BY NUCLEIC ACID (DNA OR RNA); SEVERE ACUTE RESPIRATORY SYNDROME CORONAVIRUS 2 (SARS-COV-2) (CORONAVIRUS DISEASE [COVID-19]), AMPLIFIED PROBE TECHNIQUE, MAKING USE OF HIGH THROUGHPUT TECHNOLOGIES AS DESCRIBED BY CMS-2020-01-R: HCPCS | Performed by: INTERNAL MEDICINE

## 2022-01-24 LAB
SARS-COV-2 RNA RESP QL NAA+PROBE: DETECTED
SARS-COV-2- CYCLE NUMBER: 16

## 2022-01-30 ENCOUNTER — LAB VISIT (OUTPATIENT)
Dept: PRIMARY CARE CLINIC | Facility: OTHER | Age: 72
End: 2022-01-30
Attending: INTERNAL MEDICINE
Payer: MEDICARE

## 2022-01-30 DIAGNOSIS — Z20.822 ENCOUNTER FOR LABORATORY TESTING FOR COVID-19 VIRUS: ICD-10-CM

## 2022-01-30 PROCEDURE — U0003 INFECTIOUS AGENT DETECTION BY NUCLEIC ACID (DNA OR RNA); SEVERE ACUTE RESPIRATORY SYNDROME CORONAVIRUS 2 (SARS-COV-2) (CORONAVIRUS DISEASE [COVID-19]), AMPLIFIED PROBE TECHNIQUE, MAKING USE OF HIGH THROUGHPUT TECHNOLOGIES AS DESCRIBED BY CMS-2020-01-R: HCPCS | Performed by: INTERNAL MEDICINE

## 2022-01-31 LAB
SARS-COV-2 RNA RESP QL NAA+PROBE: DETECTED
SARS-COV-2- CYCLE NUMBER: 36

## 2022-02-14 DIAGNOSIS — E11.9 TYPE 2 DIABETES MELLITUS WITHOUT COMPLICATION, WITHOUT LONG-TERM CURRENT USE OF INSULIN: ICD-10-CM

## 2022-02-17 ENCOUNTER — PATIENT MESSAGE (OUTPATIENT)
Dept: FAMILY MEDICINE | Facility: CLINIC | Age: 72
End: 2022-02-17
Payer: MEDICARE

## 2022-02-25 ENCOUNTER — TELEPHONE (OUTPATIENT)
Dept: CARDIOLOGY | Facility: CLINIC | Age: 72
End: 2022-02-25
Payer: MEDICARE

## 2022-02-25 NOTE — TELEPHONE ENCOUNTER
----- Message from Estefania Mckenzie sent at 2/25/2022  3:30 PM CST -----  Type: Needs Medical Advice  Who Called:  PT  Symptoms (please be specific):  Pt states losartan (COZAAR) 100 MG tablet is out at the pharmacy and they only have the name brand. Asking for the  To approve the Name brand over the generic.     Best Call Back Number: [phone]  Additional Information: Please call and advise

## 2022-02-28 ENCOUNTER — TELEPHONE (OUTPATIENT)
Dept: CARDIOLOGY | Facility: CLINIC | Age: 72
End: 2022-02-28
Payer: MEDICARE

## 2022-02-28 RX ORDER — LOSARTAN POTASSIUM 100 MG/1
100 TABLET ORAL DAILY
Qty: 90 TABLET | Refills: 0 | Status: SHIPPED | OUTPATIENT
Start: 2022-02-28 | End: 2022-03-29

## 2022-02-28 NOTE — TELEPHONE ENCOUNTER
----- Message from Howard Harris sent at 2/28/2022  2:51 PM CST -----  Contact: trinidad shea  Type: Needs Medical Advice    Who Called:  [id number] - [place] - [address]  [address] [address]  Phone: [phone] Fax: [phone]      Additional Information: Requesting callback regarding losartan (COZAAR) 100 MG tablet is on back order and brand name is not covered on ins please call back pharmacy, asking to put 50mg tablets in place of the 100mg     Please Advise-Thank you

## 2022-03-07 ENCOUNTER — TELEPHONE (OUTPATIENT)
Dept: CARDIOLOGY | Facility: CLINIC | Age: 72
End: 2022-03-07
Payer: MEDICARE

## 2022-03-07 NOTE — TELEPHONE ENCOUNTER
----- Message from Pankaj Vitale MA sent at 3/7/2022 12:33 PM CST -----  Type: Needs Medical Advice    Who Called: ROBERT GARCIA [[id number]]  Best Call Back Number: [phone]  Inquiry/Question: Would you kindly callROBERT GARCIA [[id number]] regarding a medication refill concern that has been an ongoing issue . Thank you~

## 2022-03-07 NOTE — TELEPHONE ENCOUNTER
----- Message from Dashawn Roger sent at 3/7/2022 12:07 PM CST -----  Type:  Sooner Apoointment Request    Caller is requesting a sooner appointment.  Caller declined first available appointment listed below.  Caller will not accept being placed on the waitlist and is requesting a message be sent to doctor.    Name of Caller:  Pt  When is the first available appointment?  6/27  Symptoms:  check up needs to be seen for RX refills  Best Call Back Number:  [phone]     Additional Information:  Please advise -- Thank you

## 2022-03-17 ENCOUNTER — OFFICE VISIT (OUTPATIENT)
Dept: CARDIOLOGY | Facility: CLINIC | Age: 72
End: 2022-03-17
Payer: MEDICARE

## 2022-03-17 VITALS
BODY MASS INDEX: 33.13 KG/M2 | SYSTOLIC BLOOD PRESSURE: 136 MMHG | OXYGEN SATURATION: 99 % | HEART RATE: 66 BPM | HEIGHT: 62 IN | DIASTOLIC BLOOD PRESSURE: 76 MMHG | WEIGHT: 180 LBS

## 2022-03-17 DIAGNOSIS — E08.65 DIABETES MELLITUS DUE TO UNDERLYING CONDITION WITH HYPERGLYCEMIA, WITHOUT LONG-TERM CURRENT USE OF INSULIN: ICD-10-CM

## 2022-03-17 DIAGNOSIS — E78.5 HYPERLIPIDEMIA ASSOCIATED WITH TYPE 2 DIABETES MELLITUS: ICD-10-CM

## 2022-03-17 DIAGNOSIS — I25.10 CORONARY ARTERY DISEASE INVOLVING NATIVE CORONARY ARTERY OF NATIVE HEART WITHOUT ANGINA PECTORIS: Primary | ICD-10-CM

## 2022-03-17 DIAGNOSIS — I15.2 HYPERTENSION ASSOCIATED WITH DIABETES: ICD-10-CM

## 2022-03-17 DIAGNOSIS — E11.59 HYPERTENSION ASSOCIATED WITH DIABETES: ICD-10-CM

## 2022-03-17 DIAGNOSIS — E11.9 TYPE 2 DIABETES MELLITUS WITHOUT COMPLICATION, WITHOUT LONG-TERM CURRENT USE OF INSULIN: ICD-10-CM

## 2022-03-17 DIAGNOSIS — E11.69 HYPERLIPIDEMIA ASSOCIATED WITH TYPE 2 DIABETES MELLITUS: ICD-10-CM

## 2022-03-17 DIAGNOSIS — I25.2 H/O MYOCARDIAL INFARCTION, GREATER THAN 8 WEEKS: ICD-10-CM

## 2022-03-17 PROCEDURE — 99214 PR OFFICE/OUTPT VISIT, EST, LEVL IV, 30-39 MIN: ICD-10-PCS | Mod: S$GLB,,, | Performed by: NURSE PRACTITIONER

## 2022-03-17 PROCEDURE — 99214 OFFICE O/P EST MOD 30 MIN: CPT | Mod: S$GLB,,, | Performed by: NURSE PRACTITIONER

## 2022-03-17 RX ORDER — METFORMIN HYDROCHLORIDE 1000 MG/1
1000 TABLET ORAL 2 TIMES DAILY WITH MEALS
Qty: 60 TABLET | Refills: 0 | Status: SHIPPED | OUTPATIENT
Start: 2022-03-17 | End: 2022-04-18 | Stop reason: SDUPTHER

## 2022-03-17 RX ORDER — OLMESARTAN MEDOXOMIL 40 MG/1
40 TABLET ORAL DAILY
Qty: 90 TABLET | Refills: 3 | Status: SHIPPED | OUTPATIENT
Start: 2022-03-17 | End: 2023-03-21

## 2022-03-17 NOTE — PROGRESS NOTES
Subjective:    Patient ID:  Jessica Luna is a 71 y.o. female   Chief Complaint   Patient presents with    Follow-up    Medication Refill       HPI:  Patient presents today for follow-up appointment.  Patient denies any chest pain, dizziness, shortness of breath, palpitations, or syncope.  Patient has been doing well and taking medications as ordered.  Denies any falls or head injuries.  Denies any blood in the stool or in the urine.      Review of patient's allergies indicates:   Allergen Reactions    No known drug allergies        Past Medical History:   Diagnosis Date    Anticoagulant long-term use     Arthritis     CAD (coronary artery disease)     C. Stents X2 Dr. Og Jeronimo    Diabetes mellitus     Diabetes mellitus type II     Diverticulosis     Hyperlipidemia     Hypertension     Low magnesium levels     Myocardial infarction     Obstructive uropathy 10/16/2015    Renal stone     Status post cystoscopy - Left laser percutaneous nephrolithotripsy 6/29/2016    Ureterolithiasis Obstructive-left 1/3/2015    Wears dentures     FULL UPPER - PARTIAL BOTTOM    Wears dentures     Full upper; partial lower    Wears glasses     Wears glasses      Past Surgical History:   Procedure Laterality Date    ARTHROSCOPIC REPAIR OF ROTATOR CUFF OF SHOULDER Right 4/8/2021    Procedure: REPAIR, ROTATOR CUFF, ARTHROSCOPIC;  Surgeon: Rudy Graves MD;  Location: [facility] OR;  Service: Orthopedics;  Laterality: Right;  GENERAL AND BLOCK    ARTHROSCOPIC TENOTOMY OF BICEPS TENDON Right 4/8/2021    Procedure: TENOTOMY, BICEPS, ARTHROSCOPIC;  Surgeon: Rudy Graves MD;  Location: [facility] OR;  Service: Orthopedics;  Laterality: Right;  GENERAL AND BLOCK    ARTHROSCOPY OF SHOULDER WITH DECOMPRESSION OF SUBACROMIAL SPACE Right 4/8/2021    Procedure: ARTHROSCOPY, SHOULDER, WITH SUBACROMIAL SPACE DECOMPRESSION;  Surgeon: Rudy Graves MD;  Location: [facility] OR;  Service: Orthopedics;  Laterality: Right;   GENERAL AND BLOCK  MITEK     SECTION      CHOLECYSTECTOMY      COLONOSCOPY N/A 10/7/2015    Procedure: COLONOSCOPY;  Surgeon: Washington Rodriguez MD;  Location: [facility] ENDO;  Service: Endoscopy;  Laterality: N/A;    COLONOSCOPY N/A 10/3/2019    Procedure: COLONOSCOPY;  Surgeon: Viviane Simeon MD;  Location: [facility] ENDO;  Service: Endoscopy;  Laterality: N/A;    CORONARY STENT PLACEMENT  2006    2 vessels    CYSTOSCOPY      CYSTOSCOPY W/ LASER LITHOTRIPSY  12/15/15    CYSTOSCOPY W/ RETROGRADES Bilateral 2019    Procedure: CYSTOSCOPY, WITH RETROGRADE PYELOGRAM;  Surgeon: Ubaldo Cho MD;  Location: [facility] OR;  Service: Urology;  Laterality: Bilateral;    CYSTOSCOPY W/ URETERAL STENT REMOVAL Left 10/1/2019    Procedure: CYSTOSCOPY, WITH URETERAL STENT REMOVAL;  Surgeon: Ubaldo Cho MD;  Location: [facility] OR;  Service: Urology;  Laterality: Left;    HYSTERECTOMY      GRACE/BSO, DUB    LASER LITHOTRIPSY Left 2019    Procedure: LITHOTRIPSY, USING LASER;  Surgeon: Ubaldo Cho MD;  Location: [facility] OR;  Service: Urology;  Laterality: Left;    OOPHORECTOMY      PERCUTANEOUS NEPHROLITHOTRIPSY  2016    ROTATOR CUFF REPAIR      left    URETERAL STENT PLACEMENT Left 2019    Procedure: INSERTION, STENT, URETER;  Surgeon: Ubaldo Cho MD;  Location: [facility] OR;  Service: Urology;  Laterality: Left;    URETEROSCOPIC REMOVAL OF URETERIC CALCULUS Left 2019    Procedure: REMOVAL, CALCULUS, URETER, URETEROSCOPIC;  Surgeon: Ubaldo Cho MD;  Location: [facility] OR;  Service: Urology;  Laterality: Left;    ureteroscopy  12/15/15     Social History     Tobacco Use    Smoking status: Never Smoker    Smokeless tobacco: Never Used   Substance Use Topics    Alcohol use: No    Drug use: No     Family History   Problem Relation Age of Onset    Heart disease Father 90    Cancer Mother         breast cancer    Breast cancer Mother 60    Diabetes Brother     Kidney disease  Brother         nephrectomy due to cancer    Spina bifida Brother     Cancer Sister         breast cancer    Breast cancer Sister 59        Review of Systems:   Constitution: Negative for diaphoresis and fever.   HEENT: Negative for nosebleeds.    Cardiovascular: Negative for chest pain       No dyspnea on exertion       No leg swelling        No palpitations  Respiratory: Negative for shortness of breath and wheezing.    Hematologic/Lymphatic: Negative for bleeding problem. Does not bruise/bleed easily.   Skin: Negative for color change and rash.   Musculoskeletal: Negative for falls and myalgias.   Gastrointestinal: Negative for hematemesis and hematochezia.   Genitourinary: Negative for hematuria.   Neurological: Negative for dizziness and light-headedness.   Psychiatric/Behavioral: Negative for altered mental status and memory loss.          Objective:        Vitals:    03/17/22 1522   BP: 136/76   Pulse: 66       Lab Results   Component Value Date    WBC 9.70 04/05/2021    HGB 13.6 04/05/2021    HCT 40.8 04/05/2021     04/05/2021    CHOL 159 01/19/2021    TRIG 157 (H) 01/19/2021    HDL 44 01/19/2021    ALT 11 01/19/2021    AST 13 01/19/2021     (L) 04/05/2021    K 4.2 04/05/2021     04/05/2021    CREATININE 0.8 04/05/2021    BUN 20 04/05/2021    CO2 24 04/05/2021    TSH 1.095 05/27/2015    INR 1.0 09/12/2019    GLUF 642 (HH) 01/03/2015    HGBA1C 7.7 (H) 01/19/2021        ECHOCARDIOGRAM RESULTS  Results for orders placed in visit on 04/06/21    Echo Color Flow Doppler? Yes    Interpretation Summary  · The estimated PA systolic pressure is 31 mmHg.  · The left ventricle is normal in size with moderate concentric hypertrophy and normal systolic function.  · The estimated ejection fraction is 70%.  · Normal left ventricular diastolic function.  · Normal right ventricular size with normal right ventricular systolic function.  · Moderate left atrial enlargement.  · Mild to moderate tricuspid  regurgitation.  · Cannot rule out a small vegetation with thickening of the anterior mitral leaflet.        CURRENT/PREVIOUS VISIT EKG  Results for orders placed or performed in visit on 02/08/21   IN OFFICE EKG 12-LEAD (to [place])    Collection Time: 02/08/21 12:25 PM    Narrative    Test Reason : Z00.00,    Vent. Rate : 072 BPM     Atrial Rate : 072 BPM     P-R Int : 220 ms          QRS Dur : 110 ms      QT Int : 394 ms       P-R-T Axes : 070 079 074 degrees     QTc Int : 431 ms    Sinus rhythm with 1st degree A-V block  Otherwise normal ECG  No previous ECGs available  Confirmed by Gregory MIGUEL, Ubaldo BRITT ([id number]) on 2/9/2021 10:40:42 AM    Referred By:             Confirmed By:Ubaldo Jenkins MD     No valid procedures specified.   Results for orders placed in visit on 04/06/21    Nuclear Stress Test    Interpretation Summary    The EKG portion of this study is negative for ischemia.    The patient reported no chest pain during the stress test.    The nuclear portion of this study will be reported separately.      Physical Exam:  CONSTITUTIONAL: No fever, no chills  HEENT: Normocephalic, atraumatic,pupils reactive to light                 NECK:  No JVD no carotid bruit  CVS: S1S2+, RRR   LUNGS: Clear  ABDOMEN: Soft, NT, BS+  EXTREMITIES: No cyanosis, edema  : No león catheter  NEURO: AAO X 3  PSY: Normal affect      Medication List with Changes/Refills   New Medications    OLMESARTAN (BENICAR) 40 MG TABLET    Take 1 tablet (40 mg total) by mouth once daily.   Current Medications    ACETAMINOPHEN (TYLENOL) 500 MG TABLET    Take 500 mg by mouth every 6 (six) hours as needed for Pain.    ALLOPURINOL (ZYLOPRIM) 100 MG TABLET    Take 1 tablet (100 mg total) by mouth once daily.    ASPIRIN 81 MG CHEW    Take 1 tablet (81 mg total) by mouth once daily.    ATORVASTATIN (LIPITOR) 40 MG TABLET    Take 1 tablet by mouth once daily    GLIMEPIRIDE (AMARYL) 4 MG TABLET    Take 2 tablets (8 mg total) by mouth daily with  breakfast.    HYDROCODONE-ACETAMINOPHEN (NORCO) 5-325 MG PER TABLET    TAKE 1 TO 2 TABLETS BY MOUTH EVERY 6 HOURS AS NEEDED FOR PAIN    IBUPROFEN (ADVIL,MOTRIN) 800 MG TABLET    Take 1 tablet (800 mg total) by mouth 3 (three) times daily.    LOSARTAN (COZAAR) 100 MG TABLET    Take 1 tablet (100 mg total) by mouth once daily.    MAGNESIUM OXIDE (MAG-OX) 250 MG MAGNESIUM TAB    Take 0.5 tablets by mouth once daily.    METOPROLOL TARTRATE (LOPRESSOR) 25 MG TABLET    Take 1 tablet (25 mg total) by mouth 2 (two) times daily.    OMEGA-3 FATTY ACIDS-VITAMIN E 1,000-5 MG-UNIT CAP    Take 1 capsule by mouth 2 (two) times daily. Two times a day    POTASSIUM CITRATE (UROCIT-K 15) 15 MEQ TBSR    Take 1 tablet by mouth twice daily    SITAGLIPTIN (JANUVIA) 100 MG TAB    Take 1 tablet (100 mg total) by mouth once daily.   Changed and/or Refilled Medications    Modified Medication Previous Medication    METFORMIN (GLUCOPHAGE) 1000 MG TABLET metFORMIN (GLUCOPHAGE) 1000 MG tablet       Take 1 tablet (1,000 mg total) by mouth 2 (two) times daily with meals.    TAKE 1 TABLET BY MOUTH TWICE DAILY WITH MEALS             Assessment:       1. Coronary artery disease involving native coronary artery of native heart without angina pectoris    2. Type 2 diabetes mellitus without complication, without long-term current use of insulin    3. H/O myocardial infarction, greater than 8 weeks    4. Hyperlipidemia associated with type 2 diabetes mellitus    5. Hypertension associated with diabetes    6. Diabetes mellitus due to underlying condition with hyperglycemia, without long-term current use of insulin         Plan:     1. Patient has not been able to get her losartan any longer due to shortage. Will switch to olmesartan 40 mg po daily.   2. Patient is also out of her metformin. Will give her a 30 day refill until she sees her new PCP.  3. Patient is due for labs to be done. She will have that done soon.   4. Recommend low fat low cholesterol  diet and regular exercise.   5. Follow up in about 6 months. May call or return sooner if problems arise.     Problem List Items Addressed This Visit        Unprioritized    CAD (coronary artery disease) - Primary    Overview     Dr. Jeronimo           H/O myocardial infarction, greater than 8 weeks    Hypertension associated with diabetes    Relevant Medications    metFORMIN (GLUCOPHAGE) 1000 MG tablet    Hyperlipidemia associated with type 2 diabetes mellitus    Relevant Medications    metFORMIN (GLUCOPHAGE) 1000 MG tablet    Diabetes mellitus due to underlying condition with hyperglycemia, without long-term current use of insulin    Relevant Medications    metFORMIN (GLUCOPHAGE) 1000 MG tablet      Other Visit Diagnoses     Type 2 diabetes mellitus without complication, without long-term current use of insulin        Relevant Medications    metFORMIN (GLUCOPHAGE) 1000 MG tablet          Follow up in about 6 months (around 9/17/2022).

## 2022-03-29 ENCOUNTER — OFFICE VISIT (OUTPATIENT)
Dept: FAMILY MEDICINE | Facility: CLINIC | Age: 72
End: 2022-03-29
Payer: MEDICARE

## 2022-03-29 VITALS
HEIGHT: 62 IN | HEART RATE: 72 BPM | BODY MASS INDEX: 32.46 KG/M2 | DIASTOLIC BLOOD PRESSURE: 72 MMHG | OXYGEN SATURATION: 97 % | WEIGHT: 176.38 LBS | SYSTOLIC BLOOD PRESSURE: 136 MMHG

## 2022-03-29 DIAGNOSIS — E66.9 OBESITY, CLASS I, BMI 30-34.9: ICD-10-CM

## 2022-03-29 DIAGNOSIS — I15.2 HYPERTENSION ASSOCIATED WITH DIABETES: Primary | ICD-10-CM

## 2022-03-29 DIAGNOSIS — G54.0 BRACHIAL PLEXOPATHY: ICD-10-CM

## 2022-03-29 DIAGNOSIS — N20.0 NEPHROLITHIASIS: ICD-10-CM

## 2022-03-29 DIAGNOSIS — M25.641 STIFFNESS OF RIGHT HAND JOINT: ICD-10-CM

## 2022-03-29 DIAGNOSIS — E78.5 HYPERLIPIDEMIA ASSOCIATED WITH TYPE 2 DIABETES MELLITUS: ICD-10-CM

## 2022-03-29 DIAGNOSIS — E11.9 TYPE 2 DIABETES MELLITUS WITHOUT COMPLICATION, WITHOUT LONG-TERM CURRENT USE OF INSULIN: ICD-10-CM

## 2022-03-29 DIAGNOSIS — E08.65 DIABETES MELLITUS DUE TO UNDERLYING CONDITION WITH HYPERGLYCEMIA, WITHOUT LONG-TERM CURRENT USE OF INSULIN: ICD-10-CM

## 2022-03-29 DIAGNOSIS — E11.59 HYPERTENSION ASSOCIATED WITH DIABETES: Primary | ICD-10-CM

## 2022-03-29 DIAGNOSIS — R29.898 RIGHT HAND WEAKNESS: ICD-10-CM

## 2022-03-29 DIAGNOSIS — K57.90 DIVERTICULOSIS OF INTESTINE WITHOUT BLEEDING, UNSPECIFIED INTESTINAL TRACT LOCATION: ICD-10-CM

## 2022-03-29 DIAGNOSIS — Z12.31 SCREENING MAMMOGRAM, ENCOUNTER FOR: ICD-10-CM

## 2022-03-29 DIAGNOSIS — E61.2 MAGNESIUM DEFICIENCY: ICD-10-CM

## 2022-03-29 DIAGNOSIS — I25.10 CORONARY ARTERY DISEASE INVOLVING NATIVE CORONARY ARTERY OF NATIVE HEART WITHOUT ANGINA PECTORIS: ICD-10-CM

## 2022-03-29 DIAGNOSIS — E11.69 HYPERLIPIDEMIA ASSOCIATED WITH TYPE 2 DIABETES MELLITUS: ICD-10-CM

## 2022-03-29 PROCEDURE — 99999 PR PBB SHADOW E&M-EST. PATIENT-LVL III: ICD-10-PCS | Mod: PBBFAC,,, | Performed by: FAMILY MEDICINE

## 2022-03-29 PROCEDURE — 99214 OFFICE O/P EST MOD 30 MIN: CPT | Mod: S$PBB,,, | Performed by: FAMILY MEDICINE

## 2022-03-29 PROCEDURE — 99999 PR PBB SHADOW E&M-EST. PATIENT-LVL III: CPT | Mod: PBBFAC,,, | Performed by: FAMILY MEDICINE

## 2022-03-29 PROCEDURE — 99213 OFFICE O/P EST LOW 20 MIN: CPT | Mod: PBBFAC,PO | Performed by: FAMILY MEDICINE

## 2022-03-29 PROCEDURE — 99214 PR OFFICE/OUTPT VISIT, EST, LEVL IV, 30-39 MIN: ICD-10-PCS | Mod: S$PBB,,, | Performed by: FAMILY MEDICINE

## 2022-03-29 NOTE — PROGRESS NOTES
Subjective:       Patient ID: Jessica Luna is a 71 y.o. female.    Chief Complaint: Establish Care    Former pt of Dr. Martinez here to establish.     Concerns of R hand weakness. 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 Had been seeing OT for hand which was helping but insurance ran out for the year. She would like a new order to go back.     Due for labs and mammogram.     Review of Systems   Constitutional: Negative for activity change, chills and fever.   HENT: Negative for nasal congestion and sinus pressure/congestion.    Eyes: Negative for itching.   Respiratory: Negative for chest tightness and shortness of breath.    Cardiovascular: Negative for chest pain and palpitations.   Gastrointestinal: Negative for abdominal pain, constipation, nausea and vomiting.   Endocrine: Negative for cold intolerance.   Genitourinary: Negative for difficulty urinating and menstrual problem.   Musculoskeletal: Negative for arthralgias, joint swelling and myalgias.   Integumentary:  Negative for rash.   Allergic/Immunologic: Negative for environmental allergies.   Neurological: Negative for dizziness, weakness and headaches.   Psychiatric/Behavioral: Negative for agitation and confusion.         Objective:      Physical Exam  Vitals and nursing note reviewed.   Constitutional:       Appearance: She is well-developed.   HENT:      Head: Normocephalic and atraumatic.   Eyes:      Pupils: Pupils are equal, round, and reactive to light.   Cardiovascular:      Rate and Rhythm: Normal rate and regular rhythm.      Heart sounds: Normal heart sounds. No murmur heard.  Pulmonary:      Effort: Pulmonary effort is normal. No respiratory distress.      Breath sounds: Normal breath sounds. No wheezing or rales.   Abdominal:      General: Bowel sounds are normal. There is no distension.      Palpations: Abdomen is soft.      Tenderness: There is no abdominal tenderness. There is no guarding.    Musculoskeletal:      Right hand: No tenderness. Decreased range of motion. Decreased strength of finger abduction and thumb/finger opposition.      Cervical back: Normal range of motion and neck supple.   Skin:     General: Skin is warm and dry.   Neurological:      Mental Status: She is alert and oriented to person, place, and time.      Deep Tendon Reflexes: Reflexes normal.   Psychiatric:         Behavior: Behavior normal.         Thought Content: Thought content normal.         Judgment: Judgment normal.         Assessment:       Problem List Items Addressed This Visit        Cardiac/Vascular    CAD (coronary artery disease)    Hypertension associated with diabetes - Primary    Hyperlipidemia associated with type 2 diabetes mellitus       Renal/    Nephrolithiasis       Endocrine    Obesity, Class I, BMI 30-34.9    Diabetes mellitus due to underlying condition with hyperglycemia, without long-term current use of insulin       GI    Diverticulosis of intestine without bleeding       Orthopedic    Right hand weakness    Stiffness of right hand joint      Other Visit Diagnoses     Type 2 diabetes mellitus without complication, without long-term current use of insulin        Magnesium deficiency        Screening mammogram, encounter for              Plan:       Jessica Rosa was seen today for establish care.    Diagnoses and all orders for this visit:    Hypertension associated with diabetes  -     CBC Auto Differential; Future  -     Comprehensive Metabolic Panel; Future  -     Hemoglobin A1C; Future  -     Lipid Panel; Future  -     Microalbumin/Creatinine Ratio, Urine    Type 2 diabetes mellitus without complication, without long-term current use of insulin  -     SITagliptin (JANUVIA) 100 MG Tab; Take 1 tablet (100 mg total) by mouth once daily.  -     CBC Auto Differential; Future  -     Comprehensive Metabolic Panel; Future  -     Hemoglobin A1C; Future  -     Lipid Panel; Future  -      Microalbumin/Creatinine Ratio, Urine    Hyperlipidemia associated with type 2 diabetes mellitus  -     CBC Auto Differential; Future  -     Comprehensive Metabolic Panel; Future  -     Hemoglobin A1C; Future  -     Lipid Panel; Future  -     Microalbumin/Creatinine Ratio, Urine    Coronary artery disease involving native coronary artery of native heart without angina pectoris  -     CBC Auto Differential; Future  -     Comprehensive Metabolic Panel; Future  -     Hemoglobin A1C; Future  -     Lipid Panel; Future  -     Microalbumin/Creatinine Ratio, Urine    Nephrolithiasis  -     CBC Auto Differential; Future  -     Comprehensive Metabolic Panel; Future  -     Hemoglobin A1C; Future  -     Lipid Panel; Future  -     Microalbumin/Creatinine Ratio, Urine    Obesity, Class I, BMI 30-34.9  -     CBC Auto Differential; Future  -     Comprehensive Metabolic Panel; Future  -     Hemoglobin A1C; Future  -     Lipid Panel; Future  -     Microalbumin/Creatinine Ratio, Urine    Diabetes mellitus due to underlying condition with hyperglycemia, without long-term current use of insulin  -     CBC Auto Differential; Future  -     Comprehensive Metabolic Panel; Future  -     Hemoglobin A1C; Future  -     Lipid Panel; Future  -     Microalbumin/Creatinine Ratio, Urine    Diverticulosis of intestine without bleeding, unspecified intestinal tract location  -     CBC Auto Differential; Future  -     Comprehensive Metabolic Panel; Future  -     Hemoglobin A1C; Future  -     Lipid Panel; Future  -     Microalbumin/Creatinine Ratio, Urine    Magnesium deficiency  -     Magnesium; Future  -     CBC Auto Differential; Future  -     Comprehensive Metabolic Panel; Future  -     Hemoglobin A1C; Future  -     Lipid Panel; Future  -     Microalbumin/Creatinine Ratio, Urine    Stiffness of right hand joint  -     CBC Auto Differential; Future  -     Comprehensive Metabolic Panel; Future  -     Hemoglobin A1C; Future  -     Lipid Panel; Future  -      Microalbumin/Creatinine Ratio, Urine  -     Ambulatory referral/consult to Physical/Occupational Therapy; Future    Right hand weakness  -     CBC Auto Differential; Future  -     Comprehensive Metabolic Panel; Future  -     Hemoglobin A1C; Future  -     Lipid Panel; Future  -     Microalbumin/Creatinine Ratio, Urine  -     Ambulatory referral/consult to Physical/Occupational Therapy; Future    Screening mammogram, encounter for  -     CBC Auto Differential; Future  -     Comprehensive Metabolic Panel; Future  -     Hemoglobin A1C; Future  -     Lipid Panel; Future  -     Mammo Digital Screening Bilat; Future    Brachial plexopathy      Fasting labs ordered. New PT/OT orders placed.

## 2022-04-01 ENCOUNTER — LAB VISIT (OUTPATIENT)
Dept: LAB | Facility: HOSPITAL | Age: 72
End: 2022-04-01
Attending: FAMILY MEDICINE
Payer: MEDICARE

## 2022-04-01 DIAGNOSIS — M25.641 STIFFNESS OF RIGHT HAND JOINT: ICD-10-CM

## 2022-04-01 DIAGNOSIS — Z12.31 SCREENING MAMMOGRAM, ENCOUNTER FOR: ICD-10-CM

## 2022-04-01 DIAGNOSIS — E61.2 MAGNESIUM DEFICIENCY: ICD-10-CM

## 2022-04-01 DIAGNOSIS — I25.10 CORONARY ARTERY DISEASE INVOLVING NATIVE CORONARY ARTERY OF NATIVE HEART WITHOUT ANGINA PECTORIS: ICD-10-CM

## 2022-04-01 DIAGNOSIS — N20.0 NEPHROLITHIASIS: ICD-10-CM

## 2022-04-01 DIAGNOSIS — E66.9 OBESITY, CLASS I, BMI 30-34.9: ICD-10-CM

## 2022-04-01 DIAGNOSIS — E11.69 HYPERLIPIDEMIA ASSOCIATED WITH TYPE 2 DIABETES MELLITUS: ICD-10-CM

## 2022-04-01 DIAGNOSIS — I15.2 HYPERTENSION ASSOCIATED WITH DIABETES: ICD-10-CM

## 2022-04-01 DIAGNOSIS — K57.90 DIVERTICULOSIS OF INTESTINE WITHOUT BLEEDING, UNSPECIFIED INTESTINAL TRACT LOCATION: ICD-10-CM

## 2022-04-01 DIAGNOSIS — E11.9 TYPE 2 DIABETES MELLITUS WITHOUT COMPLICATION, WITHOUT LONG-TERM CURRENT USE OF INSULIN: ICD-10-CM

## 2022-04-01 DIAGNOSIS — E08.65 DIABETES MELLITUS DUE TO UNDERLYING CONDITION WITH HYPERGLYCEMIA, WITHOUT LONG-TERM CURRENT USE OF INSULIN: ICD-10-CM

## 2022-04-01 DIAGNOSIS — R29.898 RIGHT HAND WEAKNESS: ICD-10-CM

## 2022-04-01 DIAGNOSIS — E11.59 HYPERTENSION ASSOCIATED WITH DIABETES: ICD-10-CM

## 2022-04-01 DIAGNOSIS — E78.5 HYPERLIPIDEMIA ASSOCIATED WITH TYPE 2 DIABETES MELLITUS: ICD-10-CM

## 2022-04-01 LAB
ALBUMIN SERPL BCP-MCNC: 3.8 G/DL (ref 3.5–5.2)
ALP SERPL-CCNC: 114 U/L (ref 55–135)
ALT SERPL W/O P-5'-P-CCNC: 7 U/L (ref 10–44)
ANION GAP SERPL CALC-SCNC: 7 MMOL/L (ref 8–16)
AST SERPL-CCNC: 14 U/L (ref 10–40)
BASOPHILS # BLD AUTO: 0.03 K/UL (ref 0–0.2)
BASOPHILS NFR BLD: 0.4 % (ref 0–1.9)
BILIRUB SERPL-MCNC: 0.4 MG/DL (ref 0.1–1)
BUN SERPL-MCNC: 20 MG/DL (ref 8–23)
CALCIUM SERPL-MCNC: 9.9 MG/DL (ref 8.7–10.5)
CHLORIDE SERPL-SCNC: 105 MMOL/L (ref 95–110)
CHOLEST SERPL-MCNC: 133 MG/DL (ref 120–199)
CHOLEST/HDLC SERPL: 3.3 {RATIO} (ref 2–5)
CO2 SERPL-SCNC: 27 MMOL/L (ref 23–29)
CREAT SERPL-MCNC: 0.8 MG/DL (ref 0.5–1.4)
DIFFERENTIAL METHOD: ABNORMAL
EOSINOPHIL # BLD AUTO: 0.2 K/UL (ref 0–0.5)
EOSINOPHIL NFR BLD: 2.8 % (ref 0–8)
ERYTHROCYTE [DISTWIDTH] IN BLOOD BY AUTOMATED COUNT: 13.2 % (ref 11.5–14.5)
EST. GFR  (AFRICAN AMERICAN): >60 ML/MIN/1.73 M^2
EST. GFR  (NON AFRICAN AMERICAN): >60 ML/MIN/1.73 M^2
ESTIMATED AVG GLUCOSE: 192 MG/DL (ref 68–131)
GLUCOSE SERPL-MCNC: 147 MG/DL (ref 70–110)
HBA1C MFR BLD: 8.3 % (ref 4–5.6)
HCT VFR BLD AUTO: 38.9 % (ref 37–48.5)
HDLC SERPL-MCNC: 40 MG/DL (ref 40–75)
HDLC SERPL: 30.1 % (ref 20–50)
HGB BLD-MCNC: 12.2 G/DL (ref 12–16)
IMM GRANULOCYTES # BLD AUTO: 0.04 K/UL (ref 0–0.04)
IMM GRANULOCYTES NFR BLD AUTO: 0.5 % (ref 0–0.5)
LDLC SERPL CALC-MCNC: 58 MG/DL (ref 63–159)
LYMPHOCYTES # BLD AUTO: 1.8 K/UL (ref 1–4.8)
LYMPHOCYTES NFR BLD: 22.1 % (ref 18–48)
MAGNESIUM SERPL-MCNC: 1.4 MG/DL (ref 1.6–2.6)
MCH RBC QN AUTO: 27.8 PG (ref 27–31)
MCHC RBC AUTO-ENTMCNC: 31.4 G/DL (ref 32–36)
MCV RBC AUTO: 89 FL (ref 82–98)
MONOCYTES # BLD AUTO: 0.6 K/UL (ref 0.3–1)
MONOCYTES NFR BLD: 6.7 % (ref 4–15)
NEUTROPHILS # BLD AUTO: 5.6 K/UL (ref 1.8–7.7)
NEUTROPHILS NFR BLD: 67.5 % (ref 38–73)
NONHDLC SERPL-MCNC: 93 MG/DL
NRBC BLD-RTO: 0 /100 WBC
PLATELET # BLD AUTO: 222 K/UL (ref 150–450)
PMV BLD AUTO: 11.8 FL (ref 9.2–12.9)
POTASSIUM SERPL-SCNC: 4.6 MMOL/L (ref 3.5–5.1)
PROT SERPL-MCNC: 7.1 G/DL (ref 6–8.4)
RBC # BLD AUTO: 4.39 M/UL (ref 4–5.4)
SODIUM SERPL-SCNC: 139 MMOL/L (ref 136–145)
TRIGL SERPL-MCNC: 175 MG/DL (ref 30–150)
WBC # BLD AUTO: 8.24 K/UL (ref 3.9–12.7)

## 2022-04-01 PROCEDURE — 36415 COLL VENOUS BLD VENIPUNCTURE: CPT | Mod: PO | Performed by: FAMILY MEDICINE

## 2022-04-01 PROCEDURE — 83036 HEMOGLOBIN GLYCOSYLATED A1C: CPT | Performed by: FAMILY MEDICINE

## 2022-04-01 PROCEDURE — 80061 LIPID PANEL: CPT | Performed by: FAMILY MEDICINE

## 2022-04-01 PROCEDURE — 83735 ASSAY OF MAGNESIUM: CPT | Performed by: FAMILY MEDICINE

## 2022-04-01 PROCEDURE — 80053 COMPREHEN METABOLIC PANEL: CPT | Performed by: FAMILY MEDICINE

## 2022-04-01 PROCEDURE — 85025 COMPLETE CBC W/AUTO DIFF WBC: CPT | Performed by: FAMILY MEDICINE

## 2022-04-04 ENCOUNTER — PATIENT MESSAGE (OUTPATIENT)
Dept: ADMINISTRATIVE | Facility: HOSPITAL | Age: 72
End: 2022-04-04
Payer: MEDICARE

## 2022-04-06 DIAGNOSIS — E11.9 TYPE 2 DIABETES MELLITUS WITHOUT COMPLICATION: ICD-10-CM

## 2022-04-07 ENCOUNTER — HOSPITAL ENCOUNTER (OUTPATIENT)
Dept: RADIOLOGY | Facility: CLINIC | Age: 72
Discharge: HOME | End: 2022-04-07
Attending: FAMILY MEDICINE
Payer: MEDICARE

## 2022-04-07 VITALS — BODY MASS INDEX: 32.46 KG/M2 | WEIGHT: 176.38 LBS | HEIGHT: 62 IN

## 2022-04-07 DIAGNOSIS — Z12.31 SCREENING MAMMOGRAM, ENCOUNTER FOR: ICD-10-CM

## 2022-04-07 PROCEDURE — 77067 MAMMO DIGITAL SCREENING BILAT WITH TOMO: ICD-10-PCS | Mod: 26,,, | Performed by: RADIOLOGY

## 2022-04-07 PROCEDURE — 77063 BREAST TOMOSYNTHESIS BI: CPT | Mod: TC,PO

## 2022-04-07 PROCEDURE — 77063 MAMMO DIGITAL SCREENING BILAT WITH TOMO: ICD-10-PCS | Mod: 26,,, | Performed by: RADIOLOGY

## 2022-04-07 PROCEDURE — 77067 SCR MAMMO BI INCL CAD: CPT | Mod: 26,,, | Performed by: RADIOLOGY

## 2022-04-07 PROCEDURE — 77067 SCR MAMMO BI INCL CAD: CPT | Mod: TC,PO

## 2022-04-07 PROCEDURE — 77063 BREAST TOMOSYNTHESIS BI: CPT | Mod: 26,,, | Performed by: RADIOLOGY

## 2022-04-11 ENCOUNTER — PATIENT MESSAGE (OUTPATIENT)
Dept: ADMINISTRATIVE | Facility: HOSPITAL | Age: 72
End: 2022-04-11
Payer: MEDICARE

## 2022-04-15 RX ORDER — METFORMIN HYDROCHLORIDE 500 MG/1
500 TABLET ORAL
Qty: 90 TABLET | Refills: 3 | Status: SHIPPED | OUTPATIENT
Start: 2022-04-15 | End: 2022-06-16

## 2022-04-18 ENCOUNTER — TELEPHONE (OUTPATIENT)
Dept: FAMILY MEDICINE | Facility: CLINIC | Age: 72
End: 2022-04-18
Payer: MEDICARE

## 2022-04-18 ENCOUNTER — PATIENT MESSAGE (OUTPATIENT)
Dept: FAMILY MEDICINE | Facility: CLINIC | Age: 72
End: 2022-04-18
Payer: MEDICARE

## 2022-04-18 DIAGNOSIS — E11.9 TYPE 2 DIABETES MELLITUS WITHOUT COMPLICATION, WITHOUT LONG-TERM CURRENT USE OF INSULIN: ICD-10-CM

## 2022-04-18 DIAGNOSIS — E78.5 TYPE 2 DIABETES MELLITUS WITH HYPERLIPIDEMIA: ICD-10-CM

## 2022-04-18 DIAGNOSIS — E11.9 TYPE 2 DIABETES MELLITUS WITHOUT COMPLICATION, WITHOUT LONG-TERM CURRENT USE OF INSULIN: Primary | ICD-10-CM

## 2022-04-18 DIAGNOSIS — E11.69 TYPE 2 DIABETES MELLITUS WITH HYPERLIPIDEMIA: ICD-10-CM

## 2022-04-18 RX ORDER — METFORMIN HYDROCHLORIDE 1000 MG/1
1000 TABLET ORAL 2 TIMES DAILY WITH MEALS
Qty: 60 TABLET | Refills: 0 | Status: SHIPPED | OUTPATIENT
Start: 2022-04-18 | End: 2022-05-10

## 2022-04-18 RX ORDER — GLIMEPIRIDE 4 MG/1
8 TABLET ORAL
Qty: 180 TABLET | Refills: 0 | Status: SHIPPED | OUTPATIENT
Start: 2022-04-18 | End: 2022-07-15

## 2022-04-18 NOTE — TELEPHONE ENCOUNTER
----- Message from Kristy Huizar sent at 4/18/2022  1:36 PM CDT -----  Type: Patient Call Back    Who called:pt    What is the request in detail:pt returned the nurse's phone call. Call pt    Can the clinic reply by [facility]?    Would the patient rather a call back or a response via [facility]? call    Best call back number:[phone] (home)       Additional Information:

## 2022-04-18 NOTE — TELEPHONE ENCOUNTER
Spoke with pt via phone. Pt informed of results. Verbalized understanding, pt states she takes 1000mg BID metformin, pt would like to know what new dose will be. Pt will continue current supplement for magnesium. Pt would like referral for DM Education.

## 2022-04-18 NOTE — TELEPHONE ENCOUNTER
No new care gaps identified.  Powered by [employer]. Reference number: [id number].   4/18/2022 1:33:11 PM CDT

## 2022-04-19 ENCOUNTER — PATIENT MESSAGE (OUTPATIENT)
Dept: FAMILY MEDICINE | Facility: CLINIC | Age: 72
End: 2022-04-19
Payer: MEDICARE

## 2022-04-20 RX ORDER — ATORVASTATIN CALCIUM 40 MG/1
40 TABLET, FILM COATED ORAL DAILY
Qty: 90 TABLET | Refills: 3 | Status: SHIPPED | OUTPATIENT
Start: 2022-04-20 | End: 2023-06-23 | Stop reason: SDUPTHER

## 2022-04-21 ENCOUNTER — PATIENT MESSAGE (OUTPATIENT)
Dept: CARDIOLOGY | Facility: CLINIC | Age: 72
End: 2022-04-21
Payer: MEDICARE

## 2022-04-21 DIAGNOSIS — E83.42 HYPOMAGNESEMIA: Primary | ICD-10-CM

## 2022-04-25 ENCOUNTER — LAB VISIT (OUTPATIENT)
Dept: LAB | Facility: HOSPITAL | Age: 72
End: 2022-04-25
Attending: NURSE PRACTITIONER
Payer: MEDICARE

## 2022-04-25 DIAGNOSIS — E83.42 HYPOMAGNESEMIA: ICD-10-CM

## 2022-04-25 LAB — MAGNESIUM SERPL-MCNC: 1.4 MG/DL (ref 1.6–2.6)

## 2022-04-25 PROCEDURE — 36415 COLL VENOUS BLD VENIPUNCTURE: CPT | Performed by: NURSE PRACTITIONER

## 2022-04-25 PROCEDURE — 83735 ASSAY OF MAGNESIUM: CPT | Performed by: NURSE PRACTITIONER

## 2022-04-26 ENCOUNTER — TELEPHONE (OUTPATIENT)
Dept: CARDIOLOGY | Facility: CLINIC | Age: 72
End: 2022-04-26
Payer: MEDICARE

## 2022-04-26 DIAGNOSIS — E83.42 HYPOMAGNESEMIA: Primary | ICD-10-CM

## 2022-04-26 NOTE — TELEPHONE ENCOUNTER
Called infusion center and was informed to just place an order in and to hang the scheduling department. Called scheduling and left a voicemail. Waiting to here back.

## 2022-04-26 NOTE — TELEPHONE ENCOUNTER
----- Message from Jessica Casiano NP sent at 4/26/2022  8:19 AM CDT -----  Can we please arrange for her to receive 3 grams of IV mag sulfate today or tomorrow and recheck her labs on Thursday.

## 2022-04-27 PROBLEM — E83.42 HYPOMAGNESEMIA: Status: ACTIVE | Noted: 2022-04-27

## 2022-04-28 ENCOUNTER — INFUSION (OUTPATIENT)
Dept: INFUSION THERAPY | Facility: HOSPITAL | Age: 72
End: 2022-04-28
Attending: NURSE PRACTITIONER
Payer: MEDICARE

## 2022-04-28 VITALS
OXYGEN SATURATION: 100 % | WEIGHT: 179 LBS | RESPIRATION RATE: 18 BRPM | SYSTOLIC BLOOD PRESSURE: 108 MMHG | HEART RATE: 71 BPM | TEMPERATURE: 98 F | BODY MASS INDEX: 32.94 KG/M2 | HEIGHT: 62 IN | DIASTOLIC BLOOD PRESSURE: 69 MMHG

## 2022-04-28 DIAGNOSIS — E83.42 HYPOMAGNESEMIA: Primary | ICD-10-CM

## 2022-04-28 PROCEDURE — 96366 THER/PROPH/DIAG IV INF ADDON: CPT

## 2022-04-28 PROCEDURE — 96365 THER/PROPH/DIAG IV INF INIT: CPT

## 2022-04-28 PROCEDURE — 25000003 PHARM REV CODE 250: Performed by: NURSE PRACTITIONER

## 2022-04-28 PROCEDURE — 63600175 PHARM REV CODE 636 W HCPCS: Performed by: NURSE PRACTITIONER

## 2022-04-28 RX ADMIN — MAGNESIUM SULFATE HEPTAHYDRATE 3 G: 500 INJECTION, SOLUTION INTRAMUSCULAR; INTRAVENOUS at 08:04

## 2022-05-04 ENCOUNTER — TELEPHONE (OUTPATIENT)
Dept: ADMINISTRATIVE | Facility: HOSPITAL | Age: 72
End: 2022-05-04
Payer: MEDICARE

## 2022-05-09 ENCOUNTER — TELEPHONE (OUTPATIENT)
Dept: CARDIOLOGY | Facility: CLINIC | Age: 72
End: 2022-05-09
Payer: MEDICARE

## 2022-05-10 DIAGNOSIS — E11.9 TYPE 2 DIABETES MELLITUS WITHOUT COMPLICATION, WITHOUT LONG-TERM CURRENT USE OF INSULIN: ICD-10-CM

## 2022-05-10 RX ORDER — METFORMIN HYDROCHLORIDE 1000 MG/1
TABLET ORAL
Qty: 60 TABLET | Refills: 0 | Status: SHIPPED | OUTPATIENT
Start: 2022-05-10 | End: 2022-05-16 | Stop reason: SDUPTHER

## 2022-05-10 NOTE — TELEPHONE ENCOUNTER
No new care gaps identified.  [facility] Embedded Care Gaps. Reference number: [id number]. 5/10/2022   3:39:28 PM CDT

## 2022-05-10 NOTE — TELEPHONE ENCOUNTER
Refill Routing Note   Medication(s) are not appropriate for processing by [facility] for the following reason(s):      - Medication requested has undergone a recent dosage adjustment (<3 months)    [facility] action(s):  Defer          Medication reconciliation completed: No     Appointments  past 12m or future 3m with PCP    Date Provider   Last Visit   3/29/2022 Soren Lockett MD   Next Visit   Visit date not found Soren Lockett MD   ED visits in past 90 days: 0        Note composed:3:44 PM 05/10/2022

## 2022-05-24 ENCOUNTER — CLINICAL SUPPORT (OUTPATIENT)
Dept: DIABETES | Facility: CLINIC | Age: 72
End: 2022-05-24
Payer: MEDICARE

## 2022-05-24 ENCOUNTER — PATIENT MESSAGE (OUTPATIENT)
Dept: DIABETES | Facility: CLINIC | Age: 72
End: 2022-05-24

## 2022-05-24 DIAGNOSIS — E11.9 TYPE 2 DIABETES MELLITUS WITHOUT COMPLICATION, WITHOUT LONG-TERM CURRENT USE OF INSULIN: ICD-10-CM

## 2022-05-24 PROCEDURE — 99999 PR PBB SHADOW E&M-EST. PATIENT-LVL II: CPT | Mod: PBBFAC,,, | Performed by: NUTRITIONIST

## 2022-05-24 PROCEDURE — G0108 DIAB MANAGE TRN  PER INDIV: HCPCS | Mod: PBBFAC,PO | Performed by: NUTRITIONIST

## 2022-05-24 PROCEDURE — 99999 PR PBB SHADOW E&M-EST. PATIENT-LVL II: ICD-10-PCS | Mod: PBBFAC,,, | Performed by: NUTRITIONIST

## 2022-05-24 PROCEDURE — 99212 OFFICE O/P EST SF 10 MIN: CPT | Mod: PBBFAC,PO | Performed by: NUTRITIONIST

## 2022-05-24 NOTE — PROGRESS NOTES
Diabetes Care Specialist Progress Note  Author: Vika Parker RD  Date: 5/24/2022    Program Intake  Reason for Diabetes Program Visit:: Initial Diabetes Assessment  Current diabetes risk level:: moderate  In the last 12 months, have you:: none  Permission to speak with others about care:: no    Lab Results   Component Value Date    HGBA1C 8.3 (H) 04/01/2022       Clinical    Patient Health Rating  Compared to other people your age, how would you rate your health?: Good    Problem Review  Reviewed Problem List with Patient: yes  Active comorbidities affecting diabetes self-care.: yes  Comorbidities: Cardiovascular Disease, Hypertension, Gastrointestinal Disorder  Reviewed health maintenance: yes    Clinical Assessment  Current Diabetes Treatment: Oral Medication (4 mg Glimepiride AM; Metformin 1500 mg in AM, 1000 mg PM; Januvia 100 mg AM)  Have you ever experienced hypoglycemia (low blood sugar)?: no    Medication Information  How do you obtain your medications?: Family picks up (shoulder sx within last year; right hand/arm still not functioning properly)  How many days a week do you miss your medications?: Never  Do you use a pill box or medication chart to help you manage your medications?: Pill box  Do you sometimes have difficulty refilling your medications?: No  Medication adherence impacting ability to self-manage diabetes?: No    Labs  Do you have regular lab work to monitor your medications?: Yes  Type of Regular Lab Work: A1c, Cholesterol, CBC, Other  Where do you get your labs drawn?: [facility]  Lab Compliance Barriers: No    Nutritional Status  Diet: Regular (since HA1C has increased, has been trying to be more careful with diet)  Meal Plan 24 Hour Recall: Breakfast, Dinner, Lunch, Snack (drinks only coffee, water and Diet Coke)  Meal Plan 24 Hour Recall - Breakfast:  (eggs, toast (1-2); coffee w/S&L, milk)  Meal Plan 24 Hour Recall - Lunch:  (crackers, ham; Diet COke)  Meal Plan 24 Hour Recall - Dinner:   (salad--cucumbers, tomato, olives, dressing; grapes; water and Diet Coke)  Meal Plan 24 Hour Recall - Snack:  (mid-afternoon--roll; HS--2 Shelley Cookies)  Change in appetite?: No  Dentation:: Intact  Recent Changes in Weight: No Recent Weight Change  Current nutritional status an area of need that is impacting patient's ability to self-manage diabetes?: No    Additional Social History    Support  Does anyone support you with your diabetes care?: yes  Who supports you?: spouse, self  Who takes you to your medical appointments?: self, spouse  Does the current support meet the patient's needs?: Yes  Is Support an area impacting ability to self-manage diabetes?: No    Access to Mass Media & Technology  Does the patient have access to any of the following devices or technologies?: Smart phone, Home computer, Internet Access  Media or technology needs impacting ability to self-manage diabetes?: No    Cognitive/Behavioral Health  Alert and Oriented: Yes  Difficulty Thinking: No  Requires Prompting: No  Requires assistance for routine expression?: No  Cognitive or behavioral barriers impacting ability to self-manage diabetes?: No    Culture/Uatsdin  Culture or Voodoo beliefs that may impact ability to access healthcare: No    Communication  Language preference: English  Hearing Problems: No  Vision Problems: No  Communication needs impacting ability to self-manage diabetes?: No    Health Literacy  Preferred Learning Method: Face to Face  How often do you need to have someone help you read instructions, pamphlets, or written material from your doctor or pharmacy?: Never  Health literacy needs impacting ability to self-manage diabetes?: No      Diabetes Self-Management Skills Assessment    Diabetes Disease Process/Treatment Options  Patient/caregiver able to state what happens when someone has diabetes.: yes  Patient/caregiver knows what type of diabetes they have.: yes  Diabetes Type : Type II  Patient/caregiver able to  identify at least three signs and symptoms of diabetes.: yes  Identified signs and symptoms:: blurred vision, fatigue, frequent infections, frequent urination, increased thirst  Patient able to identify at least three risk factors for diabetes.: yes  Identified risk factors:: being overweight, age over 40, family history, reduced activity  Diabetes Disease Process/Treatment Options: Skills Assessment Completed: Yes  Assessment indicates:: Adequate understanding  Area of need?: No    Nutrition/Healthy Eating  Challenges to healthy eating:: portion control  Method of carbohydrate measurement:: no method  Patient can identify foods that impact blood sugar.: yes  Patient-identified foods:: starches (bread, pasta, rice, cereal), starchy vegetables (corn, peas, beans), soda, milk, fruit/fruit juice, sweets, yogurt  Nutrition/Healthy Eating Skills Assessment Completed:: Yes  Assessment indicates:: Knowledge deficit, Instruction Needed  Area of need?: Yes    Physical Activity/Exercise  Patient's daily activity level:: sedentary  Patient formally exercises outside of work.: no  Patient can identify forms of physical activity.: yes  Stated forms of physical activity:: moving to burn calories  Patient can identify reasons why exercise/physical activity is important in diabetes management.: yes  Identified reasons:: lowers blood glucose, blood pressure, and cholesterol, strengthens heart, muscles, and bones, lowers risk of heart disease and stroke, relieves stress  Physical Activity/Exercise Skills Assessment Completed: : Yes  Assessment indicates:: Knowledge deficit, Instruction Needed  Area of need?: Yes (walking for errands is only exercise/activity pt does.  No sidewalks near home)    Medications  Patient is able to describe current diabetes management routine.: yes  Diabetes management routine:: oral medications (4 mg Glimepiride AM; Metformin 1500 mg in AM, 1000 mg PM; Januvia 100 mg AM)  Patient is able to identify  current diabetes medications, dosages, and appropriate timing of medications.: yes  Patient understands the purpose of the medications taken for diabetes.: yes  Patient reports problems or concerns with current medication regimen.: no  Medication Skills Assessment Completed:: Yes  Assessment indicates:: Adequate understanding  Area of need?: No    Home Blood Glucose Monitoring  Patient states that blood sugar is checked at home daily.: no  Reasons for not monitoring:: device lost or stolen (lost lancet device; rec [employer])  Home Blood Glucose Monitoring Skills Assessment Completed: : Yes  Assessment indicates:: Instruction Needed  Area of need?: Yes    Acute Complications  Acute Complications Skills Assessment Completed: : No  Deffered due to:: Time  Area of need?: Yes    Chronic Complications  Patient can identify major chronic complications of diabetes.: yes  Stated chronic complications:: heart disease/heart attack, stroke, kidney disease, neuropathy/nerve damage, retinopathy  Patient can identify ways to prevent or delay diabetes complications.: yes  Stated ways to prevent complications:: controlling blood sugar, healthy eating and regular activity  Patient is aware that having diabetes increases risk of heart disease?: Yes  Patient is aware that heart disease is the leading cause of death and disability in people with diabetes?: Yes  Patient able to state risk factors for heart disease?: Yes  Patient stated risk factors for heart disease:: High blood pressure, High cholesterol, Diet, Limited activity, Medication non-adherance, Having diabetes  Patient is taking statin?: Yes  Chronic Complications Skills Assessment Completed: : Yes  Assessment indicates:: Adequate understanding  Area of need?: No    Psychosocial/Coping  Patient can identify ways of coping with chronic disease.: yes  Patient-stated ways of coping with chronic disease:: support from loved ones  Psychosocial/Coping Skills Assessment  Completed: : Yes  Assessment indicates:: Adequate understanding  Area of need?: No      Diabetes Self Support Plan    Assessment Summary and Plan    Based on today's diabetes care assessment, the following areas of need were identified:      Social 5/24/2022   Support No   Access to Mass Media/Tech No   Cognitive/Behavioral Health No   Culture/Roman Catholic No   Communication No   Health Literacy No        Clinical 5/24/2022   Medication Adherence No   Lab Compliance No   Nutritional Status No        Diabetes Self-Management Skills 5/24/2022   Diabetes Disease Process/Treatment Options No   Nutrition/Healthy Eating Yes--see Care Plan   Physical Activity/Exercise Yes--encouraged to increase movement/walking around the house   Medication No   Home Blood Glucose Monitoring Yes--see Care Plan   Acute Complications Yes--to discuss at future appt   Chronic Complications No   Psychosocial/Coping No          Today's interventions were provided through individual discussion, instruction, and written materials were provided.      Patient verbalized understanding of instruction and written materials.  Pt was able to return back demonstration of instructions today. Patient understood key points, needs reinforcement and further instruction.     Diabetes Self-Management Care Plan:    Today's Diabetes Self-Management Care Plan was developed with Jessica Rosa's input. So has agreed to work toward the following goal(s) to improve his/her overall diabetes control.      Care Plan: Diabetes Management   Updates made since 4/24/2022 12:00 AM      Problem: Healthy Eating       Goal: Eat 3 meals daily with 30 g/2 servings of Carbohydrate per meal.    Start Date: 5/24/2022   Expected End Date: 8/25/2022   Priority: High   Barriers: No Barriers Identified   Note:    Reviewed basic principles of DM diet, focusing on portion sizes, combining carbs w/PRO for meals and snacks, and label reading (look at total carbs).  Pt will check labels on  Shelley Zavala (having 2 for HS),  rolls     Task: Reviewed the sources and role of Carbohydrate, Protein, and Fat and how each nutrient impacts blood sugar. Completed 5/24/2022      Task: Provided visual examples using dry measuring cups, food models, and other familiar objects such as computer mouse, deck or cards, tennis ball etc. to help with visualization of portions. Completed 5/24/2022      Task: Review the importance of balancing carbohydrates with each meal using portion control techniques to count servings of carbohydrate and label reading to identify serving size and amount of total carbs per serving. Completed 5/24/2022      Task: Provided Sample plate method and reviewed the use of the plate to estimate amounts of carbohydrate per meal. Completed 5/24/2022      Problem: Blood Glucose Self-Monitoring       Goal: Patient agrees to check and record blood sugars 1-2 times per day.    Start Date: 5/24/2022   Expected End Date: 8/24/2022   Priority: Level 1   Barriers: Lack of Supplies   Note:    Pt states she lost lancet device and hasn't checked BG for a number of months.  Encouraged to get [employer] lancet device and lancets; pt will try to get asap and start checking BG 1-2 times daily--fasting morning and fasting dinner OR HS.  BG log provided     Task: Reviewed the importance of self-monitoring blood glucose and keeping logs. Completed 5/24/2022      Task: Provided patient with blood glucose logs, reviewed appropriate timing and frequency to SMBG, education on parameters on when to notify provider and advised patient to bring logs to all appts with PCP/Endocrinologist/Diabetes Care Specialist. Completed 5/24/2022          Follow Up Plan     Follow up in about 2 months (around 7/24/2022) for Check HA1C/BG log.  Discuss dietary changes--reading labels for specific foods (above); watching portion sizes and combining carbs w/PRO?    Today's care plan and follow up schedule was discussed with  patient.  Jessica Rosa verbalized understanding of the care plan, goals, and agrees to follow up plan.        The patient was encouraged to communicate with his/her health care provider/physician and care team regarding his/her condition(s) and treatment.  I provided the patient with my contact information today and encouraged to contact me via phone or [facility] Patient Portal as needed.     Length of Visit   Total Time: 60 Minutes

## 2022-06-06 ENCOUNTER — TELEPHONE (OUTPATIENT)
Dept: FAMILY MEDICINE | Facility: CLINIC | Age: 72
End: 2022-06-06
Payer: MEDICARE

## 2022-06-06 NOTE — TELEPHONE ENCOUNTER
----- Message from Dashawn Roger sent at 6/6/2022 11:22 AM CDT -----  Type:  Sooner Appointment Request    Caller is requesting a sooner appointment.  Caller declined first available appointment listed below.  Caller will not accept being placed on the waitlist and is requesting a message be sent to doctor.    Name of Caller:  Pt  When is the first available appointment?  8/29  Symptoms:  Est care (dr Lockett pt)  Best Call Back Number:  [phone]   Additional Information:  Would like an appt end of July ... Please advise == Tarik land

## 2022-06-08 ENCOUNTER — OFFICE VISIT (OUTPATIENT)
Dept: PODIATRY | Facility: CLINIC | Age: 72
End: 2022-06-08
Payer: MEDICARE

## 2022-06-08 VITALS — BODY MASS INDEX: 32.74 KG/M2 | HEIGHT: 62 IN | RESPIRATION RATE: 16 BRPM

## 2022-06-08 DIAGNOSIS — M20.11 HALLUX VALGUS OF RIGHT FOOT: ICD-10-CM

## 2022-06-08 DIAGNOSIS — E11.9 ENCOUNTER FOR DIABETIC FOOT EXAM: ICD-10-CM

## 2022-06-08 DIAGNOSIS — M79.671 RIGHT FOOT PAIN: ICD-10-CM

## 2022-06-08 DIAGNOSIS — E08.65 DIABETES MELLITUS DUE TO UNDERLYING CONDITION WITH HYPERGLYCEMIA, WITHOUT LONG-TERM CURRENT USE OF INSULIN: Primary | ICD-10-CM

## 2022-06-08 DIAGNOSIS — M21.619 BUNION: ICD-10-CM

## 2022-06-08 PROCEDURE — 99203 OFFICE O/P NEW LOW 30 MIN: CPT | Mod: S$GLB,,, | Performed by: PODIATRIST

## 2022-06-08 PROCEDURE — 99203 PR OFFICE/OUTPT VISIT, NEW, LEVL III, 30-44 MIN: ICD-10-PCS | Mod: S$GLB,,, | Performed by: PODIATRIST

## 2022-06-08 NOTE — PATIENT INSTRUCTIONS
Your Diabetes Foot Care Program    Every day you depend on your feet to keep you moving. But when you have diabetes, your feet need special care. Even a small foot problem can become very serious. So dont take your feet for granted. By working with your diabetes healthcare team, you can learn how to protect your feet and keep them healthy.  Evaluating your feet  An evaluation helps your healthcare provider check the condition of your feet. The evaluation includes a review of your diabetes history and overall health. It may also include a foot exam, X-rays, or other tests. These can help show problems beneath the skin that you cant see or feel.  Medical history  You will be asked about your overall health and any history of foot problems. Youll also discuss your diabetes history, such as whether your blood sugar level has changed over time. It also includes questions about sensations of pain, tingling, pins and needles, or numbness. Your healthcare provider will also want to know if you have high blood pressure and heart disease, or if you smoke. Be sure to mention any medicines (including over-the-counter), supplements, or herbal remedies you take.  Foot exam  A foot exam checks the condition of different parts of your foot. First, your skin and nails are examined for any signs of infection. Blood flow is checked by feeling for the pulses in each foot. You may also have tests to study the nerves in the foot. These include using a small filament (wire) to see how sensitive your feet are. In certain cases, you will be asked to walk a short distance to check for bone, joint, and muscle problems.  Diagnostic tests  If needed, your healthcare provider will suggest certain tests to learn more about your feet. These include:  Doppler tests to measure blood flow in the feet and lower leg.  X-rays, which can show bone or joint problems.  Other imaging tests, such as an MRI (magnetic resonance imaging), bone scan, and CT  (computed tomography) scan. These can help show bone infections.  Other tests, such as vascular tests, which study the blood flow in your feet and legs. You may also have nerve studies to learn how sensitive your feet are.  Creating a foot care program  Based on the evaluation, your healthcare provider will create a foot care program for you. Your program may be as simple as starting a daily self-care routine and changing the types of shoes your wear. It may also involve treating minor foot problems, such as a corn or blister. In some cases, surgery will be needed to treat an infection or mechanical problems, such as hammer toes.  Preventing problems  When you have diabetes, its easier to prevent problems than to treat them later on. So see your healthcare team for regular checkups and foot care. Your healthcare team can also help you learn more about caring for your feet at home. For example, you may be told to avoid walking barefoot. Or you may be told that special footwear is needed to protect your feet.  Have regular checkups  Foot problems can develop quickly. So be sure to follow your healthcare teams schedule for regular checkups. During office visits, take off your shoes and socks as soon as you get in the exam room. Ask your healthcare provider to examine your feet for problems. This will make it easier to find and treat small skin irritations before they get worse. Regular checkups can also help keep track of the blood flow and feeling in your feet. If you have neuropathy (lack of feeling in your feet), you will need to have checkups more often.  Learn about self-care  The more you know about diabetes and your feet, the easier it will be to prevent problems. Members of your healthcare team can teach you how to inspect your feet and teach you to look for warning signs. They can also give you other foot care tips. During office visits, be sure to ask any questions you have.  Date Last Reviewed: 7/1/2016  ©  5676-9228 [employer]. [address] [place] [address], [place] [address]. All rights reserved. This information is not intended as a substitute for professional medical care. Always follow your healthcare professional's instructions.

## 2022-06-08 NOTE — PROGRESS NOTES
"  [address] [facility] [address]  [place] [address]  Phone: [phone]   [phone]    Patient's PCP:Soren Lockett MD  Referring Provider: Aaareferral Self    Subjective:      Chief Complaint:: Diabetic Foot Exam    HPI  Jessica Luna is a 71 y.o. female who presents today for a diabetic foot exam.  Pt has seen Dr. Soren Lockett MD on 03/29/2022 who treats them for their diabetes.  Pt has been a diabetic for about 15 years.  Taking Metformin and Amaryl to treat diabetes.    Blood sugar: 155  Hemoglobin A1C: 8.3 (04/01/2022)      Also mentioned that when the appointment was made, she was having pain that was present in right medial aspect.    Bought flip flops, sketchers, has been wearing them for about 3 weeks now.  After that, she woke up one morning and the pain/discomfort started.  Patient thinks the big toe and 2nd toe was crossing over the 1st toe. States bunion area was swollen, especially at night when she went to sleep.  She has had to elevate her feet.  No trauma.  Patient states that the pain has almost fully resolved over the past day or 2.     Vitals:    06/08/22 1428   Resp: 16   Height: 5' 2" (1.575 m)   PainSc: 0-No pain      Shoe Size: 6.5 - 7    Past Surgical History:   Procedure Laterality Date    ARTHROSCOPIC REPAIR OF ROTATOR CUFF OF SHOULDER Right 4/8/2021    Procedure: REPAIR, ROTATOR CUFF, ARTHROSCOPIC;  Surgeon: Rudy Graves MD;  Location: [facility] OR;  Service: Orthopedics;  Laterality: Right;  GENERAL AND BLOCK    ARTHROSCOPIC TENOTOMY OF BICEPS TENDON Right 4/8/2021    Procedure: TENOTOMY, BICEPS, ARTHROSCOPIC;  Surgeon: Rudy Graves MD;  Location: [facility] OR;  Service: Orthopedics;  Laterality: Right;  GENERAL AND BLOCK    ARTHROSCOPY OF SHOULDER WITH DECOMPRESSION OF SUBACROMIAL SPACE Right 4/8/2021    Procedure: ARTHROSCOPY, SHOULDER, WITH SUBACROMIAL SPACE DECOMPRESSION;  Surgeon: Rudy rGaves MD;  Location: [facility] OR;  Service: Orthopedics;  Laterality: Right;  " GENERAL AND BLOCK  MITEK     SECTION      CHOLECYSTECTOMY      COLONOSCOPY N/A 10/7/2015    Procedure: COLONOSCOPY;  Surgeon: Washington Rodriguez MD;  Location: [facility] ENDO;  Service: Endoscopy;  Laterality: N/A;    COLONOSCOPY N/A 10/3/2019    Procedure: COLONOSCOPY;  Surgeon: Viviane Simeon MD;  Location: [facility] ENDO;  Service: Endoscopy;  Laterality: N/A;    CORONARY STENT PLACEMENT  2006    2 vessels    CYSTOSCOPY      CYSTOSCOPY W/ LASER LITHOTRIPSY  12/15/15    CYSTOSCOPY W/ RETROGRADES Bilateral 2019    Procedure: CYSTOSCOPY, WITH RETROGRADE PYELOGRAM;  Surgeon: Ubaldo Cho MD;  Location: [facility] OR;  Service: Urology;  Laterality: Bilateral;    CYSTOSCOPY W/ URETERAL STENT REMOVAL Left 10/1/2019    Procedure: CYSTOSCOPY, WITH URETERAL STENT REMOVAL;  Surgeon: Ubaldo Cho MD;  Location: [facility] OR;  Service: Urology;  Laterality: Left;    HYSTERECTOMY      GRACE/BSO, DUB    LASER LITHOTRIPSY Left 2019    Procedure: LITHOTRIPSY, USING LASER;  Surgeon: Ubaldo Cho MD;  Location: [facility] OR;  Service: Urology;  Laterality: Left;    OOPHORECTOMY      PERCUTANEOUS NEPHROLITHOTRIPSY  2016    ROTATOR CUFF REPAIR      left    URETERAL STENT PLACEMENT Left 2019    Procedure: INSERTION, STENT, URETER;  Surgeon: Ubaldo Cho MD;  Location: [facility] OR;  Service: Urology;  Laterality: Left;    URETEROSCOPIC REMOVAL OF URETERIC CALCULUS Left 2019    Procedure: REMOVAL, CALCULUS, URETER, URETEROSCOPIC;  Surgeon: Ubaldo Cho MD;  Location: [facility] OR;  Service: Urology;  Laterality: Left;    ureteroscopy  12/15/15     Past Medical History:   Diagnosis Date    Anticoagulant long-term use     Arthritis     CAD (coronary artery disease)     C. Stents X2 Dr. Og Jeronimo    Diabetes mellitus     Diabetes mellitus type II     Diverticulosis     Hyperlipidemia     Hypertension     Low magnesium levels     Myocardial infarction     Obstructive uropathy  10/16/2015    Renal stone     Status post cystoscopy - Left laser percutaneous nephrolithotripsy 6/29/2016    Ureterolithiasis Obstructive-left 1/3/2015    Wears dentures     FULL UPPER - PARTIAL BOTTOM    Wears dentures     Full upper; partial lower    Wears glasses     Wears glasses      Family History   Problem Relation Age of Onset    Heart disease Father 90    Cancer Mother         breast cancer    Breast cancer Mother 60    Diabetes Brother     Kidney disease Brother         nephrectomy due to cancer    Spina bifida Brother     Cancer Sister         breast cancer    Breast cancer Sister 59        Social History:   Marital Status:   Alcohol History:  reports no history of alcohol use.  Tobacco History:  reports that she has never smoked. She has never used smokeless tobacco.  Drug History:  reports no history of drug use.    Review of patient's allergies indicates:   Allergen Reactions    No known drug allergies        Current Outpatient Medications   Medication Sig Dispense Refill    acetaminophen (TYLENOL) 500 MG tablet Take 500 mg by mouth every 6 (six) hours as needed for Pain.      allopurinoL (ZYLOPRIM) 100 MG tablet Take 1 tablet (100 mg total) by mouth once daily. 90 tablet 4    aspirin 81 MG Chew Take 1 tablet (81 mg total) by mouth once daily. 90 tablet 1    atorvastatin (LIPITOR) 40 MG tablet Take 1 tablet (40 mg total) by mouth once daily. 90 tablet 3    glimepiride (AMARYL) 4 MG tablet Take 2 tablets (8 mg total) by mouth daily with breakfast. 180 tablet 0    magnesium oxide (MAG-OX) 250 mg magnesium Tab Take 0.5 tablets by mouth once daily.      metFORMIN (GLUCOPHAGE) 1000 MG tablet Take 1 tablet (1,000 mg total) by mouth 2 (two) times daily with meals. 60 tablet 0    metFORMIN (GLUCOPHAGE) 500 MG tablet Take 1 tablet (500 mg total) by mouth daily with breakfast. 90 tablet 3    metoprolol tartrate (LOPRESSOR) 25 MG tablet Take 1 tablet (25 mg total) by mouth 2 (two)  times daily. 180 tablet 3    olmesartan (BENICAR) 40 MG tablet Take 1 tablet (40 mg total) by mouth once daily. 90 tablet 3    omega-3 fatty acids-vitamin E 1,000-5 mg-unit Cap Take 1 capsule by mouth 2 (two) times daily. Two times a day      potassium citrate (UROCIT-K 15) 15 mEq TbSR Take 1 tablet by mouth twice daily 60 tablet 11    SITagliptin (JANUVIA) 100 MG Tab Take 1 tablet (100 mg total) by mouth once daily. 90 tablet 3     No current facility-administered medications for this visit.       Review of Systems   Constitutional: Negative for chills, fatigue, fever and unexpected weight change.   HENT: Negative for hearing loss and trouble swallowing.    Eyes: Negative for photophobia and visual disturbance.   Respiratory: Negative for cough, shortness of breath and wheezing.    Cardiovascular: Negative for chest pain, palpitations and leg swelling.   Gastrointestinal: Negative for abdominal pain and nausea.   Genitourinary: Negative for dysuria and frequency.   Musculoskeletal: Negative for arthralgias, back pain, gait problem, joint swelling and myalgias.   Skin: Negative for rash and wound.   Neurological: Negative for tremors, seizures, weakness, numbness and headaches.   Hematological: Does not bruise/bleed easily.         Objective:        Physical Exam:   Foot Exam    General  General Appearance: appears stated age and healthy   Orientation: alert and oriented to person, place, and time   Affect: appropriate   Gait: unimpaired       Right Foot/Ankle     Inspection and Palpation  Ecchymosis: none  Tenderness: none   Swelling: none   Arch: normal  Hallux valgus: yes  Skin Exam: skin intact; no drainage, no ulcer and no erythema   Neurovascular  Dorsalis pedis: 2+  Posterior tibial: 2+  Capillary Refill: 2+  Varicose veins: not present  Saphenous nerve sensation: normal  Tibial nerve sensation: normal  Superficial peroneal nerve sensation: normal  Deep peroneal nerve sensation: normal  Sural nerve  sensation: normal    Edema  Type of edema: non-pitting    Muscle Strength  Ankle dorsiflexion: 5  Ankle plantar flexion: 5  Ankle inversion: 5  Ankle eversion: 5  Great toe extension: 5  Great toe flexion: 5    Range of Motion    Normal right ankle ROM    Tests  Anterior drawer: negative   Talar tilt: negative   PT Tinel's sign: negative    Paresthesia: negative    Left Foot/Ankle      Inspection and Palpation  Ecchymosis: none  Tenderness: none   Swelling: none   Arch: normal  Skin Exam: skin intact; no drainage, no ulcer and no erythema   Neurovascular  Dorsalis pedis: 2+  Posterior tibial: 2+  Capillary refill: 2+  Varicose veins: not present  Saphenous nerve sensation: normal  Tibial nerve sensation: normal  Superficial peroneal nerve sensation: normal  Deep peroneal nerve sensation: normal  Sural nerve sensation: normal    Edema  Type of edema: non-pitting    Muscle Strength  Ankle dorsiflexion: 5  Ankle plantar flexion: 5  Ankle inversion: 5  Ankle eversion: 5  Great toe extension: 5  Great toe flexion: 5    Range of Motion    Normal left ankle ROM    Tests  Anterior drawer: negative   Talar tilt: negative   PT Tinel's sign: negative  Paresthesia: negative        Physical Exam  Cardiovascular:      Pulses:           Dorsalis pedis pulses are 2+ on the right side and 2+ on the left side.        Posterior tibial pulses are 2+ on the right side and 2+ on the left side.   Musculoskeletal:      Right foot: Bunion present.   Feet:      Right foot:      Skin integrity: No ulcer or erythema.      Left foot:      Skin integrity: No ulcer or erythema.         Imaging: none            Assessment:       1. Diabetes mellitus due to underlying condition with hyperglycemia, without long-term current use of insulin    2. Bunion    3. Hallux valgus of right foot    4. Encounter for diabetic foot exam    5. Right foot pain      Plan:   Diabetes mellitus due to underlying condition with hyperglycemia, without long-term current  use of insulin  -      DIABETES FOOT EXAM    Bunion    Hallux valgus of right foot    Encounter for diabetic foot exam  -      DIABETES FOOT EXAM    Right foot pain      Follow up in about 1 year (around 6/8/2023), or if symptoms worsen or fail to improve.    Procedures        I discussed the patient the pain in her foot was likely from shoes irritating or bunion.    Counseling:     I provided patient education verbally regarding:   Patient diagnosis, treatment options, as well as alternatives, risks, and benefits.     Counseled patient on the aspects of diabetes and how it pertains to the feet.  I explained the importance of proper diabetic foot care and how it is essential for the health of their feet.    I discussed the importance of knowing their HGA1c and that the level needs to be as close to 6 as possible.  I discussed the increase complications of high blood sugar including stroke, blindness, heart attack, kidney failure and loss of limb secondary to neuropathy and PVD.    Patient  was made aware of inspecting their feet.  Patient was told to be aware of any breaks in the skin or redness.  With neuropathy, these areas are not recognized early due to the numbness.  I discussed the lab test and NCV EMG test and also the role of the neurologist in evaluating the patient.  I discussed Diabetes, lower back issues, metabolic disorders, systemic causes, chemotherapy, viatmain defiencey, heavy metal exposure, as some of the causes.  I also explained that as much as 40% of the time we can not find a cause.  I discussed different treatments available to control the symptoms but whcih may not cure the problem.      Shoe inspection. Patient instructed on proper foot hygeine. We discussed wearing proper shoe gear, daily foot inspections, never walking without protective shoe gear, never putting sharp instruments to feet, routine podiatric nail visits every 2-3 months.      I provided patient education regarding:  Bunion deformity and causes. I discussed conservative treatment with shoe modification, pads, treating symptoms with OTC NSAIDs, pads between toes, inserts and pads over the bunion. I explained that conservative treatment is non-curative but may help with pain and slow down the progression of the deformity.       This note was created using Dragon voice recognition software that occasionally misinterpreted phrases or words.

## 2022-06-15 ENCOUNTER — TELEPHONE (OUTPATIENT)
Dept: UROLOGY | Facility: CLINIC | Age: 72
End: 2022-06-15
Payer: MEDICARE

## 2022-06-15 DIAGNOSIS — N20.0 KIDNEY STONES: Primary | ICD-10-CM

## 2022-06-16 DIAGNOSIS — E11.9 TYPE 2 DIABETES MELLITUS WITHOUT COMPLICATION, WITHOUT LONG-TERM CURRENT USE OF INSULIN: ICD-10-CM

## 2022-06-16 RX ORDER — METFORMIN HYDROCHLORIDE 1000 MG/1
1000 TABLET ORAL 2 TIMES DAILY WITH MEALS
Qty: 60 TABLET | Refills: 3 | Status: SHIPPED | OUTPATIENT
Start: 2022-06-16 | End: 2022-10-18 | Stop reason: SDUPTHER

## 2022-06-16 NOTE — TELEPHONE ENCOUNTER
No new care gaps identified.  [facility] Embedded Care Gaps. Reference number: [id number]. 6/16/2022   8:23:08 AM CDT

## 2022-06-20 ENCOUNTER — PATIENT MESSAGE (OUTPATIENT)
Dept: FAMILY MEDICINE | Facility: CLINIC | Age: 72
End: 2022-06-20
Payer: MEDICARE

## 2022-06-20 ENCOUNTER — TELEPHONE (OUTPATIENT)
Dept: FAMILY MEDICINE | Facility: CLINIC | Age: 72
End: 2022-06-20
Payer: MEDICARE

## 2022-06-20 RX ORDER — POTASSIUM CITRATE 15 MEQ/1
TABLET, EXTENDED RELEASE ORAL
Qty: 60 TABLET | Refills: 11 | Status: SHIPPED | OUTPATIENT
Start: 2022-06-20 | End: 2023-08-14 | Stop reason: SDUPTHER

## 2022-06-20 NOTE — TELEPHONE ENCOUNTER
Refill authorized  Last seen oct 2021 by NP    Plan as follows:  F/UP in one year with imaging and uric acid level to be performed prior to appointment at that time.    Ordered RBUS, uric acid, bmp.  Set NP f/u for annual in oct 2022 per above with   Labs and imaging 1-2 weeks prior  And advise repeating 24 hour urine a few weeks prior, and send order

## 2022-06-20 NOTE — TELEPHONE ENCOUNTER
Chester w pt informed of med refill and MD isabel pt agreed   appts scheduled along with 24 hr urine to be performed 1 mth -2 mths prior pt voiced ok

## 2022-07-26 ENCOUNTER — NUTRITION (OUTPATIENT)
Dept: DIABETES | Facility: CLINIC | Age: 72
End: 2022-07-26
Payer: MEDICARE

## 2022-07-26 ENCOUNTER — TELEPHONE (OUTPATIENT)
Dept: FAMILY MEDICINE | Facility: CLINIC | Age: 72
End: 2022-07-26
Payer: MEDICARE

## 2022-07-26 DIAGNOSIS — E08.65 DIABETES MELLITUS DUE TO UNDERLYING CONDITION WITH HYPERGLYCEMIA, WITHOUT LONG-TERM CURRENT USE OF INSULIN: Primary | ICD-10-CM

## 2022-07-26 PROCEDURE — 99999 PR PBB SHADOW E&M-EST. PATIENT-LVL II: ICD-10-PCS | Mod: PBBFAC,,, | Performed by: NUTRITIONIST

## 2022-07-26 PROCEDURE — G0108 DIAB MANAGE TRN  PER INDIV: HCPCS | Mod: PBBFAC,PO | Performed by: NUTRITIONIST

## 2022-07-26 PROCEDURE — 99212 OFFICE O/P EST SF 10 MIN: CPT | Mod: PBBFAC,PO | Performed by: NUTRITIONIST

## 2022-07-26 PROCEDURE — 99999 PR PBB SHADOW E&M-EST. PATIENT-LVL II: CPT | Mod: PBBFAC,,, | Performed by: NUTRITIONIST

## 2022-07-26 NOTE — TELEPHONE ENCOUNTER
----- Message from Vika Parker RD sent at 7/26/2022  8:45 AM CDT -----  This pt has been seeing me for DM Ed.  She is currently on 4 mg glimepiride; 100 mg Januvia and Metformin 1500 mg in morning and 1000 mg in evening.  She came today for a f/u appt.  Her BG log (in chart note) shows she is experiencing some lows recently after making some dietary changes.  You are seeing her 7/27/22.  Please advise if any med changes may be possible to help avoid future lows.  Thank you

## 2022-07-26 NOTE — PROGRESS NOTES
Diabetes Care Specialist Progress Note  Author: Vika Parker RD  Date: 7/26/2022    Program Intake  Reason for Diabetes Program Visit:: Intervention  Type of Intervention:: Individual  Individual: Education  Education: Nutrition and Meal Planning  Current diabetes risk level:: moderate  In the last 12 months, have you:: none  Permission to speak with others about care:: no    Lab Results   Component Value Date    HGBA1C 8.3 (H) 04/01/2022       Clinical    Clinical Assessment  Current Diabetes Treatment: Oral Medication (Glimepiride 4 mg in AM; Januvia 100 mg daily; Metformin 1500mg  in AM, 1000 mg PM)  Have you ever experienced hypoglycemia (low blood sugar)?: yes  In the last month, how often have you experienced low blood sugar?: once a week  Are you able to tell when your blood sugar is low?: Yes  What symptoms do you experience?: Shaky  Have you ever been hospitalized because your blood sugar was too low?: no  How do you treat hypoglycemia (low blood sugar)?: other (has WW bread w/PNB)    Diabetes Self-Management Skills Assessment    Medications  Patient is able to describe current diabetes management routine.: yes  Diabetes management routine:: oral medications (Glimepiride 4 mg in AM; Januvia 100 mg daily; Metformin 1500mg  in AM, 1000 mg PM)  Patient is able to identify current diabetes medications, dosages, and appropriate timing of medications.: yes  Patient understands the purpose of the medications taken for diabetes.: yes  Patient reports problems or concerns with current medication regimen.: yes  Medication regimen problems/concerns:: other (see comments) (a few recent lows (<80))  Medication Skills Assessment Completed:: Yes  Assessment indicates:: Adequate understanding  Area of need?: No    Home Blood Glucose Monitoring  Patient states that blood sugar is checked at home daily.: yes    Monitoring Method:: home glucometer  How often do you check your blood sugar?: Twice a day  When do you check your  blood sugar?: Before breakfast, Before dinner  Blood glucose logs:: yes, encouraged to bring logs to provider visits  Blood glucose logs reviewed today?: yes  Home Blood Glucose Monitoring Skills Assessment Completed: : Yes  Assessment indicates:: Adequate understanding  Area of need?: No                   Diabetes Self Support Plan    Assessment Summary and Plan    Based on today's diabetes care assessment, the following areas of need were identified:      Social 5/24/2022   Support No   Access to Mass Media/Tech No   Cognitive/Behavioral Health No   Culture/Yazidi No   Communication No   Health Literacy No        Clinical 5/24/2022   Medication Adherence No   Lab Compliance No   Nutritional Status No        Diabetes Self-Management Skills 7/26/2022   Diabetes Disease Process/Treatment Options -   Nutrition/Healthy Eating See Care Plan   Physical Activity/Exercise -   Medication No--Educator to contact MD regarding possible med reduction due to some low BG levels   Home Blood Glucose Monitoring No--provided another BG log; will con't to check 1-2 ties daily   Acute Complications -   Chronic Complications -   Psychosocial/Coping -          Today's interventions were provided through individual discussion, instruction, and written materials were provided.      Patient verbalized understanding of instruction and written materials.  Pt was able to return back demonstration of instructions today. Patient understood key points, needs reinforcement and further instruction.     Diabetes Self-Management Care Plan:    Today's Diabetes Self-Management Care Plan was developed with Jessica Rosa's input. Jessica Rosa has agreed to work toward the following goal(s) to improve his/her overall diabetes control.      Care Plan: Diabetes Management   Updates made since 6/26/2022 12:00 AM      Problem: Healthy Eating       Goal: Eat 3 meals daily with 30 g/2 servings of Carbohydrate per meal.    Start Date: 5/24/2022   Expected End Date:  8/25/2022   This Visit's Progress: On track   Priority: High   Barriers: No Barriers Identified   Note:    Reviewed basic principles of DM diet, focusing on portion sizes, combining carbs w/PRO for meals and snacks, and label reading (look at total carbs).  Pt will check labels on [employer] Cookies (having 2 for HS),  rolls    7/26/22--Pt has been making changes in diet:  *cutting back on portions  *increasing non-starchy veggies  *no more cookies or [place]in rolls (looked at labels)  *paying attention to BG levels to help learn how body reacts to food    Pt states she feels much better.       Problem: Blood Glucose Self-Monitoring       Goal: Patient agrees to check and record blood sugars 1-2 times per day. Completed 7/26/2022   Start Date: 5/24/2022   Expected End Date: 8/24/2022   This Visit's Progress: Met   Priority: Level 1   Barriers: Lack of Supplies   Note:    Pt states she lost lancet device and hasn't checked BG for a number of months.  Encouraged to get [employer] lancet device and lancets; pt will try to get asap and start checking BG 1-2 times daily--fasting morning and fasting dinner OR HS.  BG log provided    7/26/22--Pt arrives today with great BG log (see above).  Concerned with a few recent lows; will contact Dr Guerrero regarding this.  May want to make med adjustment          Follow Up Plan     Follow up in about 3 months (around 10/26/2022) for HA1C.    Today's care plan and follow up schedule was discussed with patient.  Jessica Rosa verbalized understanding of the care plan, goals, and agrees to follow up plan.        The patient was encouraged to communicate with his/her health care provider/physician and care team regarding his/her condition(s) and treatment.  I provided the patient with my contact information today and encouraged to contact me via phone or [facility]'s Patient Portal as needed.     Length of Visit   Total Time: 30 Minutes

## 2022-07-27 ENCOUNTER — OFFICE VISIT (OUTPATIENT)
Dept: FAMILY MEDICINE | Facility: CLINIC | Age: 72
End: 2022-07-27
Payer: MEDICARE

## 2022-07-27 ENCOUNTER — LAB VISIT (OUTPATIENT)
Dept: LAB | Facility: HOSPITAL | Age: 72
End: 2022-07-27
Attending: STUDENT IN AN ORGANIZED HEALTH CARE EDUCATION/TRAINING PROGRAM
Payer: MEDICARE

## 2022-07-27 VITALS
OXYGEN SATURATION: 97 % | SYSTOLIC BLOOD PRESSURE: 110 MMHG | RESPIRATION RATE: 14 BRPM | BODY MASS INDEX: 31.97 KG/M2 | TEMPERATURE: 99 F | HEART RATE: 72 BPM | HEIGHT: 62 IN | WEIGHT: 173.75 LBS | DIASTOLIC BLOOD PRESSURE: 62 MMHG

## 2022-07-27 DIAGNOSIS — I15.2 HYPERTENSION ASSOCIATED WITH DIABETES: ICD-10-CM

## 2022-07-27 DIAGNOSIS — I25.10 CORONARY ARTERY DISEASE, UNSPECIFIED VESSEL OR LESION TYPE, UNSPECIFIED WHETHER ANGINA PRESENT, UNSPECIFIED WHETHER NATIVE OR TRANSPLANTED HEART: ICD-10-CM

## 2022-07-27 DIAGNOSIS — E11.9 TYPE 2 DIABETES MELLITUS WITHOUT COMPLICATION, WITHOUT LONG-TERM CURRENT USE OF INSULIN: Primary | ICD-10-CM

## 2022-07-27 DIAGNOSIS — E11.59 HYPERTENSION ASSOCIATED WITH DIABETES: ICD-10-CM

## 2022-07-27 DIAGNOSIS — M25.641 STIFFNESS OF RIGHT HAND JOINT: ICD-10-CM

## 2022-07-27 DIAGNOSIS — E11.9 TYPE 2 DIABETES MELLITUS WITHOUT COMPLICATION, WITHOUT LONG-TERM CURRENT USE OF INSULIN: ICD-10-CM

## 2022-07-27 LAB
ESTIMATED AVG GLUCOSE: 114 MG/DL (ref 68–131)
HBA1C MFR BLD: 5.6 % (ref 4–5.6)

## 2022-07-27 PROCEDURE — 99215 OFFICE O/P EST HI 40 MIN: CPT | Mod: PBBFAC,PO | Performed by: STUDENT IN AN ORGANIZED HEALTH CARE EDUCATION/TRAINING PROGRAM

## 2022-07-27 PROCEDURE — 99214 PR OFFICE/OUTPT VISIT, EST, LEVL IV, 30-39 MIN: ICD-10-PCS | Mod: S$PBB,,, | Performed by: STUDENT IN AN ORGANIZED HEALTH CARE EDUCATION/TRAINING PROGRAM

## 2022-07-27 PROCEDURE — 99999 PR PBB SHADOW E&M-EST. PATIENT-LVL V: CPT | Mod: PBBFAC,,, | Performed by: STUDENT IN AN ORGANIZED HEALTH CARE EDUCATION/TRAINING PROGRAM

## 2022-07-27 PROCEDURE — 99999 PR PBB SHADOW E&M-EST. PATIENT-LVL V: ICD-10-PCS | Mod: PBBFAC,,, | Performed by: STUDENT IN AN ORGANIZED HEALTH CARE EDUCATION/TRAINING PROGRAM

## 2022-07-27 PROCEDURE — 36415 COLL VENOUS BLD VENIPUNCTURE: CPT | Mod: PO | Performed by: STUDENT IN AN ORGANIZED HEALTH CARE EDUCATION/TRAINING PROGRAM

## 2022-07-27 PROCEDURE — 99214 OFFICE O/P EST MOD 30 MIN: CPT | Mod: S$PBB,,, | Performed by: STUDENT IN AN ORGANIZED HEALTH CARE EDUCATION/TRAINING PROGRAM

## 2022-07-27 PROCEDURE — 83036 HEMOGLOBIN GLYCOSYLATED A1C: CPT | Performed by: STUDENT IN AN ORGANIZED HEALTH CARE EDUCATION/TRAINING PROGRAM

## 2022-07-27 NOTE — PROGRESS NOTES
[facility] Note    Subjective:    The HPI and pertinent ROS is included in the Diagnostic Impression Remarks section at the end of the note. Please see below for further details. Chief complaint is at end of note.     Medication List with Changes/Refills   New Medications    SEMAGLUTIDE (RYBELSUS) 3 MG TABLET    Take 1 tablet (3 mg total) by mouth once daily.    SEMAGLUTIDE (RYBELSUS) 7 MG TABLET    Take 1 tablet (7 mg total) by mouth once daily.   Current Medications    ACETAMINOPHEN (TYLENOL) 500 MG TABLET    Take 500 mg by mouth every 6 (six) hours as needed for Pain.    ALLOPURINOL (ZYLOPRIM) 100 MG TABLET    Take 1 tablet (100 mg total) by mouth once daily.    ASPIRIN 81 MG CHEW    Take 1 tablet (81 mg total) by mouth once daily.    ATORVASTATIN (LIPITOR) 40 MG TABLET    Take 1 tablet (40 mg total) by mouth once daily.    GLIMEPIRIDE (AMARYL) 4 MG TABLET    TAKE 2 TABLETS BY MOUTH DAILY WITH BREAKFAST    MAGNESIUM OXIDE (MAG-OX) 250 MG MAGNESIUM TAB    Take 0.5 tablets by mouth once daily.    METFORMIN (GLUCOPHAGE) 1000 MG TABLET    Take 1 tablet (1,000 mg total) by mouth 2 (two) times daily with meals.    METOPROLOL TARTRATE (LOPRESSOR) 25 MG TABLET    Take 1 tablet (25 mg total) by mouth 2 (two) times daily.    OLMESARTAN (BENICAR) 40 MG TABLET    Take 1 tablet (40 mg total) by mouth once daily.    OMEGA-3 FATTY ACIDS-VITAMIN E 1,000-5 MG-UNIT CAP    Take 1 capsule by mouth 2 (two) times daily. Two times a day    POTASSIUM CITRATE (UROCIT-K 15) 15 MEQ TBSR    TAKE ONE TABLET BY MOUTH TWICE DAILY   Discontinued Medications    SITAGLIPTIN (JANUVIA) 100 MG TAB    Take 1 tablet (100 mg total) by mouth once daily.     Modified Medications    No medications on file       Data reviewed 274}  Previous medical records reviewed and summarized in plan section at end of note.      If you are due for any health screening(s) below please notify me so we can arrange them to be ordered and scheduled to  maintain your health.     Tests to Keep You Healthy    Mammogram: Met  Eye Exam: Met  Colon Cancer Screening: Met  Blood Pressure <= 139/89: Yes  HbA1c < 8: NO    The following portions of the patient's history were reviewed and updated as appropriate: allergies, current medications, past family history, past medical history, past social history, past surgical history and problem list.    She  has a past medical history of Anticoagulant long-term use, Arthritis, CAD (coronary artery disease), Diabetes mellitus, Diabetes mellitus type II, Diverticulosis, Hyperlipidemia, Hypertension, Low magnesium levels, Myocardial infarction, Obstructive uropathy (10/16/2015), Renal stone, Status post cystoscopy - Left laser percutaneous nephrolithotripsy (2016), Ureterolithiasis Obstructive-left (1/3/2015), Wears dentures, Wears dentures, Wears glasses, and Wears glasses.  She  has a past surgical history that includes Coronary stent placement (); Rotator cuff repair; Cholecystectomy;  section; Hysterectomy; Colonoscopy (N/A, 10/7/2015); Cystoscopy; ureteroscopy (12/15/15); Cystoscopy w/ laser lithotripsy (12/15/15); Percutaneous nephrolithotripsy (2016); Laser lithotripsy (Left, 2019); Ureteroscopic removal of ureteric calculus (Left, 2019); Ureteral stent placement (Left, 2019); Cystoscopy w/ retrogrades (Bilateral, 2019); Cystoscopy w/ ureteral stent removal (Left, 10/1/2019); Colonoscopy (N/A, 10/3/2019); Oophorectomy; Arthroscopic repair of rotator cuff of shoulder (Right, 2021); Arthroscopic tenotomy of biceps tendon (Right, 2021); and Arthroscopy of shoulder with decompression of subacromial space (Right, 2021).    She  reports that she has never smoked. She has never used smokeless tobacco. She reports that she does not drink alcohol and does not use drugs.  She family history includes Breast cancer (age of onset: 59) in her sister; Breast cancer (age of onset: 60) in  "her mother; Cancer in her mother and sister; Diabetes in her brother; Heart disease (age of onset: 90) in her father; Kidney disease in her brother; Spina bifida in her brother.    Review of patient's allergies indicates:   Allergen Reactions    No known drug allergies        Tobacco Use: Low Risk     Smoking Tobacco Use: Never Smoker    Smokeless Tobacco Use: Never Used     Physical Examination  General appearance: alert, cooperative, no distress  Neck: no thyromegaly, no neck stiffness  Lungs: clear to auscultation, no wheezes, rales or rhonchi, symmetric air entry  Heart: normal rate, regular rhythm, normal S1, S2, no murmurs, rubs, clicks or gallops  Abdomen: soft, nontender, nondistended, no rigidity, rebound, or guarding.   Back: no point tenderness over spine  Extremities: peripheral pulses normal, no unilateral leg swelling or calf tenderness   Neurological:alert, oriented, normal speech, no new focal findings or movement disorder noted from baseline  Inability to fully flex right hand stiffness     BP Readings from Last 3 Encounters:   07/27/22 110/62   04/28/22 108/69   03/29/22 136/72     Wt Readings from Last 3 Encounters:   07/27/22 78.8 kg (173 lb 11.6 oz)   04/28/22 81.2 kg (179 lb)   04/07/22 80 kg (176 lb 5.9 oz)     /62 (BP Location: Right arm, Patient Position: Sitting, BP Method: Large (Manual))   Pulse 72   Temp 98.6 °F (37 °C) (Oral)   Resp 14   Ht 5' 2" (1.575 m)   Wt 78.8 kg (173 lb 11.6 oz)   SpO2 97%   BMI 31.77 kg/m²    274}  Laboratory: I have reviewed old labs below:    274}    Lab Results   Component Value Date    WBC 8.24 04/01/2022    HGB 12.2 04/01/2022    HCT 38.9 04/01/2022    MCV 89 04/01/2022     04/01/2022     04/01/2022    K 4.6 04/01/2022     04/01/2022    CALCIUM 9.9 04/01/2022    PHOS 3.1 10/17/2015    CO2 27 04/01/2022     (H) 04/01/2022    BUN 20 04/01/2022    CREATININE 0.8 04/01/2022    ANIONGAP 7 (L) 04/01/2022    ESTGFRAFRICA " ">60.0 04/01/2022    EGFRNONAA >60.0 04/01/2022    PROT 7.1 04/01/2022    ALBUMIN 3.8 04/01/2022    BILITOT 0.4 04/01/2022    ALKPHOS 114 04/01/2022    ALT 7 (L) 04/01/2022    AST 14 04/01/2022    INR 1.0 09/12/2019    CHOL 133 04/01/2022    TRIG 175 (H) 04/01/2022    HDL 40 04/01/2022    LDLCALC 58.0 (L) 04/01/2022    TSH 1.095 05/27/2015    GLUF 642 (HH) 01/03/2015    HGBA1C 8.3 (H) 04/01/2022     Lab reviewed by me: Particular labs of significance that I will monitor, workup, or treat to improve are mentioned below in diagnostic impression remarks.    Imaging/EKG: I have reviewed the pertinent results and my findings are noted in remarks.  274}    CC:   Chief Complaint   Patient presents with    Establish Care    Hypertension    Diabetes        274}    Assessment/Plan  Jessica Luna is a 71 y.o. female who presents to clinic with:  1. Type 2 diabetes mellitus without complication, without long-term current use of insulin    2. Hypertension associated with diabetes    3. Stiffness of right hand joint    4. Coronary artery disease, unspecified vessel or lesion type, unspecified whether angina present, unspecified whether native or transplanted heart       274}  Diagnostic Impression Remarks + HPI     Documentation entered by me for this encounter may have been done in part using speech-recognition technology. Although I have made an effort to ensure accuracy, "sound like" errors may exist and should be interpreted in context.      Diabetes-went over glucose log had a few hypoglycemic events and she was taking an extra 500 mg of metformin in addition to her 1000 mg tablets twice a day and recommend to not take more than 2000 mg metformin per day and to stop taking the extra metformin tablet.  Also will try to see if a G LP 1 agonist is the same price as her Januvia since this will cause additional weight loss and have cardiovascular benefits with her heart history and can further improve her A1c and will " send it to the pharmacy if not will continue Januvia will also check urine protein and blood work   Hypertension well controlled continue current meds   CAD-stable no chest pain continue aspirin statin   Stiffness right hand-patient reports ever since March for right shoulder surgery she has had some stiffness of the right hand has some sensation difficulty with flexing complete hand this is not new no slurred speech no arm weakness will put a referral for Ortho also physical therapy    This is the extent of the patient's concerns at this present time. She did not feel chest pain upon exertion, dyspnea, nausea, vomiting, diaphoresis, or syncope. No pleuritic chest pain, unilateral leg swelling, calf tenderness, or calf pain. Jessica Rosa will return to clinic in a few months for further workup and reassessment or sooner as needed. She was instructed to call the clinic or go to the emergency department if her symptoms do not improve, worsens, or if new symptoms develop. As we discussed that symptoms could worsen over the next 24 hours she was advised that if any increased swelling, pain, or numbness arise to go immediately to the ED. Patient knows to call any time if an emergency arises. Shared decision making occurred and she verbalized understanding in agreement with this plan. I discussed imaging findings, diagnosis, possibilities, treatment options, medications, risks, and benefits. She had many questions regarding the options and long-term effects. All questions were answered. She expressed understanding after counseling regarding the diagnosis and recommendations. She was capable and demonstrated competence with understanding of these options. Shared decision making was performed resulting in her choosing the current treatment plan. Patient handout was given with instructions and recommendations. Advised the patient that if they become pregnant to alert us immediately to assess for medication changes. I also  discussed the importance of close follow up to discuss labs, change or modify her medications if needed, monitor side effects, and further evaluation of medical problems.     Additional workup planned: see labs ordered below.    See below for labs and meds ordered with associated diagnosis      1. Type 2 diabetes mellitus without complication, without long-term current use of insulin  - Hemoglobin A1C; Future  - MICROALBUMIN / CREATININE RATIO URINE; Future  - Ambulatory referral/consult to Endocrinology; Future    2. Hypertension associated with diabetes  - Hemoglobin A1C; Future  - MICROALBUMIN / CREATININE RATIO URINE; Future    3. Stiffness of right hand joint  - Ambulatory referral/consult to Physical/Occupational Therapy; Future  - Ambulatory referral/consult to Orthopedics; Future    4. Coronary artery disease, unspecified vessel or lesion type, unspecified whether angina present, unspecified whether native or transplanted heart      Skip Guerrero MD   274}    Tests to Keep You Healthy    Mammogram: Met  Eye Exam: Met  Colon Cancer Screening: Met  Blood Pressure <= 139/89: Yes  HbA1c < 8: NO

## 2022-08-01 DIAGNOSIS — M79.641 PAIN OF RIGHT HAND: Primary | ICD-10-CM

## 2022-08-03 ENCOUNTER — OFFICE VISIT (OUTPATIENT)
Dept: ORTHOPEDICS | Facility: CLINIC | Age: 72
End: 2022-08-03
Payer: MEDICARE

## 2022-08-03 ENCOUNTER — HOSPITAL ENCOUNTER (OUTPATIENT)
Dept: RADIOLOGY | Facility: HOSPITAL | Age: 72
Discharge: HOME | End: 2022-08-03
Attending: ORTHOPAEDIC SURGERY
Payer: MEDICARE

## 2022-08-03 VITALS — HEIGHT: 62 IN | WEIGHT: 173.75 LBS | BODY MASS INDEX: 31.97 KG/M2

## 2022-08-03 DIAGNOSIS — M79.641 PAIN OF RIGHT HAND: ICD-10-CM

## 2022-08-03 DIAGNOSIS — M25.641 STIFFNESS OF RIGHT HAND JOINT: ICD-10-CM

## 2022-08-03 PROCEDURE — 99213 OFFICE O/P EST LOW 20 MIN: CPT | Mod: PBBFAC,PN | Performed by: ORTHOPAEDIC SURGERY

## 2022-08-03 PROCEDURE — 73130 XR HAND COMPLETE 3 VIEW RIGHT: ICD-10-PCS | Mod: 26,RT,, | Performed by: RADIOLOGY

## 2022-08-03 PROCEDURE — 99203 OFFICE O/P NEW LOW 30 MIN: CPT | Mod: S$PBB,,, | Performed by: ORTHOPAEDIC SURGERY

## 2022-08-03 PROCEDURE — 73130 X-RAY EXAM OF HAND: CPT | Mod: TC,PN,RT

## 2022-08-03 PROCEDURE — 99999 PR PBB SHADOW E&M-EST. PATIENT-LVL III: CPT | Mod: PBBFAC,,, | Performed by: ORTHOPAEDIC SURGERY

## 2022-08-03 PROCEDURE — 99999 PR PBB SHADOW E&M-EST. PATIENT-LVL III: ICD-10-PCS | Mod: PBBFAC,,, | Performed by: ORTHOPAEDIC SURGERY

## 2022-08-03 PROCEDURE — 73130 X-RAY EXAM OF HAND: CPT | Mod: 26,RT,, | Performed by: RADIOLOGY

## 2022-08-03 PROCEDURE — 99203 PR OFFICE/OUTPT VISIT, NEW, LEVL III, 30-44 MIN: ICD-10-PCS | Mod: S$PBB,,, | Performed by: ORTHOPAEDIC SURGERY

## 2022-08-03 NOTE — PROGRESS NOTES
8/3/2022      Past Medical History:   Diagnosis Date    Anticoagulant long-term use     Arthritis     CAD (coronary artery disease)     C. Stents X2 Dr. Og Jeronimo    Diabetes mellitus     Diabetes mellitus type II     Diverticulosis     Hyperlipidemia     Hypertension     Low magnesium levels     Myocardial infarction     Obstructive uropathy 10/16/2015    Renal stone     Status post cystoscopy - Left laser percutaneous nephrolithotripsy 2016    Ureterolithiasis Obstructive-left 1/3/2015    Wears dentures     FULL UPPER - PARTIAL BOTTOM    Wears dentures     Full upper; partial lower    Wears glasses     Wears glasses        Past Surgical History:   Procedure Laterality Date    ARTHROSCOPIC REPAIR OF ROTATOR CUFF OF SHOULDER Right 2021    Procedure: REPAIR, ROTATOR CUFF, ARTHROSCOPIC;  Surgeon: Rudy Graves MD;  Location: [facility] OR;  Service: Orthopedics;  Laterality: Right;  GENERAL AND BLOCK    ARTHROSCOPIC TENOTOMY OF BICEPS TENDON Right 2021    Procedure: TENOTOMY, BICEPS, ARTHROSCOPIC;  Surgeon: Rudy Graves MD;  Location: [facility] OR;  Service: Orthopedics;  Laterality: Right;  GENERAL AND BLOCK    ARTHROSCOPY OF SHOULDER WITH DECOMPRESSION OF SUBACROMIAL SPACE Right 2021    Procedure: ARTHROSCOPY, SHOULDER, WITH SUBACROMIAL SPACE DECOMPRESSION;  Surgeon: Rudy Graves MD;  Location: [facility] OR;  Service: Orthopedics;  Laterality: Right;  GENERAL AND BLOCK  MITEK     SECTION      CHOLECYSTECTOMY      COLONOSCOPY N/A 10/7/2015    Procedure: COLONOSCOPY;  Surgeon: Washington Rodriguez MD;  Location: [facility] ENDO;  Service: Endoscopy;  Laterality: N/A;    COLONOSCOPY N/A 10/3/2019    Procedure: COLONOSCOPY;  Surgeon: Viviane Simeon MD;  Location: [facility] ENDO;  Service: Endoscopy;  Laterality: N/A;    CORONARY STENT PLACEMENT      2 vessels    CYSTOSCOPY      CYSTOSCOPY W/ LASER LITHOTRIPSY  12/15/15    CYSTOSCOPY W/ RETROGRADES Bilateral 2019     Procedure: CYSTOSCOPY, WITH RETROGRADE PYELOGRAM;  Surgeon: Ubaldo Cho MD;  Location: [facility] OR;  Service: Urology;  Laterality: Bilateral;    CYSTOSCOPY W/ URETERAL STENT REMOVAL Left 10/1/2019    Procedure: CYSTOSCOPY, WITH URETERAL STENT REMOVAL;  Surgeon: Ubaldo Cho MD;  Location: [facility] OR;  Service: Urology;  Laterality: Left;    HYSTERECTOMY      GRACE/BSO, DUB    LASER LITHOTRIPSY Left 9/19/2019    Procedure: LITHOTRIPSY, USING LASER;  Surgeon: Ubaldo Cho MD;  Location: [facility] OR;  Service: Urology;  Laterality: Left;    OOPHORECTOMY      PERCUTANEOUS NEPHROLITHOTRIPSY  06/28/2016    ROTATOR CUFF REPAIR      left    URETERAL STENT PLACEMENT Left 9/19/2019    Procedure: INSERTION, STENT, URETER;  Surgeon: Ubaldo Cho MD;  Location: [facility] OR;  Service: Urology;  Laterality: Left;    URETEROSCOPIC REMOVAL OF URETERIC CALCULUS Left 9/19/2019    Procedure: REMOVAL, CALCULUS, URETER, URETEROSCOPIC;  Surgeon: Ubaldo Cho MD;  Location: [facility] OR;  Service: Urology;  Laterality: Left;    ureteroscopy  12/15/15         Current Outpatient Medications:     acetaminophen (TYLENOL) 500 MG tablet, Take 500 mg by mouth every 6 (six) hours as needed for Pain., Disp: , Rfl:     allopurinoL (ZYLOPRIM) 100 MG tablet, Take 1 tablet (100 mg total) by mouth once daily., Disp: 90 tablet, Rfl: 4    atorvastatin (LIPITOR) 40 MG tablet, Take 1 tablet (40 mg total) by mouth once daily., Disp: 90 tablet, Rfl: 3    glimepiride (AMARYL) 4 MG tablet, TAKE 2 TABLETS BY MOUTH DAILY WITH BREAKFAST, Disp: 180 tablet, Rfl: 0    magnesium oxide (MAG-OX) 250 mg magnesium Tab, Take 0.5 tablets by mouth once daily., Disp: , Rfl:     metFORMIN (GLUCOPHAGE) 1000 MG tablet, Take 1 tablet (1,000 mg total) by mouth 2 (two) times daily with meals., Disp: 60 tablet, Rfl: 3    metoprolol tartrate (LOPRESSOR) 25 MG tablet, Take 1 tablet (25 mg total) by mouth 2 (two) times daily., Disp: 180 tablet, Rfl: 3     olmesartan (BENICAR) 40 MG tablet, Take 1 tablet (40 mg total) by mouth once daily., Disp: 90 tablet, Rfl: 3    omega-3 fatty acids-vitamin E 1,000-5 mg-unit Cap, Take 1 capsule by mouth 2 (two) times daily. Two times a day, Disp: , Rfl:     potassium citrate (UROCIT-K 15) 15 mEq TbSR, TAKE ONE TABLET BY MOUTH TWICE DAILY, Disp: 60 tablet, Rfl: 11    semaglutide (RYBELSUS) 3 mg tablet, Take 1 tablet (3 mg total) by mouth once daily, then begin Rybelsus 7mg RX., Disp: 30 tablet, Rfl: 0    [START ON 8/27/2022] semaglutide (RYBELSUS) 7 mg tablet, Take 1 tablet (7 mg total) by mouth once daily., Disp: 30 tablet, Rfl: 2    aspirin 81 MG Chew, Take 1 tablet (81 mg total) by mouth once daily., Disp: 90 tablet, Rfl: 1    Allergies as of 08/03/2022 - Reviewed 08/03/2022   Allergen Reaction Noted    No known drug allergies  01/18/2012       Family History   Problem Relation Age of Onset    Heart disease Father 90    Cancer Mother         breast cancer    Breast cancer Mother 60    Diabetes Brother     Kidney disease Brother         nephrectomy due to cancer    Spina bifida Brother     Cancer Sister         breast cancer    Breast cancer Sister 59       Social History     Socioeconomic History    Marital status:    Tobacco Use    Smoking status: Never Smoker    Smokeless tobacco: Never Used   Substance and Sexual Activity    Alcohol use: No    Drug use: No    Sexual activity: Yes     Partners: Male             HPI:  Patient is being seen today with right hand pain complains of stiffness in all fingers .  Patient denies any trauma patient has a history of surgery to her right shoulder by Dr. Graves in the past      Review of Systems   Constitution: Negative for chills and fever.   HENT: Negative for headaches and blurry vision.   Cardiovascular: Negative for chest pain, irregular heartbeat, leg swelling and palpitations.   Respiratory: Negative for cough and shortness of breath.   Endocrine: Negative  for polyuria.   Hematologic/Lymphatic: Negative for bleeding problem. Does not bruise/bleed easily.   Skin: Negative for poor wound healing and rash.   Musculoskeletal: Negative for joint pain. Negative for arthritis, joint swelling, muscle weakness and myalgias.   Gastrointestinal: Negative for abdominal pain, heartburn, melena, nausea and vomiting.   Genitourinary: Negative for bladder incontinence and dysuria.   Neurological: Negative for numbness.   Psychiatric/Behavioral: Negative for depression. The patient is not nervous/anxious.     PE:  [unfilled]    A&Ox3, NAD  Neck-supple with no pain neurovascularly intact in the upper extremities  RUE patient has a right hand is not a excessively swollen it is slightly cooler as complain had to the left hand she has stiffness in the PIP joint and D IP joints of all of her fingers and her thumb she has fairly good motion in the MCP joints of all of her fingers except for the small finger.  LUE no swelling or deformity  Pelvis no pelvic pain hip pain  Back no back pain straight leg raise negative bilaterally  RLE no swelling deformity  LLE no swelling or deformity    Xrays:  X-rays of her right hand show some diffuse osteopenia no evidence of any significant arthritic changes or deformity        Labs:    No results found for this or any previous visit (from the past 24 hour(s)).    Assessment/Plan:   This patient had surgery on her shoulder back in April 2021 for rotator cuff repair she also had an interscalene nerve block.  She was operated on by Dr. Graves at that time patient states that after surgery she started having stiffness in the hand I reviewed some of the records and apparently the patient was going to physical therapy for hand for stiffness.  It appears that she may have had a reflex sympathetic dystrophy.  Patient states that she is scheduled to go back to physical therapy for her hand.  The patient has a lot of stiffness and contractures in the PIP joints  and the IP joints of the fingers.  I am also going to go ahead refer her to Dr. Mendes for evaluation and possible treatment

## 2022-08-04 ENCOUNTER — OFFICE VISIT (OUTPATIENT)
Dept: ORTHOPEDICS | Facility: CLINIC | Age: 72
End: 2022-08-04
Payer: MEDICARE

## 2022-08-04 VITALS — WEIGHT: 173.75 LBS | BODY MASS INDEX: 31.97 KG/M2 | HEIGHT: 62 IN

## 2022-08-04 DIAGNOSIS — M25.641 STIFFNESS OF RIGHT HAND JOINT: Primary | ICD-10-CM

## 2022-08-04 PROCEDURE — 99204 PR OFFICE/OUTPT VISIT, NEW, LEVL IV, 45-59 MIN: ICD-10-PCS | Mod: S$PBB,,, | Performed by: ORTHOPAEDIC SURGERY

## 2022-08-04 PROCEDURE — 99213 OFFICE O/P EST LOW 20 MIN: CPT | Mod: PBBFAC,PN | Performed by: ORTHOPAEDIC SURGERY

## 2022-08-04 PROCEDURE — 99999 PR PBB SHADOW E&M-EST. PATIENT-LVL III: CPT | Mod: PBBFAC,,, | Performed by: ORTHOPAEDIC SURGERY

## 2022-08-04 PROCEDURE — 99204 OFFICE O/P NEW MOD 45 MIN: CPT | Mod: S$PBB,,, | Performed by: ORTHOPAEDIC SURGERY

## 2022-08-04 PROCEDURE — 99999 PR PBB SHADOW E&M-EST. PATIENT-LVL III: ICD-10-PCS | Mod: PBBFAC,,, | Performed by: ORTHOPAEDIC SURGERY

## 2022-08-04 NOTE — PROGRESS NOTES
[facility]  History & Physical  Orthopedics    SUBJECTIVE:      COVID-19 attestation:  This patient was treated during the COVID-19 pandemic.  This was discussed with the patient, they are aware of our current policies and procedures, were given the option of delaying their visit and or switching to a virtual visit, delaying their surgery when applicable, and they elect to proceed.    Chief Complaint:  Right upper extremity    Referring Provider: Williams Rodriguez MD     History of Present Illness:  Patient is a 71 y.o. right hand dominant female who presents today with complaints of right upper extremity dysfunction.  The patient notes that on April 8, 2021 she underwent a right shoulder arthroscopy with Dr. Graves.  Her shoulder is doing well these days but postoperatively she developed significant disability and debility in the right hand.  She notes that for a long period of time she had essentially no active motion hand whatsoever.  This has fortunately recently begun to improve and she has now developed good function at the MCP joints but still has very limited function at the IP joint.  She has attended occupational therapy which she feels is helping and she is about to reinitiate that.  She denies any significant pain but does note some numbness and tingling into her right thumb and index finger.  No other complaints today and she presents for initial evaluation by myself.     Onset of symptoms/DOI was April 2021.    Symptoms are aggravated by activity and movement.    Symptoms are alleviated by rest and Therapy.    Symptoms consist of pain, swelling, decreased ROM and numbness/tingling.    Attempted treatment(s) and/or interventions include activity modifications, rest, physical and/or occupational therapy.     The patient denies any fevers, chills, N/V, D/C and presents for evaluation.       Past Medical History:   Diagnosis Date    Anticoagulant long-term use     Arthritis      CAD (coronary artery disease)     C. Stents X2 Dr. Og Jeronimo    Diabetes mellitus     Diabetes mellitus type II     Diverticulosis     Hyperlipidemia     Hypertension     Low magnesium levels     Myocardial infarction     Obstructive uropathy 10/16/2015    Renal stone     Status post cystoscopy - Left laser percutaneous nephrolithotripsy 2016    Ureterolithiasis Obstructive-left 1/3/2015    Wears dentures     FULL UPPER - PARTIAL BOTTOM    Wears dentures     Full upper; partial lower    Wears glasses     Wears glasses      Past Surgical History:   Procedure Laterality Date    ARTHROSCOPIC REPAIR OF ROTATOR CUFF OF SHOULDER Right 2021    Procedure: REPAIR, ROTATOR CUFF, ARTHROSCOPIC;  Surgeon: Rudy Graves MD;  Location: [facility] OR;  Service: Orthopedics;  Laterality: Right;  GENERAL AND BLOCK    ARTHROSCOPIC TENOTOMY OF BICEPS TENDON Right 2021    Procedure: TENOTOMY, BICEPS, ARTHROSCOPIC;  Surgeon: Rudy Graves MD;  Location: [facility] OR;  Service: Orthopedics;  Laterality: Right;  GENERAL AND BLOCK    ARTHROSCOPY OF SHOULDER WITH DECOMPRESSION OF SUBACROMIAL SPACE Right 2021    Procedure: ARTHROSCOPY, SHOULDER, WITH SUBACROMIAL SPACE DECOMPRESSION;  Surgeon: Rudy Graves MD;  Location: [facility] OR;  Service: Orthopedics;  Laterality: Right;  GENERAL AND BLOCK  MITEK     SECTION      CHOLECYSTECTOMY      COLONOSCOPY N/A 10/7/2015    Procedure: COLONOSCOPY;  Surgeon: Washington Rodriguez MD;  Location: [facility] ENDO;  Service: Endoscopy;  Laterality: N/A;    COLONOSCOPY N/A 10/3/2019    Procedure: COLONOSCOPY;  Surgeon: Viviane Simeon MD;  Location: [facility] ENDO;  Service: Endoscopy;  Laterality: N/A;    CORONARY STENT PLACEMENT      2 vessels    CYSTOSCOPY      CYSTOSCOPY W/ LASER LITHOTRIPSY  12/15/15    CYSTOSCOPY W/ RETROGRADES Bilateral 2019    Procedure: CYSTOSCOPY, WITH RETROGRADE PYELOGRAM;  Surgeon: Ubaldo Cho MD;  Location: [facility] OR;   Service: Urology;  Laterality: Bilateral;    CYSTOSCOPY W/ URETERAL STENT REMOVAL Left 10/1/2019    Procedure: CYSTOSCOPY, WITH URETERAL STENT REMOVAL;  Surgeon: Ubaldo Cho MD;  Location: [facility] OR;  Service: Urology;  Laterality: Left;    HYSTERECTOMY      GRACE/BSO, DUB    LASER LITHOTRIPSY Left 9/19/2019    Procedure: LITHOTRIPSY, USING LASER;  Surgeon: Ubaldo Cho MD;  Location: [facility] OR;  Service: Urology;  Laterality: Left;    OOPHORECTOMY      PERCUTANEOUS NEPHROLITHOTRIPSY  06/28/2016    ROTATOR CUFF REPAIR      left    URETERAL STENT PLACEMENT Left 9/19/2019    Procedure: INSERTION, STENT, URETER;  Surgeon: Ubaldo Cho MD;  Location: [facility] OR;  Service: Urology;  Laterality: Left;    URETEROSCOPIC REMOVAL OF URETERIC CALCULUS Left 9/19/2019    Procedure: REMOVAL, CALCULUS, URETER, URETEROSCOPIC;  Surgeon: Ubaldo Cho MD;  Location: [facility] OR;  Service: Urology;  Laterality: Left;    ureteroscopy  12/15/15     Review of patient's allergies indicates:   Allergen Reactions    No known drug allergies      Social History     Social History Narrative    Not on file     Family History   Problem Relation Age of Onset    Heart disease Father 90    Cancer Mother         breast cancer    Breast cancer Mother 60    Diabetes Brother     Kidney disease Brother         nephrectomy due to cancer    Spina bifida Brother     Cancer Sister         breast cancer    Breast cancer Sister 59         Current Outpatient Medications:     acetaminophen (TYLENOL) 500 MG tablet, Take 500 mg by mouth every 6 (six) hours as needed for Pain., Disp: , Rfl:     allopurinoL (ZYLOPRIM) 100 MG tablet, Take 1 tablet (100 mg total) by mouth once daily., Disp: 90 tablet, Rfl: 4    atorvastatin (LIPITOR) 40 MG tablet, Take 1 tablet (40 mg total) by mouth once daily., Disp: 90 tablet, Rfl: 3    glimepiride (AMARYL) 4 MG tablet, TAKE 2 TABLETS BY MOUTH DAILY WITH BREAKFAST, Disp: 180 tablet, Rfl: 0     "magnesium oxide (MAG-OX) 250 mg magnesium Tab, Take 0.5 tablets by mouth once daily., Disp: , Rfl:     metFORMIN (GLUCOPHAGE) 1000 MG tablet, Take 1 tablet (1,000 mg total) by mouth 2 (two) times daily with meals., Disp: 60 tablet, Rfl: 3    metoprolol tartrate (LOPRESSOR) 25 MG tablet, Take 1 tablet (25 mg total) by mouth 2 (two) times daily., Disp: 180 tablet, Rfl: 3    olmesartan (BENICAR) 40 MG tablet, Take 1 tablet (40 mg total) by mouth once daily., Disp: 90 tablet, Rfl: 3    omega-3 fatty acids-vitamin E 1,000-5 mg-unit Cap, Take 1 capsule by mouth 2 (two) times daily. Two times a day, Disp: , Rfl:     potassium citrate (UROCIT-K 15) 15 mEq TbSR, TAKE ONE TABLET BY MOUTH TWICE DAILY, Disp: 60 tablet, Rfl: 11    semaglutide (RYBELSUS) 3 mg tablet, Take 1 tablet (3 mg total) by mouth once daily, then begin Rybelsus 7mg RX., Disp: 30 tablet, Rfl: 0    [START ON 8/27/2022] semaglutide (RYBELSUS) 7 mg tablet, Take 1 tablet (7 mg total) by mouth once daily., Disp: 30 tablet, Rfl: 2    aspirin 81 MG Chew, Take 1 tablet (81 mg total) by mouth once daily., Disp: 90 tablet, Rfl: 1      Review of Systems:  As per HPI otherwise noncontributory    OBJECTIVE:      Vital Signs (Most Recent):  Vitals:    08/04/22 1528   Weight: 78.8 kg (173 lb 11.6 oz)   Height: 5' 2" (1.575 m)     Body mass index is 31.77 kg/m².      Physical Exam:  Constitutional: The patient appears well-developed and well-nourished. No distress.   Skin: No lesions appreciated  Head: Normocephalic and atraumatic.   Nose: Nose normal.   Ears: No deformities seen  Eyes: Conjunctivae and EOM are normal.   Neck: No tracheal deviation present.   Cardiovascular: Normal rate and intact distal pulses.    Pulmonary/Chest: Effort normal. No respiratory distress.   Abdominal: There is no guarding.   Neurological: The patient is alert.   Psychiatric: The patient has a normal mood and affect.     Right Hand/Wrist " Examination:    Observation/Inspection:  Swelling  mild    Deformity  none  Discoloration  none     Scars   none    Atrophy  none      Neurovascular Exam:  Digits WWP, brisk CR < 3s throughout  NVI motor/LTS to M/R/U nerves, radial pulse 2+ with some diminished light touch sensation to the right thumb and index finger  Tinel's Test - Carpal Tunnel  Neg  Tinel's Test - Cubital Tunnel  Neg  Phalen's Test    Neg  Median Nerve Compression Test Neg    ROM hand with full painless range of motion at the MP joint but IP range of motion is markedly limited at the PIP and D IP joints which to the index long ring and small fingers can all flex from 0-10 degrees approximately    ROM wrist full, painless    ROM elbow full, painless    Abdomen not guarded  Respirations nonlabored  Perfusion intact    Diagnostic Results:     Imaging - I independently viewed the patient's imaging as well as the radiology report.  Xrays of the patient's right hand  demonstrate no evidence of any acute fractures or dislocations with some IP joint degenerative changes.    EMG - May 2021-Impression:  Abnormal examination.  There is electrodiagnostic evidence of:  1.  Acute denervation in the distal greater than proximal muscles of the right upper extremity with severely diminished or absent motor units.  This could potentially indicate a cervical radiculopathy likely affecting the C6, C7, C8 plus or minus T1 nerve root involvement.   There is evidence that could indicate cervical radiculopathy as there is denervation in the cervical paraspinals. I cannot rule out a brachial plexopathy. There is evidence based upon decreased amplitude of the sensory responses which may indicate a post ganglionic brachial plexopathy.  As the denervation seems diffuse, I cannot specifically localize the lesion to a specific nerve root, trunk, or division of the brachial plexus.  On physical exam, there is no signs or symptoms of hyper reflexia indicating a central lesion.   At this time, will order a MRI of the cervical spine without contrast.  On initial visit, I performed a cursory examination of the nerve roots and trunks into the supraclavicular region which did not show any gross abnormalities.  I am going to order a MRI with and without contrast of the right brachial plexus to rule out gross abnormalities such as a fluid formation or abscess which could not be visualized with ultrasound       EMG July 2021-   Impression:  Abnormal examination.  There is electrodiagnostic evidence of:  1. A resolving right lower trunk brachial plexopathy(Axonotemesis, grade 2 Mahin classification) in [place] the patient with evidence of active denervation (significantly improved from prior examination) and reinnervation of the muscles tested (significant improvement in recruitment of the muscles tested with evidence of reinnervation potentials)       ASSESSMENT/PLAN:      71 y.o. yo female with markedly restricted function to the right hand status post right shoulder arthroscopy in April of 2021  Plan: The patient and I had a thorough discussion today.  We discussed the working diagnosis as well as several other potential alternative diagnoses.  Treatment options were discussed, both conservative and surgical.  Conservative treatment options would include things such as activity modifications, workplace modifications, a period of rest, oral vs topical OTC and prescription anti-inflammatory medications, occupational therapy, splinting/bracing, immobilization, corticosteroid injections, and others.  Surgical options were discussed as well.     At this time, the patient held a long discussion.  The etiology of her hand dysfunction certainly may be multifactorial in the differential diagnosis includes things such as CRPS, brachial plexopathy status post shoulder scope, osteoarthritis and stiffness related to that, and others.  As far as treatment options going forward in an attempt to improve her  function, we discussed several options.  First, I would recommend that she see a brachial plexus surgeon in [place] in the [facility] market.  I informed her that I do not do brachial plexus surgery but would recommend she seek consultation from a provider who does to determine if that would be of any benefit.  Secondarily, I recommended and offered a referral to Pain Management for evaluation of stay lunate ganglion blocks for treatment of CRPS or any other interventions that may have to offer for this.  I do strongly agree with continuation of occupational therapy and this time I would like to include a dynamic flexion glove and some scrub and carry protocol.  Lastly, her largest lingering issue does seem to be joint contractures of the IP joints of the right hand and we could certainly consider surgical options for that such as capsulotomy.  For now she notes that she has really adapted to this dysfunction quite well and she is not interested in discussing any surgery.  She would like to just continue doing occupational therapy at this time and I would certainly be happy to reorder another course of this to include a dynamic flexion glove.  She may follow-up with me any time but really the only thing I would offer her would be surgical intervention.  Should she wish to discuss or consider that she may return any time.  Otherwise activities as tolerated without restrictions.      Should the patient's symptoms worsen, persist, or fail to improve they should return for reevaluation and I would be happy to see them back anytime.        Danish Mendes M.D.     Please be aware that this note has been generated with the assistance of [employer] voice-to-text.  Please excuse any spelling or grammatical errors.    Thank you for choosing Dr. Danish Mendes for your orthopedic hand and upper extremity care. It is our goal to provide you with exceptional care that will help keep you healthy, active, and get you back in the  game.     If you felt that you received exemplary care today, please consider leaving feedback for Dr. Mendes on [email or web address] at [email or web address].    Please do not hesitate to reach out to us via email, phone, or [employer] with any questions, concerns, or feedback.

## 2022-08-12 ENCOUNTER — CLINICAL SUPPORT (OUTPATIENT)
Dept: REHABILITATION | Facility: HOSPITAL | Age: 72
End: 2022-08-12
Attending: STUDENT IN AN ORGANIZED HEALTH CARE EDUCATION/TRAINING PROGRAM
Payer: MEDICARE

## 2022-08-12 DIAGNOSIS — M25.541 PAIN IN JOINT OF RIGHT HAND: ICD-10-CM

## 2022-08-12 DIAGNOSIS — M25.641 STIFFNESS OF RIGHT HAND JOINT: ICD-10-CM

## 2022-08-12 PROCEDURE — 97110 THERAPEUTIC EXERCISES: CPT | Mod: PN

## 2022-08-12 PROCEDURE — 97165 OT EVAL LOW COMPLEX 30 MIN: CPT | Mod: PN

## 2022-08-12 NOTE — PATIENT INSTRUCTIONS
8-12-22   -do below stretches one time, at least 2 x day, at least a 10 minute hold, mild discomfort only    *all 3 joints get bent towards palm, do on index thru ring one at a time  *use index and long of left hand to push right thumb down and across palm  *use left hand to bend big knuckle of right small finger    *avoid painful use

## 2022-08-12 NOTE — PLAN OF CARE
[facility] Occupational Therapy  Initial Evaluation     Date: 2022  Name: Jessica Luna  Clinic Number: [id number]    Therapy Diagnosis:   Encounter Diagnosis   Name Primary?    Stiffness of right hand joint      Physician: Skip Guerrero MD    Physician Orders: evaluate and tx, see epic  Medical Diagnosis: right hand joint stiffness  Surgical Procedure and Date: n/a, n/a  Evaluation Date: 2022  Insurance Authorization Period Expiration: referral [id number] 22 thru 23              Plan of Care Certification Period: 22 thru 22  Date of Return to referral source's office: see [facility]    Visit # / Visits authorized:  referral [id number] 22 thru 23  Time In:2  Time Out: 245  Total Billable Time: 15 minutes    Precautions:  universal, see epic, protocol if applicable  Subjective     Involved Side: right  Dominant Side: right  Date of Onset: 2021  History of Current Condition/Mechanism of Injury: reports had shoulder surgery in 2021 and soon after developed right hand edema, pain, and stiffness; had multiple tests because of this plus did some therapy which stopped due to insurance issues; recently went to referral source who sent to therapy  Imaging: see [facility]  Previous Therapy: see above    Past Medical History/Physical Systems Review:   Jessica Luna  has a past medical history of Anticoagulant long-term use, Arthritis, CAD (coronary artery disease), Diabetes mellitus, Diabetes mellitus type II, Diverticulosis, Hyperlipidemia, Hypertension, Low magnesium levels, Myocardial infarction, Obstructive uropathy, Renal stone, Status post cystoscopy - Left laser percutaneous nephrolithotripsy, Ureterolithiasis Obstructive-left, Wears dentures, Wears dentures, Wears glasses, and Wears glasses.    Jessica Luna  has a past surgical history that includes Coronary stent placement (); Rotator cuff repair; Cholecystectomy;   section; Hysterectomy; Colonoscopy (N/A, 10/7/2015); Cystoscopy; ureteroscopy (12/15/15); Cystoscopy w/ laser lithotripsy (12/15/15); Percutaneous nephrolithotripsy (06/28/2016); Laser lithotripsy (Left, 9/19/2019); Ureteroscopic removal of ureteric calculus (Left, 9/19/2019); Ureteral stent placement (Left, 9/19/2019); Cystoscopy w/ retrogrades (Bilateral, 9/19/2019); Cystoscopy w/ ureteral stent removal (Left, 10/1/2019); Colonoscopy (N/A, 10/3/2019); Oophorectomy; Arthroscopic repair of rotator cuff of shoulder (Right, 4/8/2021); Arthroscopic tenotomy of biceps tendon (Right, 4/8/2021); and Arthroscopy of shoulder with decompression of subacromial space (Right, 4/8/2021).    Jessica Rosa has a current medication list which includes the following prescription(s): acetaminophen, allopurinol, aspirin, atorvastatin, glimepiride, magnesium oxide, metformin, metoprolol tartrate, olmesartan, omega-3 fatty acids-vitamin e, potassium citrate, semaglutide, and [START ON 8/27/2022] semaglutide.    Review of patient's allergies indicates:   Allergen Reactions    No known drug allergies         Patient's Goals for Therapy: full painless use    Pain:  Functional Pain Scale Rating 0-10:     No pain at rest or with use for the most part, rarely feels some intermittent pain at thenar area with use  Occupation:  retired  Working presently: no  Duties: per job if working; typical self and home care    Functional Limitations/Social History:    Previous functional status includes: Independent with all ADLs.     Current Functional Status   Home/Living environment : lives with spouse    Limitation of Functional Status as follows: decreased motion, use, and strength with ADL   ADLs/IADLs:               See above   Leisure: not tested  pain meds: denies  nicotine: denies  caffeine: denies    Objective   Posture:slightly shiny appearance volar thumb and index finger, skin is lighter on right hand vs left, swan neck like posture on bilateral  LF, RF  Palpation:nt  Sensation:denies burning, numbness, tingling  ROM:  Prom of wrist and thumb = to left side except opp on right sf base vs dpc on left      Prom                                         IF                   LF                   RF                     SF  Composite flex tip to dpc       3 cm                   4                   3                   nt since mcp 80  Intrinsic stretch tip to A1        5                        5                    3                        3               Composite ext                        full                 full                  full                      full                            Edema; none observed        CMS Impairment/Limitation/Restriction for FOTO Survey    Therapist reviewed FOTO scores for Jessica Luna on 8/12/2022.   FOTO documents entered into EPIC - see Media section.    Limitation Score: 47%  Category: Carrying    Current : CK = at least 40% but < 60% impaired, limited or restricted  Goal: CJ = at least 20% but < 40% impaired, limited or restricted               Treatment     Treatment Time In: 230  Treatment Time Out: 245  Total Treatment time separate from Evaluation time:15      Jessica received therapeutic exercises for 15 minutes including:  -see above                    Home Exercise Program/Education:  Issued HEP (see patient instructions in EMR in media or note) . Exercises were reviewed and Jessica was able to demonstrate them prior to the end of the session.   Pt received a written copy of exercises to perform at home. Jessica demonstrated good understanding of the education provided.  Pt was advised to perform these exercises free of pain, and to stop performing them if pain occurs.    Patient/Family Education: role of OT, goals for OT, scheduling/cancellations - pt verbalized understanding. Discussed insurance limitations with patient.    Additional Education provided: likely tx progression, expectations of rehab    Assessment     Jessica  Shelley Luna is a 71 y.o. female referred to outpatient occupational therapy and presents with a medical diagnosis of see above resulting in Decreased ROM, Decreased muscle strength, Decreased functional hand use, Increased pain and Joint Stiffness and demonstrates limitations as described in the chart below. Following medical record review it is determined that pt will benefit from occupational therapy services in order to maximize pain free and/or functional use of right hand. The following goals were discussed with the patient and patient is in agreement with them as to be addressed in the treatment plan. The patient's rehab potential is good.     Anticipated barriers to occupational therapy: pain, stiffness, weakness, chronicity of condition  Pt has no cultural, educational or language barriers to learning provided.    Profile and History Assessment of Occupational Performance Level of Clinical Decision Making Complexity Score   Occupational Profile:   Jessica Luna is a 71 y.o. female who lives with their spouse and is retired Jessica Luna has difficulty with  ADLs and IADLs as listed previously, which  affecting his/her daily functional abilities.      Comorbidities:    has a past medical history of Anticoagulant long-term use, Arthritis, CAD (coronary artery disease), Diabetes mellitus, Diabetes mellitus type II, Diverticulosis, Hyperlipidemia, Hypertension, Low magnesium levels, Myocardial infarction, Obstructive uropathy, Renal stone, Status post cystoscopy - Left laser percutaneous nephrolithotripsy, Ureterolithiasis Obstructive-left, Wears dentures, Wears dentures, Wears glasses, and Wears glasses.    Medical and Therapy History Review:   Brief               Performance Deficits    Physical:  Joint Mobility  Muscle Power/Strength  Pain    Cognitive:  No Deficits    Psychosocial:    No Deficits     Clinical Decision Making:  low    Assessment Process:  Problem-Focused  Assessments    Modification/Need for Assistance:  Not Necessary    Intervention Selection:  Limited Treatment Options       low  Based on PMHX, co morbidities , data from assessments and functional level of assistance required with task and clinical presentation directly impacting function.           The following goals were discussed with the patient and patient is in agreement with them as to be addressed in the treatment plan.     Goals:   stg to be met in 2 weeks 1. Patient will be I with HEP 2. Patient will have 2/10 pain with light use 3. Patient will have = prom of bilateral all digits to enhance affected arm use with ADL      ltg to be met by d/c 1. Patient will be I with d/c HEP 2. Patient will have 1/10 pain with all use 3. Patient will have about 75% /pinch on affected side vs unaffected side to promote full functional use                                                                 4. Patient will be I with all ADL      all goals ongoing unless noted above or met today or in past but not noted yet    Plan   Certification Period/Plan of care expiration: 8/12/2022 to 9-9-22          Outpatient Occupational Therapy  1 times weekly for 4 weeks to include the following interventions: eval and tx    Above frequency and duration in above dates may change based on patient progress and need for therapy    Saul AGUIRRE CHT

## 2022-08-19 ENCOUNTER — CLINICAL SUPPORT (OUTPATIENT)
Dept: REHABILITATION | Facility: HOSPITAL | Age: 72
End: 2022-08-19
Attending: STUDENT IN AN ORGANIZED HEALTH CARE EDUCATION/TRAINING PROGRAM
Payer: MEDICARE

## 2022-08-19 DIAGNOSIS — M25.641 STIFFNESS OF RIGHT HAND JOINT: Primary | ICD-10-CM

## 2022-08-19 DIAGNOSIS — M25.541 PAIN IN JOINT OF RIGHT HAND: ICD-10-CM

## 2022-08-19 PROCEDURE — 97110 THERAPEUTIC EXERCISES: CPT | Mod: PN

## 2022-08-19 PROCEDURE — 97022 WHIRLPOOL THERAPY: CPT | Mod: PN

## 2022-08-19 NOTE — PATIENT INSTRUCTIONS
8-19-22   -do below stretches one time, at least 2 x day, at least a 10 minute hold, mild discomfort only; do on index thru small one at a time

## 2022-08-19 NOTE — PROGRESS NOTES
Occupational Therapy Daily Treatment Note     Date: 8/19/2022  Name: Jessica Luna  Clinic Number: [id number]    Therapy Diagnosis:   Encounter Diagnoses   Name Primary?    Stiffness of right hand joint Yes    Pain in joint of right hand      Physician: Skip Guerrero MD      Physician Orders: evaluate and tx, see epic  Medical Diagnosis: right hand joint stiffness  Surgical Procedure and Date: n/a, n/a  Evaluation Date: 8/12/2022  Insurance Authorization Period Expiration: referral [id number] 8-12-22 thru 12-31-22  Plan of Care Certification Period: 8-12-22 thru 9-9-22  Date of Return to referral source's office: see [facility]     Visit # / Visits authorized: 1 / 20 referral [id number] 8-12-22 thru 12-31-22      Time In:10  Time Out: 11  Total Billable Time: 60 minutes    Precautions:  universal, see epic, protocol if applicable    Subjective     Pt reports: an understanding of likely tx progression  she is compliant with home exercise program.  Response to previous treatment:good  Functional change: none stated    Pain: 0/10 rest; 4/10 light use  Side:right  Location: diffuse      Digits index thru small  Objective       Timed units:  3 therapeutic exercise                            Time:1015-11    Untimed units:  fluidotherapy                           Time:      Measurements:     n/a        Fluido: 15 min.    U/s:n/a      UBE: n/a    Scar massage: n/a    Stretches as tolerated-pain free: composite flex, intrinsic index thru small; sf mcp flexion      Manual: n/a    ROM: n/a    PRE: n/a      HEP: see above and/or below    Home Exercises and Education Provided     Education provided:     Briefly talked about how we will follow referring doctor's order for OT, I acknowledged Dr. Mendes in [facility] saw patient and recommended a flexion glove but in my experience self stretching is more effective           progress towards goals   likely tx progression  rationale of rehab interventions    Written Home  Exercises/Information Provided: yes.        previously issued exercises and/or other issued home therapy instructions were reviewed if still part of current tx plan as well as any issued today and Jessica was able to demonstrate them prior to the end of the session.  Jessica demonstrated good understanding of the HEP provided.     See EMR under patient instructions and/or media for HEP issued today or in past    Assessment             Pt would continue to benefit from skilled OT in order to facilitate strong, painless, and full use.    Jessica is progressing well towards her goals and there are no updates to goals at this time unless goals noted below are different than goals on previous notes or evaluation. Pt prognosis is good  Pt will continue to benefit from skilled outpatient occupational therapy to address the deficits listed in the problem list on initial evaluation to provide pt/family education and to maximize pt's level of independence in the home and community environment.     Anticipated barriers to occupational therapy: pain, stiffness, duration of tightness    Pt's spiritual, cultural and educational needs considered and pt agreeable to plan of care and goals.    Goals:  stg to be met in 2 weeks 1. Patient will be I with HEP 2. Patient will have 2/10 pain with light use 3. Patient will have = prom of bilateral all digits to enhance affected arm use with ADL        ltg to be met by d/c 1. Patient will be I with d/c HEP 2. Patient will have 1/10 pain with all use 3. Patient will have about 75% /pinch on affected side vs unaffected side to promote full functional use                                                                 4. Patient will be I with all ADL       all goals ongoing unless noted above or met today or in past but not noted yet              Any goals met today ?  (if any met see above)    Updates/Grading for next session: prn    Plan: evaluate and tx    Saul AGUIRRE CHT

## 2022-08-22 ENCOUNTER — TELEPHONE (OUTPATIENT)
Dept: PHARMACY | Facility: CLINIC | Age: 72
End: 2022-08-22
Payer: MEDICARE

## 2022-08-22 ENCOUNTER — PATIENT MESSAGE (OUTPATIENT)
Dept: PHARMACY | Facility: CLINIC | Age: 72
End: 2022-08-22
Payer: MEDICARE

## 2022-08-22 NOTE — TELEPHONE ENCOUNTER
Informed patient I need her household proof of income, copy of current insurance card & Medicare card front and back to submit an application to [employer] ([employer])

## 2022-08-23 ENCOUNTER — TELEPHONE (OUTPATIENT)
Dept: FAMILY MEDICINE | Facility: CLINIC | Age: 72
End: 2022-08-23
Payer: MEDICARE

## 2022-08-26 ENCOUNTER — CLINICAL SUPPORT (OUTPATIENT)
Dept: REHABILITATION | Facility: HOSPITAL | Age: 72
End: 2022-08-26
Attending: STUDENT IN AN ORGANIZED HEALTH CARE EDUCATION/TRAINING PROGRAM
Payer: MEDICARE

## 2022-08-26 DIAGNOSIS — M25.541 PAIN IN JOINT OF RIGHT HAND: ICD-10-CM

## 2022-08-26 DIAGNOSIS — M25.641 STIFFNESS OF RIGHT HAND JOINT: Primary | ICD-10-CM

## 2022-08-26 PROCEDURE — 97022 WHIRLPOOL THERAPY: CPT | Mod: PN

## 2022-08-26 PROCEDURE — 97110 THERAPEUTIC EXERCISES: CPT | Mod: PN

## 2022-08-26 NOTE — PROGRESS NOTES
Occupational Therapy Daily Treatment Note     Date: 8/26/2022  Name: Jessica Luna  Clinic Number: [id number]    Therapy Diagnosis:   Encounter Diagnoses   Name Primary?    Stiffness of right hand joint Yes    Pain in joint of right hand      Physician: Skip Guerrero MD      Physician Orders: evaluate and tx, see epic  Medical Diagnosis: right hand joint stiffness  Surgical Procedure and Date: n/a, n/a  Evaluation Date: 8/12/2022  Insurance Authorization Period Expiration: referral [id number] 8-12-22 thru 12-31-22  Plan of Care Certification Period: 8-12-22 thru 9-9-22  Date of Return to referral source's office: see epic     Visit # / Visits authorized: 2 / 20 referral [id number] 8-12-22 thru 12-31-22      Time In:11  Time Out: 12  Total Billable Time: 60 minutes    Precautions:  universal, see epic, protocol if applicable    Subjective     Pt reports: able to cut meat with right hand now  she is compliant with home exercise program.  Response to previous treatment:good  Functional change: none stated    Pain: 0/10 rest; 4/10 light use  Side:right  Location: diffuse      Digits index thru small  Objective       Timed units:  3 therapeutic exercise                 Time:1115-12    Untimed units:  fluidotherapy                           Time:      Measurements:     n/a        Fluido: 15 min.    U/s:n/a      UBE: n/a    Scar massage: n/a    Stretches as tolerated-pain free: composite flex, intrinsic index thru small; sf mcp flexion      Manual: n/a    ROM: n/a    PRE: n/a      HEP: see above and/or below    Home Exercises and Education Provided     Education provided:     Reviewed proper technique of home stretches          progress towards goals   likely tx progression  rationale of rehab interventions    Written Home Exercises/Information Provided: no      previously issued exercises and/or other issued home therapy instructions were reviewed if still part of current tx plan as well as any issued  today and Jessica was able to demonstrate them prior to the end of the session.  Jessica demonstrated good understanding of the HEP provided.     See EMR under patient instructions and/or media for HEP issued today or in past    Assessment       Slow but steady decrease in joint hypomobility vs day 1 of OT      Pt would continue to benefit from skilled OT in order to facilitate strong, painless, and full use.    Jessica is progressing well towards her goals and there are no updates to goals at this time unless goals noted below are different than goals on previous notes or evaluation. Pt prognosis is good  Pt will continue to benefit from skilled outpatient occupational therapy to address the deficits listed in the problem list on initial evaluation to provide pt/family education and to maximize pt's level of independence in the home and community environment.     Anticipated barriers to occupational therapy: pain, stiffness, duration of tightness    Pt's spiritual, cultural and educational needs considered and pt agreeable to plan of care and goals.    Goals:  stg to be met in 2 weeks 1. Patient will be I with HEP 2. Patient will have 2/10 pain with light use 3. Patient will have = prom of bilateral all digits to enhance affected arm use with ADL        ltg to be met by d/c 1. Patient will be I with d/c HEP 2. Patient will have 1/10 pain with all use 3. Patient will have about 75% /pinch on affected side vs unaffected side to promote full functional use                                                                 4. Patient will be I with all ADL       all goals ongoing unless noted above or met today or in past but not noted yet              Any goals met today ?  (if any met see above)    Updates/Grading for next session: prn    Plan: evaluate and tx    Saul AGUIRRE CHT

## 2022-08-31 DIAGNOSIS — Z78.0 MENOPAUSE: ICD-10-CM

## 2022-09-02 ENCOUNTER — CLINICAL SUPPORT (OUTPATIENT)
Dept: REHABILITATION | Facility: HOSPITAL | Age: 72
End: 2022-09-02
Attending: STUDENT IN AN ORGANIZED HEALTH CARE EDUCATION/TRAINING PROGRAM
Payer: MEDICARE

## 2022-09-02 DIAGNOSIS — M25.541 PAIN IN JOINT OF RIGHT HAND: ICD-10-CM

## 2022-09-02 DIAGNOSIS — M25.641 STIFFNESS OF RIGHT HAND JOINT: Primary | ICD-10-CM

## 2022-09-02 PROCEDURE — 97022 WHIRLPOOL THERAPY: CPT | Mod: PN

## 2022-09-02 PROCEDURE — 97110 THERAPEUTIC EXERCISES: CPT | Mod: PN

## 2022-09-02 NOTE — PROGRESS NOTES
Occupational Therapy Daily Treatment Note     Date: 9/2/2022  Name: Jessica Luna  Clinic Number: [id number]    Therapy Diagnosis:   Encounter Diagnoses   Name Primary?    Stiffness of right hand joint Yes    Pain in joint of right hand      Physician: Skip Guerrero MD      Physician Orders: evaluate and tx, see epic  Medical Diagnosis: right hand joint stiffness  Surgical Procedure and Date: n/a, n/a  Evaluation Date: 8/12/2022  Insurance Authorization Period Expiration: referral [id number] 8-12-22 thru 12-31-22  Plan of Care Certification Period: 8-12-22 thru 9-9-22  Date of Return to referral source's office: see epic     Visit # / Visits authorized: 3 / 20 referral [id number] 8-12-22 thru 12-31-22      Time In:10  Time Out: 11  Total Billable Time: 60 minutes    Precautions:  universal, see epic, protocol if applicable    Subjective     Pt reports: no new issues  she is compliant with home exercise program.  Response to previous treatment:good  Functional change: none stated    Pain: 0/10 rest; 4/10 light use  Side:right  Location: diffuse      Digits index thru small  Objective       Timed units:  3 therapeutic exercise                 Time:1015-11    Untimed units:  fluidotherapy                           Time:      Measurements:       Passive range of motion                                                                                            index              long                ring                   small  Composite flex tip to dpc         2 cm                 2                   2.5                     nt since mcp 80  Intrinsic stretch tip to A1          3.5 cm              4                  3                          2                       Composite extension               full                 full                  full                      full          Fluido: 15 min.    U/s:n/a      UBE: n/a    Scar massage: n/a    Stretches as tolerated-pain free: composite flex, intrinsic  index thru small; sf mcp flexion      Manual: n/a    ROM: n/a    PRE: n/a      HEP: see above and/or below    Home Exercises and Education Provided     Education provided:     Told her to use heating pad x 15 min. on medium setting pre stretches and explained how to order isotoner therapeutic glove (see media)          progress towards goals   likely tx progression  rationale of rehab interventions    Written Home Exercises/Information Provided: yes      previously issued exercises and/or other issued home therapy instructions were reviewed if still part of current tx plan as well as any issued today and Jessica was able to demonstrate them prior to the end of the session.  Jessica demonstrated good understanding of the HEP provided.     See EMR under patient instructions and/or media for HEP issued today or in past    Assessment       Chronic joint contractures slowly resolving      Pt would continue to benefit from skilled OT in order to facilitate strong, painless, and full use.    Jessica is progressing well towards her goals and there are no updates to goals at this time unless goals noted below are different than goals on previous notes or evaluation. Pt prognosis is good  Pt will continue to benefit from skilled outpatient occupational therapy to address the deficits listed in the problem list on initial evaluation to provide pt/family education and to maximize pt's level of independence in the home and community environment.     Anticipated barriers to occupational therapy: pain, stiffness, duration of tightness    Pt's spiritual, cultural and educational needs considered and pt agreeable to plan of care and goals.    Goals:  stg to be met in 2 weeks 1. Patient will be I with HEP 2. Patient will have 2/10 pain with light use 3. Patient will have = prom of bilateral all digits to enhance affected arm use with ADL        ltg to be met by d/c 1. Patient will be I with d/c HEP 2. Patient will have 1/10 pain with all use 3.  Patient will have about 75% /pinch on affected side vs unaffected side to promote full functional use                                                                 4. Patient will be I with all ADL       all goals ongoing unless noted above or met today or in past but not noted yet              Any goals met today ?  (if any met see above)    Updates/Grading for next session: prn    Plan: evaluate and tx    Saul AGUIRRE CHT

## 2022-09-06 ENCOUNTER — PATIENT MESSAGE (OUTPATIENT)
Dept: PHARMACY | Facility: CLINIC | Age: 72
End: 2022-09-06
Payer: MEDICARE

## 2022-09-06 NOTE — TELEPHONE ENCOUNTER
Patient approved with [employer] ([employer]ybels[employer]) until 12/31/22. Medication will be ship to [facility] within 7-10 business days.

## 2022-09-08 ENCOUNTER — CLINICAL SUPPORT (OUTPATIENT)
Dept: REHABILITATION | Facility: HOSPITAL | Age: 72
End: 2022-09-08
Attending: STUDENT IN AN ORGANIZED HEALTH CARE EDUCATION/TRAINING PROGRAM
Payer: MEDICARE

## 2022-09-08 DIAGNOSIS — M25.641 STIFFNESS OF RIGHT HAND JOINT: Primary | ICD-10-CM

## 2022-09-08 DIAGNOSIS — M25.541 PAIN IN JOINT OF RIGHT HAND: ICD-10-CM

## 2022-09-08 PROCEDURE — 97110 THERAPEUTIC EXERCISES: CPT | Mod: PN

## 2022-09-08 PROCEDURE — 97022 WHIRLPOOL THERAPY: CPT | Mod: PN

## 2022-09-08 NOTE — PROGRESS NOTES
Occupational Therapy Daily Treatment Note     Date: 9/8/2022  Name: Jessica Luna  Clinic Number: [id number]    Therapy Diagnosis:   Encounter Diagnoses   Name Primary?    Stiffness of right hand joint Yes    Pain in joint of right hand      Physician: Skip Guerrero MD      Physician Orders: evaluate and tx, see epic  Medical Diagnosis: right hand joint stiffness  Surgical Procedure and Date: n/a, n/a  Evaluation Date: 8/12/2022  Insurance Authorization Period Expiration: referral [id number] 8-12-22 thru 12-31-22  Plan of Care Certification Period: 8-12-22 thru 9-9-22  Date of Return to referral source's office: see epic     Visit # / Visits authorized: 4 / 20 referral [id number] 8-12-22 thru 12-31-22      Time In:10  Time Out: 11  Total Billable Time: 60 minutes    Precautions:  universal, see epic, protocol if applicable    Subjective     Pt reports: she is compliant with home exercise program. Says volar index and thumb with constant numbness, understands to tell referring doctor about this. Using right hand to manipulate fork now.  Response to previous treatment:good  Functional change: none stated    Pain: 0/10 rest; 4/10 light use  Side:right  Location: diffuse      Digits index thru small  Objective       Timed units:  3 therapeutic exercise                 Time:1015-11    Untimed units:  fluidotherapy                           Time:      Measurements:       Passive range of motion                                                                                            Thumb opp right dpc left sf base    Fluido: 15 min.    U/s:n/a      UBE: n/a    Scar massage: n/a    Stretches as tolerated-pain free: composite flex, intrinsic index thru small; sf mcp flexion; thumb ip flex with mcp at 0      Manual: n/a    ROM: n/a    PRE: n/a      HEP: see above and/or below    Home Exercises and Education Provided     Education provided:     Likely timing for thumb ip flex with mcp at 0  stretch        progress towards goals   likely tx progression  rationale of rehab interventions    Written Home Exercises/Information Provided: no      previously issued exercises and/or other issued home therapy instructions were reviewed if still part of current tx plan as well as any issued today and Jessica was able to demonstrate them prior to the end of the session.  Jessica demonstrated good understanding of the HEP provided.     See EMR under patient instructions and/or media for HEP issued today or in past    Assessment       Slightly shiny skin on right hand vs left          Pt would continue to benefit from skilled OT in order to facilitate strong, painless, and full use.    Jessica is progressing well towards her goals and there are no updates to goals at this time unless goals noted below are different than goals on previous notes or evaluation. Pt prognosis is good  Pt will continue to benefit from skilled outpatient occupational therapy to address the deficits listed in the problem list on initial evaluation to provide pt/family education and to maximize pt's level of independence in the home and community environment.     Anticipated barriers to occupational therapy: pain, stiffness, duration of tightness    Pt's spiritual, cultural and educational needs considered and pt agreeable to plan of care and goals.    Goals:  stg to be met in 2 weeks 1. Patient will be I with HEP 2. Patient will have 2/10 pain with light use 3. Patient will have = prom of bilateral all digits to enhance affected arm use with ADL        ltg to be met by d/c 1. Patient will be I with d/c HEP 2. Patient will have 1/10 pain with all use 3. Patient will have about 75% /pinch on affected side vs unaffected side to promote full functional use                                                                 4. Patient will be I with all ADL       all goals ongoing unless noted above or met today or in past but not noted yet              Any  goals met today ?  (if any met see above)    Updates/Grading for next session: prn    Plan: evaluate and tx    Saul AGUIRRE CHT

## 2022-09-15 ENCOUNTER — TELEPHONE (OUTPATIENT)
Dept: CARDIOLOGY | Facility: CLINIC | Age: 72
End: 2022-09-15

## 2022-09-15 ENCOUNTER — OFFICE VISIT (OUTPATIENT)
Dept: CARDIOLOGY | Facility: CLINIC | Age: 72
End: 2022-09-15
Payer: MEDICARE

## 2022-09-15 ENCOUNTER — LAB VISIT (OUTPATIENT)
Dept: LAB | Facility: HOSPITAL | Age: 72
End: 2022-09-15
Payer: MEDICARE

## 2022-09-15 VITALS
SYSTOLIC BLOOD PRESSURE: 118 MMHG | RESPIRATION RATE: 16 BRPM | HEART RATE: 77 BPM | HEIGHT: 62 IN | DIASTOLIC BLOOD PRESSURE: 68 MMHG | OXYGEN SATURATION: 97 % | WEIGHT: 166 LBS | BODY MASS INDEX: 30.55 KG/M2

## 2022-09-15 DIAGNOSIS — E83.42 HYPOMAGNESEMIA: ICD-10-CM

## 2022-09-15 DIAGNOSIS — E78.5 HYPERLIPIDEMIA ASSOCIATED WITH TYPE 2 DIABETES MELLITUS: ICD-10-CM

## 2022-09-15 DIAGNOSIS — E08.65 DIABETES MELLITUS DUE TO UNDERLYING CONDITION WITH HYPERGLYCEMIA, WITHOUT LONG-TERM CURRENT USE OF INSULIN: ICD-10-CM

## 2022-09-15 DIAGNOSIS — I15.2 HYPERTENSION ASSOCIATED WITH DIABETES: ICD-10-CM

## 2022-09-15 DIAGNOSIS — I25.10 CORONARY ARTERY DISEASE INVOLVING NATIVE CORONARY ARTERY OF NATIVE HEART WITHOUT ANGINA PECTORIS: ICD-10-CM

## 2022-09-15 DIAGNOSIS — E11.69 HYPERLIPIDEMIA ASSOCIATED WITH TYPE 2 DIABETES MELLITUS: ICD-10-CM

## 2022-09-15 DIAGNOSIS — E11.59 HYPERTENSION ASSOCIATED WITH DIABETES: ICD-10-CM

## 2022-09-15 LAB
MAGNESIUM SERPL-MCNC: 1.8 MG/DL (ref 1.6–2.6)
POTASSIUM SERPL-SCNC: 4.6 MMOL/L (ref 3.5–5.1)

## 2022-09-15 PROCEDURE — 84132 ASSAY OF SERUM POTASSIUM: CPT

## 2022-09-15 PROCEDURE — 99214 PR OFFICE/OUTPT VISIT, EST, LEVL IV, 30-39 MIN: ICD-10-PCS | Mod: S$GLB,,,

## 2022-09-15 PROCEDURE — 99214 OFFICE O/P EST MOD 30 MIN: CPT | Mod: S$GLB,,,

## 2022-09-15 PROCEDURE — 83735 ASSAY OF MAGNESIUM: CPT

## 2022-09-15 PROCEDURE — 36415 COLL VENOUS BLD VENIPUNCTURE: CPT

## 2022-09-15 NOTE — TELEPHONE ENCOUNTER
----- Message from Kassi Jackson PA-C sent at 9/15/2022  2:26 PM CDT -----  Please call and let her know that her potassium and magnesium both are within normal limits.

## 2022-09-15 NOTE — ASSESSMENT & PLAN NOTE
BP well controlled 118/68   Continue lopressor 25 mg BID   Continue olmesartan 40 mg daily   Low Na diet

## 2022-09-15 NOTE — ASSESSMENT & PLAN NOTE
LDL 58   Continue lipitor 40 mg daily   May need to cut back on statin if leg cramps continue depsite normal e levels

## 2022-09-15 NOTE — PROGRESS NOTES
Subjective:    Patient ID:  Jessica Luna is a 71 y.o. female patient here for evaluation Follow-up      History of Present Illness:     Patient is here today for a follow up. She has no chest pain, SOB ,dizziness, syncope, palpitations, falls, edema, neck/arm/jaw pain.   She does get occasional cramping at night in her calves normally worse when she has low K or Mag.   BP has been well controlled at home.         Review of patient's allergies indicates:   Allergen Reactions    No known drug allergies        Past Medical History:   Diagnosis Date    Anticoagulant long-term use     Arthritis     CAD (coronary artery disease)     C. Stents X2 Dr. Og Jeronimo    Diabetes mellitus     Diabetes mellitus type II     Diverticulosis     Hyperlipidemia     Hypertension     Low magnesium levels     Myocardial infarction     Obstructive uropathy 10/16/2015    Renal stone     Status post cystoscopy - Left laser percutaneous nephrolithotripsy 2016    Ureterolithiasis Obstructive-left 1/3/2015    Wears dentures     FULL UPPER - PARTIAL BOTTOM    Wears dentures     Full upper; partial lower    Wears glasses     Wears glasses      Past Surgical History:   Procedure Laterality Date    ARTHROSCOPIC REPAIR OF ROTATOR CUFF OF SHOULDER Right 2021    Procedure: REPAIR, ROTATOR CUFF, ARTHROSCOPIC;  Surgeon: Rudy Graves MD;  Location: [facility] OR;  Service: Orthopedics;  Laterality: Right;  GENERAL AND BLOCK    ARTHROSCOPIC TENOTOMY OF BICEPS TENDON Right 2021    Procedure: TENOTOMY, BICEPS, ARTHROSCOPIC;  Surgeon: Rudy Graves MD;  Location: [facility] OR;  Service: Orthopedics;  Laterality: Right;  GENERAL AND BLOCK    ARTHROSCOPY OF SHOULDER WITH DECOMPRESSION OF SUBACROMIAL SPACE Right 2021    Procedure: ARTHROSCOPY, SHOULDER, WITH SUBACROMIAL SPACE DECOMPRESSION;  Surgeon: Rudy Graves MD;  Location: [facility] OR;  Service: Orthopedics;  Laterality: Right;  GENERAL AND BLOCK  MITEK     SECTION       CHOLECYSTECTOMY      COLONOSCOPY N/A 10/7/2015    Procedure: COLONOSCOPY;  Surgeon: Washington Rodriguez MD;  Location: [facility] ENDO;  Service: Endoscopy;  Laterality: N/A;    COLONOSCOPY N/A 10/3/2019    Procedure: COLONOSCOPY;  Surgeon: Viviane Simeon MD;  Location: [facility] ENDO;  Service: Endoscopy;  Laterality: N/A;    CORONARY STENT PLACEMENT  2006    2 vessels    CYSTOSCOPY      CYSTOSCOPY W/ LASER LITHOTRIPSY  12/15/15    CYSTOSCOPY W/ RETROGRADES Bilateral 9/19/2019    Procedure: CYSTOSCOPY, WITH RETROGRADE PYELOGRAM;  Surgeon: Ubaldo Cho MD;  Location: [facility] OR;  Service: Urology;  Laterality: Bilateral;    CYSTOSCOPY W/ URETERAL STENT REMOVAL Left 10/1/2019    Procedure: CYSTOSCOPY, WITH URETERAL STENT REMOVAL;  Surgeon: Ubaldo Cho MD;  Location: [facility] OR;  Service: Urology;  Laterality: Left;    HYSTERECTOMY      GRACE/BSO, DUB    LASER LITHOTRIPSY Left 9/19/2019    Procedure: LITHOTRIPSY, USING LASER;  Surgeon: Ubaldo Cho MD;  Location: [facility] OR;  Service: Urology;  Laterality: Left;    OOPHORECTOMY      PERCUTANEOUS NEPHROLITHOTRIPSY  06/28/2016    ROTATOR CUFF REPAIR      left    URETERAL STENT PLACEMENT Left 9/19/2019    Procedure: INSERTION, STENT, URETER;  Surgeon: Ubaldo Cho MD;  Location: [facility] OR;  Service: Urology;  Laterality: Left;    URETEROSCOPIC REMOVAL OF URETERIC CALCULUS Left 9/19/2019    Procedure: REMOVAL, CALCULUS, URETER, URETEROSCOPIC;  Surgeon: Ubaldo Cho MD;  Location: [facility] OR;  Service: Urology;  Laterality: Left;    ureteroscopy  12/15/15     Social History     Tobacco Use    Smoking status: Never    Smokeless tobacco: Never   Substance Use Topics    Alcohol use: No    Drug use: No        Review of Systems:    As noted in HPI in addition      REVIEW OF SYSTEMS  CARDIOVASCULAR: No recent chest pain, palpitations, arm, neck, or jaw pain  RESPIRATORY: No recent fever, cough chills, SOB or congestion  : No blood in the urine  GI: No Nausea, vomiting,  constipation, diarrhea, blood, or reflux.  MUSCULOSKELETAL: No myalgias  NEURO: No lightheadedness or dizziness  EYES: No Double vision, blurry, vision or headache              Objective        Vitals:    09/15/22 1300   BP: 118/68   Pulse: 77   Resp: 16       LIPIDS - LAST 2   Lab Results   Component Value Date    CHOL 133 04/01/2022    CHOL 159 01/19/2021    HDL 40 04/01/2022    HDL 44 01/19/2021    LDLCALC 58.0 (L) 04/01/2022    LDLCALC 83.6 01/19/2021    TRIG 175 (H) 04/01/2022    TRIG 157 (H) 01/19/2021    CHOLHDL 30.1 04/01/2022    CHOLHDL 27.7 01/19/2021       CBC - LAST 2  Lab Results   Component Value Date    WBC 8.24 04/01/2022    WBC 9.70 04/05/2021    RBC 4.39 04/01/2022    RBC 4.77 04/05/2021    HGB 12.2 04/01/2022    HGB 13.6 04/05/2021    HCT 38.9 04/01/2022    HCT 40.8 04/05/2021    MCV 89 04/01/2022    MCV 86 04/05/2021    MCH 27.8 04/01/2022    MCH 28.5 04/05/2021    MCHC 31.4 (L) 04/01/2022    MCHC 33.3 04/05/2021    RDW 13.2 04/01/2022    RDW 13.8 04/05/2021     04/01/2022     04/05/2021    MPV 11.8 04/01/2022    MPV 9.6 04/05/2021    GRAN 5.6 04/01/2022    GRAN 67.5 04/01/2022    LYMPH 1.8 04/01/2022    LYMPH 22.1 04/01/2022    MONO 0.6 04/01/2022    MONO 6.7 04/01/2022    BASO 0.03 04/01/2022    BASO 0.04 04/05/2021    NRBC 0 04/01/2022    NRBC 0 04/05/2021       CHEMISTRY & LIVER FUNCTION - LAST 2  Lab Results   Component Value Date     04/01/2022     (L) 04/05/2021    K 4.6 04/01/2022    K 4.2 04/05/2021     04/01/2022     04/05/2021    CO2 27 04/01/2022    CO2 24 04/05/2021    ANIONGAP 7 (L) 04/01/2022    ANIONGAP 11 04/05/2021    BUN 20 04/01/2022    BUN 20 04/05/2021    CREATININE 0.8 04/01/2022    CREATININE 0.8 04/05/2021     (H) 04/01/2022     (H) 04/05/2021    CALCIUM 9.9 04/01/2022    CALCIUM 10.2 04/05/2021    MG 1.4 (L) 04/25/2022    MG 1.4 (L) 04/01/2022    ALBUMIN 3.8 04/01/2022    ALBUMIN 4.0 01/19/2021    PROT 7.1 04/01/2022     PROT 7.8 01/19/2021    ALKPHOS 114 04/01/2022    ALKPHOS 126 01/19/2021    ALT 7 (L) 04/01/2022    ALT 11 01/19/2021    AST 14 04/01/2022    AST 13 01/19/2021    BILITOT 0.4 04/01/2022    BILITOT 0.6 01/19/2021        CARDIAC PROFILE - LAST 2  No results found for: BNP, CPK, CPKMB, LDH, TROPONINI     COAGULATION - LAST 2  Lab Results   Component Value Date    INR 1.0 09/12/2019       ENDOCRINE & PSA - LAST 2  Lab Results   Component Value Date    HGBA1C 5.6 07/27/2022    HGBA1C 8.3 (H) 04/01/2022    TSH 1.095 05/27/2015        ECHOCARDIOGRAM RESULTS  Results for orders placed in visit on 04/06/21    Echo Color Flow Doppler? Yes    Interpretation Summary  · The estimated PA systolic pressure is 31 mmHg.  · The left ventricle is normal in size with moderate concentric hypertrophy and normal systolic function.  · The estimated ejection fraction is 70%.  · Normal left ventricular diastolic function.  · Normal right ventricular size with normal right ventricular systolic function.  · Moderate left atrial enlargement.  · Mild to moderate tricuspid regurgitation.  · Cannot rule out a small vegetation with thickening of the anterior mitral leaflet.      CURRENT/PREVIOUS VISIT EKG  Results for orders placed or performed in visit on 02/08/21   IN OFFICE EKG 12-LEAD (to [place])    Collection Time: 02/08/21 12:25 PM    Narrative    Test Reason : Z00.00,    Vent. Rate : 072 BPM     Atrial Rate : 072 BPM     P-R Int : 220 ms          QRS Dur : 110 ms      QT Int : 394 ms       P-R-T Axes : 070 079 074 degrees     QTc Int : 431 ms    Sinus rhythm with 1st degree A-V block  Otherwise normal ECG  No previous ECGs available  Confirmed by Gregory MIGUEL, Ubaldo BRITT ([id number]) on 2/9/2021 10:40:42 AM    Referred By:             Confirmed By:Ubaldo Jenkins MD     No valid procedures specified.   Results for orders placed in visit on 04/06/21    Nuclear Stress Test    Interpretation Summary    The EKG portion of this study is negative for  ischemia.    The patient reported no chest pain during the stress test.    The nuclear portion of this study will be reported separately.    No valid procedures specified.    PHYSICAL EXAM  CONSTITUTIONAL: Well built, well nourished in no apparent distress  NECK: no carotid bruit, no JVD  LUNGS: CTA  CHEST WALL: no tenderness  HEART: regular rate and rhythm, S1, S2 normal, no murmur, click, rub or gallop   ABDOMEN: soft, non-tender; bowel sounds normal; no masses,  no organomegaly  EXTREMITIES: Extremities normal, no edema, no calf tenderness noted  NEURO: AAO X 3    I HAVE REVIEWED :    The vital signs, nurses notes, and all the pertinent radiology and labs.        Current Outpatient Medications   Medication Instructions    acetaminophen (TYLENOL) 500 mg, Oral, Every 6 hours PRN    allopurinoL (ZYLOPRIM) 100 mg, Oral, Daily    aspirin 81 mg, Oral, Daily    atorvastatin (LIPITOR) 40 mg, Oral, Daily    glimepiride (AMARYL) 4 MG tablet TAKE 2 TABLETS BY MOUTH DAILY WITH BREAKFAST    magnesium oxide (MAG-OX) 250 mg magnesium Tab 0.5 tablets, Oral, Daily    metFORMIN (GLUCOPHAGE) 1,000 mg, Oral, 2 times daily with meals    metoprolol tartrate (LOPRESSOR) 25 mg, Oral, 2 times daily    olmesartan (BENICAR) 40 mg, Oral, Daily    omega-3 fatty acids-vitamin E 1,000-5 mg-unit Cap 1 capsule, Oral, 2 times daily, Two times a day    potassium citrate (UROCIT-K 15) 15 mEq TbSR TAKE ONE TABLET BY MOUTH TWICE DAILY    RYBELSUS 7 mg, Oral, Daily          Assessment & Plan     Hyperlipidemia associated with type 2 diabetes mellitus  LDL 58   Continue lipitor 40 mg daily   May need to cut back on statin if leg cramps continue depsite normal e levels     Hypertension associated with diabetes  BP well controlled 118/68   Continue lopressor 25 mg BID   Continue olmesartan 40 mg daily   Low Na diet     Coronary artery disease  NO angial equivalents noted   Continue ASA and statin therapy     Hypomagnesemia  Recheck K and Mag today      Diabetes mellitus due to underlying condition with hyperglycemia, without long-term current use of insulin  Last A1C 5.6  Per PCP   On scarlett burr           Follow up in about 6 months (around 3/15/2023).

## 2022-09-26 ENCOUNTER — CLINICAL SUPPORT (OUTPATIENT)
Dept: REHABILITATION | Facility: HOSPITAL | Age: 72
End: 2022-09-26
Attending: STUDENT IN AN ORGANIZED HEALTH CARE EDUCATION/TRAINING PROGRAM
Payer: MEDICARE

## 2022-09-26 DIAGNOSIS — M25.541 PAIN IN JOINT OF RIGHT HAND: ICD-10-CM

## 2022-09-26 DIAGNOSIS — M25.641 STIFFNESS OF RIGHT HAND JOINT: Primary | ICD-10-CM

## 2022-09-26 PROCEDURE — 97022 WHIRLPOOL THERAPY: CPT | Mod: PN

## 2022-09-26 PROCEDURE — 97110 THERAPEUTIC EXERCISES: CPT | Mod: PN

## 2022-09-26 NOTE — PATIENT INSTRUCTIONS
9-26-22  -do below exercise one time, at least 2 x day, at least a 10 minute hold, mild discomfort only  *use left hand to hold big knuckle of right thumb straight, push thumb tip bent on a cushioned surface

## 2022-09-26 NOTE — PROGRESS NOTES
Occupational Therapy Daily Treatment Note     Date: 9/26/2022  Name: Jessica Luna  Clinic Number: [id number]    Therapy Diagnosis:   No diagnosis found.    Physician: Skip Guerrero MD      Physician Orders: evaluate and tx, see epic  Medical Diagnosis: right hand joint stiffness  Surgical Procedure and Date: n/a, n/a  Evaluation Date: 8/12/2022  Insurance Authorization Period Expiration: referral [id number] 8-12-22 thru 12-31-22  Plan of Care Certification Period: 9-26-22 thru 12-19-22  Date of Return to referral source's office: see epic     Visit # / Visits authorized: 5 / 20 referral [id number] 8-12-22 thru 12-31-22      Time In: 8  Time Out: 9  Total Billable Time: 60 minutes    Precautions:  universal, see epic, protocol if applicable    Subjective     Pt reports: she is compliant with home exercise program. Understands to continue current stretch routine.  Response to previous treatment:good  Functional change: none stated    Pain: 0/10 rest; 4/10 light use  Side:right  Location: diffuse      Digits index thru small  Objective       Timed units:  3 therapeutic exercise                 Time:815-9    Untimed units:  fluidotherapy                           Time:8-815      Measurements:       N/a    Fluido: 15 min.    U/s:n/a      UBE: n/a    Scar massage: n/a    Stretches as tolerated-pain free: composite flex, intrinsic index thru small; sf mcp flexion; thumb ip flex with mcp at 0      Manual: n/a    ROM: n/a    PRE: n/a      HEP: see above and/or below    Home Exercises and Education Provided     Education provided:           progress towards goals   likely tx progression  rationale of rehab interventions    Written Home Exercises/Information Provided: yes      previously issued exercises and/or other issued home therapy instructions were reviewed if still part of current tx plan as well as any issued today and Jessica was able to demonstrate them prior to the end of the session.  Jessica demonstrated good  understanding of the HEP provided.     See EMR under patient instructions and/or media for HEP issued today or in past    Assessment       Tightness continue to decrease consistently         Pt would continue to benefit from skilled OT in order to facilitate strong, painless, and full use.    Jessica is progressing well towards her goals and there are no updates to goals at this time unless goals noted below are different than goals on previous notes or evaluation. Pt prognosis is good  Pt will continue to benefit from skilled outpatient occupational therapy to address the deficits listed in the problem list on initial evaluation to provide pt/family education and to maximize pt's level of independence in the home and community environment.     Anticipated barriers to occupational therapy: pain, stiffness, duration of tightness    Pt's spiritual, cultural and educational needs considered and pt agreeable to plan of care and goals.    Goals:  stg to be met in 2 weeks 1. Patient will be I with HEP 2. Patient will have 2/10 pain with light use 3. Patient will have = prom of bilateral all digits to enhance affected arm use with ADL        ltg to be met by d/c 1. Patient will be I with d/c HEP 2. Patient will have 1/10 pain with all use 3. Patient will have about 75% /pinch on affected side vs unaffected side to promote full functional use                                                                 4. Patient will be I with all ADL       all goals ongoing unless noted above or met today or in past but not noted yet              Any goals met today ?  (if any met see above)    Updates/Grading for next session: prn    Plan: evaluate and tx    Saul AGUIRRE CHT

## 2022-09-26 NOTE — PLAN OF CARE
Outpatient Therapy Updated Plan of Care     Visit Date: 9/26/2022  Name: Jessica Luna  Clinic Number: [id number]    Therapy Diagnosis: No diagnosis found.  Physician: Skip Guerrero MD    Physician Orders: evaluate and tx, see epic  Medical Diagnosis: see evaluation  Evaluation Date: 8-12-22    Total Visits Received: 6  Cancelled Visits: see epic  No Show Visits: see epic    Current Certification Period:  8-12-22 to 9-9-22  Precautions:  universal, see epic  Visits from Evaluation Date:  5  Functional Level Prior to Evaluation:  Decreased rom, use, and strength; pre symptom onset had no deficits with functional use     Subjective     Update: doing HEP    Objective     Update: see prior notes for measurements    Assessment     Update: overall tightness continues to slowly decrease    Previous Short Term Goals Status:   see last note  New Short Term Goals Status:   n/a  Long Term Goal Status:   continue per initial plan of care.  Continue any LTGs added in notes post initial eval  Reasons for Recertification of Therapy:   continued rehab needed to progress tx to maximize functional use and upgrade HEP prn    Plan     Updated Certification Period: 9/26/2022 to 12-19-22  Recommended Treatment Plan: 2 times per week for 12 weeks: evaluate and tx      Saul Mora OT/CHT  9/26/2022    frequency per week may change based on patient progress and need for therapy      I CERTIFY THE NEED FOR THESE SERVICES FURNISHED UNDER THIS PLAN OF TREATMENT AND WHILE UNDER MY CARE    Physician's comments:        Physician's Signature: ___________________________________________________

## 2022-09-27 DIAGNOSIS — E11.65 TYPE 2 DIABETES MELLITUS WITH HYPERGLYCEMIA, WITHOUT LONG-TERM CURRENT USE OF INSULIN: Primary | ICD-10-CM

## 2022-10-04 ENCOUNTER — HOSPITAL ENCOUNTER (OUTPATIENT)
Dept: RADIOLOGY | Facility: HOSPITAL | Age: 72
Discharge: HOME | End: 2022-10-04
Attending: UROLOGY
Payer: MEDICARE

## 2022-10-04 DIAGNOSIS — N20.0 KIDNEY STONES: ICD-10-CM

## 2022-10-04 PROCEDURE — 76770 US EXAM ABDO BACK WALL COMP: CPT | Mod: TC

## 2022-10-04 PROCEDURE — 76770 US EXAM ABDO BACK WALL COMP: CPT | Mod: 26,,, | Performed by: RADIOLOGY

## 2022-10-04 PROCEDURE — 76770 US RETROPERITONEAL COMPLETE: ICD-10-PCS | Mod: 26,,, | Performed by: RADIOLOGY

## 2022-10-10 ENCOUNTER — CLINICAL SUPPORT (OUTPATIENT)
Dept: REHABILITATION | Facility: HOSPITAL | Age: 72
End: 2022-10-10
Attending: STUDENT IN AN ORGANIZED HEALTH CARE EDUCATION/TRAINING PROGRAM
Payer: MEDICARE

## 2022-10-10 DIAGNOSIS — M25.541 PAIN IN JOINT OF RIGHT HAND: ICD-10-CM

## 2022-10-10 DIAGNOSIS — M25.641 STIFFNESS OF RIGHT HAND JOINT: Primary | ICD-10-CM

## 2022-10-10 PROCEDURE — 97022 WHIRLPOOL THERAPY: CPT | Mod: PN

## 2022-10-10 PROCEDURE — 97110 THERAPEUTIC EXERCISES: CPT | Mod: PN

## 2022-10-10 NOTE — PATIENT INSTRUCTIONS
10-10-22 current routine  -slowly progress use as tolerated-pain free  -do below stretches one time, at least a 10 minute hold, at least 2 x day, mild discomfort only  *use left hand to keep big joint of thumb straight, push tip bent on a cushioned surface  *use left index and long to push right thumb down and across palm    *all 3 joints get bent towards palm; do on index thru ring one at a time  ___________________________________________________________________    *knuckle stays straight, middle and tip joints get bent; do on index thru small one at a time  -use left hand to bend knuckle of right small finger  -eventually once knuckle of small finger bends all the way down, start doing above stretch where all 3 joints get bent towards palm

## 2022-10-10 NOTE — PROGRESS NOTES
Occupational Therapy Daily Treatment Note     Date: 10/10/2022  Name: Jessica Luna  Clinic Number: [id number]    Therapy Diagnosis:   Encounter Diagnoses   Name Primary?    Stiffness of right hand joint Yes    Pain in joint of right hand        Physician: Skip Guerrero MD      Physician Orders: evaluate and tx, see epic  Medical Diagnosis: right hand joint stiffness  Surgical Procedure and Date: n/a, n/a  Evaluation Date: 8/12/2022  Insurance Authorization Period Expiration: referral [id number] 8-12-22 thru 12-31-22  Plan of Care Certification Period: 9-26-22 thru 12-19-22  Date of Return to referral source's office: see epic     Visit # / Visits authorized: 6 / 20 referral [id number] 8-12-22 thru 12-31-22      Time In: 9  Time Out: 10  Total Billable Time: 60 minutes    Precautions:  universal, see epic, protocol if applicable    Subjective     Pt reports: she is compliant with home exercise program. Doing more with hand with light use since day 1 of OT.  Response to previous treatment:good  Functional change: none stated    Pain: 0/10 rest; 4/10 light use  Side:right  Location: diffuse      Digits index thru small  Objective       Timed units:  3 therapeutic exercise                 Time:915-10    Untimed units:  fluidotherapy                           Time:9-915      Measurements:       Passive range of motion                                                                                            index              long                ring                   small  Composite flex tip to dpc          2 cm                2                   1.5                   nt since mcp to 70  Intrinsic stretch tip to A1           3 cm                3                    2                           2                       Composite extension                 full                 full                  full                      full                                                                                                       Right                        Left   Thumb   palmar abduction                                                           80                            80                radial abduction                                                              80                             80               opposition                                                                dpc with mild tension      dpc               retroposition                                                                 full                              full               First web length                                                            full                              full                Metacarpophalangeal joint extension                           0                                 0               Interphalangeal joint extension                                     0                                  0                Ip flexion with mcp at 0                                                 70                                90    Fluido: 15 min.    U/s:n/a      UBE: n/a    Scar massage: n/a    Stretches as tolerated-pain free: composite flex, intrinsic index thru small; sf mcp flexion; thumb ip flex with mcp at 0      Manual: n/a    ROM: n/a    PRE: n/a      HEP: see above and/or below    Home Exercises and Education Provided     Education provided:     Heating pad use pre stretches    Explained continued clinic OT nonessential       progress towards goals   likely tx progression  rationale of rehab interventions    Written Home Exercises/Information Provided: yes      previously issued exercises and/or other issued home therapy instructions were reviewed if still part of current tx plan as well as any issued today and Jessica was able to demonstrate them prior to the end of the session.  Jessica demonstrated good understanding of the HEP provided.     See EMR under patient instructions and/or media for HEP issued today or in  past    Assessment     D/c home program should help to maximize functional use long term     Pt's spiritual, cultural and educational needs considered and pt agreeable to plan of care and goals.    Goals:  stg to be met in 2 weeks 1. Patient will be I with HEP 2. Patient will have 2/10 pain with light use 3. Patient will have = prom of bilateral all digits to enhance affected arm use with ADL        ltg to be met by d/c 1. Patient will be I with d/c HEP 2. Patient will have 1/10 pain with all use 3. Patient will have about 75% /pinch on affected side vs unaffected side to promote full functional use                                                                 4. Patient will be I with all ADL       all goals met except stg 2, 3; ltg 2,3, and 4          Any goals met today ?  (if any met see above)    Updates/Grading for next session: prn    Plan: evaluate and tx    Saul AGUIRRE CHT

## 2022-10-11 ENCOUNTER — NUTRITION (OUTPATIENT)
Dept: DIABETES | Facility: CLINIC | Age: 72
End: 2022-10-11
Payer: MEDICARE

## 2022-10-11 DIAGNOSIS — E11.9 TYPE 2 DIABETES MELLITUS WITHOUT COMPLICATION, WITHOUT LONG-TERM CURRENT USE OF INSULIN: Primary | ICD-10-CM

## 2022-10-11 PROCEDURE — 99212 OFFICE O/P EST SF 10 MIN: CPT | Mod: PBBFAC,PO | Performed by: NUTRITIONIST

## 2022-10-11 PROCEDURE — 99999 PR PBB SHADOW E&M-EST. PATIENT-LVL II: CPT | Mod: PBBFAC,,, | Performed by: NUTRITIONIST

## 2022-10-11 PROCEDURE — 99999 PR PBB SHADOW E&M-EST. PATIENT-LVL II: ICD-10-PCS | Mod: PBBFAC,,, | Performed by: NUTRITIONIST

## 2022-10-11 PROCEDURE — G0108 DIAB MANAGE TRN  PER INDIV: HCPCS | Mod: PBBFAC,PO | Performed by: NUTRITIONIST

## 2022-10-11 NOTE — PROGRESS NOTES
Diabetes Care Specialist Progress Note  Author: Vika Parker RD  Date: 10/11/2022    Program Intake  Reason for Diabetes Program Visit:: Post Program Follow-Up  Type of Intervention:: Individual  Individual: Education  Education: Nutrition and Meal Planning  Type of Follow-Up: Other  Current diabetes risk level:: low  In the last 12 months, have you:: none  Permission to speak with others about care:: no    Lab Results   Component Value Date    HGBA1C 5.6 07/27/2022 4/1/22  HA1C  8.3  5/24/22--Initial Assessment by Educator  7/26/22--follow up    Clinical    Clinical Assessment  Current Diabetes Treatment: Oral Medication (1000 mg Metformin BID; 8 mg Glimepiride at b'fast; Januvia 100 mg daily (restarted on 9/25/22).  Still waiting on Rybelsus)  Have you ever experienced hypoglycemia (low blood sugar)?: yes  In the last month, how often have you experienced low blood sugar?: once a week  Are you able to tell when your blood sugar is low?: Yes  What symptoms do you experience?: Shaky  Have you ever been hospitalized because your blood sugar was too low?: no  How do you treat hypoglycemia (low blood sugar)?: 1/2 can soda/fruit juice, 5-6 pieces of hard candy  Have you ever experienced hyperglycemia (high blood sugar)?: no    Nutritional Status  Meal Plan 24 Hour Recall: Breakfast, Lunch, Dinner  Meal Plan 24 Hour Recall - Breakfast:  ([employer]--potatoes, egg, gravy; coffee w/SF sub , creamer)  Meal Plan 24 Hour Recall - Lunch:  (beans, rice, sausage, SF [place])  Meal Plan 24 Hour Recall - Dinner:  (same as lunch)  Change in appetite?: No    Diabetes Self-Management Skills Assessment    Home Blood Glucose Monitoring  Patient states that blood sugar is checked at home daily.: yes  Monitoring Method:: home glucometer  How often do you check your blood sugar?: Twice a day  When do you check your blood sugar?: Before breakfast, 2 hours after meal  Blood glucose logs:: yes, encouraged to bring logs to provider  visits  Blood glucose logs reviewed today?: yes  Home Blood Glucose Monitoring Skills Assessment Completed: : Yes  Assessment indicates:: Adequate understanding  Area of need?: No          Diabetes Self Support Plan    Assessment Summary and Plan    Based on today's diabetes care assessment, the following areas of need were identified:      Social 5/24/2022   Support No   Access to Mass Media/Tech No   Cognitive/Behavioral Health No   Culture/Uatsdin No   Communication No   Health Literacy No        Clinical 5/24/2022   Medication Adherence No   Lab Compliance No   Nutritional Status No        Diabetes Self-Management Skills 10/11/2022   Diabetes Disease Process/Treatment Options -   Nutrition/Healthy Eating -   Physical Activity/Exercise -   Medication Pt will discuss meds at next appt 10/28/22 w/Estefania montanez.  Pt has not received Rybelsus yet, but did restart Januvia on 9/25/22.  BG logs (above) indicate pt having some lows so may need to reevaluate meds   Home Blood Glucose Monitoring No   Acute Complications -   Chronic Complications -   Psychosocial/Coping -          Today's interventions were provided through individual discussion, instruction, and written materials were provided.      Patient verbalized understanding of instruction and written materials.  Pt was able to return back demonstration of instructions today. Patient understood key points, needs reinforcement and further instruction.     Diabetes Self-Management Care Plan:    Today's Diabetes Self-Management Care Plan was developed with Jessica Rosa's input. Jessica Rosa has agreed to work toward the following goal(s) to improve his/her overall diabetes control.      Care Plan: Diabetes Management   Updates made since 9/11/2022 12:00 AM        Problem: Healthy Eating         Goal: Eat 3 meals daily with 30 g/2 servings of Carbohydrate per meal. Completed 10/11/2022   Start Date: 5/24/2022   Expected End Date: 8/25/2022   This Visit's Progress: Met    Recent Progress: On track   Priority: High   Barriers: No Barriers Identified   Note:    Reviewed basic principles of DM diet, focusing on portion sizes, combining carbs w/PRO for meals and snacks, and label reading (look at total carbs).  Pt will check labels on Shelley Zavala (having 2 for HS),  rolls    7/26/22--Pt has been making changes in diet:  *cutting back on portions  *increasing non-starchy veggies  *no more cookies or [place] rolls (looked at labels)  *paying attention to BG levels to help learn how body reacts to food    Pt states she feels much better.      10/11/22--Pt has been very careful with oral intake, cutting portions, eating more veggies/salads, using SF subs and drinks.  Checking BG levels before and 2 hours after meal has helped pt understand how her body reacts to food and amounts.  Does have a small banana for a snack occasionally.         Follow Up Plan     Follow up if symptoms worsen or fail to improve.    Today's care plan and follow up schedule was discussed with patient.  Jessica Rosa verbalized understanding of the care plan, goals, and agrees to follow up plan.        The patient was encouraged to communicate with his/her health care provider/physician and care team regarding his/her condition(s) and treatment.  I provided the patient with my contact information today and encouraged to contact me via phone or [facility] Patient Portal as needed.     Length of Visit   Total Time: 30 Minutes

## 2022-10-11 NOTE — LETTER
October 11, 2022               Patient: Jessica Luna   MR Number: [id number]   YOB: 1950   Date of Visit: 10/11/2022       Dear Dr. Guerrero       Thank you for referring Jessica for diabetes self-management education and support services. She has completed all components of our Diabetes Management Program. Below is a summary of her clinical outcomes and goal progress.    Patient Outcomes:    A1c Status:   Lab Results   Component Value Date    HGBA1C 5.6 07/27/2022    HGBA1C 8.3 (H) 04/01/2022 5/24/22--Initial Assessment by Educator  7/26/22--follow up     Pt will continue to watch portions, increase non-starchy veggies, combine carbs w/PRO, and use SF beverages and subs.  Pt will also continue to check BG levels BID and keep BG log.  There has been some low BG levels which pt will discuss on visit  With CHLOE Thomas on 10/28/22. Please note, pt has still not received Rybelsus, but did re-start her Januvia on 9/25/22.     Follow up:   · Jessica to attend medical appointments as scheduled  · Jessica to update you on her DM education progress as needed      If you have questions, please do not hesitate to call me. I look forward to providing additional education and support as needed.    Sincerely,    Vika Parekr RD  Diabetes Care and

## 2022-10-19 ENCOUNTER — OFFICE VISIT (OUTPATIENT)
Dept: UROLOGY | Facility: CLINIC | Age: 72
End: 2022-10-19
Payer: MEDICARE

## 2022-10-19 VITALS
DIASTOLIC BLOOD PRESSURE: 70 MMHG | BODY MASS INDEX: 30.86 KG/M2 | SYSTOLIC BLOOD PRESSURE: 115 MMHG | HEART RATE: 68 BPM | HEIGHT: 62 IN | WEIGHT: 167.69 LBS

## 2022-10-19 DIAGNOSIS — N20.0 KIDNEY STONES: Primary | ICD-10-CM

## 2022-10-19 DIAGNOSIS — E11.9 TYPE 2 DIABETES MELLITUS WITHOUT COMPLICATION, WITHOUT LONG-TERM CURRENT USE OF INSULIN: ICD-10-CM

## 2022-10-19 DIAGNOSIS — N20.0 URIC ACID RENAL CALCULUS: ICD-10-CM

## 2022-10-19 LAB
BILIRUBIN, UA POC OHS: NEGATIVE
BLOOD, UA POC OHS: NEGATIVE
CLARITY, UA POC OHS: CLEAR
COLOR, UA POC OHS: YELLOW
GLUCOSE, UA POC OHS: NEGATIVE
KETONES, UA POC OHS: NEGATIVE
LEUKOCYTES, UA POC OHS: NEGATIVE
NITRITE, UA POC OHS: NEGATIVE
PH, UA POC OHS: 5.5
PROTEIN, UA POC OHS: NEGATIVE
SPECIFIC GRAVITY, UA POC OHS: 1.02
UROBILINOGEN, UA POC OHS: 0.2

## 2022-10-19 PROCEDURE — 99214 PR OFFICE/OUTPT VISIT, EST, LEVL IV, 30-39 MIN: ICD-10-PCS | Mod: S$PBB,,, | Performed by: NURSE PRACTITIONER

## 2022-10-19 PROCEDURE — 99213 OFFICE O/P EST LOW 20 MIN: CPT | Mod: PBBFAC,PN | Performed by: NURSE PRACTITIONER

## 2022-10-19 PROCEDURE — 99999 PR PBB SHADOW E&M-EST. PATIENT-LVL III: ICD-10-PCS | Mod: PBBFAC,,, | Performed by: NURSE PRACTITIONER

## 2022-10-19 PROCEDURE — 81003 URINALYSIS AUTO W/O SCOPE: CPT | Mod: PBBFAC,PN | Performed by: NURSE PRACTITIONER

## 2022-10-19 PROCEDURE — 99999 PR PBB SHADOW E&M-EST. PATIENT-LVL III: CPT | Mod: PBBFAC,,, | Performed by: NURSE PRACTITIONER

## 2022-10-19 PROCEDURE — 99214 OFFICE O/P EST MOD 30 MIN: CPT | Mod: S$PBB,,, | Performed by: NURSE PRACTITIONER

## 2022-10-19 RX ORDER — ALLOPURINOL 100 MG/1
100 TABLET ORAL DAILY
Qty: 90 TABLET | Refills: 4 | Status: SHIPPED | OUTPATIENT
Start: 2022-10-19 | End: 2023-10-30 | Stop reason: SDUPTHER

## 2022-10-19 NOTE — PROGRESS NOTES
[facility] Note  Staff: JOSELYN Weiss-NITZA    PCP: MD Cesar  Urologist:  MD Marquis    Chief Complaint: Annual f/up-kidney stones    Subjective:        HPI: Jessica Luna is a 72 y.o. female presents today for follow-up    71 yo F with history of uric acid stones found to have L renal pelvic stone on recent GH workup had cyto/RPGs (with possible subclinical bilateral obstruction), and L URS/HLL (with patent UPJ accomodating scope) on 9/21/19 to remove left stone burden.      10/01/2019:  Left ureteral stent removal, s/p left ureteroscopy on 09/21/19 performed by Dr. Cho.     OV 10/15/2021:  Recent imaging showed no stones seen at this time.  Thinning of the renal cortex of both kidneys suggesting intrinsic renal disease is noted.  Hydronephrosis or other abnormalities are not seen.     Overall, pt doing well with no complaints voiced at this time.  No gross hematuria, No dysuria noted.    TODAY:  UA in office showed normal findings.  No gross hematuria, no flank pain voiced by pt today.  Overall, doing well with no complaints voiced.    Current  meds:  Allopurinol 100 mg daily  Urocit-K 15 mEq    Recent Imaging:  Renal/Bladder US done 10/4/2022:  FINDINGS:  Right kidney: The right kidney measures 10.6 cm. No cortical thinning. No loss of corticomedullary distinction. Resistive index measures 0.70.  No mass. No renal stone. No hydronephrosis.     Left kidney: The left kidney measures 10.4 cm. No cortical thinning. No loss of corticomedullary distinction. Resistive index measures 0.71.  No mass. A 5 mm calcification is seen in the lower pole of the left kidney consistent with a probable intrarenal stone.  Significant hydronephrosis is not seen.     The bladder is partially distended at the time of scanning and has an unremarkable appearance.     Impression:  5 mm left intrarenal stone.  Otherwise negative renal ultrasound.     REVIEW OF SYSTEMS:  A comprehensive 10 system  review was performed and is negative except as noted above in HPI    PMHx:  Past Medical History:   Diagnosis Date    Anticoagulant long-term use     Arthritis     CAD (coronary artery disease)     C. Stents X2 Dr. Og Jeronimo    Diabetes mellitus     Diabetes mellitus type II     Diverticulosis     Hyperlipidemia     Hypertension     Low magnesium levels     Myocardial infarction     Obstructive uropathy 10/16/2015    Renal stone     Status post cystoscopy - Left laser percutaneous nephrolithotripsy 2016    Ureterolithiasis Obstructive-left 1/3/2015    Wears dentures     FULL UPPER - PARTIAL BOTTOM    Wears dentures     Full upper; partial lower    Wears glasses     Wears glasses      PSHx:  Past Surgical History:   Procedure Laterality Date    ARTHROSCOPIC REPAIR OF ROTATOR CUFF OF SHOULDER Right 2021    Procedure: REPAIR, ROTATOR CUFF, ARTHROSCOPIC;  Surgeon: Rudy Graves MD;  Location: [facility] OR;  Service: Orthopedics;  Laterality: Right;  GENERAL AND BLOCK    ARTHROSCOPIC TENOTOMY OF BICEPS TENDON Right 2021    Procedure: TENOTOMY, BICEPS, ARTHROSCOPIC;  Surgeon: Rudy Graves MD;  Location: [facility] OR;  Service: Orthopedics;  Laterality: Right;  GENERAL AND BLOCK    ARTHROSCOPY OF SHOULDER WITH DECOMPRESSION OF SUBACROMIAL SPACE Right 2021    Procedure: ARTHROSCOPY, SHOULDER, WITH SUBACROMIAL SPACE DECOMPRESSION;  Surgeon: Rudy Graves MD;  Location: [facility] OR;  Service: Orthopedics;  Laterality: Right;  GENERAL AND BLOCK  MITEK     SECTION      CHOLECYSTECTOMY      COLONOSCOPY N/A 10/7/2015    Procedure: COLONOSCOPY;  Surgeon: Wsahington Rodriguez MD;  Location: [facility] ENDO;  Service: Endoscopy;  Laterality: N/A;    COLONOSCOPY N/A 10/3/2019    Procedure: COLONOSCOPY;  Surgeon: Viviane Simeon MD;  Location: [facility] ENDO;  Service: Endoscopy;  Laterality: N/A;    CORONARY STENT PLACEMENT      2 vessels    CYSTOSCOPY      CYSTOSCOPY W/ LASER LITHOTRIPSY  12/15/15    CYSTOSCOPY  W/ RETROGRADES Bilateral 9/19/2019    Procedure: CYSTOSCOPY, WITH RETROGRADE PYELOGRAM;  Surgeon: Ubaldo Cho MD;  Location: [facility] OR;  Service: Urology;  Laterality: Bilateral;    CYSTOSCOPY W/ URETERAL STENT REMOVAL Left 10/1/2019    Procedure: CYSTOSCOPY, WITH URETERAL STENT REMOVAL;  Surgeon: Ubaldo Cho MD;  Location: [facility] OR;  Service: Urology;  Laterality: Left;    HYSTERECTOMY      GRACE/BSO, DUB    LASER LITHOTRIPSY Left 9/19/2019    Procedure: LITHOTRIPSY, USING LASER;  Surgeon: Ubaldo Cho MD;  Location: [facility] OR;  Service: Urology;  Laterality: Left;    OOPHORECTOMY      PERCUTANEOUS NEPHROLITHOTRIPSY  06/28/2016    ROTATOR CUFF REPAIR      left    URETERAL STENT PLACEMENT Left 9/19/2019    Procedure: INSERTION, STENT, URETER;  Surgeon: Ubaldo Cho MD;  Location: [facility] OR;  Service: Urology;  Laterality: Left;    URETEROSCOPIC REMOVAL OF URETERIC CALCULUS Left 9/19/2019    Procedure: REMOVAL, CALCULUS, URETER, URETEROSCOPIC;  Surgeon: Ubaldo Cho MD;  Location: [facility] OR;  Service: Urology;  Laterality: Left;    ureteroscopy  12/15/15     Allergies:  No known drug allergies    Medications: reviewed   Objective:     Vitals:    10/19/22 1135   BP: 115/70   Pulse: 68     Physical Exam  Vitals reviewed.   Constitutional:       Appearance: She is well-developed.   HENT:      Head: Normocephalic and atraumatic.   Eyes:      Conjunctiva/sclera: Conjunctivae normal.      Pupils: Pupils are equal, round, and reactive to light.   Cardiovascular:      Rate and Rhythm: Normal rate and regular rhythm.      Heart sounds: Normal heart sounds.   Pulmonary:      Effort: Pulmonary effort is normal.      Breath sounds: Normal breath sounds.   Abdominal:      General: Bowel sounds are normal.      Palpations: Abdomen is soft.   Musculoskeletal:         General: Normal range of motion.      Cervical back: Normal range of motion and neck supple.   Skin:     General: Skin is warm and dry.    Neurological:      Mental Status: She is alert and oriented to person, place, and time.      Deep Tendon Reflexes: Reflexes are normal and symmetric.   Psychiatric:         Behavior: Behavior normal.         Thought Content: Thought content normal.         Judgment: Judgment normal.     I have discussed the following results with patient during office visit today with all questions answered.  [facility] Results:   Reference Range:  Urine Volume:  1.94       0.5-4  SS CaOx:  1.61        6-10  Urine Calcium:  85       Female <200   Urine Oxalate:  17       20-40   Urine Citrate:  691       Female >550   SS CaP:  0.58        0.5-2  24 hour urine PH: 7.154       5.8-6.2  SS Uric Acid: 0.04       0-1  Urine Uric Acid:  0.406       Female <0.750    Assessment:       1. Kidney stones    2. Uric acid renal calculus          Plan:   Hx of uric acid stones:    Overall, pt doing well with no new stone obstructions noted within one year.  We will monitor the 5 mm stone seen on recent US.    Meds have been refilled upon visit today.    Kidney stone management has improved since reviewing 24 hr urine results today.    F/UP in one year or sooner should new symptoms develop.  Pt verbalized understanding.    Kasia Ryan, HELDERC

## 2022-10-20 RX ORDER — METFORMIN HYDROCHLORIDE 1000 MG/1
1000 TABLET ORAL 2 TIMES DAILY WITH MEALS
Qty: 60 TABLET | Refills: 3 | OUTPATIENT
Start: 2022-10-20

## 2022-10-21 ENCOUNTER — DOCUMENTATION ONLY (OUTPATIENT)
Dept: REHABILITATION | Facility: HOSPITAL | Age: 72
End: 2022-10-21
Payer: MEDICARE

## 2022-10-21 NOTE — PROGRESS NOTES
Outpatient Therapy Discharge Summary     Date: 10-21-22      Name: Jessica Luna  [facility] Number: [id number]    Therapy Diagnosis: No diagnosis found.  Physician: No ref. provider found    Physician Orders: see evaluation  Medical Diagnosis: see evaluation  Evaluation Date: 8-12-22      Date of Last visit: 10-10-22  Total Visits Received: 7  Cancelled Visits: see epic  No Show Visits: see epic    Assessment    Goals: see last note    Discharge reason: Patient has reached the maximum rehab potential for the present time    Plan   This patient is discharged from Occupational Therapy

## 2022-10-28 ENCOUNTER — HOSPITAL ENCOUNTER (OUTPATIENT)
Dept: RADIOLOGY | Facility: CLINIC | Age: 72
Discharge: HOME | End: 2022-10-28
Attending: STUDENT IN AN ORGANIZED HEALTH CARE EDUCATION/TRAINING PROGRAM
Payer: MEDICARE

## 2022-10-28 ENCOUNTER — OFFICE VISIT (OUTPATIENT)
Dept: FAMILY MEDICINE | Facility: CLINIC | Age: 72
End: 2022-10-28
Payer: MEDICARE

## 2022-10-28 VITALS
HEART RATE: 73 BPM | SYSTOLIC BLOOD PRESSURE: 120 MMHG | BODY MASS INDEX: 30.51 KG/M2 | DIASTOLIC BLOOD PRESSURE: 60 MMHG | WEIGHT: 165.81 LBS | OXYGEN SATURATION: 99 % | RESPIRATION RATE: 17 BRPM | TEMPERATURE: 98 F | HEIGHT: 62 IN

## 2022-10-28 DIAGNOSIS — E11.69 HYPERLIPIDEMIA ASSOCIATED WITH TYPE 2 DIABETES MELLITUS: ICD-10-CM

## 2022-10-28 DIAGNOSIS — E78.5 HYPERLIPIDEMIA ASSOCIATED WITH TYPE 2 DIABETES MELLITUS: ICD-10-CM

## 2022-10-28 DIAGNOSIS — E11.59 HYPERTENSION ASSOCIATED WITH DIABETES: ICD-10-CM

## 2022-10-28 DIAGNOSIS — E16.2 HYPOGLYCEMIA: ICD-10-CM

## 2022-10-28 DIAGNOSIS — Z78.0 MENOPAUSE: ICD-10-CM

## 2022-10-28 DIAGNOSIS — I15.2 HYPERTENSION ASSOCIATED WITH DIABETES: ICD-10-CM

## 2022-10-28 DIAGNOSIS — M25.541 PAIN IN JOINT OF RIGHT HAND: ICD-10-CM

## 2022-10-28 DIAGNOSIS — E08.65 DIABETES MELLITUS DUE TO UNDERLYING CONDITION WITH HYPERGLYCEMIA, WITHOUT LONG-TERM CURRENT USE OF INSULIN: Primary | ICD-10-CM

## 2022-10-28 DIAGNOSIS — I25.10 CORONARY ARTERY DISEASE INVOLVING NATIVE CORONARY ARTERY OF NATIVE HEART WITHOUT ANGINA PECTORIS: ICD-10-CM

## 2022-10-28 PROCEDURE — 99999 PR PBB SHADOW E&M-EST. PATIENT-LVL IV: CPT | Mod: PBBFAC,,, | Performed by: PHYSICIAN ASSISTANT

## 2022-10-28 PROCEDURE — 99214 OFFICE O/P EST MOD 30 MIN: CPT | Mod: S$PBB,,, | Performed by: PHYSICIAN ASSISTANT

## 2022-10-28 PROCEDURE — 99999 PR PBB SHADOW E&M-EST. PATIENT-LVL IV: ICD-10-PCS | Mod: PBBFAC,,, | Performed by: PHYSICIAN ASSISTANT

## 2022-10-28 PROCEDURE — 77080 DXA BONE DENSITY AXIAL: CPT | Mod: TC,PO

## 2022-10-28 PROCEDURE — G0008 ADMIN INFLUENZA VIRUS VAC: HCPCS | Mod: PBBFAC,PO

## 2022-10-28 PROCEDURE — 99214 PR OFFICE/OUTPT VISIT, EST, LEVL IV, 30-39 MIN: ICD-10-PCS | Mod: S$PBB,,, | Performed by: PHYSICIAN ASSISTANT

## 2022-10-28 PROCEDURE — 99214 OFFICE O/P EST MOD 30 MIN: CPT | Mod: PBBFAC,PO,25 | Performed by: PHYSICIAN ASSISTANT

## 2022-10-28 PROCEDURE — 77080 DXA BONE DENSITY AXIAL: CPT | Mod: 26,,, | Performed by: RADIOLOGY

## 2022-10-28 PROCEDURE — 77080 DEXA BONE DENSITY SPINE HIP: ICD-10-PCS | Mod: 26,,, | Performed by: RADIOLOGY

## 2022-10-28 NOTE — PROGRESS NOTES
Subjective:       Patient ID: Jessica Luna is a 72 y.o. female.    Chief Complaint: Follow-up (3 month f/u )    Ms. Luna is a 72 y.o. female with CAD, HTN, HLD, DMII, and nephrolithiasis who presents for routine follow up. Patient was switched from Januvia to Rybelsus by Dr. Guerrero a few months ago. She took Rybelsus for 2 months and then switched back to Januvia for a month due to pharmacy issues with getting the Rybelsus. Pharmacy problem resolved and she has been taking Rybelsus again for the past month with 4 refills left. She has been checking her BG 2x/day before breakfast and dinner with average <140. She reports a few times a week feeling jittery and has BG readings in the 50s-70s prior to eating. She will send glucose logs to clinic later today via [employer]. In the past she had been taking extra metformin in addition to the 1000 mg BID which was addressed by Dr. Guerrero at her last visit. She reports taking her medication as prescribed now. She denies N/V/D, dysuria, SOB, chest pain, or fevers. Followed by Urology for chronic kidney stones that are stable at this point. Due for DEXA, flu, shingles vax. Up to date on routine labs.     Review of Systems   Constitutional:  Negative for fever.   HENT:  Negative for congestion, rhinorrhea and sore throat.    Respiratory:  Negative for cough, chest tightness and shortness of breath.    Cardiovascular:  Negative for chest pain, palpitations and leg swelling.   Gastrointestinal:  Negative for abdominal pain, constipation, diarrhea, nausea and vomiting.   Genitourinary:  Negative for difficulty urinating, dysuria, flank pain and hematuria.   Musculoskeletal:  Positive for arthralgias. Negative for myalgias.        Right hand pain   Neurological:  Negative for weakness and headaches.     Objective:      Physical Exam  Constitutional:       General: She is not in acute distress.     Appearance: Normal appearance. She is normal weight. She is not  ill-appearing.   HENT:      Head: Normocephalic and atraumatic.      Mouth/Throat:      Mouth: Mucous membranes are moist.   Eyes:      Extraocular Movements: Extraocular movements intact.      Conjunctiva/sclera: Conjunctivae normal.   Cardiovascular:      Rate and Rhythm: Normal rate and regular rhythm.      Heart sounds: Normal heart sounds. No murmur heard.    No friction rub. No gallop.   Pulmonary:      Effort: Pulmonary effort is normal.      Breath sounds: Normal breath sounds.   Abdominal:      General: Abdomen is flat. Bowel sounds are normal.      Palpations: Abdomen is soft.      Tenderness: There is no abdominal tenderness.   Musculoskeletal:         General: Normal range of motion.      Cervical back: Normal range of motion.      Right lower leg: No edema.      Left lower leg: No edema.      Comments: Brace on right hand   Skin:     General: Skin is warm and dry.   Neurological:      Mental Status: She is alert.       Assessment:       1. Diabetes mellitus due to underlying condition with hyperglycemia, without long-term current use of insulin    2. Coronary artery disease involving native coronary artery of native heart without angina pectoris    3. Hypertension associated with diabetes    4. Hyperlipidemia associated with type 2 diabetes mellitus    5. Pain in joint of right hand    6. Hypoglycemia        Plan:       Jessica Rosa was seen today for follow-up.    Diagnoses and all orders for this visit:    Diabetes mellitus due to underlying condition with hyperglycemia, without long-term current use of insulin  Controlled  Continue Metformin and Rybelsus as prescribed  Stop Glimeperide. A1c has been stable and episodes of hypoglycemia should resolve after stopping this medication.   Continue checking BG 2x/day and keeping a log. Follow up in 1 month in office  Coronary artery disease involving native coronary artery of native heart without angina pectoris  Follow up cardiology   Hypertension associated  with diabetes  Controlled  Low salt diet  Continue current medication  Hyperlipidemia associated with type 2 diabetes mellitus  High fiber diet  Continue current medication  Pain in joint of right hand  Chronic  Continue home exercises  Hypoglycemia  Likely due to amaryl  Follow up in one month after stopping amaryl   Other orders  -     Influenza - Quadrivalent (Adjuvanted)  The following orders have not been finalized:  -     Cancel: influenza (HIGH-DOSE PF) vaccine 0.5 mL      Follow up in one month or sooner if needed.   Patient evaluated with PA-S. I agree with physical exam findings and documentation of PA-S.

## 2022-10-28 NOTE — PATIENT INSTRUCTIONS
Sathish Rosa,     If you are due for any health screening(s) below please notify me so we can arrange them to be ordered and scheduled to maintain your health. Most healthy patients complete it. Don't lose out on improving your health.     All of your core healthy metrics are met.

## 2022-10-31 ENCOUNTER — PATIENT MESSAGE (OUTPATIENT)
Dept: FAMILY MEDICINE | Facility: CLINIC | Age: 72
End: 2022-10-31
Payer: MEDICARE

## 2022-10-31 ENCOUNTER — TELEPHONE (OUTPATIENT)
Dept: FAMILY MEDICINE | Facility: CLINIC | Age: 72
End: 2022-10-31
Payer: MEDICARE

## 2022-10-31 NOTE — TELEPHONE ENCOUNTER
Reviewed. Continue with recommendations to stop amaryl and continue metformin and rybelsus. Follow up in one month as scheduled.

## 2022-11-07 ENCOUNTER — LAB VISIT (OUTPATIENT)
Dept: LAB | Facility: HOSPITAL | Age: 72
End: 2022-11-07
Attending: PHYSICIAN ASSISTANT
Payer: MEDICARE

## 2022-11-07 ENCOUNTER — OFFICE VISIT (OUTPATIENT)
Dept: ENDOCRINOLOGY | Facility: CLINIC | Age: 72
End: 2022-11-07
Payer: MEDICARE

## 2022-11-07 VITALS
HEART RATE: 71 BPM | HEIGHT: 62 IN | DIASTOLIC BLOOD PRESSURE: 70 MMHG | TEMPERATURE: 98 F | OXYGEN SATURATION: 95 % | SYSTOLIC BLOOD PRESSURE: 110 MMHG | BODY MASS INDEX: 29.78 KG/M2 | WEIGHT: 161.81 LBS

## 2022-11-07 DIAGNOSIS — Z78.0 POSTMENOPAUSAL: ICD-10-CM

## 2022-11-07 DIAGNOSIS — E55.9 HYPOVITAMINOSIS D: ICD-10-CM

## 2022-11-07 DIAGNOSIS — I15.2 HYPERTENSION ASSOCIATED WITH DIABETES: Primary | ICD-10-CM

## 2022-11-07 DIAGNOSIS — E78.5 HYPERLIPIDEMIA ASSOCIATED WITH TYPE 2 DIABETES MELLITUS: ICD-10-CM

## 2022-11-07 DIAGNOSIS — E11.9 TYPE 2 DIABETES MELLITUS WITHOUT COMPLICATION, WITHOUT LONG-TERM CURRENT USE OF INSULIN: ICD-10-CM

## 2022-11-07 DIAGNOSIS — E11.59 HYPERTENSION ASSOCIATED WITH DIABETES: Primary | ICD-10-CM

## 2022-11-07 DIAGNOSIS — E11.69 HYPERLIPIDEMIA ASSOCIATED WITH TYPE 2 DIABETES MELLITUS: ICD-10-CM

## 2022-11-07 LAB — 25(OH)D3+25(OH)D2 SERPL-MCNC: 48 NG/ML (ref 30–96)

## 2022-11-07 PROCEDURE — 99999 PR PBB SHADOW E&M-EST. PATIENT-LVL IV: ICD-10-PCS | Mod: PBBFAC,,, | Performed by: PHYSICIAN ASSISTANT

## 2022-11-07 PROCEDURE — 82306 VITAMIN D 25 HYDROXY: CPT | Performed by: PHYSICIAN ASSISTANT

## 2022-11-07 PROCEDURE — 36415 COLL VENOUS BLD VENIPUNCTURE: CPT | Mod: PO | Performed by: PHYSICIAN ASSISTANT

## 2022-11-07 PROCEDURE — 83036 HEMOGLOBIN GLYCOSYLATED A1C: CPT | Performed by: PHYSICIAN ASSISTANT

## 2022-11-07 PROCEDURE — 99214 PR OFFICE/OUTPT VISIT, EST, LEVL IV, 30-39 MIN: ICD-10-PCS | Mod: S$PBB,,, | Performed by: PHYSICIAN ASSISTANT

## 2022-11-07 PROCEDURE — 99214 OFFICE O/P EST MOD 30 MIN: CPT | Mod: S$PBB,,, | Performed by: PHYSICIAN ASSISTANT

## 2022-11-07 PROCEDURE — 99214 OFFICE O/P EST MOD 30 MIN: CPT | Mod: PBBFAC,PO | Performed by: PHYSICIAN ASSISTANT

## 2022-11-07 PROCEDURE — 99999 PR PBB SHADOW E&M-EST. PATIENT-LVL IV: CPT | Mod: PBBFAC,,, | Performed by: PHYSICIAN ASSISTANT

## 2022-11-07 NOTE — PROGRESS NOTES
"CC: This 72 y.o. female presents for management of T2DM  and chronic conditions pending review including HTN, HLP    HPI: was diagnosed with T2DM in  on lab work. New to endocrine.  Has never been hospitalized r/t DM.  Family hx of DM: brother  Fhx of thyroid disease:none  Denies missing doses of DM medication.   hypoglycemia at home: none  monitoring BG at home:  Fastin-140s    Diet:   BF-coffee, eggs, toast, boiled potatoes  LH-skipped  DN-stefania  Snacks grapes or a banana.  Avoids sugary beverages.     Exercise: Walking in her house    CURRENT DM MEDS: rybelsus 7 mg qd, Metformin 1000 mg bid    Standards of Care:  Eye exam:    Podiatry exam:  Dr. SCHMIDT: 10/22 JEVON Parker    DEXA scan: 10/22 osteopenia. Taking ca and vd (started one week ago). She does stretching exercises with her arms.   PMHx, PSHx: reviewed in epic.  Social Hx: no E/T use.    Wt Readings from Last 6 Encounters:   22 73.4 kg (161 lb 13.1 oz)   10/28/22 75.2 kg (165 lb 12.6 oz)   10/19/22 76.1 kg (167 lb 10.6 oz)   09/15/22 75.3 kg (166 lb)   22 78.8 kg (173 lb 11.6 oz)   22 78.8 kg (173 lb 11.6 oz)      ROS:   Gen: Appetite good, + wt loss (12 lbs since ) denies fatigue and weakness.  Skin: Skin is intact and heals well, no rashes, no hair changes  Eyes: Denies visual disturbances  Resp: no SOB or CHARLES, no cough  Cardiac: No palpitations, chest pain, no edema   GI: No nausea or vomiting, diarrhea, constipation, or abdominal pain.  /GYN: No nocturia, burning or pain.   MS/Neuro: Denies numbness/ tingling in BLE; Gait steady, speech clear  Psych: Denies drug/ETOH abuse, no hx of depression.  Other systems: negative.    /70 (BP Location: Left arm, Patient Position: Sitting, BP Method: Small (Manual))   Pulse 71   Temp 97.8 °F (36.6 °C) (Oral)   Ht 5' 2" (1.575 m)   Wt 73.4 kg (161 lb 13.1 oz)   SpO2 95%   BMI 29.60 kg/m²      PE:  GENERAL: elderly female, Well developed, well nourished.  PSYCH: " AAOx3, appropriate mood and affect, pleasant expression, conversant, appears relaxed, well groomed.   EYES: Conjunctiva, corneas clear  NECK: Supple, trachea midline,no thyromegaly or nodules  CHEST: Resp even and unlabored, CTA bilateral.  CARDIAC: RRR, S1, S2 heard, no murmurs  ABDOMEN: Soft, non-tender, non-distended   VASCULAR: DP pulses +2/4 bilaterally, no edema.  NEURO: Gait steady  SKIN: Skin warm and dry no acanthosis nigracans.    Personally reviewed labs below:    Office Visit on 10/19/2022   Component Date Value Ref Range Status    Color, POC UA 10/19/2022 Yellow  Yellow, Straw, Colorless Final    Clarity, POC UA 10/19/2022 Clear  Clear Final    Glucose, POC UA 10/19/2022 Negative  Negative Final    Bilirubin, POC UA 10/19/2022 Negative  Negative Final    Ketones, POC UA 10/19/2022 Negative  Negative Final    Spec Grav POC UA 10/19/2022 1.025  1.005 - 1.030 Final    Blood, POC UA 10/19/2022 Negative  Negative Final    pH, POC UA 10/19/2022 5.5  5.0 - 8.0 Final    Protein, POC UA 10/19/2022 Negative  Negative Final    Urobilinogen, POC UA 10/19/2022 0.2  <=1.0 Final    Nitrite, POC UA 10/19/2022 Negative  Negative Final    WBC, POC UA 10/19/2022 Negative  Negative Final         ASSESSMENT and PLAN:    1. Hypertension associated with diabetes        2. Type 2 diabetes mellitus without complication, without long-term current use of insulin  Ambulatory referral/consult to Endocrinology    Lipid Panel    Comprehensive Metabolic Panel    Hemoglobin A1C    TSH    Hemoglobin A1C      3. Hyperlipidemia associated with type 2 diabetes mellitus        4. Postmenopausal        5. Hypovitaminosis D  Vitamin D         T2DM with hyperglycemia-A1c today. Continue Rybelsus and Metformin. discussed DM, progression of disease, long term complications, tx options.   Discussed A1c and BG goals.   Reviewed  hypoglycemia, s/s and appropriate tx.   Instructed to monitor BG and bring meter/ log to every clinic visit.   - takes  ASA, ARB, statin    HTN - controlled, continue meds as previously prescribed and monitor.     HLP - LDL , on statin therapy, LFTs WNL  Postmenopausal-DEXA scan 10/24  Hypovitaminosis d-stable-check vitamin D     Follow-Up   A1c, vd today  F/u in 4 mths w/ labs prior

## 2022-11-08 LAB
ESTIMATED AVG GLUCOSE: 108 MG/DL (ref 68–131)
HBA1C MFR BLD: 5.4 % (ref 4–5.6)

## 2022-12-02 ENCOUNTER — OFFICE VISIT (OUTPATIENT)
Dept: FAMILY MEDICINE | Facility: CLINIC | Age: 72
End: 2022-12-02
Payer: MEDICARE

## 2022-12-02 VITALS
HEIGHT: 62 IN | RESPIRATION RATE: 16 BRPM | TEMPERATURE: 98 F | DIASTOLIC BLOOD PRESSURE: 64 MMHG | OXYGEN SATURATION: 96 % | WEIGHT: 156.31 LBS | SYSTOLIC BLOOD PRESSURE: 120 MMHG | BODY MASS INDEX: 28.76 KG/M2 | HEART RATE: 62 BPM

## 2022-12-02 DIAGNOSIS — E11.69 HYPERLIPIDEMIA ASSOCIATED WITH TYPE 2 DIABETES MELLITUS: ICD-10-CM

## 2022-12-02 DIAGNOSIS — I15.2 HYPERTENSION ASSOCIATED WITH DIABETES: ICD-10-CM

## 2022-12-02 DIAGNOSIS — E78.5 HYPERLIPIDEMIA ASSOCIATED WITH TYPE 2 DIABETES MELLITUS: ICD-10-CM

## 2022-12-02 DIAGNOSIS — E16.2 HYPOGLYCEMIA: ICD-10-CM

## 2022-12-02 DIAGNOSIS — E11.65 TYPE 2 DIABETES MELLITUS WITH HYPERGLYCEMIA, WITHOUT LONG-TERM CURRENT USE OF INSULIN: Primary | ICD-10-CM

## 2022-12-02 DIAGNOSIS — E11.59 HYPERTENSION ASSOCIATED WITH DIABETES: ICD-10-CM

## 2022-12-02 PROCEDURE — 99214 OFFICE O/P EST MOD 30 MIN: CPT | Mod: PBBFAC,PO | Performed by: PHYSICIAN ASSISTANT

## 2022-12-02 PROCEDURE — 99999 PR PBB SHADOW E&M-EST. PATIENT-LVL IV: CPT | Mod: PBBFAC,,, | Performed by: PHYSICIAN ASSISTANT

## 2022-12-02 PROCEDURE — 99214 PR OFFICE/OUTPT VISIT, EST, LEVL IV, 30-39 MIN: ICD-10-PCS | Mod: S$PBB,,, | Performed by: PHYSICIAN ASSISTANT

## 2022-12-02 PROCEDURE — 99999 PR PBB SHADOW E&M-EST. PATIENT-LVL IV: ICD-10-PCS | Mod: PBBFAC,,, | Performed by: PHYSICIAN ASSISTANT

## 2022-12-02 PROCEDURE — 99214 OFFICE O/P EST MOD 30 MIN: CPT | Mod: S$PBB,,, | Performed by: PHYSICIAN ASSISTANT

## 2022-12-02 NOTE — PROGRESS NOTES
Subjective:       Patient ID: Jessica Luna is a 72 y.o. female.    Chief Complaint: Follow-up (1 month f/u )    Ms. Luna is a 72 year old female with DM and HTN. She is here today to review blood sugars since stopping glimepiride. The patient has had no low blood sugars as she was previously but she is having some higher readings. She is tolerating metformin and rybelsus well at this time. The patient has no complaints today and reports overall she is feeling well.     Review of patient's allergies indicates:   Allergen Reactions    No known drug allergies          Current Outpatient Medications:     acetaminophen (TYLENOL) 500 MG tablet, Take 500 mg by mouth every 6 (six) hours as needed for Pain., Disp: , Rfl:     allopurinoL (ZYLOPRIM) 100 MG tablet, Take 1 tablet (100 mg total) by mouth once daily., Disp: 90 tablet, Rfl: 4    atorvastatin (LIPITOR) 40 MG tablet, Take 1 tablet (40 mg total) by mouth once daily., Disp: 90 tablet, Rfl: 3    magnesium oxide (MAG-OX) 250 mg magnesium Tab, Take 0.5 tablets by mouth once daily., Disp: , Rfl:     metFORMIN (GLUCOPHAGE) 1000 MG tablet, Take 1 tablet (1,000 mg total) by mouth 2 (two) times daily with meals., Disp: 60 tablet, Rfl: 3    metoprolol tartrate (LOPRESSOR) 25 MG tablet, Take 1 tablet (25 mg total) by mouth 2 (two) times daily., Disp: 180 tablet, Rfl: 3    olmesartan (BENICAR) 40 MG tablet, Take 1 tablet (40 mg total) by mouth once daily., Disp: 90 tablet, Rfl: 3    omega-3 fatty acids-vitamin E 1,000-5 mg-unit Cap, Take 1 capsule by mouth 2 (two) times daily. Two times a day, Disp: , Rfl:     potassium citrate (UROCIT-K 15) 15 mEq TbSR, TAKE ONE TABLET BY MOUTH TWICE DAILY, Disp: 60 tablet, Rfl: 11    aspirin 81 MG Chew, Take 1 tablet (81 mg total) by mouth once daily., Disp: 90 tablet, Rfl: 1    semaglutide (RYBELSUS) 14 mg tablet, Take 1 tablet (14 mg total) by mouth once daily., Disp: 30 tablet, Rfl: 5    Lab Results   Component Value Date    WBC  8.24 04/01/2022    HGB 12.2 04/01/2022    HCT 38.9 04/01/2022     04/01/2022    CHOL 133 04/01/2022    TRIG 175 (H) 04/01/2022    HDL 40 04/01/2022    ALT 7 (L) 04/01/2022    AST 14 04/01/2022     10/04/2022    K 5.0 10/04/2022     10/04/2022    CREATININE 1.0 10/04/2022    BUN 28 (H) 10/04/2022    CO2 24 10/04/2022    TSH 1.095 05/27/2015    INR 1.0 09/12/2019    GLUF 642 (HH) 01/03/2015    HGBA1C 5.4 11/07/2022       Review of Systems   Constitutional:  Negative for activity change, appetite change and fever.   HENT:  Negative for postnasal drip, rhinorrhea and sinus pressure.    Eyes:  Negative for visual disturbance.   Respiratory:  Negative for cough and shortness of breath.    Cardiovascular:  Negative for chest pain.   Gastrointestinal:  Negative for abdominal distention and abdominal pain.   Genitourinary:  Negative for difficulty urinating and dysuria.   Musculoskeletal:  Negative for arthralgias and myalgias.   Neurological:  Negative for headaches.   Hematological:  Negative for adenopathy.   Psychiatric/Behavioral:  The patient is not nervous/anxious.      Objective:      Physical Exam  Constitutional:       Appearance: Normal appearance.   HENT:      Head: Normocephalic and atraumatic.   Eyes:      Conjunctiva/sclera: Conjunctivae normal.   Cardiovascular:      Rate and Rhythm: Normal rate and regular rhythm.   Pulmonary:      Effort: Pulmonary effort is normal. No respiratory distress.      Breath sounds: Normal breath sounds. No wheezing.   Abdominal:      General: There is no distension.      Palpations: There is no mass.      Tenderness: There is no abdominal tenderness.   Musculoskeletal:      Right lower leg: No edema.      Left lower leg: No edema.   Skin:     Findings: No erythema.   Neurological:      Mental Status: She is alert and oriented to person, place, and time.   Psychiatric:         Behavior: Behavior normal.       Assessment:       1. Type 2 diabetes mellitus with  hyperglycemia, without long-term current use of insulin    2. Hypoglycemia    3. Hypertension associated with diabetes    4. Hyperlipidemia associated with type 2 diabetes mellitus          Plan:   Jessica Rosa was seen today for follow-up.    Diagnoses and all orders for this visit:    Type 2 diabetes mellitus with hyperglycemia, without long-term current use of insulin  -     semaglutide (RYBELSUS) 14 mg tablet; Take 1 tablet (14 mg total) by mouth once daily.  Increase rybelsus  Continue metformin  Monitor sugars x 1 month  Follow up in one month.   Hypoglycemia  Resolved since stopping glimepiride   Hypertension associated with diabetes  Controlled  Low salt diet  Continue current medication  Hyperlipidemia associated with type 2 diabetes mellitus  High fiber diet  Continue current medication

## 2022-12-14 ENCOUNTER — PATIENT MESSAGE (OUTPATIENT)
Dept: ENDOCRINOLOGY | Facility: CLINIC | Age: 72
End: 2022-12-14
Payer: MEDICARE

## 2022-12-14 DIAGNOSIS — E11.9 TYPE 2 DIABETES MELLITUS WITHOUT COMPLICATION, WITHOUT LONG-TERM CURRENT USE OF INSULIN: ICD-10-CM

## 2022-12-14 RX ORDER — METFORMIN HYDROCHLORIDE 1000 MG/1
1000 TABLET ORAL
Qty: 180 TABLET | Refills: 3 | Status: SHIPPED | OUTPATIENT
Start: 2022-12-14 | End: 2023-09-19 | Stop reason: SDUPTHER

## 2022-12-15 ENCOUNTER — TELEPHONE (OUTPATIENT)
Dept: ENDOCRINOLOGY | Facility: CLINIC | Age: 72
End: 2022-12-15
Payer: MEDICARE

## 2022-12-15 NOTE — TELEPHONE ENCOUNTER
----- Message from Cecily Munguia sent at 12/15/2022 10:56 AM CST -----  Contact: pharm  Type: Needs Medical Advice         Who Called:pharm  Best Call Back Number:-  Additional Information: Requesting a call back regarding  they need office to call about rx metFORMIN (GLUCOPHAGE) 1000 MG tablet. Pt was taking 2x day not rx is for 1x day . They need to make sure that is correct.     [id number] - [place] - [address]  [address] [address]  Phone: [phone] Fax: [phone]      Please Advise- Thank you

## 2022-12-15 NOTE — TELEPHONE ENCOUNTER
Spoke with Shannan with [facility] , verified patient will take Metformin 1,000 mg qd, patient also notified.

## 2022-12-30 ENCOUNTER — TELEPHONE (OUTPATIENT)
Dept: PHARMACY | Facility: CLINIC | Age: 72
End: 2022-12-30
Payer: MEDICARE

## 2022-12-30 NOTE — TELEPHONE ENCOUNTER
Jessica Luna has provided the necessary documents today to begin the enrollment process into the [employer] program. The prescription portion of the application has been sent to the providers office for approval and signature.

## 2022-12-30 NOTE — TELEPHONE ENCOUNTER
Hello,       A Patient Assistance Application for Jessica Luna - MRN  [id number] was faxed to your office @[phone]. Please have Dr. Skip Guerrero review the application to ensure the prescription is correct. If correct, sign and fax the application back to the Pharmacy Patient Assistance Team @[phone].      If changes need to be made to this application, please let me know so corrections can be made, and the application will be faxed back to you for approval and signature.         Thank you,    Keli Bravo

## 2023-01-03 ENCOUNTER — OFFICE VISIT (OUTPATIENT)
Dept: FAMILY MEDICINE | Facility: CLINIC | Age: 73
End: 2023-01-03
Payer: MEDICARE

## 2023-01-03 VITALS
SYSTOLIC BLOOD PRESSURE: 110 MMHG | RESPIRATION RATE: 16 BRPM | BODY MASS INDEX: 27.83 KG/M2 | HEIGHT: 62 IN | HEART RATE: 64 BPM | TEMPERATURE: 98 F | OXYGEN SATURATION: 99 % | DIASTOLIC BLOOD PRESSURE: 60 MMHG | WEIGHT: 151.25 LBS

## 2023-01-03 DIAGNOSIS — I15.2 HYPERTENSION ASSOCIATED WITH DIABETES: Primary | ICD-10-CM

## 2023-01-03 DIAGNOSIS — E11.69 HYPERLIPIDEMIA ASSOCIATED WITH TYPE 2 DIABETES MELLITUS: ICD-10-CM

## 2023-01-03 DIAGNOSIS — E78.5 HYPERLIPIDEMIA ASSOCIATED WITH TYPE 2 DIABETES MELLITUS: ICD-10-CM

## 2023-01-03 DIAGNOSIS — E08.65 DIABETES MELLITUS DUE TO UNDERLYING CONDITION WITH HYPERGLYCEMIA, WITHOUT LONG-TERM CURRENT USE OF INSULIN: ICD-10-CM

## 2023-01-03 DIAGNOSIS — E11.59 HYPERTENSION ASSOCIATED WITH DIABETES: Primary | ICD-10-CM

## 2023-01-03 PROCEDURE — 99214 OFFICE O/P EST MOD 30 MIN: CPT | Mod: PBBFAC,PO | Performed by: PHYSICIAN ASSISTANT

## 2023-01-03 PROCEDURE — 99214 PR OFFICE/OUTPT VISIT, EST, LEVL IV, 30-39 MIN: ICD-10-PCS | Mod: S$PBB,,, | Performed by: PHYSICIAN ASSISTANT

## 2023-01-03 PROCEDURE — 99999 PR PBB SHADOW E&M-EST. PATIENT-LVL IV: ICD-10-PCS | Mod: PBBFAC,,, | Performed by: PHYSICIAN ASSISTANT

## 2023-01-03 PROCEDURE — 99999 PR PBB SHADOW E&M-EST. PATIENT-LVL IV: CPT | Mod: PBBFAC,,, | Performed by: PHYSICIAN ASSISTANT

## 2023-01-03 PROCEDURE — 99214 OFFICE O/P EST MOD 30 MIN: CPT | Mod: S$PBB,,, | Performed by: PHYSICIAN ASSISTANT

## 2023-01-03 NOTE — PROGRESS NOTES
Subjective:       Patient ID: Jessica Luna is a 72 y.o. female.    Chief Complaint: Follow-up    Ms. Luna is a 72 year old female with DM, HTN, and HLD. She was here today for 1 month follow up of blood sugars; she does not bring log today. The patient's metformin was recently decreased to 1000mg daily. She continues to take rybelsus. Glimepiride was discontinued due to low blood sugars. She does admit that her sugars have been running a little higher over the last few months due to holidays and also her  was hospitalized for 2 weeks prior to [place] so she was eating hospital food/ [employer] food.     Review of patient's allergies indicates:   Allergen Reactions    No known drug allergies          Current Outpatient Medications:     acetaminophen (TYLENOL) 500 MG tablet, Take 500 mg by mouth every 6 (six) hours as needed for Pain., Disp: , Rfl:     allopurinoL (ZYLOPRIM) 100 MG tablet, Take 1 tablet (100 mg total) by mouth once daily., Disp: 90 tablet, Rfl: 4    atorvastatin (LIPITOR) 40 MG tablet, Take 1 tablet (40 mg total) by mouth once daily., Disp: 90 tablet, Rfl: 3    magnesium oxide (MAG-OX) 250 mg magnesium Tab, Take 0.5 tablets by mouth once daily., Disp: , Rfl:     metFORMIN (GLUCOPHAGE) 1000 MG tablet, Take 1 tablet (1,000 mg total) by mouth daily with breakfast., Disp: 180 tablet, Rfl: 3    metoprolol tartrate (LOPRESSOR) 25 MG tablet, Take 1 tablet (25 mg total) by mouth 2 (two) times daily., Disp: 180 tablet, Rfl: 3    olmesartan (BENICAR) 40 MG tablet, Take 1 tablet (40 mg total) by mouth once daily., Disp: 90 tablet, Rfl: 3    omega-3 fatty acids-vitamin E 1,000-5 mg-unit Cap, Take 1 capsule by mouth 2 (two) times daily. Two times a day, Disp: , Rfl:     potassium citrate (UROCIT-K 15) 15 mEq TbSR, TAKE ONE TABLET BY MOUTH TWICE DAILY, Disp: 60 tablet, Rfl: 11    semaglutide (RYBELSUS) 14 mg tablet, Take 1 tablet (14 mg total) by mouth once daily., Disp: 30 tablet, Rfl: 5     aspirin 81 MG Chew, Take 1 tablet (81 mg total) by mouth once daily., Disp: 90 tablet, Rfl: 1    Lab Results   Component Value Date    WBC 8.24 04/01/2022    HGB 12.2 04/01/2022    HCT 38.9 04/01/2022     04/01/2022    CHOL 133 04/01/2022    TRIG 175 (H) 04/01/2022    HDL 40 04/01/2022    ALT 7 (L) 04/01/2022    AST 14 04/01/2022     10/04/2022    K 5.0 10/04/2022     10/04/2022    CREATININE 1.0 10/04/2022    BUN 28 (H) 10/04/2022    CO2 24 10/04/2022    TSH 1.095 05/27/2015    INR 1.0 09/12/2019    GLUF 642 (HH) 01/03/2015    HGBA1C 5.4 11/07/2022       Review of Systems   Constitutional:  Negative for activity change, appetite change and fever.   HENT:  Negative for postnasal drip, rhinorrhea and sinus pressure.    Eyes:  Negative for visual disturbance.   Respiratory:  Negative for cough and shortness of breath.    Cardiovascular:  Negative for chest pain.   Gastrointestinal:  Negative for abdominal distention and abdominal pain.   Genitourinary:  Negative for difficulty urinating and dysuria.   Musculoskeletal:  Negative for arthralgias and myalgias.   Neurological:  Negative for headaches.   Hematological:  Negative for adenopathy.   Psychiatric/Behavioral:  The patient is not nervous/anxious.      Objective:      Physical Exam  Constitutional:       Appearance: Normal appearance.   HENT:      Head: Normocephalic and atraumatic.   Eyes:      Conjunctiva/sclera: Conjunctivae normal.   Cardiovascular:      Rate and Rhythm: Normal rate and regular rhythm.   Pulmonary:      Effort: Pulmonary effort is normal. No respiratory distress.      Breath sounds: Normal breath sounds. No wheezing.   Abdominal:      General: There is no distension.      Palpations: There is no mass.      Tenderness: There is no abdominal tenderness.   Musculoskeletal:      Right lower leg: No edema.      Left lower leg: No edema.   Lymphadenopathy:      Cervical: No cervical adenopathy.   Skin:     Findings: No erythema.    Neurological:      Mental Status: She is alert and oriented to person, place, and time.   Psychiatric:         Behavior: Behavior normal.       Assessment:       1. Hypertension associated with diabetes    2. Hyperlipidemia associated with type 2 diabetes mellitus    3. Diabetes mellitus due to underlying condition with hyperglycemia, without long-term current use of insulin          Plan:   Jessica Rosa was seen today for follow-up.    Diagnoses and all orders for this visit:    Hypertension associated with diabetes  Controlled  Low salt diet  Continue current medication  Hyperlipidemia associated with type 2 diabetes mellitus  High fiber diet  Continue current medication  Diabetes mellitus due to underlying condition with hyperglycemia, without long-term current use of insulin  Diabetic diet  Continue current medications  Follow up as scheduled with PCP; bring blood sugar log  Follow up with endocrinology as scheduled.

## 2023-01-13 DIAGNOSIS — E11.65 TYPE 2 DIABETES MELLITUS WITH HYPERGLYCEMIA, WITHOUT LONG-TERM CURRENT USE OF INSULIN: ICD-10-CM

## 2023-01-13 NOTE — TELEPHONE ENCOUNTER
No new care gaps identified.  [facility] Embedded Care Gaps. Reference number: [id number]. 1/13/2023   1:54:06 PM CST

## 2023-01-13 NOTE — TELEPHONE ENCOUNTER
----- Message from Ann Gaines sent at 1/13/2023  1:17 PM CST -----  Type:  RX Refill Request    Who Called:  Pt     Refill or New Rx: Refill     RX Name and Strength:semaglutide (RYBELSUS) 14 mg tablet    How is the patient currently taking it? Sig - Route: Take 1 tablet (14 mg total) by mouth once daily. - Oral    Is this a 30 day or 90 day RX:    Preferred Pharmacy with phone number:[id number] - [place], [place] - [address]    Local or Mail Order:local     Ordering Provider: Cesar     Would the patient rather a call back or a response via [facility]?  Call back     Best Call Back Number:  (akedfl). [phone]    Additional Information:

## 2023-01-18 ENCOUNTER — PATIENT MESSAGE (OUTPATIENT)
Dept: FAMILY MEDICINE | Facility: CLINIC | Age: 73
End: 2023-01-18
Payer: MEDICARE

## 2023-01-26 NOTE — TELEPHONE ENCOUNTER
I have informed Jessica Luna she has been approved in the [employer] Patient Assistance Program through 12/31/23. A 120 day supply of Rybelsus will be shipped to [facility] in 10-14 business days. Patient has 3 refills and enrolled in auto-refills.  Updated proof of eligibility is required to re-enroll for 2024 calendar year.

## 2023-02-01 DIAGNOSIS — E11.9 TYPE 2 DIABETES MELLITUS WITHOUT COMPLICATION, UNSPECIFIED WHETHER LONG TERM INSULIN USE: ICD-10-CM

## 2023-02-06 ENCOUNTER — PATIENT MESSAGE (OUTPATIENT)
Dept: ADMINISTRATIVE | Facility: HOSPITAL | Age: 73
End: 2023-02-06
Payer: MEDICARE

## 2023-02-16 ENCOUNTER — CLINICAL SUPPORT (OUTPATIENT)
Dept: FAMILY MEDICINE | Facility: CLINIC | Age: 73
End: 2023-02-16
Attending: STUDENT IN AN ORGANIZED HEALTH CARE EDUCATION/TRAINING PROGRAM
Payer: MEDICARE

## 2023-02-16 ENCOUNTER — OFFICE VISIT (OUTPATIENT)
Dept: FAMILY MEDICINE | Facility: CLINIC | Age: 73
End: 2023-02-16
Payer: MEDICARE

## 2023-02-16 VITALS
DIASTOLIC BLOOD PRESSURE: 58 MMHG | OXYGEN SATURATION: 100 % | SYSTOLIC BLOOD PRESSURE: 110 MMHG | BODY MASS INDEX: 27.18 KG/M2 | WEIGHT: 147.69 LBS | TEMPERATURE: 98 F | HEIGHT: 62 IN | HEART RATE: 67 BPM | RESPIRATION RATE: 16 BRPM

## 2023-02-16 DIAGNOSIS — E11.69 HYPERLIPIDEMIA ASSOCIATED WITH TYPE 2 DIABETES MELLITUS: ICD-10-CM

## 2023-02-16 DIAGNOSIS — I70.0 AORTIC ATHEROSCLEROSIS: ICD-10-CM

## 2023-02-16 DIAGNOSIS — E08.65 DIABETES MELLITUS DUE TO UNDERLYING CONDITION WITH HYPERGLYCEMIA, WITHOUT LONG-TERM CURRENT USE OF INSULIN: ICD-10-CM

## 2023-02-16 DIAGNOSIS — I15.2 HYPERTENSION ASSOCIATED WITH DIABETES: ICD-10-CM

## 2023-02-16 DIAGNOSIS — E83.42 HYPOMAGNESEMIA: ICD-10-CM

## 2023-02-16 DIAGNOSIS — E11.59 HYPERTENSION ASSOCIATED WITH DIABETES: ICD-10-CM

## 2023-02-16 DIAGNOSIS — E78.5 HYPERLIPIDEMIA ASSOCIATED WITH TYPE 2 DIABETES MELLITUS: ICD-10-CM

## 2023-02-16 DIAGNOSIS — Z12.31 ENCOUNTER FOR SCREENING MAMMOGRAM FOR MALIGNANT NEOPLASM OF BREAST: ICD-10-CM

## 2023-02-16 DIAGNOSIS — Z00.00 HEALTHCARE MAINTENANCE: Primary | ICD-10-CM

## 2023-02-16 PROCEDURE — 99999 PR PBB SHADOW E&M-EST. PATIENT-LVL IV: ICD-10-PCS | Mod: PBBFAC,,, | Performed by: STUDENT IN AN ORGANIZED HEALTH CARE EDUCATION/TRAINING PROGRAM

## 2023-02-16 PROCEDURE — 99214 OFFICE O/P EST MOD 30 MIN: CPT | Mod: PBBFAC,PO | Performed by: STUDENT IN AN ORGANIZED HEALTH CARE EDUCATION/TRAINING PROGRAM

## 2023-02-16 PROCEDURE — 92228 IMG RTA DETC/MNTR DS PHY/QHP: CPT | Mod: PBBFAC,PO

## 2023-02-16 PROCEDURE — 92228 IMG RTA DETC/MNTR DS PHY/QHP: CPT | Mod: 26,S$PBB,, | Performed by: OPTOMETRIST

## 2023-02-16 PROCEDURE — 99214 OFFICE O/P EST MOD 30 MIN: CPT | Mod: S$PBB,,, | Performed by: STUDENT IN AN ORGANIZED HEALTH CARE EDUCATION/TRAINING PROGRAM

## 2023-02-16 PROCEDURE — 99999 PR PBB SHADOW E&M-EST. PATIENT-LVL IV: CPT | Mod: PBBFAC,,, | Performed by: STUDENT IN AN ORGANIZED HEALTH CARE EDUCATION/TRAINING PROGRAM

## 2023-02-16 PROCEDURE — 92228 DIABETIC EYE SCREENING PHOTO: ICD-10-PCS | Mod: 26,S$PBB,, | Performed by: OPTOMETRIST

## 2023-02-16 PROCEDURE — 99214 PR OFFICE/OUTPT VISIT, EST, LEVL IV, 30-39 MIN: ICD-10-PCS | Mod: S$PBB,,, | Performed by: STUDENT IN AN ORGANIZED HEALTH CARE EDUCATION/TRAINING PROGRAM

## 2023-02-16 NOTE — PROGRESS NOTES
[facility] Note    Subjective:    The HPI and pertinent ROS is included in the Diagnostic Impression Remarks section at the end of the note. Please see below for further details. Chief complaint is at end of note.     Jessica is a pleasant intelligent patient who is here for evaluation.     Modified Medications    No medications on file       Data reviewed 274}  Previous medical records reviewed and summarized in plan section at end of note.      If you are due for any health screening(s) below please notify me so we can arrange them to be ordered and scheduled. Most healthy patients at your age complete them, but you are free to accept or refuse. If you can't do it, I'll definitely understand. If you can, I'd certainly appreciate it!     Tests to Keep You Healthy    Mammogram: Met on 2022  Eye Exam: ORDERED BUT NOT SCHEDULED  Colon Cancer Screening: Met on 10/3/2019  Last Blood Pressure <= 139/89 (2023): Yes  Last HbA1c < 8 (2022): Yes      The following portions of the patient's history were reviewed and updated as appropriate: allergies, current medications, past family history, past medical history, past social history, past surgical history and problem list.    She  has a past medical history of Anticoagulant long-term use, Arthritis, CAD (coronary artery disease), Diabetes mellitus, Diabetes mellitus type II, Diverticulosis, Hyperlipidemia, Hypertension, Low magnesium levels, Myocardial infarction, Obstructive uropathy (10/16/2015), Renal stone, Status post cystoscopy - Left laser percutaneous nephrolithotripsy (2016), Ureterolithiasis Obstructive-left (1/3/2015), Wears dentures, Wears dentures, Wears glasses, and Wears glasses.  She  has a past surgical history that includes Coronary stent placement (); Rotator cuff repair; Cholecystectomy;  section; Hysterectomy; Colonoscopy (N/A, 10/7/2015); Cystoscopy; ureteroscopy (12/15/15); Cystoscopy w/ laser lithotripsy  (12/15/15); Percutaneous nephrolithotripsy (06/28/2016); Laser lithotripsy (Left, 9/19/2019); Ureteroscopic removal of ureteric calculus (Left, 9/19/2019); Ureteral stent placement (Left, 9/19/2019); Cystoscopy w/ retrogrades (Bilateral, 9/19/2019); Cystoscopy w/ ureteral stent removal (Left, 10/1/2019); Colonoscopy (N/A, 10/3/2019); Oophorectomy; Arthroscopic repair of rotator cuff of shoulder (Right, 4/8/2021); Arthroscopic tenotomy of biceps tendon (Right, 4/8/2021); and Arthroscopy of shoulder with decompression of subacromial space (Right, 4/8/2021).    She  reports that she has never smoked. She has never been exposed to tobacco smoke. She has never used smokeless tobacco. She reports that she does not drink alcohol and does not use drugs.  She family history includes Breast cancer (age of onset: 59) in her sister; Breast cancer (age of onset: 60) in her mother; Cancer in her mother and sister; Diabetes in her brother; Heart disease (age of onset: 90) in her father; Kidney disease in her brother; Spina bifida in her brother.    Review of patient's allergies indicates:   Allergen Reactions    No known drug allergies        Tobacco Use: Low Risk     Smoking Tobacco Use: Never    Smokeless Tobacco Use: Never    Passive Exposure: Never     Physical Examination  General appearance: alert, cooperative, no distress  Neck: no thyromegaly, no neck stiffness  Lungs: clear to auscultation, no wheezes, rales or rhonchi, symmetric air entry  Heart: normal rate, regular rhythm, normal S1, S2, no murmurs, rubs, clicks or gallops  Abdomen: soft, nontender, nondistended, no rigidity, rebound, or guarding.   Back: no point tenderness over spine  Extremities: peripheral pulses normal, no unilateral leg swelling or calf tenderness   Neurological:alert, oriented, normal speech, no new focal findings or movement disorder noted from baseline    BP Readings from Last 3 Encounters:   02/16/23 (!) 110/58   01/03/23 110/60   12/02/22  "120/64     Wt Readings from Last 3 Encounters:   02/16/23 67 kg (147 lb 11.3 oz)   01/03/23 68.6 kg (151 lb 3.8 oz)   12/02/22 70.9 kg (156 lb 4.9 oz)     BP (!) 110/58 (BP Location: Left arm, Patient Position: Sitting, BP Method: Large (Manual))   Pulse 67   Temp 98.2 °F (36.8 °C) (Oral)   Resp 16   Ht 5' 2" (1.575 m)   Wt 67 kg (147 lb 11.3 oz)   SpO2 100%   BMI 27.02 kg/m²    274}  Laboratory: I have reviewed old labs below:    274}    Lab Results   Component Value Date    WBC 8.24 04/01/2022    HGB 12.2 04/01/2022    HCT 38.9 04/01/2022    MCV 89 04/01/2022     04/01/2022     10/04/2022    K 5.0 10/04/2022     10/04/2022    CALCIUM 10.2 10/04/2022    PHOS 3.1 10/17/2015    CO2 24 10/04/2022     (H) 10/04/2022    BUN 28 (H) 10/04/2022    CREATININE 1.0 10/04/2022    ANIONGAP 10 10/04/2022    PROT 7.1 04/01/2022    ALBUMIN 3.8 04/01/2022    BILITOT 0.4 04/01/2022    ALKPHOS 114 04/01/2022    ALT 7 (L) 04/01/2022    AST 14 04/01/2022    INR 1.0 09/12/2019    CHOL 133 04/01/2022    TRIG 175 (H) 04/01/2022    HDL 40 04/01/2022    LDLCALC 58.0 (L) 04/01/2022    TSH 1.095 05/27/2015    GLUF 642 (HH) 01/03/2015    HGBA1C 5.4 11/07/2022     Lab reviewed by me: Particular labs of significance that I will monitor, workup, or treat to improve are mentioned below in diagnostic impression remarks.    Imaging/EKG: I have reviewed the pertinent results and my findings are noted in remarks.  274}    CC:   Chief Complaint   Patient presents with    Follow-up    Diabetes    Hypertension        274}    Assessment/Plan  Jessica Luna is a 72 y.o. female who presents to clinic with:  1. Healthcare maintenance    2. Diabetes mellitus due to underlying condition with hyperglycemia, without long-term current use of insulin    3. Hyperlipidemia associated with type 2 diabetes mellitus    4. Hypertension associated with diabetes    5. Aortic atherosclerosis       274}  Diagnostic Impression Remarks + " "HPI     Documentation entered by me for this encounter may have been done in part using speech-recognition technology. Although I have made an effort to ensure accuracy, "sound like" errors may exist and should be interpreted in context.     Preventive-has eye exam in march [facility], [id number]  Diabetes needs improvement in her pre meal glucose has been 140s and 150s and she was off semaglutide but is now in the 14 mg daily past few months.  She was taking off sulf for rhinorrhea due to the hypoglycemia.  Is on metformin as well.  Will try Jardiance if it is covered after discussing risk and benefits recommend to keep a log and glucose low glycemic index diet he plans to get an eye exam see above will try to get records     Hypertension well controlled continue current meds     Hypo magnesium-stable continue magnesium  Hyperlipidemia control uncertain continue statin recheck blood work recommend healthy diet monitor    Atherosclerosis of aorta- stable continue statin and rec healthy diet monitor follow lipid levels     This is the extent of this pleasant patient's concerns at this present time. She did not feel chest pain upon exertion, dyspnea, nausea, vomiting, diaphoresis, or syncope. No pleuritic chest pain, unilateral leg swelling, calf tenderness, or calf pain. Negative for unintentional weight loss night sweats and fevers. Jessica Rosa will return to clinic in a few months for further workup and reassessment or sooner as needed. She was instructed to call the clinic or go to the emergency department or urgent care immediately if her symptoms do not improve, worsens, or if any new symptoms develop. As we discussed that symptoms could worsen over the next 24 hours she was advised that if any increased swelling, pain, or numbness arise to go immediately to the ED. Patient knows to call any time if an emergency arises. Shared decision making occurred and she verbalized understanding in " agreement with this plan. I discussed imaging findings, diagnosis, possibilities, treatment options, medications, risks, and benefits. She had many questions regarding the options and long-term effects. All questions were answered. She expressed understanding after counseling regarding the diagnosis and recommendations. She was capable and demonstrated competence with understanding of these options. Shared decision making was performed resulting in her choosing the current treatment plan. Patient handout was given with instructions and recommendations. Advised the patient that if they become pregnant to alert us immediately to assess for medication changes. I also discussed the importance of close follow up to discuss labs, change or modify her medications if needed, monitor side effects, and further evaluation of medical problems.     Additional workup planned: see labs ordered below.    See below for labs and meds ordered with associated diagnosis      1. Healthcare maintenance    2. Diabetes mellitus due to underlying condition with hyperglycemia, without long-term current use of insulin  - Diabetic Eye Screening Photo; Future    3. Hyperlipidemia associated with type 2 diabetes mellitus    4. Hypertension associated with diabetes    5. Aortic atherosclerosis      Skip Guerrero MD   274}    If you are due for any health screening(s) below please notify me so we can arrange them to be ordered and scheduled. Most healthy patients at your age complete them, but you are free to accept or refuse.     If you can't do it, I'll definitely understand. If you can, I'd certainly appreciate it!   Tests to Keep You Healthy    Mammogram: Met on 4/7/2022  Eye Exam: ORDERED BUT NOT SCHEDULED  Colon Cancer Screening: Met on 10/3/2019  Last Blood Pressure <= 139/89 (2/16/2023): Yes  Last HbA1c < 8 (11/07/2022): Yes

## 2023-02-16 NOTE — PROGRESS NOTES
Jessica Luna is a 72 y.o. female here for a diabetic eye screening with non-dilated fundus photos per Dr. Skip Guerrero.    Patient cooperative?: Yes  Small pupils?: Yes  Last eye exam: 1/26/22 with Dr. Yana Montero    For exam results, see Encounter Report.

## 2023-02-25 DIAGNOSIS — E11.65 TYPE 2 DIABETES MELLITUS WITH HYPERGLYCEMIA, WITHOUT LONG-TERM CURRENT USE OF INSULIN: ICD-10-CM

## 2023-02-25 NOTE — TELEPHONE ENCOUNTER
Care Due:                  Date            Visit Type   Department     Provider  --------------------------------------------------------------------------------                                EP -                              PRIMARY      [facility]  Last Visit: 02-      CARE ([facility])   GAURI Guerrero                              EP -                              PRIMARY      [facility]  Next Visit: 08-      CARE ([facility])   MEDICINE       Skip Guerrero                                                            Last  Test          Frequency    Reason                     Performed    Due Date  --------------------------------------------------------------------------------    HBA1C.......  6 months...  empagliflozin,             11- 05-                             semaglutide..............    [employer] Embedded Care Gaps. Reference number: [id number]. 2/25/2023   5:25:24 PM CST

## 2023-03-07 ENCOUNTER — OFFICE VISIT (OUTPATIENT)
Dept: ENDOCRINOLOGY | Facility: CLINIC | Age: 73
End: 2023-03-07
Payer: MEDICARE

## 2023-03-07 VITALS
SYSTOLIC BLOOD PRESSURE: 90 MMHG | TEMPERATURE: 98 F | HEIGHT: 62 IN | OXYGEN SATURATION: 99 % | HEART RATE: 68 BPM | WEIGHT: 143.31 LBS | BODY MASS INDEX: 26.37 KG/M2 | DIASTOLIC BLOOD PRESSURE: 60 MMHG

## 2023-03-07 DIAGNOSIS — E78.5 HYPERLIPIDEMIA ASSOCIATED WITH TYPE 2 DIABETES MELLITUS: ICD-10-CM

## 2023-03-07 DIAGNOSIS — E11.65 TYPE 2 DIABETES MELLITUS WITH HYPERGLYCEMIA, WITHOUT LONG-TERM CURRENT USE OF INSULIN: Primary | ICD-10-CM

## 2023-03-07 DIAGNOSIS — Z78.0 POSTMENOPAUSAL: ICD-10-CM

## 2023-03-07 DIAGNOSIS — I15.2 HYPERTENSION ASSOCIATED WITH DIABETES: ICD-10-CM

## 2023-03-07 DIAGNOSIS — E11.69 HYPERLIPIDEMIA ASSOCIATED WITH TYPE 2 DIABETES MELLITUS: ICD-10-CM

## 2023-03-07 DIAGNOSIS — E55.9 HYPOVITAMINOSIS D: ICD-10-CM

## 2023-03-07 DIAGNOSIS — E11.59 HYPERTENSION ASSOCIATED WITH DIABETES: ICD-10-CM

## 2023-03-07 PROCEDURE — 99213 PR OFFICE/OUTPT VISIT, EST, LEVL III, 20-29 MIN: ICD-10-PCS | Mod: S$PBB,,, | Performed by: PHYSICIAN ASSISTANT

## 2023-03-07 PROCEDURE — 99999 PR PBB SHADOW E&M-EST. PATIENT-LVL IV: CPT | Mod: PBBFAC,,, | Performed by: PHYSICIAN ASSISTANT

## 2023-03-07 PROCEDURE — 99213 OFFICE O/P EST LOW 20 MIN: CPT | Mod: S$PBB,,, | Performed by: PHYSICIAN ASSISTANT

## 2023-03-07 PROCEDURE — 99999 PR PBB SHADOW E&M-EST. PATIENT-LVL IV: ICD-10-PCS | Mod: PBBFAC,,, | Performed by: PHYSICIAN ASSISTANT

## 2023-03-07 PROCEDURE — 99214 OFFICE O/P EST MOD 30 MIN: CPT | Mod: PBBFAC,PO | Performed by: PHYSICIAN ASSISTANT

## 2023-03-07 NOTE — PROGRESS NOTES
"CC: This 72 y.o. female presents for management of T2DM  and chronic conditions pending review including HTN, HLP    HPI: was diagnosed with T2DM in  on lab work.   Has never been hospitalized r/t DM.  Family hx of DM: brother  Fhx of thyroid disease:none  Denies missing doses of DM medication.   hypoglycemia at home: none  monitoring BG at home:  Fastin-140    Diet:   BF-coffee, eggs, toast,  sometimes mcds biscuit  LH-skipped  DN-pulled pork  Snacks grapes or a banana. Avoids sugary beverages.     Exercise: None. Occ walks at her house.    CURRENT DM MEDS: rybelsus 14 mg qd, Metformin 1000 mg qd, Jardiance 25 mg qd    Standards of Care:  Eye exam:    Podiatry exam:  Dr. SCHMIDT: 10/22 JEVON Parker    DEXA scan: 10/22 osteopenia. Taking ca and vd. She does stretching exercises with her arms.   PMHx, PSHx: reviewed in epic.  Social Hx: no E/T use.    Wt Readings from Last 6 Encounters:   23 65 kg (143 lb 4.8 oz)   23 67 kg (147 lb 11.3 oz)   23 68.6 kg (151 lb 3.8 oz)   22 70.9 kg (156 lb 4.9 oz)   22 73.4 kg (161 lb 13.1 oz)   10/28/22 75.2 kg (165 lb 12.6 oz)      ROS:   Gen: Appetite good, + wt loss (22 lbs since 10/22) denies fatigue and weakness.  Skin: Skin is intact and heals well, no rashes, no hair changes  Eyes: Denies visual disturbances  Resp: no SOB or CHARLES, no cough  Cardiac: No palpitations, chest pain, no edema   GI: No nausea or vomiting, diarrhea, constipation, or abdominal pain.  /GYN: No nocturia, burning or pain.   MS/Neuro: Denies numbness/ tingling in BLE; Gait steady, speech clear  Psych: Denies drug/ETOH abuse, no hx of depression.  Other systems: negative.    BP 90/60 (BP Location: Left arm, Patient Position: Sitting, BP Method: Medium (Manual))   Pulse 68   Temp 98 °F (36.7 °C) (Oral)   Ht 5' 2" (1.575 m)   Wt 65 kg (143 lb 4.8 oz)   SpO2 99%   BMI 26.21 kg/m²      PE:  GENERAL: elderly female, Well developed, well nourished.  PSYCH: AAOx3, " appropriate mood and affect, pleasant expression, conversant, appears relaxed, well groomed.   EYES: Conjunctiva, corneas clear  NECK: Supple, trachea midline,no thyromegaly or nodules  CHEST: Resp even and unlabored, CTA bilateral.  CARDIAC: RRR, S1, S2 heard, no murmurs  ABDOMEN: Soft, non-tender, non-distended   VASCULAR: DP pulses +2/4 bilaterally, no edema.  NEURO: Gait steady  SKIN: Skin warm and dry no acanthosis nigracans.    Personally reviewed labs below:    No visits with results within 1 Month(s) from this visit.   Latest known visit with results is:   Lab Visit on 11/07/2022   Component Date Value Ref Range Status    Hemoglobin A1C 11/07/2022 5.4  4.0 - 5.6 % Final    Comment: ADA Screening Guidelines:  5.7-6.4%  Consistent with prediabetes  >or=6.5%  Consistent with diabetes    High levels of fetal hemoglobin interfere with the HbA1C  assay. Heterozygous hemoglobin variants (HbS, HgC, etc)do  not significantly interfere with this assay.   However, presence of multiple variants may affect accuracy.      Estimated Avg Glucose 11/07/2022 108  68 - 131 mg/dL Final    Vit D, 25-Hydroxy 11/07/2022 48  30 - 96 ng/mL Final    Comment: Vitamin D deficiency.........<10 ng/mL                              Vitamin D insufficiency......10-29 ng/mL       Vitamin D sufficiency........> or equal to 30 ng/mL  Vitamin D toxicity............>100 ng/mL           ASSESSMENT and PLAN:    1. Type 2 diabetes mellitus with hyperglycemia, without long-term current use of insulin  Fructosamine    Hemoglobin A1C      2. Hypertension associated with diabetes        3. Hyperlipidemia associated with type 2 diabetes mellitus        4. Postmenopausal        5. Hypovitaminosis D           T2DM with hyperglycemia-A1c today. Continue Rybelsus and Metformin. discussed DM, progression of disease, long term complications, tx options.   Discussed A1c and BG goals.   Reviewed  hypoglycemia, s/s and appropriate tx.   Instructed to monitor BG  and bring meter/ log to every clinic visit.   - takes ASA, ARB, statin    HTN - controlled, continue meds as previously prescribed and monitor.     HLP - LDL , on statin therapy, LFTs WNL  Postmenopausal-DEXA scan 10/24. Continue ca and vd.  Hypovitaminosis d-stable-check vitamin D     Follow-Up     F/u in 6 mths w/ labs prior

## 2023-03-08 ENCOUNTER — TELEPHONE (OUTPATIENT)
Dept: ENDOCRINOLOGY | Facility: CLINIC | Age: 73
End: 2023-03-08
Payer: MEDICARE

## 2023-03-08 ENCOUNTER — LAB VISIT (OUTPATIENT)
Dept: LAB | Facility: HOSPITAL | Age: 73
End: 2023-03-08
Payer: MEDICARE

## 2023-03-08 DIAGNOSIS — E11.9 TYPE 2 DIABETES MELLITUS WITHOUT COMPLICATION, WITHOUT LONG-TERM CURRENT USE OF INSULIN: ICD-10-CM

## 2023-03-08 DIAGNOSIS — E11.65 TYPE 2 DIABETES MELLITUS WITH HYPERGLYCEMIA, WITHOUT LONG-TERM CURRENT USE OF INSULIN: ICD-10-CM

## 2023-03-08 LAB
ALBUMIN SERPL BCP-MCNC: 4.1 G/DL (ref 3.5–5.2)
ALP SERPL-CCNC: 113 U/L (ref 55–135)
ALT SERPL W/O P-5'-P-CCNC: 10 U/L (ref 10–44)
ANION GAP SERPL CALC-SCNC: 13 MMOL/L (ref 8–16)
AST SERPL-CCNC: 16 U/L (ref 10–40)
BILIRUB SERPL-MCNC: 0.4 MG/DL (ref 0.1–1)
BUN SERPL-MCNC: 27 MG/DL (ref 8–23)
CALCIUM SERPL-MCNC: 9.8 MG/DL (ref 8.7–10.5)
CHLORIDE SERPL-SCNC: 105 MMOL/L (ref 95–110)
CHOLEST SERPL-MCNC: 124 MG/DL (ref 120–199)
CHOLEST/HDLC SERPL: 3.4 {RATIO} (ref 2–5)
CO2 SERPL-SCNC: 20 MMOL/L (ref 23–29)
CREAT SERPL-MCNC: 1.2 MG/DL (ref 0.5–1.4)
EST. GFR  (NO RACE VARIABLE): 48.1 ML/MIN/1.73 M^2
ESTIMATED AVG GLUCOSE: 151 MG/DL (ref 68–131)
GLUCOSE SERPL-MCNC: 137 MG/DL (ref 70–110)
HBA1C MFR BLD: 6.9 % (ref 4–5.6)
HDLC SERPL-MCNC: 37 MG/DL (ref 40–75)
HDLC SERPL: 29.8 % (ref 20–50)
LDLC SERPL CALC-MCNC: 63.4 MG/DL (ref 63–159)
NONHDLC SERPL-MCNC: 87 MG/DL
POTASSIUM SERPL-SCNC: 4.5 MMOL/L (ref 3.5–5.1)
PROT SERPL-MCNC: 7.3 G/DL (ref 6–8.4)
SODIUM SERPL-SCNC: 138 MMOL/L (ref 136–145)
TRIGL SERPL-MCNC: 118 MG/DL (ref 30–150)
TSH SERPL DL<=0.005 MIU/L-ACNC: 1.47 UIU/ML (ref 0.4–4)

## 2023-03-08 PROCEDURE — 36415 COLL VENOUS BLD VENIPUNCTURE: CPT | Mod: PO | Performed by: PHYSICIAN ASSISTANT

## 2023-03-08 PROCEDURE — 83036 HEMOGLOBIN GLYCOSYLATED A1C: CPT | Performed by: PHYSICIAN ASSISTANT

## 2023-03-08 PROCEDURE — 80061 LIPID PANEL: CPT | Performed by: PHYSICIAN ASSISTANT

## 2023-03-08 PROCEDURE — 84443 ASSAY THYROID STIM HORMONE: CPT | Performed by: PHYSICIAN ASSISTANT

## 2023-03-08 PROCEDURE — 80053 COMPREHEN METABOLIC PANEL: CPT | Performed by: PHYSICIAN ASSISTANT

## 2023-03-08 PROCEDURE — 82985 ASSAY OF GLYCATED PROTEIN: CPT | Performed by: PHYSICIAN ASSISTANT

## 2023-03-13 ENCOUNTER — TELEPHONE (OUTPATIENT)
Dept: FAMILY MEDICINE | Facility: CLINIC | Age: 73
End: 2023-03-13
Payer: MEDICARE

## 2023-03-13 DIAGNOSIS — E11.65 TYPE 2 DIABETES MELLITUS WITH HYPERGLYCEMIA, WITHOUT LONG-TERM CURRENT USE OF INSULIN: ICD-10-CM

## 2023-03-13 LAB — FRUCTOSAMINE SERPL-SCNC: 266 UMOL /L

## 2023-03-13 NOTE — TELEPHONE ENCOUNTER
----- Message from Skip Guerrero MD sent at 3/13/2023  2:57 PM CDT -----  Regarding: FW: Rybelsus 14mg tablets  Please pend medication at the pharmacy to sign thank you  ----- Message -----  From: Nisha Pierre, PamD  Sent: 3/13/2023   2:32 PM CDT  To: Skip Guerrero MD  Subject: Rybelsus 14mg tablets                            [facility] has received medication from [employer] patient assistance program for your patient ROBERT GARCIA (MRN [id number]).  At your earliest convenience please send to [facility] escript for RYBELSUS 14MG (120 tablets).  Thanks

## 2023-03-13 NOTE — TELEPHONE ENCOUNTER
No new care gaps identified.  [facility] Embedded Care Gaps. Reference number: [id number]. 3/13/2023   4:35:16 PM CDT

## 2023-03-13 NOTE — TELEPHONE ENCOUNTER
----- Message from Skip Guerrero MD sent at 3/13/2023  2:57 PM CDT -----  Regarding: FW: Rybelsus 14mg tablets  Please pend medication at the pharmacy to sign thank you  ----- Message -----  From: Nisha Pierre, PamD  Sent: 3/13/2023   2:32 PM CDT  To: Skip Guerrero MD  Subject: Rybelsus 14mg tablets                            [facility] has received medication from [employer] patient assistance program for your patient ROBERT GARCIA (MRN [id number]).  At your earliest convenience please send to [facility] escript for RYBELSUS 14MG (120 tablets).  Thanks

## 2023-03-20 RX ORDER — OLMESARTAN MEDOXOMIL 40 MG/1
40 TABLET ORAL DAILY
Qty: 90 TABLET | Refills: 3 | OUTPATIENT
Start: 2023-03-20 | End: 2024-03-19

## 2023-03-20 NOTE — TELEPHONE ENCOUNTER
----- Message from Armando Lepe sent at 3/20/2023 12:48 PM CDT -----  Regarding: refill  Type:  RX Refill Request    Who Called:  piper  Refill or New Rx:  refill    RX Name and Strength:    olmesartan (BENICAR) 40 MG tablet 90 tablet 3 3/17/2022 3/17/2023   Sig - Route: Take 1 tablet (40 mg total) by mouth once daily. - Oral   Sent to pharmacy as: olmesartan (BENICAR) 40 MG tablet   Notes to Pharmacy: .   E-Prescribing Status: Receipt confirmed by pharmacy (3/17/2022  3:32 PM CDT)     How is the patient currently taking it? (ex. 1XDay):  see above  Is this a 30 day or 90 day RX:  see above    Preferred Pharmacy with phone number:    [id number] - [place] - [address]  [address] [address]  Phone: [phone] Fax: [phone]    Local or Mail Order:  local  Ordering Provider:  SALINAS Casiano    Best Call Back Number:  [phone]    Additional Information:  pt is out of med. Pt has upcoming appt with Dr Jeronimo 4/12/23. Pt needs refill sent today.

## 2023-03-21 RX ORDER — OLMESARTAN MEDOXOMIL 40 MG/1
40 TABLET ORAL DAILY
Qty: 90 TABLET | Refills: 3 | Status: SHIPPED | OUTPATIENT
Start: 2023-03-21 | End: 2024-04-02

## 2023-04-12 ENCOUNTER — OFFICE VISIT (OUTPATIENT)
Dept: CARDIOLOGY | Facility: CLINIC | Age: 73
End: 2023-04-12
Payer: MEDICARE

## 2023-04-12 VITALS
DIASTOLIC BLOOD PRESSURE: 72 MMHG | BODY MASS INDEX: 26.31 KG/M2 | HEIGHT: 62 IN | WEIGHT: 143 LBS | SYSTOLIC BLOOD PRESSURE: 126 MMHG | RESPIRATION RATE: 16 BRPM | HEART RATE: 60 BPM | OXYGEN SATURATION: 98 %

## 2023-04-12 DIAGNOSIS — R94.31 NONSPECIFIC ABNORMAL ELECTROCARDIOGRAM (ECG) (EKG): ICD-10-CM

## 2023-04-12 DIAGNOSIS — E08.65 DIABETES MELLITUS DUE TO UNDERLYING CONDITION WITH HYPERGLYCEMIA, WITHOUT LONG-TERM CURRENT USE OF INSULIN: ICD-10-CM

## 2023-04-12 DIAGNOSIS — I15.2 HYPERTENSION ASSOCIATED WITH DIABETES: Primary | ICD-10-CM

## 2023-04-12 DIAGNOSIS — E78.5 HYPERLIPIDEMIA ASSOCIATED WITH TYPE 2 DIABETES MELLITUS: ICD-10-CM

## 2023-04-12 DIAGNOSIS — I25.10 CAD IN NATIVE ARTERY: ICD-10-CM

## 2023-04-12 DIAGNOSIS — E11.69 HYPERLIPIDEMIA ASSOCIATED WITH TYPE 2 DIABETES MELLITUS: ICD-10-CM

## 2023-04-12 DIAGNOSIS — E11.59 HYPERTENSION ASSOCIATED WITH DIABETES: Primary | ICD-10-CM

## 2023-04-12 PROCEDURE — 99999 PR PBB SHADOW E&M-EST. PATIENT-LVL IV: ICD-10-PCS | Mod: PBBFAC,,, | Performed by: INTERNAL MEDICINE

## 2023-04-12 PROCEDURE — 93005 ELECTROCARDIOGRAM TRACING: CPT | Mod: PBBFAC,PN | Performed by: INTERNAL MEDICINE

## 2023-04-12 PROCEDURE — 99214 OFFICE O/P EST MOD 30 MIN: CPT | Mod: S$PBB,,, | Performed by: INTERNAL MEDICINE

## 2023-04-12 PROCEDURE — 99214 PR OFFICE/OUTPT VISIT, EST, LEVL IV, 30-39 MIN: ICD-10-PCS | Mod: S$PBB,,, | Performed by: INTERNAL MEDICINE

## 2023-04-12 PROCEDURE — 93010 EKG 12-LEAD: ICD-10-PCS | Mod: S$PBB,,, | Performed by: INTERNAL MEDICINE

## 2023-04-12 PROCEDURE — 99214 OFFICE O/P EST MOD 30 MIN: CPT | Mod: PBBFAC,PN | Performed by: INTERNAL MEDICINE

## 2023-04-12 PROCEDURE — 93010 ELECTROCARDIOGRAM REPORT: CPT | Mod: S$PBB,,, | Performed by: INTERNAL MEDICINE

## 2023-04-12 PROCEDURE — 99999 PR PBB SHADOW E&M-EST. PATIENT-LVL IV: CPT | Mod: PBBFAC,,, | Performed by: INTERNAL MEDICINE

## 2023-04-12 NOTE — PROGRESS NOTES
Subjective:    Patient ID:  Jessica Luna is a 72 y.o. female     Chief Complaint   Patient presents with    Carotid Artery Disease    Hyperlipidemia    Hypertension       HPI:  Ms  Jessica Luna is a 72 y.o. female patient is here for follow-up.    She is been doing well no specific complaints at the present time.  Her breathing has been good denies any shortness of breath or difficulty in breathing denies any chest pain or tightness or heaviness denies any dizziness or lightheadedness denies any loss of consciousness falls or head injury.    She is taking all her medications regularly.    And her blood sugars have been stable.    Good    Review of patient's allergies indicates:   Allergen Reactions    No known drug allergies        Past Medical History:   Diagnosis Date    Anticoagulant long-term use     Arthritis     CAD (coronary artery disease)     C. Stents X2 Dr. Og Jeronimo    Diabetes mellitus     Diabetes mellitus type II     Diverticulosis     Hyperlipidemia     Hypertension     Low magnesium levels     Myocardial infarction     Obstructive uropathy 10/16/2015    Renal stone     Status post cystoscopy - Left laser percutaneous nephrolithotripsy 6/29/2016    Ureterolithiasis Obstructive-left 1/3/2015    Wears dentures     FULL UPPER - PARTIAL BOTTOM    Wears dentures     Full upper; partial lower    Wears glasses     Wears glasses      Past Surgical History:   Procedure Laterality Date    ARTHROSCOPIC REPAIR OF ROTATOR CUFF OF SHOULDER Right 4/8/2021    Procedure: REPAIR, ROTATOR CUFF, ARTHROSCOPIC;  Surgeon: Rduy Graves MD;  Location: [facility] OR;  Service: Orthopedics;  Laterality: Right;  GENERAL AND BLOCK    ARTHROSCOPIC TENOTOMY OF BICEPS TENDON Right 4/8/2021    Procedure: TENOTOMY, BICEPS, ARTHROSCOPIC;  Surgeon: Rudy Graves MD;  Location: [facility] OR;  Service: Orthopedics;  Laterality: Right;  GENERAL AND BLOCK    ARTHROSCOPY OF SHOULDER WITH DECOMPRESSION OF SUBACROMIAL SPACE  Right 2021    Procedure: ARTHROSCOPY, SHOULDER, WITH SUBACROMIAL SPACE DECOMPRESSION;  Surgeon: Rudy Graves MD;  Location: [facility] OR;  Service: Orthopedics;  Laterality: Right;  GENERAL AND BLOCK  MITEK     SECTION      CHOLECYSTECTOMY      COLONOSCOPY N/A 10/7/2015    Procedure: COLONOSCOPY;  Surgeon: Washington Rodriguez MD;  Location: [facility] ENDO;  Service: Endoscopy;  Laterality: N/A;    COLONOSCOPY N/A 10/3/2019    Procedure: COLONOSCOPY;  Surgeon: Viviane Simeon MD;  Location: [facility] ENDO;  Service: Endoscopy;  Laterality: N/A;    CORONARY STENT PLACEMENT      2 vessels    CYSTOSCOPY      CYSTOSCOPY W/ LASER LITHOTRIPSY  12/15/15    CYSTOSCOPY W/ RETROGRADES Bilateral 2019    Procedure: CYSTOSCOPY, WITH RETROGRADE PYELOGRAM;  Surgeon: Ubaldo Cho MD;  Location: [facility] OR;  Service: Urology;  Laterality: Bilateral;    CYSTOSCOPY W/ URETERAL STENT REMOVAL Left 10/1/2019    Procedure: CYSTOSCOPY, WITH URETERAL STENT REMOVAL;  Surgeon: Ubaldo Cho MD;  Location: [facility] OR;  Service: Urology;  Laterality: Left;    HYSTERECTOMY      GRACE/BSO, DUB    LASER LITHOTRIPSY Left 2019    Procedure: LITHOTRIPSY, USING LASER;  Surgeon: Ubaldo Cho MD;  Location: [facility] OR;  Service: Urology;  Laterality: Left;    OOPHORECTOMY      PERCUTANEOUS NEPHROLITHOTRIPSY  2016    ROTATOR CUFF REPAIR      left    URETERAL STENT PLACEMENT Left 2019    Procedure: INSERTION, STENT, URETER;  Surgeon: Ubaldo Cho MD;  Location: [facility];  Service: Urology;  Laterality: Left;    URETEROSCOPIC REMOVAL OF URETERIC CALCULUS Left 2019    Procedure: REMOVAL, CALCULUS, URETER, URETEROSCOPIC;  Surgeon: Ubaldo Cho MD;  Location: [facility] OR;  Service: Urology;  Laterality: Left;    ureteroscopy  12/15/15     Social History     Tobacco Use    Smoking status: Never     Passive exposure: Never    Smokeless tobacco: Never   Substance Use Topics    Alcohol use: No    Drug use: No      Family History   Problem Relation Age of Onset    Heart disease Father 90    Cancer Mother         breast cancer    Breast cancer Mother 60    Diabetes Brother     Kidney disease Brother         nephrectomy due to cancer    Spina bifida Brother     Cancer Sister         breast cancer    Breast cancer Sister 59        Review of Systems:   Constitution: Negative for diaphoresis and fever.   HEENT: Negative for nosebleeds.    Cardiovascular: Negative for chest pain       No dyspnea on exertion       No leg swelling        No palpitations  Respiratory: Negative for shortness of breath and wheezing.    Hematologic/Lymphatic: Negative for bleeding problem. Does not bruise/bleed easily.   Skin: Negative for color change and rash.   Musculoskeletal: Negative for falls and myalgias.   Gastrointestinal: Negative for hematemesis and hematochezia.   Genitourinary: Negative for hematuria.   Neurological: Negative for dizziness and light-headedness.   Psychiatric/Behavioral: Negative for altered mental status and memory loss.          Objective:        Vitals:    04/12/23 1017   BP: 126/72   Pulse: 60   Resp: 16       Lab Results   Component Value Date    WBC 8.24 04/01/2022    HGB 12.2 04/01/2022    HCT 38.9 04/01/2022     04/01/2022    CHOL 124 03/08/2023    TRIG 118 03/08/2023    HDL 37 (L) 03/08/2023    ALT 10 03/08/2023    AST 16 03/08/2023     03/08/2023    K 4.5 03/08/2023     03/08/2023    CREATININE 1.2 03/08/2023    BUN 27 (H) 03/08/2023    CO2 20 (L) 03/08/2023    TSH 1.469 03/08/2023    INR 1.0 09/12/2019    GLUF 642 (HH) 01/03/2015    HGBA1C 6.9 (H) 03/08/2023        ECHOCARDIOGRAM RESULTS  Results for orders placed in visit on 04/06/21    Echo Color Flow Doppler? Yes    Interpretation Summary  · The estimated PA systolic pressure is 31 mmHg.  · The left ventricle is normal in size with moderate concentric hypertrophy and normal systolic function.  · The estimated ejection fraction is 70%.  ·  Normal left ventricular diastolic function.  · Normal right ventricular size with normal right ventricular systolic function.  · Moderate left atrial enlargement.  · Mild to moderate tricuspid regurgitation.  · Cannot rule out a small vegetation with thickening of the anterior mitral leaflet.        CURRENT/PREVIOUS VISIT EKG  Results for orders placed or performed in visit on 02/08/21   IN OFFICE EKG 12-LEAD (to [place])    Collection Time: 02/08/21 12:25 PM    Narrative    Test Reason : Z00.00,    Vent. Rate : 072 BPM     Atrial Rate : 072 BPM     P-R Int : 220 ms          QRS Dur : 110 ms      QT Int : 394 ms       P-R-T Axes : 070 079 074 degrees     QTc Int : 431 ms    Sinus rhythm with 1st degree A-V block  Otherwise normal ECG  No previous ECGs available  Confirmed by Gregory MIGUEL, Ubaldo BRITT ([id number]) on 2/9/2021 10:40:42 AM    Referred By:             Confirmed By:Ubaldo Jenkins MD     No valid procedures specified.   Results for orders placed in visit on 04/06/21    Nuclear Stress Test    Interpretation Summary    The EKG portion of this study is negative for ischemia.    The patient reported no chest pain during the stress test.    The nuclear portion of this study will be reported separately.      Physical Exam:  CONSTITUTIONAL: No fever, no chills  HEENT: Normocephalic, atraumatic,pupils reactive to light                 NECK:  No JVD no carotid bruit  CVS: S1S2+, RRR, systolic murmurs,   LUNGS: Clear  ABDOMEN: Soft, NT, BS+  EXTREMITIES: No cyanosis, edema  : No león catheter  NEURO: AAO X 3  PSY: Normal affect      Medication List with Changes/Refills   Current Medications    ACETAMINOPHEN (TYLENOL) 500 MG TABLET    Take 500 mg by mouth every 6 (six) hours as needed for Pain.    ALLOPURINOL (ZYLOPRIM) 100 MG TABLET    Take 1 tablet (100 mg total) by mouth once daily.    ASPIRIN 81 MG CHEW    Take 1 tablet (81 mg total) by mouth once daily.    ATORVASTATIN (LIPITOR) 40 MG TABLET    Take 1 tablet (40 mg total)  by mouth once daily.    EMPAGLIFLOZIN (JARDIANCE) 25 MG TABLET    Take 1 tablet (25 mg total) by mouth once daily.    MAGNESIUM OXIDE (MAG-OX) 250 MG MAGNESIUM TAB    Take 0.5 tablets by mouth once daily.    METFORMIN (GLUCOPHAGE) 1000 MG TABLET    Take 1 tablet (1,000 mg total) by mouth daily with breakfast.    METOPROLOL TARTRATE (LOPRESSOR) 25 MG TABLET    Take 1 tablet (25 mg total) by mouth 2 (two) times daily.    OLMESARTAN (BENICAR) 40 MG TABLET    TAKE ONE TABLET BY MOUTH ONCE DAILY    OLMESARTAN (BENICAR) 40 MG TABLET    Take 1 tablet (40 mg total) by mouth once daily.    OMEGA-3 FATTY ACIDS-VITAMIN E 1,000-5 MG-UNIT CAP    Take 1 capsule by mouth 2 (two) times daily. Two times a day    POTASSIUM CITRATE (UROCIT-K 15) 15 MEQ TBSR    TAKE ONE TABLET BY MOUTH TWICE DAILY    SEMAGLUTIDE (RYBELSUS) 14 MG TABLET    Take 1 tablet (14 mg total) by mouth once daily.             Assessment:       1. Hypertension associated with diabetes    2. Hyperlipidemia associated with type 2 diabetes mellitus    3. CAD in native artery    4. Diabetes mellitus due to underlying condition with hyperglycemia, without long-term current use of insulin    5. Nonspecific abnormal electrocardiogram (ECG) (EKG)         Plan:   1. Essential hypertension  Patient's blood pressure is very well controlled is 126/72 and she is currently on olmesartan 40 mg p.o. daily continue the same she is also on metoprolol tartrate 25 mg p.o. b.i.d..    Discussed with patient that she needs to continue the same medication.  2. Mixed hyperlipidemia   She is currently on atorvastatin 40 mg p.o. daily continue the same.  And shewed her total cholesterol at 124 HDL is 37 LDL is 63 and triglycerides 118.  Continue the same medication.  Patient would benefit from taking Co Q10 along with the statins.    3. Diabetes mellitus   ardiance 25 mg p.o. daily she is also on Rybelsus 14 mg p.o. daily continue the same she is also on metformin 1000 mg with breakfast.   Patient follows up with her endocrinologist.  Continue the same   4. CAD  She is stable at the present time denies any chest pain or tightness or heaviness or shortness of breath.  She is on aspirin 81 mg p.o. daily continue the same.    5. Muscle cramps   She does have intermittent muscle cramps.  And she does take magnesium and she does consume fluids when she has cramps.  And they are spontaneously resolved.  6. EKG  Reviewed EKG independently patient is in normal sinus rhythm with a heart rate of 60 beats per minute ST-elevation possibly early repolarization changes.  No acute ischemia.  Poor R-wave progression in the anterior precordial leads.  7. Patient to continue her current management and I will see her back in the office in 6 months time.              Problem List Items Addressed This Visit          Cardiac/Vascular    Hyperlipidemia associated with type 2 diabetes mellitus    Hypertension associated with diabetes - Primary       Endocrine    Diabetes mellitus due to underlying condition with hyperglycemia, without long-term current use of insulin     Other Visit Diagnoses       CAD in native artery        Nonspecific abnormal electrocardiogram (ECG) (EKG)                No follow-ups on file.

## 2023-04-24 ENCOUNTER — HOSPITAL ENCOUNTER (OUTPATIENT)
Dept: RADIOLOGY | Facility: HOSPITAL | Age: 73
Discharge: HOME | End: 2023-04-24
Attending: STUDENT IN AN ORGANIZED HEALTH CARE EDUCATION/TRAINING PROGRAM
Payer: MEDICARE

## 2023-04-24 DIAGNOSIS — Z12.31 ENCOUNTER FOR SCREENING MAMMOGRAM FOR MALIGNANT NEOPLASM OF BREAST: ICD-10-CM

## 2023-04-24 PROCEDURE — 77067 SCR MAMMO BI INCL CAD: CPT | Mod: 26,,, | Performed by: RADIOLOGY

## 2023-04-24 PROCEDURE — 77063 MAMMO DIGITAL SCREENING BILAT WITH TOMO: ICD-10-PCS | Mod: 26,,, | Performed by: RADIOLOGY

## 2023-04-24 PROCEDURE — 77067 SCR MAMMO BI INCL CAD: CPT | Mod: TC

## 2023-04-24 PROCEDURE — 77067 MAMMO DIGITAL SCREENING BILAT WITH TOMO: ICD-10-PCS | Mod: 26,,, | Performed by: RADIOLOGY

## 2023-04-24 PROCEDURE — 77063 BREAST TOMOSYNTHESIS BI: CPT | Mod: 26,,, | Performed by: RADIOLOGY

## 2023-05-09 DIAGNOSIS — E08.65 DIABETES MELLITUS DUE TO UNDERLYING CONDITION WITH HYPERGLYCEMIA, WITHOUT LONG-TERM CURRENT USE OF INSULIN: ICD-10-CM

## 2023-05-09 NOTE — TELEPHONE ENCOUNTER
No care due was identified.  [facility] Embedded Care Due Messages. Reference number: [id number].   5/09/2023 1:59:36 PM CDT

## 2023-05-22 DIAGNOSIS — E08.65 DIABETES MELLITUS DUE TO UNDERLYING CONDITION WITH HYPERGLYCEMIA, WITHOUT LONG-TERM CURRENT USE OF INSULIN: ICD-10-CM

## 2023-05-22 RX ORDER — METOPROLOL TARTRATE 25 MG/1
25 TABLET, FILM COATED ORAL 2 TIMES DAILY
Qty: 180 TABLET | Refills: 3 | Status: SHIPPED | OUTPATIENT
Start: 2023-05-22

## 2023-05-22 NOTE — TELEPHONE ENCOUNTER
No care due was identified.  [facility] Embedded Care Due Messages. Reference number: [id number].   5/22/2023 9:33:22 AM CDT

## 2023-05-22 NOTE — TELEPHONE ENCOUNTER
Refill Decision Note   Jessica Luna  is requesting a refill authorization.  Brief Assessment and Rationale for Refill:  Quick Discontinue     Medication Therapy Plan:       Medication Reconciliation Completed: No   Comments:   empagliflozin (JARDIANCE) 25 mg tablet 90 tablet 3 5/9/2023 5/8/2024 No   Sig - Route: Take 1 tablet (25 mg total) by mouth once daily. - Oral   Sent to pharmacy as: empagliflozin (JARDIANCE) 25 mg tablet   Class: Normal   Order: [id number]   Date/Time Signed: 5/9/2023 14:06       E-Prescribing Status: Receipt confirmed by pharmacy (5/9/2023  2:06 PM CDT)     Ordering Encounter Report    Associated Reports   View Encounter       No Care Gaps recommended.     Note composed:11:57 AM 05/22/2023

## 2023-06-23 RX ORDER — ATORVASTATIN CALCIUM 40 MG/1
40 TABLET, FILM COATED ORAL DAILY
Qty: 90 TABLET | Refills: 3 | Status: SHIPPED | OUTPATIENT
Start: 2023-06-23 | End: 2023-10-16 | Stop reason: SDUPTHER

## 2023-07-14 ENCOUNTER — PES CALL (OUTPATIENT)
Dept: ADMINISTRATIVE | Facility: CLINIC | Age: 73
End: 2023-07-14
Payer: MEDICARE

## 2023-07-14 DIAGNOSIS — E08.65 DIABETES MELLITUS DUE TO UNDERLYING CONDITION WITH HYPERGLYCEMIA, WITHOUT LONG-TERM CURRENT USE OF INSULIN: ICD-10-CM

## 2023-07-14 NOTE — TELEPHONE ENCOUNTER
Care Due:                  Date            Visit Type   Department     Provider  --------------------------------------------------------------------------------                                EP -                              PRIMARY      [facility]  Last Visit: 02-      CARE ([facility])   GAURI Guerrero                              EP -                              PRIMARY      [facility]  Next Visit: 08-      CARE ([facility])   MEDICINE       Skip Guerrero                                                            Last  Test          Frequency    Reason                     Performed    Due Date  --------------------------------------------------------------------------------    HBA1C.......  6 months...  empagliflozin,             03- 09-                             semaglutide..............    [employer] Embedded Care Due Messages. Reference number: [id number].   7/14/2023 5:11:09 PM CDT

## 2023-07-15 NOTE — TELEPHONE ENCOUNTER
Refill Decision Note   So Jeremy  is requesting a refill authorization.  Brief Assessment and Rationale for Refill:  Approve     Medication Therapy Plan:  FLOS 8/31    Medication Reconciliation Completed: No   Comments:     No Care Gaps recommended.     Note composed:9:43 AM 07/15/2023

## 2023-07-20 ENCOUNTER — TELEPHONE (OUTPATIENT)
Dept: FAMILY MEDICINE | Facility: CLINIC | Age: 73
End: 2023-07-20
Payer: MEDICARE

## 2023-07-20 NOTE — TELEPHONE ENCOUNTER
Patient needs to reschedule her 6 month f/u appointment   She needs Monday Tuesday or Wednesday Please call when avalable    ----- Message from Eddy Wilkinson sent at 7/19/2023 11:19 AM CDT -----  Regarding: appt  Type:  Sooner Appointment Request    Caller is requesting a sooner appointment.  Caller declined first available appointment listed below.  Caller will not accept being placed on the waitlist and is requesting a message be sent to doctor.    Name of Caller:  pt  When is the first available appointment?  1/31/24  Symptoms:    Best Call Back Number:  [phone]    Additional Information:  pt is looking to reschedule her appt. Please call to discuss.

## 2023-08-14 ENCOUNTER — TELEPHONE (OUTPATIENT)
Dept: UROLOGY | Facility: CLINIC | Age: 73
End: 2023-08-14
Payer: MEDICARE

## 2023-08-14 RX ORDER — POTASSIUM CITRATE 15 MEQ/1
1 TABLET, EXTENDED RELEASE ORAL 2 TIMES DAILY
Qty: 180 TABLET | Refills: 2 | Status: SHIPPED | OUTPATIENT
Start: 2023-08-14 | End: 2023-10-30 | Stop reason: SDUPTHER

## 2023-08-14 NOTE — TELEPHONE ENCOUNTER
----- Message from Melissa Ray sent at 8/14/2023 10:19 AM CDT -----  Contact: pt [phone]  Type:  RX Refill Request    Who Called:  Pt   Refill or New Rx:  refill  RX Name and Strength:  potassium citrate (UROCIT-K 15) 15 mEq TbSR  How is the patient currently taking it? (ex. 1XDay):  2xday   Is this a 30 day or 90 day RX:  90  Preferred Pharmacy with phone number:    [id number] - [place] - [address]  [address] [address]  Phone: [phone] Fax: [phone]    Local or Mail Order:  Local   Ordering Provider:  Jaime Bridges Call Back Number:  [phone]    Additional Information:  Pls call back and advise

## 2023-08-23 ENCOUNTER — TELEPHONE (OUTPATIENT)
Dept: ENDOCRINOLOGY | Facility: CLINIC | Age: 73
End: 2023-08-23
Payer: MEDICARE

## 2023-08-23 ENCOUNTER — PATIENT MESSAGE (OUTPATIENT)
Dept: FAMILY MEDICINE | Facility: CLINIC | Age: 73
End: 2023-08-23
Payer: MEDICARE

## 2023-08-23 NOTE — TELEPHONE ENCOUNTER
Spoke to patient, she is worried that she has not had her diabetic urine screen as of yet. Advised her Ms. Yang generally does the testing after you see her, but anything that needs done now. Dr. Guerrero will order at her appointment on 8-30-23.

## 2023-08-23 NOTE — TELEPHONE ENCOUNTER
----- Message from Eddy Wilkinson sent at 8/23/2023 11:59 AM CDT -----  Regarding: advice  Type:  Needs Medical Advice    Who Called: pt    Best Call Back Number: [phone]      Additional Information: pt st that she needs labs ordered.  please call to discuss.

## 2023-08-30 ENCOUNTER — OFFICE VISIT (OUTPATIENT)
Dept: FAMILY MEDICINE | Facility: CLINIC | Age: 73
End: 2023-08-30
Payer: MEDICARE

## 2023-08-30 ENCOUNTER — LAB VISIT (OUTPATIENT)
Dept: LAB | Facility: HOSPITAL | Age: 73
End: 2023-08-30
Attending: PHYSICIAN ASSISTANT
Payer: MEDICARE

## 2023-08-30 VITALS
BODY MASS INDEX: 25.15 KG/M2 | DIASTOLIC BLOOD PRESSURE: 60 MMHG | HEIGHT: 62 IN | TEMPERATURE: 98 F | SYSTOLIC BLOOD PRESSURE: 102 MMHG | WEIGHT: 136.69 LBS | HEART RATE: 60 BPM | OXYGEN SATURATION: 99 % | RESPIRATION RATE: 16 BRPM

## 2023-08-30 DIAGNOSIS — I15.2 HYPERTENSION ASSOCIATED WITH DIABETES: ICD-10-CM

## 2023-08-30 DIAGNOSIS — E11.69 HYPERLIPIDEMIA ASSOCIATED WITH TYPE 2 DIABETES MELLITUS: ICD-10-CM

## 2023-08-30 DIAGNOSIS — E11.65 TYPE 2 DIABETES MELLITUS WITH HYPERGLYCEMIA, WITHOUT LONG-TERM CURRENT USE OF INSULIN: ICD-10-CM

## 2023-08-30 DIAGNOSIS — E08.65 DIABETES MELLITUS DUE TO UNDERLYING CONDITION WITH HYPERGLYCEMIA, WITHOUT LONG-TERM CURRENT USE OF INSULIN: ICD-10-CM

## 2023-08-30 DIAGNOSIS — Z00.00 HEALTHCARE MAINTENANCE: Primary | ICD-10-CM

## 2023-08-30 DIAGNOSIS — R79.9 ABNORMAL FINDING OF BLOOD CHEMISTRY, UNSPECIFIED: ICD-10-CM

## 2023-08-30 DIAGNOSIS — I25.10 CORONARY ARTERY DISEASE INVOLVING NATIVE CORONARY ARTERY OF NATIVE HEART WITHOUT ANGINA PECTORIS: ICD-10-CM

## 2023-08-30 DIAGNOSIS — E78.5 HYPERLIPIDEMIA ASSOCIATED WITH TYPE 2 DIABETES MELLITUS: ICD-10-CM

## 2023-08-30 DIAGNOSIS — E11.59 HYPERTENSION ASSOCIATED WITH DIABETES: ICD-10-CM

## 2023-08-30 LAB
ALBUMIN/CREAT UR: NORMAL UG/MG (ref 0–30)
CREAT UR-MCNC: 50 MG/DL (ref 15–325)
ESTIMATED AVG GLUCOSE: 131 MG/DL (ref 68–131)
HBA1C MFR BLD: 6.2 % (ref 4–5.6)
MICROALBUMIN UR DL<=1MG/L-MCNC: <5 UG/ML

## 2023-08-30 PROCEDURE — 83036 HEMOGLOBIN GLYCOSYLATED A1C: CPT | Performed by: PHYSICIAN ASSISTANT

## 2023-08-30 PROCEDURE — 99214 OFFICE O/P EST MOD 30 MIN: CPT | Mod: S$PBB,,, | Performed by: STUDENT IN AN ORGANIZED HEALTH CARE EDUCATION/TRAINING PROGRAM

## 2023-08-30 PROCEDURE — 99999 PR PBB SHADOW E&M-EST. PATIENT-LVL III: CPT | Mod: PBBFAC,,, | Performed by: STUDENT IN AN ORGANIZED HEALTH CARE EDUCATION/TRAINING PROGRAM

## 2023-08-30 PROCEDURE — 82570 ASSAY OF URINE CREATININE: CPT | Performed by: STUDENT IN AN ORGANIZED HEALTH CARE EDUCATION/TRAINING PROGRAM

## 2023-08-30 PROCEDURE — 99214 PR OFFICE/OUTPT VISIT, EST, LEVL IV, 30-39 MIN: ICD-10-PCS | Mod: S$PBB,,, | Performed by: STUDENT IN AN ORGANIZED HEALTH CARE EDUCATION/TRAINING PROGRAM

## 2023-08-30 PROCEDURE — 99999 PR PBB SHADOW E&M-EST. PATIENT-LVL III: ICD-10-PCS | Mod: PBBFAC,,, | Performed by: STUDENT IN AN ORGANIZED HEALTH CARE EDUCATION/TRAINING PROGRAM

## 2023-08-30 PROCEDURE — 99213 OFFICE O/P EST LOW 20 MIN: CPT | Mod: PBBFAC,PO | Performed by: STUDENT IN AN ORGANIZED HEALTH CARE EDUCATION/TRAINING PROGRAM

## 2023-08-30 PROCEDURE — 36415 COLL VENOUS BLD VENIPUNCTURE: CPT | Mod: PO | Performed by: PHYSICIAN ASSISTANT

## 2023-08-30 RX ORDER — ORAL SEMAGLUTIDE 14 MG/1
14 TABLET ORAL DAILY
Qty: 120 TABLET | Refills: 0 | Status: SHIPPED | OUTPATIENT
Start: 2023-08-30

## 2023-08-30 NOTE — TELEPHONE ENCOUNTER
No care due was identified.  [facility] Embedded Care Due Messages. Reference number: [id number].   8/30/2023 12:50:56 PM CDT

## 2023-08-30 NOTE — TELEPHONE ENCOUNTER
Refill Decision Note   Jessica Luna  is requesting a refill authorization.  Brief Assessment and Rationale for Refill:  Approve     Medication Therapy Plan:         Comments:     Note composed:4:21 PM 08/30/2023

## 2023-08-30 NOTE — PROGRESS NOTES
[facility] Note    Subjective:    The HPI and pertinent ROS is included in the Diagnostic Impression Remarks section at the end of the note. Please see below for further details. Chief complaint is at end of note.     Jessica is a pleasant intelligent patient who is here for evaluation.     Modified Medications    No medications on file       Data reviewed 274}  Previous medical records reviewed and summarized in plan section at end of note.      If you are due for any health screening(s) below please notify me so we can arrange them to be ordered and scheduled. Most healthy patients at your age complete them, but you are free to accept or refuse. If you can't do it, I'll definitely understand. If you can, I'd certainly appreciate it!     All of your core healthy metrics are met.      The following portions of the patient's history were reviewed and updated as appropriate: allergies, current medications, past family history, past medical history, past social history, past surgical history and problem list.    She  has a past medical history of Anticoagulant long-term use, Arthritis, CAD (coronary artery disease), Diabetes mellitus, Diabetes mellitus type II, Diverticulosis, Hyperlipidemia, Hypertension, Low magnesium levels, Myocardial infarction, Obstructive uropathy (10/16/2015), Renal stone, Status post cystoscopy - Left laser percutaneous nephrolithotripsy (2016), Ureterolithiasis Obstructive-left (1/3/2015), Wears dentures, Wears dentures, Wears glasses, and Wears glasses.  She  has a past surgical history that includes Coronary stent placement (); Rotator cuff repair; Cholecystectomy;  section; Hysterectomy; Colonoscopy (N/A, 10/7/2015); Cystoscopy; ureteroscopy (12/15/15); Cystoscopy w/ laser lithotripsy (12/15/15); Percutaneous nephrolithotripsy (2016); Laser lithotripsy (Left, 2019); Ureteroscopic removal of ureteric calculus (Left, 2019); Ureteral stent  placement (Left, 9/19/2019); Cystoscopy w/ retrogrades (Bilateral, 9/19/2019); Cystoscopy w/ ureteral stent removal (Left, 10/1/2019); Colonoscopy (N/A, 10/3/2019); Oophorectomy; Arthroscopic repair of rotator cuff of shoulder (Right, 4/8/2021); Arthroscopic tenotomy of biceps tendon (Right, 4/8/2021); and Arthroscopy of shoulder with decompression of subacromial space (Right, 4/8/2021).    She  reports that she has never smoked. She has never been exposed to tobacco smoke. She has never used smokeless tobacco. She reports that she does not drink alcohol and does not use drugs.  She family history includes Breast cancer (age of onset: 59) in her sister; Breast cancer (age of onset: 60) in her mother; Cancer in her mother and sister; Diabetes in her brother; Heart disease (age of onset: 90) in her father; Kidney disease in her brother; Spina bifida in her brother.    Review of patient's allergies indicates:   Allergen Reactions    No known drug allergies        Tobacco Use: Low Risk  (8/30/2023)    Patient History     Smoking Tobacco Use: Never     Smokeless Tobacco Use: Never     Passive Exposure: Never     Physical Examination  General appearance: alert, cooperative, no distress  Neck: no thyromegaly, no neck stiffness  Lungs: clear to auscultation, no wheezes, rales or rhonchi, symmetric air entry  Heart: normal rate, regular rhythm, normal S1, S2, no murmurs, rubs, clicks or gallops  Abdomen: soft, nontender, nondistended, no rigidity, rebound, or guarding.   Back: no point tenderness over spine  Extremities: peripheral pulses normal, no unilateral leg swelling or calf tenderness   Neurological:alert, oriented, normal speech, no new focal findings or movement disorder noted from baseline    BP Readings from Last 3 Encounters:   08/30/23 102/60   04/12/23 126/72   03/07/23 90/60     Wt Readings from Last 3 Encounters:   08/30/23 62 kg (136 lb 11 oz)   04/12/23 64.9 kg (143 lb)   03/07/23 65 kg (143 lb 4.8 oz)  "    /60 (BP Location: Right arm, Patient Position: Sitting, BP Method: Medium (Manual))   Pulse 60   Temp 98.4 °F (36.9 °C) (Oral)   Resp 16   Ht 5' 2" (1.575 m)   Wt 62 kg (136 lb 11 oz)   SpO2 99%   BMI 25.00 kg/m²    274}  Laboratory: I have reviewed old labs below:    274}    Lab Results   Component Value Date    WBC 8.24 04/01/2022    HGB 12.2 04/01/2022    HCT 38.9 04/01/2022    MCV 89 04/01/2022     04/01/2022     03/08/2023    K 4.5 03/08/2023     03/08/2023    CALCIUM 9.8 03/08/2023    PHOS 3.1 10/17/2015    CO2 20 (L) 03/08/2023     (H) 03/08/2023    BUN 27 (H) 03/08/2023    CREATININE 1.2 03/08/2023    ANIONGAP 13 03/08/2023    PROT 7.3 03/08/2023    ALBUMIN 4.1 03/08/2023    BILITOT 0.4 03/08/2023    ALKPHOS 113 03/08/2023    ALT 10 03/08/2023    AST 16 03/08/2023    INR 1.0 09/12/2019    CHOL 124 03/08/2023    TRIG 118 03/08/2023    HDL 37 (L) 03/08/2023    LDLCALC 63.4 03/08/2023    TSH 1.469 03/08/2023    GLUF 642 (HH) 01/03/2015    HGBA1C 6.9 (H) 03/08/2023     Lab reviewed by me: Particular labs of significance that I will monitor, workup, or treat to improve are mentioned below in diagnostic impression remarks.    Imaging/EKG: I have reviewed the pertinent results and my findings are noted in remarks.  274}    CC:   Chief Complaint   Patient presents with    Follow-up    Hypertension    Diabetes        274}    Assessment/Plan  Jessica Luna is a 72 y.o. female who presents to clinic with:  1. Healthcare maintenance    2. Hypertension associated with diabetes    3. Hyperlipidemia associated with type 2 diabetes mellitus    4. Diabetes mellitus due to underlying condition with hyperglycemia, without long-term current use of insulin    5. Coronary artery disease involving native coronary artery of native heart without angina pectoris    6. Abnormal finding of blood chemistry, unspecified       274}  Diagnostic Impression Remarks + HPI     Documentation " "entered by me for this encounter may have been done in part using speech-recognition technology. Although I have made an effort to ensure accuracy, "sound like" errors may exist and should be interpreted in context.     Decreased kidney function-patient had a decreased kidney function unclear if this was due to dehydration but will recheck her kidney function get a Cystatin C if it remains decreased might have to renally dose metformin    Diabetes-stable continue current medications monitor A1c recommend eye exam yearly.  Low glycemic index diet  Hypertension stable continue current meds monitor no headache or chest pain f/u blood work   HLD stable continue current meds monitor blood work rec mediterranean diet     CAD-stable cont asa statin no chest pain monitor f/u cards     This is the extent of this pleasant patient's concerns at this present time. She did not feel chest pain upon exertion, dyspnea, nausea, vomiting, diaphoresis, or syncope. No pleuritic chest pain, unilateral leg swelling, calf tenderness, or calf pain. Negative for unintentional weight loss night sweats, hematuria, and fevers. Jessica Rosa will return to clinic in a few months for further workup and reassessment or sooner as needed. She was instructed to call the clinic or go to the emergency department or urgent care immediately if her symptoms do not improve, worsens, or if any new symptoms develop. As we discussed that symptoms could worsen over the next 24 hours she was advised that if any increased swelling, pain, or numbness arise to go immediately to the ED. Patient knows to call any time if an emergency arises. Shared decision making occurred and she verbalized understanding in agreement with this plan. I discussed imaging findings, diagnosis, possibilities, treatment options, medications, risks, and benefits. She had many questions regarding the options and long-term effects. All questions were answered. She expressed understanding after " counseling regarding the diagnosis and recommendations. She was capable and demonstrated competence with understanding of these options. Shared decision making was performed resulting in her choosing the current treatment plan. Patient handout was given with instructions and recommendations. Advised the patient that if they become pregnant to alert us immediately to assess for medication changes. Having a healthy weight can decrease the risk of 13 cancers and is an important goal. I also discussed the importance of close follow up to discuss labs, change or modify her medications if needed, monitor side effects, and further evaluation of medical problems.     Additional workup planned: see labs ordered below.    See below for labs and meds ordered with associated diagnosis      1. Healthcare maintenance    2. Hypertension associated with diabetes    3. Hyperlipidemia associated with type 2 diabetes mellitus    4. Diabetes mellitus due to underlying condition with hyperglycemia, without long-term current use of insulin  - Microalbumin/creatinine urine ratio    5. Coronary artery disease involving native coronary artery of native heart without angina pectoris    6. Abnormal finding of blood chemistry, unspecified  - Comprehensive Metabolic Panel; Future  - CYSTATIN C, SERUM; Future      Skip Guerrero MD   274}    If you are due for any health screening(s) below please notify me so we can arrange them to be ordered and scheduled. Most healthy patients at your age complete them, but you are free to accept or refuse.     If you can't do it, I'll definitely understand. If you can, I'd certainly appreciate it!   All of your core healthy metrics are met.

## 2023-09-19 DIAGNOSIS — E11.9 TYPE 2 DIABETES MELLITUS WITHOUT COMPLICATION, WITHOUT LONG-TERM CURRENT USE OF INSULIN: ICD-10-CM

## 2023-09-19 DIAGNOSIS — E08.65 DIABETES MELLITUS DUE TO UNDERLYING CONDITION WITH HYPERGLYCEMIA, WITHOUT LONG-TERM CURRENT USE OF INSULIN: ICD-10-CM

## 2023-09-19 RX ORDER — METFORMIN HYDROCHLORIDE 1000 MG/1
1000 TABLET ORAL
Qty: 180 TABLET | Refills: 3 | Status: SHIPPED | OUTPATIENT
Start: 2023-09-19

## 2023-09-19 NOTE — TELEPHONE ENCOUNTER
No care due was identified.  [facility] Embedded Care Due Messages. Reference number: [id number].   9/19/2023 6:13:41 PM CDT

## 2023-09-20 NOTE — TELEPHONE ENCOUNTER
Refill Routing Note   Medication(s) are not appropriate for processing by [facility] for the following reason(s):      Drug-disease interaction    [facility] action(s):  Defer Care Due:  None identified     Medication Therapy Plan: Drug-Disease: empagliflozin and Hydronephrosis of left kidney; Jardiance 25mg was sent 9/19/23 to [facility] 90 day with 3RF; Patient portal message requesting 30 day fill, med pended to 30day with equal number refills and resent to [facility]    Pharmacist review requested: Yes     Appointments  past 12m or future 3m with PCP    Date Provider   Last Visit   8/30/2023 Skip Guerrero MD   Next Visit   Visit date not found Skip Guerrero MD   ED visits in past 90 days: 0        Note composed:8:01 AM 09/20/2023

## 2023-09-20 NOTE — TELEPHONE ENCOUNTER
Refill Decision Note   Jessica Luna  is requesting a refill authorization.  Brief Assessment and Rationale for Refill:  Approve     Medication Therapy Plan:  Jardiance 25mg was sent 9/19/23 to [facility] 90 day with 3RF; Patient portal message requesting 30 day fill, med resent to [facility] with 30 +11 as requested      Pharmacist review requested: Yes   Comments:     No Care Gaps recommended.     Note composed:8:31 AM 09/20/2023

## 2023-10-16 ENCOUNTER — OFFICE VISIT (OUTPATIENT)
Dept: CARDIOLOGY | Facility: CLINIC | Age: 73
End: 2023-10-16
Payer: MEDICARE

## 2023-10-16 VITALS
WEIGHT: 138 LBS | OXYGEN SATURATION: 98 % | HEART RATE: 68 BPM | BODY MASS INDEX: 25.4 KG/M2 | HEIGHT: 62 IN | DIASTOLIC BLOOD PRESSURE: 80 MMHG | RESPIRATION RATE: 16 BRPM | SYSTOLIC BLOOD PRESSURE: 128 MMHG

## 2023-10-16 DIAGNOSIS — E78.5 HYPERLIPIDEMIA ASSOCIATED WITH TYPE 2 DIABETES MELLITUS: ICD-10-CM

## 2023-10-16 DIAGNOSIS — I15.2 HYPERTENSION ASSOCIATED WITH DIABETES: Primary | ICD-10-CM

## 2023-10-16 DIAGNOSIS — I25.2 H/O MYOCARDIAL INFARCTION, GREATER THAN 8 WEEKS: ICD-10-CM

## 2023-10-16 DIAGNOSIS — I25.10 CAD IN NATIVE ARTERY: ICD-10-CM

## 2023-10-16 DIAGNOSIS — E08.65 DIABETES MELLITUS DUE TO UNDERLYING CONDITION WITH HYPERGLYCEMIA, WITHOUT LONG-TERM CURRENT USE OF INSULIN: ICD-10-CM

## 2023-10-16 DIAGNOSIS — E11.69 HYPERLIPIDEMIA ASSOCIATED WITH TYPE 2 DIABETES MELLITUS: ICD-10-CM

## 2023-10-16 DIAGNOSIS — E83.42 HYPOMAGNESEMIA: ICD-10-CM

## 2023-10-16 DIAGNOSIS — E11.59 HYPERTENSION ASSOCIATED WITH DIABETES: Primary | ICD-10-CM

## 2023-10-16 DIAGNOSIS — R94.31 NONSPECIFIC ABNORMAL ELECTROCARDIOGRAM (ECG) (EKG): ICD-10-CM

## 2023-10-16 PROCEDURE — 93010 EKG 12-LEAD: ICD-10-PCS | Mod: S$PBB,,, | Performed by: INTERNAL MEDICINE

## 2023-10-16 PROCEDURE — 99213 OFFICE O/P EST LOW 20 MIN: CPT | Mod: PBBFAC,PN | Performed by: INTERNAL MEDICINE

## 2023-10-16 PROCEDURE — 93010 ELECTROCARDIOGRAM REPORT: CPT | Mod: S$PBB,,, | Performed by: INTERNAL MEDICINE

## 2023-10-16 PROCEDURE — 99214 PR OFFICE/OUTPT VISIT, EST, LEVL IV, 30-39 MIN: ICD-10-PCS | Mod: S$PBB,25,, | Performed by: INTERNAL MEDICINE

## 2023-10-16 PROCEDURE — 99999 PR PBB SHADOW E&M-EST. PATIENT-LVL III: ICD-10-PCS | Mod: PBBFAC,,, | Performed by: INTERNAL MEDICINE

## 2023-10-16 PROCEDURE — 93005 ELECTROCARDIOGRAM TRACING: CPT | Mod: PBBFAC,PN | Performed by: INTERNAL MEDICINE

## 2023-10-16 PROCEDURE — 99999 PR PBB SHADOW E&M-EST. PATIENT-LVL III: CPT | Mod: PBBFAC,,, | Performed by: INTERNAL MEDICINE

## 2023-10-16 PROCEDURE — 99214 OFFICE O/P EST MOD 30 MIN: CPT | Mod: S$PBB,25,, | Performed by: INTERNAL MEDICINE

## 2023-10-16 RX ORDER — ATORVASTATIN CALCIUM 40 MG/1
40 TABLET, FILM COATED ORAL DAILY
Qty: 90 TABLET | Refills: 3 | Status: SHIPPED | OUTPATIENT
Start: 2023-10-16

## 2023-10-16 NOTE — PROGRESS NOTES
Subjective:    Patient ID:  Jessica Luna is a 72 y.o. female     Chief Complaint   Patient presents with    Coronary Artery Disease    Hyperlipidemia    Hypertension       HPI:  Ms  Jessica Luna is a 72 y.o. female is here for follow-up.    Patient has been doing well no specific complaints at the present time.  Her breathing has been good denies any shortness of breath or difficulty in breathing denies any chest pain or tightness or heaviness denies any dizziness or lightheadedness denies any loss of consciousness falls or head injury.    Recently patient also has lost her weight.  She is been on medication for diabetes and it is well controlled.        Review of patient's allergies indicates:   Allergen Reactions    No known drug allergies        Past Medical History:   Diagnosis Date    Anticoagulant long-term use     Arthritis     CAD (coronary artery disease)     C. Stents X2 Dr. Og Jeronimo    Diabetes mellitus     Diabetes mellitus type II     Diverticulosis     Hyperlipidemia     Hypertension     Low magnesium levels     Myocardial infarction     Obstructive uropathy 10/16/2015    Renal stone     Status post cystoscopy - Left laser percutaneous nephrolithotripsy 6/29/2016    Ureterolithiasis Obstructive-left 1/3/2015    Wears dentures     FULL UPPER - PARTIAL BOTTOM    Wears dentures     Full upper; partial lower    Wears glasses     Wears glasses      Past Surgical History:   Procedure Laterality Date    ARTHROSCOPIC REPAIR OF ROTATOR CUFF OF SHOULDER Right 4/8/2021    Procedure: REPAIR, ROTATOR CUFF, ARTHROSCOPIC;  Surgeon: Rudy Graves MD;  Location: [facility] OR;  Service: Orthopedics;  Laterality: Right;  GENERAL AND BLOCK    ARTHROSCOPIC TENOTOMY OF BICEPS TENDON Right 4/8/2021    Procedure: TENOTOMY, BICEPS, ARTHROSCOPIC;  Surgeon: Rudy Graves MD;  Location: [facility] OR;  Service: Orthopedics;  Laterality: Right;  GENERAL AND BLOCK    ARTHROSCOPY OF SHOULDER WITH DECOMPRESSION OF  SUBACROMIAL SPACE Right 2021    Procedure: ARTHROSCOPY, SHOULDER, WITH SUBACROMIAL SPACE DECOMPRESSION;  Surgeon: Rudy Graves MD;  Location: [facility] OR;  Service: Orthopedics;  Laterality: Right;  GENERAL AND BLOCK  MITEK     SECTION      CHOLECYSTECTOMY      COLONOSCOPY N/A 10/7/2015    Procedure: COLONOSCOPY;  Surgeon: Washington Rodriguez MD;  Location: [facility] ENDO;  Service: Endoscopy;  Laterality: N/A;    COLONOSCOPY N/A 10/3/2019    Procedure: COLONOSCOPY;  Surgeon: Viviane Simeon MD;  Location: [facility] ENDO;  Service: Endoscopy;  Laterality: N/A;    CORONARY STENT PLACEMENT      2 vessels    CYSTOSCOPY      CYSTOSCOPY W/ LASER LITHOTRIPSY  12/15/15    CYSTOSCOPY W/ RETROGRADES Bilateral 2019    Procedure: CYSTOSCOPY, WITH RETROGRADE PYELOGRAM;  Surgeon: Ubaldo Cho MD;  Location: [facility] OR;  Service: Urology;  Laterality: Bilateral;    CYSTOSCOPY W/ URETERAL STENT REMOVAL Left 10/1/2019    Procedure: CYSTOSCOPY, WITH URETERAL STENT REMOVAL;  Surgeon: Ubaldo Cho MD;  Location: [facility] OR;  Service: Urology;  Laterality: Left;    HYSTERECTOMY      GRACE/BSO, DUB    LASER LITHOTRIPSY Left 2019    Procedure: LITHOTRIPSY, USING LASER;  Surgeon: Ubaldo Cho MD;  Location: [facility] OR;  Service: Urology;  Laterality: Left;    OOPHORECTOMY      PERCUTANEOUS NEPHROLITHOTRIPSY  2016    ROTATOR CUFF REPAIR      left    URETERAL STENT PLACEMENT Left 2019    Procedure: INSERTION, STENT, URETER;  Surgeon: Ubaldo Cho MD;  Location: [facility];  Service: Urology;  Laterality: Left;    URETEROSCOPIC REMOVAL OF URETERIC CALCULUS Left 2019    Procedure: REMOVAL, CALCULUS, URETER, URETEROSCOPIC;  Surgeon: Ubaldo Cho MD;  Location: [facility] OR;  Service: Urology;  Laterality: Left;    ureteroscopy  12/15/15     Social History     Tobacco Use    Smoking status: Never     Passive exposure: Never    Smokeless tobacco: Never   Substance Use Topics    Alcohol use: No    Drug  use: No     Family History   Problem Relation Age of Onset    Heart disease Father 90    Cancer Mother         breast cancer    Breast cancer Mother 60    Diabetes Brother     Kidney disease Brother         nephrectomy due to cancer    Spina bifida Brother     Cancer Sister         breast cancer    Breast cancer Sister 59        Review of Systems:   Constitution: Negative for diaphoresis and fever.   HEENT: Negative for nosebleeds.    Cardiovascular: Negative for chest pain       No dyspnea on exertion       No leg swelling        No palpitations  Respiratory: Negative for shortness of breath and wheezing.    Hematologic/Lymphatic: Negative for bleeding problem. Does not bruise/bleed easily.   Skin: Negative for color change and rash.   Musculoskeletal: Negative for falls and myalgias.   Gastrointestinal: Negative for hematemesis and hematochezia.   Genitourinary: Negative for hematuria.   Neurological: Negative for dizziness and light-headedness.   Psychiatric/Behavioral: Negative for altered mental status and memory loss.          Objective:        Vitals:    10/16/23 1001   BP: 128/80   Pulse: 68   Resp: 16       Lab Results   Component Value Date    WBC 8.24 04/01/2022    HGB 12.2 04/01/2022    HCT 38.9 04/01/2022     04/01/2022    CHOL 124 03/08/2023    TRIG 118 03/08/2023    HDL 37 (L) 03/08/2023    ALT 10 03/08/2023    AST 16 03/08/2023     03/08/2023    K 4.5 03/08/2023     03/08/2023    CREATININE 1.2 03/08/2023    BUN 27 (H) 03/08/2023    CO2 20 (L) 03/08/2023    TSH 1.469 03/08/2023    INR 1.0 09/12/2019    GLUF 642 (HH) 01/03/2015    HGBA1C 6.2 (H) 08/30/2023        ECHOCARDIOGRAM RESULTS  Results for orders placed in visit on 04/06/21    Echo Color Flow Doppler? Yes    Interpretation Summary  · The estimated PA systolic pressure is 31 mmHg.  · The left ventricle is normal in size with moderate concentric hypertrophy and normal systolic function.  · The estimated ejection fraction is  70%.  · Normal left ventricular diastolic function.  · Normal right ventricular size with normal right ventricular systolic function.  · Moderate left atrial enlargement.  · Mild to moderate tricuspid regurgitation.  · Cannot rule out a small vegetation with thickening of the anterior mitral leaflet.        CURRENT/PREVIOUS VISIT EKG  Results for orders placed or performed in visit on 04/12/23   IN OFFICE EKG 12-LEAD (to [place])    Collection Time: 04/12/23 10:25 AM    Narrative    Test Reason : R94.31,    Vent. Rate : 060 BPM     Atrial Rate : 060 BPM     P-R Int : 202 ms          QRS Dur : 104 ms      QT Int : 406 ms       P-R-T Axes : 054 087 077 degrees     QTc Int : 406 ms    Normal sinus rhythm  Low voltage QRS  ST elevation, consider early repolarization, pericarditis, or injury  Cannot rule out Anterior infarct ,age undetermined  Abnormal ECG  When compared with ECG of 08-FEB-2021 12:25,  Minimal criteria for Anterior infarct are now Present  Confirmed by Og Jeronimo MD ([id number]) on 4/18/2023 10:48:50 AM    Referred By:             Confirmed By:Og Jeronimo MD     No valid procedures specified.   Results for orders placed in visit on 04/06/21    Nuclear Stress Test    Interpretation Summary    The EKG portion of this study is negative for ischemia.    The patient reported no chest pain during the stress test.    The nuclear portion of this study will be reported separately.      Physical Exam:  CONSTITUTIONAL: No fever, no chills  HEENT: Normocephalic, atraumatic,pupils reactive to light                 NECK:  No JVD no carotid bruit  CVS: S1S2+, RRR, systolic murmurs,   LUNGS: Clear  ABDOMEN: Soft, NT, BS+  EXTREMITIES: No cyanosis, edema  : No león catheter  NEURO: AAO X 3  PSY: Normal affect      Medication List with Changes/Refills   Current Medications    ACETAMINOPHEN (TYLENOL) 500 MG TABLET    Take 500 mg by mouth every 6 (six) hours as needed for Pain.    ALLOPURINOL (ZYLOPRIM) 100 MG TABLET     Take 1 tablet (100 mg total) by mouth once daily.    ASPIRIN 81 MG CHEW    Take 1 tablet (81 mg total) by mouth once daily.    ATORVASTATIN (LIPITOR) 40 MG TABLET    Take 1 tablet (40 mg total) by mouth once daily.    EMPAGLIFLOZIN (JARDIANCE) 25 MG TABLET    Take 1 tablet (25 mg total) by mouth once daily.    MAGNESIUM OXIDE (MAG-OX) 250 MG MAGNESIUM TAB    Take 0.5 tablets by mouth once daily.    METFORMIN (GLUCOPHAGE) 1000 MG TABLET    Take 1 tablet (1,000 mg total) by mouth daily with breakfast.    METOPROLOL TARTRATE (LOPRESSOR) 25 MG TABLET    Take 1 tablet (25 mg total) by mouth 2 (two) times daily.    OLMESARTAN (BENICAR) 40 MG TABLET    Take 1 tablet (40 mg total) by mouth once daily.    OMEGA-3 FATTY ACIDS-VITAMIN E 1,000-5 MG-UNIT CAP    Take 1 capsule by mouth 2 (two) times daily. Two times a day    POTASSIUM CITRATE (UROCIT-K 15) 15 MEQ TBSR    Take 1 tablet by mouth 2 (two) times daily.    SEMAGLUTIDE (RYBELSUS) 14 MG TABLET    Take 1 tablet (14 mg total) by mouth once daily.             Assessment:       1. Hypertension associated with diabetes    2. Hyperlipidemia associated with type 2 diabetes mellitus    3. CAD in native artery    4. Diabetes mellitus due to underlying condition with hyperglycemia, without long-term current use of insulin    5. Hypomagnesemia    6. H/O myocardial infarction, greater than 8 weeks    7. Nonspecific abnormal electrocardiogram (ECG) (EKG)         Plan:   1. Essential hypertension   Patient's blood pressure is well controlled is 128/80 and she is currently on olmesartan 40 mg p.o. daily metoprolol tartrate 25 mg p.o. b.i.d.e the same.  2. Hyperlipidemia associated with diabetes.    Patient is currently on atorvastatin 40 mg p.o. daily continue the same and on her last blood work her total cholesterol was 124 HDL was 37 LDL was 68 and triglycerides 118.    Her primary physician is checking her cholesterol and liver function test.    3. Coronary artery disease   Patient  is stable at the present time.  Has no chest pain or EKG changes.  And continue her current medications she is also on aspirin 81 mg p.o. daily continue the same she is also on metoprolol 25 mg b.i.d..  4. Diabetes mellitus without complications   Patient's blood sugars are being monitored by her primary physician.  And she is currently on Jardiance 25 mg p.o. daily and Rybelsus  40 mg p.o. daily continue the same she is also on metformin  1000 mg p.o. daily in the morning continue the same.  She follows up with the primary physician in regards with the diabetes.  5. Hypomagnesemia   She is currently on magnesium oxide half a tablet daily continue the same.  6. EKG  Reviewed EKG independently patient is in normal sinus rhythm with a heart rate of 67 beats per minute low-voltage QRS and early repolarization changes.  Poor R-wave progression in the anterior precordial leads.  7. Patient to continue current management will also do a 2D echocardiogram before she sees says mixed time.  I will see her back in the office in 6 months time.            Problem List Items Addressed This Visit          Cardiac/Vascular    Hyperlipidemia associated with type 2 diabetes mellitus    Hypertension associated with diabetes - Primary       Renal/    Hypomagnesemia       Endocrine    Diabetes mellitus due to underlying condition with hyperglycemia, without long-term current use of insulin     Other Visit Diagnoses       CAD in native artery        H/O myocardial infarction, greater than 8 weeks        Nonspecific abnormal electrocardiogram (ECG) (EKG)        Relevant Orders    IN OFFICE EKG 12-LEAD (to [place])            No follow-ups on file.

## 2023-10-19 DIAGNOSIS — E08.65 DIABETES MELLITUS DUE TO UNDERLYING CONDITION WITH HYPERGLYCEMIA, WITHOUT LONG-TERM CURRENT USE OF INSULIN: ICD-10-CM

## 2023-10-30 ENCOUNTER — OFFICE VISIT (OUTPATIENT)
Dept: UROLOGY | Facility: CLINIC | Age: 73
End: 2023-10-30
Payer: MEDICARE

## 2023-10-30 DIAGNOSIS — N20.0 KIDNEY STONES: Primary | ICD-10-CM

## 2023-10-30 LAB
BILIRUBIN, UA POC OHS: NEGATIVE
BLOOD, UA POC OHS: NEGATIVE
CLARITY, UA POC OHS: CLEAR
COLOR, UA POC OHS: YELLOW
GLUCOSE, UA POC OHS: 500
KETONES, UA POC OHS: NEGATIVE
LEUKOCYTES, UA POC OHS: NEGATIVE
NITRITE, UA POC OHS: NEGATIVE
PH, UA POC OHS: 5.5
PROTEIN, UA POC OHS: NEGATIVE
SPECIFIC GRAVITY, UA POC OHS: 1.02
UROBILINOGEN, UA POC OHS: 0.2

## 2023-10-30 PROCEDURE — 99999PBSHW POCT URINALYSIS(INSTRUMENT): ICD-10-PCS | Mod: PBBFAC,,,

## 2023-10-30 PROCEDURE — 81003 URINALYSIS AUTO W/O SCOPE: CPT | Mod: PBBFAC,PO | Performed by: NURSE PRACTITIONER

## 2023-10-30 PROCEDURE — 99999 PR PBB SHADOW E&M-EST. PATIENT-LVL III: ICD-10-PCS | Mod: PBBFAC,,, | Performed by: NURSE PRACTITIONER

## 2023-10-30 PROCEDURE — 99999PBSHW POCT URINALYSIS(INSTRUMENT): Mod: PBBFAC,,,

## 2023-10-30 PROCEDURE — 99213 OFFICE O/P EST LOW 20 MIN: CPT | Mod: PBBFAC,PO | Performed by: NURSE PRACTITIONER

## 2023-10-30 PROCEDURE — 99213 OFFICE O/P EST LOW 20 MIN: CPT | Mod: S$PBB,,, | Performed by: NURSE PRACTITIONER

## 2023-10-30 PROCEDURE — 99213 PR OFFICE/OUTPT VISIT, EST, LEVL III, 20-29 MIN: ICD-10-PCS | Mod: S$PBB,,, | Performed by: NURSE PRACTITIONER

## 2023-10-30 PROCEDURE — 99999 PR PBB SHADOW E&M-EST. PATIENT-LVL III: CPT | Mod: PBBFAC,,, | Performed by: NURSE PRACTITIONER

## 2023-10-30 RX ORDER — POTASSIUM CITRATE 15 MEQ/1
1 TABLET, EXTENDED RELEASE ORAL 2 TIMES DAILY
Qty: 180 TABLET | Refills: 4 | Status: SHIPPED | OUTPATIENT
Start: 2023-10-30 | End: 2024-01-28

## 2023-10-30 RX ORDER — ALLOPURINOL 100 MG/1
100 TABLET ORAL DAILY
Qty: 90 TABLET | Refills: 4 | Status: SHIPPED | OUTPATIENT
Start: 2023-10-30

## 2023-10-30 NOTE — PROGRESS NOTES
[facility] Note  Staff: JOSELYN Weiss-C    PCP: JANIS Guerrero  Urologist:  JANIS Cho    Chief Complaint: Annual f/up-medication refills    Subjective:        HPI: Jessica Luna is a 73 y.o. female presents today for follow-up     72 yo F with history of uric acid stones found to have L renal pelvic stone on recent GH workup had cyto/RPGs (with possible subclinical bilateral obstruction), and L URS/HLL (with patent UPJ accomodating scope) on 9/21/19 to remove left stone burden.      10/01/2019:  Left ureteral stent removal, s/p left ureteroscopy on 09/21/19 performed by Dr. Cho.     OV 10/15/2021:  Recent imaging showed no stones seen at this time.  Thinning of the renal cortex of both kidneys suggesting intrinsic renal disease is noted.  Hydronephrosis or other abnormalities are not seen.     Overall, pt doing well with no complaints voiced at this time.  No gross hematuria, No dysuria noted.     10/19/22:  UA in office showed normal findings.  No gross hematuria, no flank pain voiced by pt today.  Overall, doing well with no complaints voiced.    10/30/23:  UA in office showed 500 of glucose, but overall normal findings for all others.  NO gross hematuria, no c/o flank pain or dysuria noted by pt.  Pt has been doing very well with no problems noted and no more stone episodes for at least one year or more.     Current  meds:  Allopurinol 100 mg daily  Urocit-K 15 mEq     Recent Imaging:  Renal/Bladder US done 10/4/2022:  FINDINGS:  Right kidney: The right kidney measures 10.6 cm. No cortical thinning. No loss of corticomedullary distinction. Resistive index measures 0.70.  No mass. No renal stone. No hydronephrosis.     Left kidney: The left kidney measures 10.4 cm. No cortical thinning. No loss of corticomedullary distinction. Resistive index measures 0.71.  No mass. A 5 mm calcification is seen in the lower pole of the left kidney consistent with a probable intrarenal  stone.  Significant hydronephrosis is not seen.     The bladder is partially distended at the time of scanning and has an unremarkable appearance.     Impression:  5 mm left intrarenal stone.  Otherwise negative renal ultrasound.     REVIEW OF SYSTEMS:  A comprehensive 10 system review was performed and is negative except as noted above in HPI     PMHx:  Past Medical History:   Diagnosis Date    Anticoagulant long-term use     Arthritis     CAD (coronary artery disease)     C. Stents X2 Dr. Og Jeronimo    Diabetes mellitus     Diabetes mellitus type II     Diverticulosis     Hyperlipidemia     Hypertension     Low magnesium levels     Myocardial infarction     Obstructive uropathy 10/16/2015    Renal stone     Status post cystoscopy - Left laser percutaneous nephrolithotripsy 2016    Ureterolithiasis Obstructive-left 1/3/2015    Wears dentures     FULL UPPER - PARTIAL BOTTOM    Wears dentures     Full upper; partial lower    Wears glasses     Wears glasses      PSHx:  Past Surgical History:   Procedure Laterality Date    ARTHROSCOPIC REPAIR OF ROTATOR CUFF OF SHOULDER Right 2021    Procedure: REPAIR, ROTATOR CUFF, ARTHROSCOPIC;  Surgeon: Rudy Graves MD;  Location: [facility] OR;  Service: Orthopedics;  Laterality: Right;  GENERAL AND BLOCK    ARTHROSCOPIC TENOTOMY OF BICEPS TENDON Right 2021    Procedure: TENOTOMY, BICEPS, ARTHROSCOPIC;  Surgeon: Rudy Graves MD;  Location: [facility] OR;  Service: Orthopedics;  Laterality: Right;  GENERAL AND BLOCK    ARTHROSCOPY OF SHOULDER WITH DECOMPRESSION OF SUBACROMIAL SPACE Right 2021    Procedure: ARTHROSCOPY, SHOULDER, WITH SUBACROMIAL SPACE DECOMPRESSION;  Surgeon: Rudy Graves MD;  Location: [facility] OR;  Service: Orthopedics;  Laterality: Right;  GENERAL AND BLOCK  MITEK     SECTION      CHOLECYSTECTOMY      COLONOSCOPY N/A 10/7/2015    Procedure: COLONOSCOPY;  Surgeon: Washington Rodriguez MD;  Location: [facility] ENDO;  Service: Endoscopy;   Laterality: N/A;    COLONOSCOPY N/A 10/3/2019    Procedure: COLONOSCOPY;  Surgeon: Viviane Simeon MD;  Location: [facility] ENDO;  Service: Endoscopy;  Laterality: N/A;    CORONARY STENT PLACEMENT  2006    2 vessels    CYSTOSCOPY      CYSTOSCOPY W/ LASER LITHOTRIPSY  12/15/15    CYSTOSCOPY W/ RETROGRADES Bilateral 9/19/2019    Procedure: CYSTOSCOPY, WITH RETROGRADE PYELOGRAM;  Surgeon: Ubaldo Cho MD;  Location: [facility] OR;  Service: Urology;  Laterality: Bilateral;    CYSTOSCOPY W/ URETERAL STENT REMOVAL Left 10/1/2019    Procedure: CYSTOSCOPY, WITH URETERAL STENT REMOVAL;  Surgeon: Ubaldo Cho MD;  Location: [facility] OR;  Service: Urology;  Laterality: Left;    HYSTERECTOMY      GRACE/BSO, DUB    LASER LITHOTRIPSY Left 9/19/2019    Procedure: LITHOTRIPSY, USING LASER;  Surgeon: Ubaldo Cho MD;  Location: [facility] OR;  Service: Urology;  Laterality: Left;    OOPHORECTOMY      PERCUTANEOUS NEPHROLITHOTRIPSY  06/28/2016    ROTATOR CUFF REPAIR      left    URETERAL STENT PLACEMENT Left 9/19/2019    Procedure: INSERTION, STENT, URETER;  Surgeon: Ubaldo Cho MD;  Location: [facility] OR;  Service: Urology;  Laterality: Left;    URETEROSCOPIC REMOVAL OF URETERIC CALCULUS Left 9/19/2019    Procedure: REMOVAL, CALCULUS, URETER, URETEROSCOPIC;  Surgeon: Ubaldo Cho MD;  Location: [facility] OR;  Service: Urology;  Laterality: Left;    ureteroscopy  12/15/15     Allergies:  No known drug allergies    Medications: reviewed   Objective:   There were no vitals filed for this visit.    Physical Exam  Constitutional:       Appearance: She is well-developed.   HENT:      Head: Normocephalic and atraumatic.   Eyes:      Conjunctiva/sclera: Conjunctivae normal.      Pupils: Pupils are equal, round, and reactive to light.   Cardiovascular:      Rate and Rhythm: Normal rate and regular rhythm.      Heart sounds: Normal heart sounds.   Pulmonary:      Effort: Pulmonary effort is normal.      Breath sounds: Normal breath sounds.    Abdominal:      General: Bowel sounds are normal.      Palpations: Abdomen is soft.   Musculoskeletal:         General: Normal range of motion.      Cervical back: Normal range of motion and neck supple.   Skin:     General: Skin is warm and dry.   Neurological:      Mental Status: She is alert and oriented to person, place, and time.      Deep Tendon Reflexes: Reflexes are normal and symmetric.   Psychiatric:         Behavior: Behavior normal.         Thought Content: Thought content normal.         Judgment: Judgment normal.         Assessment:       1. Kidney stones          Plan:      We will repeat Renal/Bladder US at this time.  Kidney stone/uric acid stones reviewed with pt today with all questions answered.  Allopurinol and Urocit-K refilled for this pt during ov today.    F/u in one year or sooner should new  symptoms develop.    [facility]: Active    Kasia Ryan, JOSELYN-C

## 2023-11-06 ENCOUNTER — HOSPITAL ENCOUNTER (OUTPATIENT)
Dept: RADIOLOGY | Facility: HOSPITAL | Age: 73
Discharge: HOME | End: 2023-11-06
Attending: NURSE PRACTITIONER
Payer: MEDICARE

## 2023-11-06 DIAGNOSIS — N20.0 KIDNEY STONES: ICD-10-CM

## 2023-11-06 PROCEDURE — 76770 US EXAM ABDO BACK WALL COMP: CPT | Mod: 26,,, | Performed by: RADIOLOGY

## 2023-11-06 PROCEDURE — 76770 US EXAM ABDO BACK WALL COMP: CPT | Mod: TC

## 2023-11-06 PROCEDURE — 76770 US RETROPERITONEAL COMPLETE: ICD-10-PCS | Mod: 26,,, | Performed by: RADIOLOGY

## 2024-01-11 DIAGNOSIS — Z00.00 ENCOUNTER FOR MEDICARE ANNUAL WELLNESS EXAM: ICD-10-CM

## 2024-01-19 NOTE — PROGRESS NOTES
Pre-Visit Chart Review  For Appointment Scheduled on 11/7/2018    Health Maintenance Due   Topic Date Due    Zoster Vaccine  10/19/2010    Hemoglobin A1c  11/30/2017    Foot Exam  03/31/2018    Influenza Vaccine  08/01/2018    Pneumococcal (65+) (2 of 2 - PPSV23) 08/30/2018    Eye Exam  09/08/2018    Colonoscopy  10/07/2018                     
No

## 2024-02-07 ENCOUNTER — OFFICE VISIT (OUTPATIENT)
Dept: ENDOCRINOLOGY | Facility: CLINIC | Age: 74
End: 2024-02-07
Payer: MEDICARE

## 2024-02-07 VITALS
DIASTOLIC BLOOD PRESSURE: 70 MMHG | HEART RATE: 78 BPM | OXYGEN SATURATION: 98 % | BODY MASS INDEX: 26.7 KG/M2 | WEIGHT: 136 LBS | TEMPERATURE: 98 F | HEIGHT: 60 IN | SYSTOLIC BLOOD PRESSURE: 132 MMHG

## 2024-02-07 DIAGNOSIS — E11.65 TYPE 2 DIABETES MELLITUS WITH HYPERGLYCEMIA, WITHOUT LONG-TERM CURRENT USE OF INSULIN: Primary | ICD-10-CM

## 2024-02-07 DIAGNOSIS — I15.2 HYPERTENSION ASSOCIATED WITH DIABETES: ICD-10-CM

## 2024-02-07 DIAGNOSIS — Z78.0 POSTMENOPAUSAL: ICD-10-CM

## 2024-02-07 DIAGNOSIS — E78.5 HYPERLIPIDEMIA ASSOCIATED WITH TYPE 2 DIABETES MELLITUS: ICD-10-CM

## 2024-02-07 DIAGNOSIS — E11.69 HYPERLIPIDEMIA ASSOCIATED WITH TYPE 2 DIABETES MELLITUS: ICD-10-CM

## 2024-02-07 DIAGNOSIS — E11.59 HYPERTENSION ASSOCIATED WITH DIABETES: ICD-10-CM

## 2024-02-07 DIAGNOSIS — E55.9 HYPOVITAMINOSIS D: ICD-10-CM

## 2024-02-07 PROCEDURE — 99999PBSHW FLU VACCINE - QUADRIVALENT - ADJUVANTED: Mod: PBBFAC,,,

## 2024-02-07 PROCEDURE — 99214 OFFICE O/P EST MOD 30 MIN: CPT | Mod: PBBFAC,PO | Performed by: PHYSICIAN ASSISTANT

## 2024-02-07 PROCEDURE — 99999 PR PBB SHADOW E&M-EST. PATIENT-LVL IV: CPT | Mod: PBBFAC,,, | Performed by: PHYSICIAN ASSISTANT

## 2024-02-07 PROCEDURE — 99213 OFFICE O/P EST LOW 20 MIN: CPT | Mod: S$PBB,,, | Performed by: PHYSICIAN ASSISTANT

## 2024-02-07 PROCEDURE — G0008 ADMIN INFLUENZA VIRUS VAC: HCPCS | Mod: PBBFAC,PO

## 2024-02-07 PROCEDURE — 90694 VACC AIIV4 NO PRSRV 0.5ML IM: CPT | Mod: PBBFAC,PO

## 2024-02-07 NOTE — PROGRESS NOTES
CC: This 73 y.o. female presents for management of T2DM  and chronic conditions pending review including HTN, HLP    HPI: was diagnosed with T2DM in  on lab work.   Has never been hospitalized r/t DM.  Family hx of DM: brother  Fhx of thyroid disease:none  Denies missing doses of DM medication.   hypoglycemia at home: none    Taking care of her sister who has metastatic  breast cancer.     monitoring BG at home:  Fastin-140    Diet:   BF-coffee, eggs, toast,  sometimes mcds biscuit  LH-skipped  DN-pulled pork  Snacks grapes or a banana. Avoids sugary beverages.     Exercise:Occ walks at her house.    CURRENT DM MEDS: rybelsus 14 mg qd, Metformin 1000 mg qd, Jardiance 25 mg qd    Standards of Care:  Eye exam:  Dr. Cox  Podiatry exam:  Dr. SCHMIDT: 10/22 JEVON Parker    DEXA scan: 10/22 osteopenia. Taking ca and vd. She does stretching exercises with her arms.     PMHx, PSHx: reviewed in epic.  Social Hx: no E/T use.    Wt Readings from Last 6 Encounters:   24 61.7 kg (136 lb 0.4 oz)   10/16/23 62.6 kg (138 lb)   23 62 kg (136 lb 11 oz)   23 64.9 kg (143 lb)   23 65 kg (143 lb 4.8 oz)   23 67 kg (147 lb 11.3 oz)      ROS:   Gen: Appetite good, + wt loss (11 lbs since ) denies fatigue and weakness.  Skin: Skin is intact and heals well, no rashes, no hair changes  Eyes: Denies visual disturbances  Resp: no SOB or CHARLES, no cough  Cardiac: No palpitations, chest pain, no edema   GI: No nausea or vomiting, diarrhea, constipation, or abdominal pain.  /GYN: No nocturia, burning or pain.   MS/Neuro: Denies numbness/ tingling in BLE; Gait steady, speech clear  Psych: Denies drug/ETOH abuse, no hx of depression.  Other systems: negative.    /70 (BP Location: Right arm, Patient Position: Sitting, BP Method: Small (Manual))   Pulse 78   Temp 98.1 °F (36.7 °C) (Oral)   Ht 5' (1.524 m)   Wt 61.7 kg (136 lb 0.4 oz)   SpO2 98%   BMI 26.57 kg/m²      PE:  GENERAL: elderly  female, Well developed, well nourished.  PSYCH: AAOx3, appropriate mood and affect, pleasant expression, conversant, appears relaxed, well groomed.   EYES: Conjunctiva, corneas clear  CHEST: Resp even and unlabored,   VASCULAR: DP pulses +2/4 bilaterally, no edema.  NEURO: Gait steady  SKIN: Skin warm and dry no acanthosis nigracans.    1/24  Foot Exam: no sores or macerations noted.     Protective Sensation (w/ 10 gram monofilament):  Right: Intact  Left: Intact    Visual Inspection:  Normal -  Bilateral and Nails Intact - without Evidence of Foot Deformity- Bilateral    Pedal Pulses:   Right: Present  Left: Present    Posterior Tibialis Pulses:   Right:Present  Left: Present     Vibratory Sensation  Right:Positive  Left:Positive     Personally reviewed labs below:    No visits with results within 1 Month(s) from this visit.   Latest known visit with results is:   Office Visit on 10/30/2023   Component Date Value Ref Range Status    Color, POC UA 10/30/2023 Yellow  Yellow, Straw, Colorless Final    Clarity, POC UA 10/30/2023 Clear  Clear Final    Glucose, POC UA 10/30/2023 500 (A)  Negative Final    Bilirubin, POC UA 10/30/2023 Negative  Negative Final    Ketones, POC UA 10/30/2023 Negative  Negative Final    Spec Grav POC UA 10/30/2023 1.020  1.005 - 1.030 Final    Blood, POC UA 10/30/2023 Negative  Negative Final    pH, POC UA 10/30/2023 5.5  5.0 - 8.0 Final    Protein, POC UA 10/30/2023 Negative  Negative Final    Urobilinogen, POC UA 10/30/2023 0.2  <=1.0 Final    Nitrite, POC UA 10/30/2023 Negative  Negative Final    WBC, POC UA 10/30/2023 Negative  Negative Final         ASSESSMENT and PLAN:    1. Type 2 diabetes mellitus with hyperglycemia, without long-term current use of insulin  Comprehensive Metabolic Panel    Hemoglobin A1C    Lipid Panel    TSH    T4, Free    Microalbumin/Creatinine Ratio, Urine      2. Hypertension associated with diabetes        3. Hyperlipidemia associated with type 2 diabetes  mellitus        4. Postmenopausal        5. Hypovitaminosis D           T2DM with hyperglycemia  A1c today.   Has been stable for 2 years.  Continue Rybelsus, Jardiance and Metformin.   F/u w/ PCP.    Discussed DM, progression of disease, long term complications, tx options.   Discussed A1c and BG goals.   Reviewed  hypoglycemia, s/s and appropriate tx.   Instructed to monitor BG and bring meter/ log to every clinic visit.   - takes ASA, ARB, statin    HTN - controlled, continue meds as previously prescribed and monitor.     HLP - LDL , on statin therapy, LFTs WNL  Postmenopausal-DEXA scan 10/24. Continue ca and vd.  Hypovitaminosis d-stable-check vitamin D     Follow-Up   Fasting labs  Flu vaccine  F/u prn

## 2024-02-14 ENCOUNTER — LAB VISIT (OUTPATIENT)
Dept: LAB | Facility: HOSPITAL | Age: 74
End: 2024-02-14
Attending: PHYSICIAN ASSISTANT
Payer: MEDICARE

## 2024-02-14 DIAGNOSIS — E11.65 TYPE 2 DIABETES MELLITUS WITH HYPERGLYCEMIA, WITHOUT LONG-TERM CURRENT USE OF INSULIN: ICD-10-CM

## 2024-02-14 LAB
ALBUMIN/CREAT UR: 39.3 UG/MG (ref 0–30)
CREAT UR-MCNC: 112 MG/DL (ref 15–325)
MICROALBUMIN UR DL<=1MG/L-MCNC: 44 UG/ML

## 2024-02-14 PROCEDURE — 82043 UR ALBUMIN QUANTITATIVE: CPT | Performed by: PHYSICIAN ASSISTANT

## 2024-02-25 DIAGNOSIS — E08.65 DIABETES MELLITUS DUE TO UNDERLYING CONDITION WITH HYPERGLYCEMIA, WITHOUT LONG-TERM CURRENT USE OF INSULIN: ICD-10-CM

## 2024-02-25 NOTE — TELEPHONE ENCOUNTER
No care due was identified.  [facility] Embedded Care Due Messages. Reference number: [id number].   2/25/2024 1:35:32 PM CST

## 2024-03-21 ENCOUNTER — TELEPHONE (OUTPATIENT)
Dept: UROLOGY | Facility: CLINIC | Age: 74
End: 2024-03-21
Payer: MEDICARE

## 2024-03-21 ENCOUNTER — OFFICE VISIT (OUTPATIENT)
Dept: UROLOGY | Facility: CLINIC | Age: 74
End: 2024-03-21
Payer: MEDICARE

## 2024-03-21 DIAGNOSIS — R31.0 GROSS HEMATURIA: Primary | ICD-10-CM

## 2024-03-21 DIAGNOSIS — N20.0 URIC ACID RENAL CALCULUS: ICD-10-CM

## 2024-03-21 LAB
BILIRUBIN, UA POC OHS: NEGATIVE
BLOOD, UA POC OHS: ABNORMAL
CLARITY, UA POC OHS: ABNORMAL
COLOR, UA POC OHS: YELLOW
GLUCOSE, UA POC OHS: >=1000
KETONES, UA POC OHS: NEGATIVE
LEUKOCYTES, UA POC OHS: ABNORMAL
NITRITE, UA POC OHS: POSITIVE
PH, UA POC OHS: 5.5
PROTEIN, UA POC OHS: 30
SPECIFIC GRAVITY, UA POC OHS: 1.02
UROBILINOGEN, UA POC OHS: 0.2

## 2024-03-21 PROCEDURE — 99999PBSHW POCT URINALYSIS(INSTRUMENT): Mod: PBBFAC,,,

## 2024-03-21 PROCEDURE — 87088 URINE BACTERIA CULTURE: CPT | Performed by: NURSE PRACTITIONER

## 2024-03-21 PROCEDURE — 87186 SC STD MICRODIL/AGAR DIL: CPT | Performed by: NURSE PRACTITIONER

## 2024-03-21 PROCEDURE — 81003 URINALYSIS AUTO W/O SCOPE: CPT | Mod: PBBFAC,PO | Performed by: NURSE PRACTITIONER

## 2024-03-21 PROCEDURE — 99213 OFFICE O/P EST LOW 20 MIN: CPT | Mod: PBBFAC,PO | Performed by: NURSE PRACTITIONER

## 2024-03-21 PROCEDURE — 99999 PR PBB SHADOW E&M-EST. PATIENT-LVL III: CPT | Mod: PBBFAC,,, | Performed by: NURSE PRACTITIONER

## 2024-03-21 PROCEDURE — 88112 CYTOPATH CELL ENHANCE TECH: CPT | Performed by: PATHOLOGY

## 2024-03-21 PROCEDURE — 87086 URINE CULTURE/COLONY COUNT: CPT | Performed by: NURSE PRACTITIONER

## 2024-03-21 PROCEDURE — 88112 CYTOPATH CELL ENHANCE TECH: CPT | Mod: 26,,, | Performed by: PATHOLOGY

## 2024-03-21 PROCEDURE — 87077 CULTURE AEROBIC IDENTIFY: CPT | Performed by: NURSE PRACTITIONER

## 2024-03-21 PROCEDURE — 99214 OFFICE O/P EST MOD 30 MIN: CPT | Mod: S$PBB,,, | Performed by: NURSE PRACTITIONER

## 2024-03-21 RX ORDER — CIPROFLOXACIN 500 MG/1
500 TABLET ORAL 2 TIMES DAILY
Qty: 20 TABLET | Refills: 0 | Status: SHIPPED | OUTPATIENT
Start: 2024-03-21 | End: 2024-03-31

## 2024-03-21 NOTE — PROGRESS NOTES
[facility] Note  Staff: JOSELYN Weiss-C    PCP: KIRTI Guerrero.  Urologist:  JANIS Cho    Chief Complaint: Gross Hematuria    Subjective:        HPI: Jessica Luna is a 73 y.o. female presents with new onset gross hematuria that began yesterday.  Pt states that she was having some mild lower flank pains last week, but otherwise no other symptoms.    Today, pt currently denies fever, fatigue, dysuria.     HX:  72 yo F with history of uric acid stones found to have L renal pelvic stone on recent GH workup had cyto/RPGs (with possible subclinical bilateral obstruction), and L URS/HLL (with patent UPJ accomodating scope) on 9/21/19 to remove left stone burden.      10/01/2019:  Left ureteral stent removal, s/p left ureteroscopy on 09/21/19 performed by Dr. Cho.     OV 10/15/2021:  Recent imaging showed no stones seen at this time.  Thinning of the renal cortex of both kidneys suggesting intrinsic renal disease is noted.  Hydronephrosis or other abnormalities are not seen.     Overall, pt doing well with no complaints voiced at this time.  No gross hematuria, No dysuria noted.     10/19/22:  UA in office showed normal findings.  No gross hematuria, no flank pain voiced by pt today.  Overall, doing well with no complaints voiced.     10/30/23:  UA in office showed 500 of glucose, but overall normal findings for all others.  NO gross hematuria, no c/o flank pain or dysuria noted by pt.  Pt has been doing very well with no problems noted and no more stone episodes for at least one year or more.     Current  meds:  Allopurinol 100 mg daily  Urocit-K 15 mEq     Recent Imaging:  Renal/Bladder US done 10/4/2022:  FINDINGS:  Right kidney: The right kidney measures 10.6 cm. No cortical thinning. No loss of corticomedullary distinction. Resistive index measures 0.70.  No mass. No renal stone. No hydronephrosis.     Left kidney: The left kidney measures 10.4 cm. No cortical thinning. No  loss of corticomedullary distinction. Resistive index measures 0.71.  No mass. A 5 mm calcification is seen in the lower pole of the left kidney consistent with a probable intrarenal stone.  Significant hydronephrosis is not seen.     The bladder is partially distended at the time of scanning and has an unremarkable appearance.     Impression:  5 mm left intrarenal stone.  Otherwise negative renal ultrasound.    REVIEW OF SYSTEMS:  A comprehensive 10 system review was performed and is negative except as noted above in HPI    PMHx:  Past Medical History:   Diagnosis Date    Anticoagulant long-term use     Arthritis     CAD (coronary artery disease)     C. Stents X2 Dr. Og Jeronimo    Diabetes mellitus     Diabetes mellitus type II     Diverticulosis     Hyperlipidemia     Hypertension     Low magnesium levels     Myocardial infarction     Obstructive uropathy 10/16/2015    Renal stone     Status post cystoscopy - Left laser percutaneous nephrolithotripsy 6/29/2016    Ureterolithiasis Obstructive-left 1/3/2015    Wears dentures     FULL UPPER - PARTIAL BOTTOM    Wears dentures     Full upper; partial lower    Wears glasses     Wears glasses      PSHx:  Past Surgical History:   Procedure Laterality Date    ARTHROSCOPIC REPAIR OF ROTATOR CUFF OF SHOULDER Right 4/8/2021    Procedure: REPAIR, ROTATOR CUFF, ARTHROSCOPIC;  Surgeon: Rudy Graves MD;  Location: [facility] OR;  Service: Orthopedics;  Laterality: Right;  GENERAL AND BLOCK    ARTHROSCOPIC TENOTOMY OF BICEPS TENDON Right 4/8/2021    Procedure: TENOTOMY, BICEPS, ARTHROSCOPIC;  Surgeon: Rudy Graves MD;  Location: [facility] OR;  Service: Orthopedics;  Laterality: Right;  GENERAL AND BLOCK    ARTHROSCOPY OF SHOULDER WITH DECOMPRESSION OF SUBACROMIAL SPACE Right 4/8/2021    Procedure: ARTHROSCOPY, SHOULDER, WITH SUBACROMIAL SPACE DECOMPRESSION;  Surgeon: Rudy Graves MD;  Location: [facility] OR;  Service: Orthopedics;  Laterality: Right;  GENERAL AND BLOCK  MITEK      SECTION      CHOLECYSTECTOMY      COLONOSCOPY N/A 10/7/2015    Procedure: COLONOSCOPY;  Surgeon: Washington Rodriguez MD;  Location: [facility] ENDO;  Service: Endoscopy;  Laterality: N/A;    COLONOSCOPY N/A 10/3/2019    Procedure: COLONOSCOPY;  Surgeon: Viviane Simeon MD;  Location: [facility] ENDO;  Service: Endoscopy;  Laterality: N/A;    CORONARY STENT PLACEMENT  2006    2 vessels    CYSTOSCOPY      CYSTOSCOPY W/ LASER LITHOTRIPSY  12/15/15    CYSTOSCOPY W/ RETROGRADES Bilateral 2019    Procedure: CYSTOSCOPY, WITH RETROGRADE PYELOGRAM;  Surgeon: Ubaldo Cho MD;  Location: [facility] OR;  Service: Urology;  Laterality: Bilateral;    CYSTOSCOPY W/ URETERAL STENT REMOVAL Left 10/1/2019    Procedure: CYSTOSCOPY, WITH URETERAL STENT REMOVAL;  Surgeon: Ubaldo Cho MD;  Location: [facility] OR;  Service: Urology;  Laterality: Left;    HYSTERECTOMY      GRACE/BSO, DUB    LASER LITHOTRIPSY Left 2019    Procedure: LITHOTRIPSY, USING LASER;  Surgeon: Ubaldo Cho MD;  Location: [facility] OR;  Service: Urology;  Laterality: Left;    OOPHORECTOMY      PERCUTANEOUS NEPHROLITHOTRIPSY  2016    ROTATOR CUFF REPAIR      left    URETERAL STENT PLACEMENT Left 2019    Procedure: INSERTION, STENT, URETER;  Surgeon: Ubaldo Cho MD;  Location: [facility] OR;  Service: Urology;  Laterality: Left;    URETEROSCOPIC REMOVAL OF URETERIC CALCULUS Left 2019    Procedure: REMOVAL, CALCULUS, URETER, URETEROSCOPIC;  Surgeon: Ubaldo Cho MD;  Location: [facility] OR;  Service: Urology;  Laterality: Left;    ureteroscopy  12/15/15     Allergies:  No known drug allergies    Medications: reviewed   Objective:   There were no vitals filed for this visit.    Physical Exam  Constitutional:       Appearance: She is well-developed.   HENT:      Head: Normocephalic and atraumatic.   Eyes:      Conjunctiva/sclera: Conjunctivae normal.      Pupils: Pupils are equal, round, and reactive to light.   Cardiovascular:      Rate and  Rhythm: Normal rate and regular rhythm.      Heart sounds: Normal heart sounds.   Pulmonary:      Effort: Pulmonary effort is normal.      Breath sounds: Normal breath sounds.   Abdominal:      General: Bowel sounds are normal.      Palpations: Abdomen is soft.   Musculoskeletal:         General: Normal range of motion.      Cervical back: Normal range of motion and neck supple.   Skin:     General: Skin is warm and dry.   Neurological:      Mental Status: She is alert and oriented to person, place, and time.      Deep Tendon Reflexes: Reflexes are normal and symmetric.   Psychiatric:         Behavior: Behavior normal.         Thought Content: Thought content normal.         Judgment: Judgment normal.         Assessment:       1. Gross hematuria    2. Uric acid renal calculus          Plan:      Urine Culture and Urine Cytology to be processed.  CTU with and without contrast to be done with serum cr for further eval.  In meantime, Cipro 500 mg BID x 10 days prescribed at this time.    I have thoroughly reviewed with pt and family on conclusion of ov today, that Gross Hematuria workup would include but not be limited to a Cystoscopy  procedure for lower tract evaluation by one of our Urologists in the near future in order for workup to be completed.  Instructions regarding Cystoscopy procedure was thoroughly reviewed with pt during office visit today.  All questions answered at this time.      Pt verbalized understanding.     F/u TBD after we receive and review all urine, lab and imaging results in order to determine further plan of care in the near future.  Pt vu. [facility]: Active    Kasia Ryan, PING

## 2024-03-21 NOTE — TELEPHONE ENCOUNTER
----- Message from Mayra Lares sent at 3/21/2024  8:11 AM CDT -----  Contact: self  Type: Needs Medical Advice  Who Called:  pt  Symptoms (please be specific):  blood in urine  How long has patient had these symptoms:  yesterday  Pharmacy name and phone #:  n/a  Best Call Back Number: [phone]   Additional Information: pt has an appt for today at 2.30pm and would like to know if she will be doing labs so should she not eat.please advise

## 2024-03-22 LAB
FINAL PATHOLOGIC DIAGNOSIS: ABNORMAL
Lab: ABNORMAL

## 2024-03-24 LAB — BACTERIA UR CULT: ABNORMAL

## 2024-03-25 ENCOUNTER — HOSPITAL ENCOUNTER (OUTPATIENT)
Dept: CARDIOLOGY | Facility: HOSPITAL | Age: 74
Discharge: HOME | End: 2024-03-25
Attending: INTERNAL MEDICINE
Payer: MEDICARE

## 2024-03-25 VITALS — HEIGHT: 60 IN | BODY MASS INDEX: 26.7 KG/M2 | WEIGHT: 136 LBS

## 2024-03-25 DIAGNOSIS — E11.69 HYPERLIPIDEMIA ASSOCIATED WITH TYPE 2 DIABETES MELLITUS: ICD-10-CM

## 2024-03-25 DIAGNOSIS — R94.31 NONSPECIFIC ABNORMAL ELECTROCARDIOGRAM (ECG) (EKG): ICD-10-CM

## 2024-03-25 DIAGNOSIS — I25.10 CAD IN NATIVE ARTERY: ICD-10-CM

## 2024-03-25 DIAGNOSIS — I15.2 HYPERTENSION ASSOCIATED WITH DIABETES: ICD-10-CM

## 2024-03-25 DIAGNOSIS — E11.59 HYPERTENSION ASSOCIATED WITH DIABETES: ICD-10-CM

## 2024-03-25 DIAGNOSIS — E78.5 HYPERLIPIDEMIA ASSOCIATED WITH TYPE 2 DIABETES MELLITUS: ICD-10-CM

## 2024-03-25 DIAGNOSIS — I25.2 H/O MYOCARDIAL INFARCTION, GREATER THAN 8 WEEKS: ICD-10-CM

## 2024-03-25 DIAGNOSIS — E83.42 HYPOMAGNESEMIA: ICD-10-CM

## 2024-03-25 DIAGNOSIS — E08.65 DIABETES MELLITUS DUE TO UNDERLYING CONDITION WITH HYPERGLYCEMIA, WITHOUT LONG-TERM CURRENT USE OF INSULIN: ICD-10-CM

## 2024-03-25 LAB
AORTIC ROOT ANNULUS: 3.1 CM
AORTIC VALVE CUSP SEPERATION: 1.6 CM
AV INDEX (PROSTH): 0.98
AV MEAN GRADIENT: 4 MMHG
AV PEAK GRADIENT: 7 MMHG
AV VALVE AREA BY VELOCITY RATIO: 3.49 CM²
AV VALVE AREA: 3.72 CM²
AV VELOCITY RATIO: 0.92
BSA FOR ECHO PROCEDURE: 1.62 M2
CV ECHO LV RWT: 0.47 CM
DOP CALC AO PEAK VEL: 1.35 M/S
DOP CALC AO VTI: 27.7 CM
DOP CALC LVOT AREA: 3.8 CM2
DOP CALC LVOT DIAMETER: 2.2 CM
DOP CALC LVOT PEAK VEL: 1.24 M/S
DOP CALC LVOT STROKE VOLUME: 102.96 CM3
DOP CALC MV VTI: 30.6 CM
DOP CALCLVOT PEAK VEL VTI: 27.1 CM
E WAVE DECELERATION TIME: 277 MSEC
E/A RATIO: 0.56
E/E' RATIO: 10.31 M/S
ECHO LV POSTERIOR WALL: 1.08 CM (ref 0.6–1.1)
FRACTIONAL SHORTENING: 36 % (ref 28–44)
INTERVENTRICULAR SEPTUM: 1.09 CM (ref 0.6–1.1)
IVC DIAMETER: 1.47 CM
IVRT: 88 MSEC
LEFT ATRIUM SIZE: 4.1 CM
LEFT ATRIUM VOLUME INDEX MOD: 34.7 ML/M2
LEFT ATRIUM VOLUME MOD: 54.9 CM3
LEFT INTERNAL DIMENSION IN SYSTOLE: 2.94 CM (ref 2.1–4)
LEFT VENTRICLE DIASTOLIC VOLUME INDEX: 61.58 ML/M2
LEFT VENTRICLE DIASTOLIC VOLUME: 97.3 ML
LEFT VENTRICLE MASS INDEX: 113 G/M2
LEFT VENTRICLE SYSTOLIC VOLUME INDEX: 21.1 ML/M2
LEFT VENTRICLE SYSTOLIC VOLUME: 33.3 ML
LEFT VENTRICULAR INTERNAL DIMENSION IN DIASTOLE: 4.6 CM (ref 3.5–6)
LEFT VENTRICULAR MASS: 177.78 G
LV LATERAL E/E' RATIO: 8.38 M/S
LV SEPTAL E/E' RATIO: 13.4 M/S
LVOT MG: 3 MMHG
LVOT MV: 0.86 CM/S
MV MEAN GRADIENT: 3 MMHG
MV PEAK A VEL: 1.19 M/S
MV PEAK E VEL: 0.67 M/S
MV PEAK GRADIENT: 7 MMHG
MV VALVE AREA BY CONTINUITY EQUATION: 3.36 CM2
PISA TR MAX VEL: 2.4 M/S
PV MV: 0.75 M/S
PV PEAK GRADIENT: 5 MMHG
PV PEAK VELOCITY: 1.07 M/S
RA PRESSURE ESTIMATED: 3 MMHG
RIGHT VENTRICULAR END-DIASTOLIC DIMENSION: 2.04 CM
RV TB RVSP: 5 MMHG
RV TISSUE DOPPLER FREE WALL SYSTOLIC VELOCITY 1 (APICAL 4 CHAMBER VIEW): 11 CM/S
TDI LATERAL: 0.08 M/S
TDI SEPTAL: 0.05 M/S
TDI: 0.07 M/S
TR MAX PG: 23 MMHG
TRICUSPID ANNULAR PLANE SYSTOLIC EXCURSION: 1.87 CM
TV REST PULMONARY ARTERY PRESSURE: 26 MMHG
Z-SCORE OF LEFT VENTRICULAR DIMENSION IN END DIASTOLE: 0.16
Z-SCORE OF LEFT VENTRICULAR DIMENSION IN END SYSTOLE: 0.37

## 2024-03-25 PROCEDURE — 93306 TTE W/DOPPLER COMPLETE: CPT

## 2024-03-25 PROCEDURE — 93306 TTE W/DOPPLER COMPLETE: CPT | Mod: 26,,, | Performed by: INTERNAL MEDICINE

## 2024-03-27 DIAGNOSIS — E11.9 TYPE 2 DIABETES MELLITUS WITHOUT COMPLICATION, UNSPECIFIED WHETHER LONG TERM INSULIN USE: ICD-10-CM

## 2024-04-02 RX ORDER — OLMESARTAN MEDOXOMIL 40 MG/1
40 TABLET ORAL
Qty: 90 TABLET | Refills: 3 | Status: SHIPPED | OUTPATIENT
Start: 2024-04-02 | End: 2024-06-19 | Stop reason: SDUPTHER

## 2024-04-10 ENCOUNTER — HOSPITAL ENCOUNTER (OUTPATIENT)
Dept: RADIOLOGY | Facility: HOSPITAL | Age: 74
Discharge: HOME | End: 2024-04-10
Attending: NURSE PRACTITIONER
Payer: MEDICARE

## 2024-04-10 DIAGNOSIS — R31.0 GROSS HEMATURIA: ICD-10-CM

## 2024-04-10 DIAGNOSIS — N20.0 URIC ACID RENAL CALCULUS: ICD-10-CM

## 2024-04-10 PROCEDURE — 74178 CT ABD&PLV WO CNTR FLWD CNTR: CPT | Mod: 26,,, | Performed by: RADIOLOGY

## 2024-04-10 PROCEDURE — 74178 CT ABD&PLV WO CNTR FLWD CNTR: CPT | Mod: TC

## 2024-04-10 PROCEDURE — 25500020 PHARM REV CODE 255

## 2024-04-10 RX ADMIN — IOHEXOL 125 ML: 350 INJECTION, SOLUTION INTRAVENOUS at 09:04

## 2024-04-25 ENCOUNTER — TELEPHONE (OUTPATIENT)
Dept: FAMILY MEDICINE | Facility: CLINIC | Age: 74
End: 2024-04-25
Payer: MEDICARE

## 2024-04-25 DIAGNOSIS — Z12.31 ENCOUNTER FOR SCREENING MAMMOGRAM FOR MALIGNANT NEOPLASM OF BREAST: Primary | ICD-10-CM

## 2024-04-25 NOTE — TELEPHONE ENCOUNTER
----- Message from Angelia Mendez, Patient Care Assistant sent at 4/25/2024  3:16 PM CDT -----  Regarding: orders  Contact: pt  Type: Needs Medical Advice    Who Called:  pt     Best Call Back Number: [phone] (home)     Additional Information: pt states she would like a callback regarding mammogram orders. Please call to advise. Thanks!

## 2024-05-03 ENCOUNTER — HOSPITAL ENCOUNTER (OUTPATIENT)
Dept: RADIOLOGY | Facility: HOSPITAL | Age: 74
Discharge: HOME | End: 2024-05-03
Attending: STUDENT IN AN ORGANIZED HEALTH CARE EDUCATION/TRAINING PROGRAM
Payer: MEDICARE

## 2024-05-03 DIAGNOSIS — Z12.31 ENCOUNTER FOR SCREENING MAMMOGRAM FOR MALIGNANT NEOPLASM OF BREAST: ICD-10-CM

## 2024-05-03 DIAGNOSIS — E08.65 DIABETES MELLITUS DUE TO UNDERLYING CONDITION WITH HYPERGLYCEMIA, WITHOUT LONG-TERM CURRENT USE OF INSULIN: ICD-10-CM

## 2024-05-03 PROCEDURE — 77063 BREAST TOMOSYNTHESIS BI: CPT | Mod: TC,PO

## 2024-05-03 PROCEDURE — 77067 SCR MAMMO BI INCL CAD: CPT | Mod: TC,PO

## 2024-05-03 PROCEDURE — 77067 SCR MAMMO BI INCL CAD: CPT | Mod: 26,,, | Performed by: RADIOLOGY

## 2024-05-03 PROCEDURE — 77063 BREAST TOMOSYNTHESIS BI: CPT | Mod: 26,,, | Performed by: RADIOLOGY

## 2024-05-03 NOTE — TELEPHONE ENCOUNTER
No care due was identified.  [employer] Embedded Care Due Messages. Reference number: [id number].   5/03/2024 10:19:56 AM CDT

## 2024-05-16 DIAGNOSIS — R31.0 GROSS HEMATURIA: Primary | ICD-10-CM

## 2024-05-16 NOTE — PROGRESS NOTES
Procedure Order to Urology [[id number]]    Electronically signed by: Ubaldo Cho MD on 05/15/24 2044 Status: Active   Ordering user: Ubaldo Cho MD 05/15/24 2044 Authorized by: Ubaldo Cho MD   Ordering mode: Standard   Frequency:  05/15/24 -     Diagnoses  Gross hematuria [R31.0]   Questionnaire    Question Answer   Procedure Cystoscopy Comment - 6/4   Facility Name: [facility], Please order Urine POCT without micro . Local sedation (no urine Dr Michaels or Dr tim)

## 2024-05-17 ENCOUNTER — TELEPHONE (OUTPATIENT)
Dept: UROLOGY | Facility: CLINIC | Age: 74
End: 2024-05-17
Payer: MEDICARE

## 2024-05-17 NOTE — TELEPHONE ENCOUNTER
----- Message from Dakota Michaels sent at 5/17/2024 11:28 AM CDT -----  Regarding: advice  Contact: patient  Type: Needs Medical Advice  Who Called:  patient   Symptoms (please be specific):    How long has patient had these symptoms:    Pharmacy name and phone #:    Best Call Back Number: [phone]  Additional Information: Pt stated that she wasn't able to talk to the nurse yesterday due to being at another appt. Please call back. Thanks

## 2024-05-20 ENCOUNTER — TELEPHONE (OUTPATIENT)
Dept: UROLOGY | Facility: CLINIC | Age: 74
End: 2024-05-20
Payer: MEDICARE

## 2024-05-20 NOTE — TELEPHONE ENCOUNTER
----- Message from Ubaldo Cho MD sent at 5/18/2024  8:07 PM CDT -----  Contact: self  Ok to bump up to this Tuesday - have asc add on to the end  ----- Message -----  From: Payal Alcala  Sent: 5/17/2024   5:04 PM CDT  To: Marquis BUTT Staff    Type:  Patient Returning Call    Who Called: Pt  Who Left Message for Patient: Gilma GARDNERRj  Does the patient know what this is regarding?: Scheduling procedure (Proc scheduled for 6/4) pt states she's unable to do that date due to  having an appt, pt is requesting 5/20 - 5/24 if at all possible... Best Call Back Number: [phone] Please call to advise/reschedule... Thank you...

## 2024-05-21 ENCOUNTER — HOSPITAL ENCOUNTER (OUTPATIENT)
Facility: HOSPITAL | Age: 74
Discharge: HOME | End: 2024-05-21
Attending: UROLOGY | Admitting: UROLOGY
Payer: MEDICARE

## 2024-05-21 VITALS
RESPIRATION RATE: 18 BRPM | HEART RATE: 71 BPM | OXYGEN SATURATION: 98 % | DIASTOLIC BLOOD PRESSURE: 67 MMHG | SYSTOLIC BLOOD PRESSURE: 146 MMHG | BODY MASS INDEX: 24.29 KG/M2 | HEIGHT: 62 IN | WEIGHT: 132 LBS | TEMPERATURE: 98 F

## 2024-05-21 DIAGNOSIS — R31.0 GROSS HEMATURIA: ICD-10-CM

## 2024-05-21 LAB
BILIRUBIN, UA POC OHS: NEGATIVE
BLOOD, UA POC OHS: NEGATIVE
CLARITY, UA POC OHS: CLEAR
COLOR, UA POC OHS: YELLOW
GLUCOSE, UA POC OHS: 500
KETONES, UA POC OHS: NEGATIVE
LEUKOCYTES, UA POC OHS: NEGATIVE
NITRITE, UA POC OHS: NEGATIVE
PH, UA POC OHS: 5.5
PROTEIN, UA POC OHS: NEGATIVE
SPECIFIC GRAVITY, UA POC OHS: 1.01
UROBILINOGEN, UA POC OHS: 0.2

## 2024-05-21 PROCEDURE — 88112 CYTOPATH CELL ENHANCE TECH: CPT | Mod: TC | Performed by: PATHOLOGY

## 2024-05-21 PROCEDURE — 87086 URINE CULTURE/COLONY COUNT: CPT | Performed by: UROLOGY

## 2024-05-21 PROCEDURE — 25000003 PHARM REV CODE 250: Performed by: UROLOGY

## 2024-05-21 PROCEDURE — 52000 CYSTOURETHROSCOPY: CPT | Performed by: UROLOGY

## 2024-05-21 PROCEDURE — 52000 CYSTOURETHROSCOPY: CPT | Mod: ,,, | Performed by: UROLOGY

## 2024-05-21 RX ORDER — CIPROFLOXACIN 500 MG/1
500 TABLET ORAL 2 TIMES DAILY
Qty: 4 TABLET | Refills: 0 | Status: SHIPPED | OUTPATIENT
Start: 2024-05-21

## 2024-05-21 RX ORDER — LIDOCAINE HYDROCHLORIDE 20 MG/ML
JELLY TOPICAL
Status: DISCONTINUED | OUTPATIENT
Start: 2024-05-21 | End: 2024-05-21 | Stop reason: HOSPADM

## 2024-05-21 NOTE — H&P
Urologist:  JANIS Cho     Chief Complaint: Gross Hematuria     Subjective:         Subjective   HPI: Jessica Luna is a 73 y.o. female presents with new onset gross hematuria that began yesterday.  Pt states that she was having some mild lower flank pains last week, but otherwise no other symptoms.     Today, pt currently denies fever, fatigue, dysuria.      HX:  72 yo F with history of uric acid stones found to have L renal pelvic stone on recent GH workup had cyto/RPGs (with possible subclinical bilateral obstruction), and L URS/HLL (with patent UPJ accomodating scope) on 9/21/19 to remove left stone burden.      10/01/2019:  Left ureteral stent removal, s/p left ureteroscopy on 09/21/19 performed by Dr. Cho.     OV 10/15/2021:  Recent imaging showed no stones seen at this time.  Thinning of the renal cortex of both kidneys suggesting intrinsic renal disease is noted.  Hydronephrosis or other abnormalities are not seen.     Overall, pt doing well with no complaints voiced at this time.  No gross hematuria, No dysuria noted.     10/19/22:  UA in office showed normal findings.  No gross hematuria, no flank pain voiced by pt today.  Overall, doing well with no complaints voiced.     10/30/23:  UA in office showed 500 of glucose, but overall normal findings for all others.  NO gross hematuria, no c/o flank pain or dysuria noted by pt.  Pt has been doing very well with no problems noted and no more stone episodes for at least one year or more.     Current  meds:  Allopurinol 100 mg daily  Urocit-K 15 mEq     Recent Imaging:  Renal/Bladder US done 10/4/2022:  FINDINGS:  Right kidney: The right kidney measures 10.6 cm. No cortical thinning. No loss of corticomedullary distinction. Resistive index measures 0.70.  No mass. No renal stone. No hydronephrosis.     Left kidney: The left kidney measures 10.4 cm. No cortical thinning. No loss of corticomedullary distinction. Resistive index measures 0.71.  No  mass. A 5 mm calcification is seen in the lower pole of the left kidney consistent with a probable intrarenal stone.  Significant hydronephrosis is not seen.     The bladder is partially distended at the time of scanning and has an unremarkable appearance.     Impression:  5 mm left intrarenal stone.  Otherwise negative renal ultrasound.     REVIEW OF SYSTEMS:  A comprehensive 10 system review was performed and is negative except as noted above in HPI     PMHx:       Past Medical History:   Diagnosis Date    Anticoagulant long-term use      Arthritis      CAD (coronary artery disease)       C. Stents X2 Dr. Og Jeronimo    Diabetes mellitus      Diabetes mellitus type II      Diverticulosis      Hyperlipidemia      Hypertension      Low magnesium levels      Myocardial infarction      Obstructive uropathy 10/16/2015    Renal stone      Status post cystoscopy - Left laser percutaneous nephrolithotripsy 2016    Ureterolithiasis Obstructive-left 1/3/2015    Wears dentures       FULL UPPER - PARTIAL BOTTOM    Wears dentures       Full upper; partial lower    Wears glasses      Wears glasses        PSHx:        Past Surgical History:   Procedure Laterality Date    ARTHROSCOPIC REPAIR OF ROTATOR CUFF OF SHOULDER Right 2021     Procedure: REPAIR, ROTATOR CUFF, ARTHROSCOPIC;  Surgeon: Rudy Graves MD;  Location: [facility] OR;  Service: Orthopedics;  Laterality: Right;  GENERAL AND BLOCK    ARTHROSCOPIC TENOTOMY OF BICEPS TENDON Right 2021     Procedure: TENOTOMY, BICEPS, ARTHROSCOPIC;  Surgeon: Rudy Graves MD;  Location: [facility] OR;  Service: Orthopedics;  Laterality: Right;  GENERAL AND BLOCK    ARTHROSCOPY OF SHOULDER WITH DECOMPRESSION OF SUBACROMIAL SPACE Right 2021     Procedure: ARTHROSCOPY, SHOULDER, WITH SUBACROMIAL SPACE DECOMPRESSION;  Surgeon: Rudy Graves MD;  Location: [facility] OR;  Service: Orthopedics;  Laterality: Right;  GENERAL AND BLOCK  MITEK     SECTION         CHOLECYSTECTOMY        COLONOSCOPY N/A 10/7/2015     Procedure: COLONOSCOPY;  Surgeon: Washington Rodriguez MD;  Location: [facility] ENDO;  Service: Endoscopy;  Laterality: N/A;    COLONOSCOPY N/A 10/3/2019     Procedure: COLONOSCOPY;  Surgeon: Viviane Simeon MD;  Location: [facility] ENDO;  Service: Endoscopy;  Laterality: N/A;    CORONARY STENT PLACEMENT   2006     2 vessels    CYSTOSCOPY        CYSTOSCOPY W/ LASER LITHOTRIPSY   12/15/15    CYSTOSCOPY W/ RETROGRADES Bilateral 9/19/2019     Procedure: CYSTOSCOPY, WITH RETROGRADE PYELOGRAM;  Surgeon: Ubaldo Cho MD;  Location: [facility] OR;  Service: Urology;  Laterality: Bilateral;    CYSTOSCOPY W/ URETERAL STENT REMOVAL Left 10/1/2019     Procedure: CYSTOSCOPY, WITH URETERAL STENT REMOVAL;  Surgeon: Ubaldo Cho MD;  Location: [facility] OR;  Service: Urology;  Laterality: Left;    HYSTERECTOMY         GRACE/BSO, DUB    LASER LITHOTRIPSY Left 9/19/2019     Procedure: LITHOTRIPSY, USING LASER;  Surgeon: Ubaldo Cho MD;  Location: [facility] OR;  Service: Urology;  Laterality: Left;    OOPHORECTOMY        PERCUTANEOUS NEPHROLITHOTRIPSY   06/28/2016    ROTATOR CUFF REPAIR         left    URETERAL STENT PLACEMENT Left 9/19/2019     Procedure: INSERTION, STENT, URETER;  Surgeon: Ubaldo Cho MD;  Location: [facility] OR;  Service: Urology;  Laterality: Left;    URETEROSCOPIC REMOVAL OF URETERIC CALCULUS Left 9/19/2019     Procedure: REMOVAL, CALCULUS, URETER, URETEROSCOPIC;  Surgeon: Ubaldo Cho MD;  Location: [facility] OR;  Service: Urology;  Laterality: Left;    ureteroscopy   12/15/15      Allergies:  No known drug allergies     Medications: reviewed   Objective:   There were no vitals filed for this visit.     Physical Exam  Constitutional:       Appearance: She is well-developed.   HENT:      Head: Normocephalic and atraumatic.   Eyes:      Conjunctiva/sclera: Conjunctivae normal.      Pupils: Pupils are equal, round, and reactive to light.   Cardiovascular:      Rate  and Rhythm: Normal rate and regular rhythm.      Heart sounds: Normal heart sounds.   Pulmonary:      Effort: Pulmonary effort is normal.      Breath sounds: Normal breath sounds.   Abdominal:      General: Bowel sounds are normal.      Palpations: Abdomen is soft.   Musculoskeletal:         General: Normal range of motion.      Cervical back: Normal range of motion and neck supple.   Skin:     General: Skin is warm and dry.   Neurological:      Mental Status: She is alert and oriented to person, place, and time.      Deep Tendon Reflexes: Reflexes are normal and symmetric.   Psychiatric:         Behavior: Behavior normal.         Thought Content: Thought content normal.         Judgment: Judgment normal.            Assessment:         Assessment  1. Gross hematuria    2. Uric acid renal calculus             Plan:       Urine Culture and Urine Cytology to be processed.  CTU with and without contrast to be done with serum cr for further eval.  In meantime, Cipro 500 mg BID x 10 days prescribed at this time.     I have thoroughly reviewed with pt and family on conclusion of ov today, that Gross Hematuria workup would include but not be limited to a Cystoscopy  procedure for lower tract evaluation by one of our Urologists in the near future in order for workup to be completed.  Instructions regarding Cystoscopy procedure was thoroughly reviewed with pt during office visit today.  All questions answered at this time.       Pt verbalized understanding.      F/u TBD after we receive and review all urine, lab and imaging results in order to determine further plan of care in the near future.  Pt vu.       Saw NP late march for hematuria  Cystology with atypia  CT from 4/10 forwarded with possible blood clot vs mass  No stones     O/V reviewed and also had UTI with ecoli uti  And the Cipro 500 mg BID is sensitive (appropriate) for this particular infection at this time therefore take med until completion.  We will follow  back up with her with further instructions AFTER she completes scheduled CT Urogram that is for 4/10/24.     Urine not repeated at that time.      Can add on for cystoscopy 6/4/24 (please review asc cystoscopy - similar to stent removal which she has had) to complete workup BUT must rule out persistence or recurrence of UTI first     --> set NV for in.out cathd urine for ua ucx 5/21 or 5/28 so have results prior to 6/4 scope      --> elected to bump up cysto  Noted no uti sxs  Udip negaTIVE TODAY  WILL RPEAT CULTURE AND CYTOL    PROCEED WITH CYTSO

## 2024-05-24 LAB — BACTERIA UR CULT: NO GROWTH

## 2024-05-24 NOTE — OP NOTE
[facility] Urology Operative Note/Brief Discharge Note     Date: 5/21/24     Staff Surgeon: Ubaldo Cho MD     Pre-Op Diagnosis: gross hematuria     Post-Op Diagnosis: same     Procedure(s) Performed:   Cystoscopy, flexible      Anesthesia: 2% lidocaine jelly urojet     Findings: negative cystoscopy, mild bladder trabeculations     Estimated Blood Loss: none     Specimens: none     Drains: none     Complications: none       INDICATION FOR PROCEDURE:   72 yo with recent isolated gross hematuria in march 2024 and was seen by NP with workup having cytologic atypia, a CT scan from 4/10/24 was forwarded with possible blood clot versus mass.  No stones.  On review she also had a urinary tract infection with E coli UTI of which the Cipro was sensitive 4, and deferred imaging until after appropriate treatment with antibiotics.  Urine culture was not repeated.  However urine urinalysis dipstick today is completely negative, and negative for blood.  She has seen no further gross hematuria      PROCEDURE IN DETAIL:   After informed consent, the patient was prepped and drapped in standard  cystoscopic fashion and 2% xylocaine jelly was place on urethral meatus and used to lubricate the cystoscope. A flexible cystoscope was passed into the bladder via the urethra.      Systematic inspection of the bladder performed revealing bilateral ureteral orifices in their orthotopic position on the trigone bilaterally with clear efflux.      Remainder of bladder systematically inspected and was free of mucosal lesions, including on retroflexion. Just mild trabeculations of bladder wall     On slow withdrawal of the scope from the bladder at the bladder neck into the urethra, no urethral lesions or abnormalities.  No external urethral abnormalities either. Orthotopic normal meatus      Patient tolerated procedure well without complication.      DISPOSITION:  Reviewed negative cystoscopy.   At this time, would attribute her GH to urinary  tract infection  Which since she was given culture specific antibiotics, no further gross or micro hematuria. Udip negative today. Will repeat culture and cytology as precaution, noting cytologic atypia was likely from current inflammation from infection. No stones, which she has history of and prev est care for. No concerns or further workup, however as this was for gross hematuria and a microscopic hematuria, would recommend at least six-month NP follow-up for recheck, re-evaluation of urine before advising returning to annual stone screening visits     Discharge home today status post uncomplicated procedure as above  Diet - resume home diet  Follow up: NP  6 mos  Instructions: drink water, burnign or blood normal in 1-2 days, complete antibiotics  Meds:     Medication List        START taking these medications      ciprofloxacin HCl 500 MG tablet  Commonly known as: CIPRO  Take 1 tablet (500 mg total) by mouth 2 (two) times a day.            CONTINUE taking these medications      acetaminophen 500 MG tablet  Commonly known as: TYLENOL     allopurinoL 100 MG tablet  Commonly known as: ZYLOPRIM  Take 1 tablet (100 mg total) by mouth once daily.     aspirin 81 MG Chew  Take 1 tablet (81 mg total) by mouth once daily.     atorvastatin 40 MG tablet  Commonly known as: LIPITOR  Take 1 tablet (40 mg total) by mouth once daily.     empagliflozin 25 mg tablet  Commonly known as: JARDIANCE  Take 1 tablet (25 mg total) by mouth once daily.     magnesium oxide 250 mg magnesium Tab  Commonly known as: MAG-OX     metFORMIN 1000 MG tablet  Commonly known as: GLUCOPHAGE  Take 1 tablet (1,000 mg total) by mouth daily with breakfast.     metoprolol tartrate 25 MG tablet  Commonly known as: LOPRESSOR  Take 1 tablet (25 mg total) by mouth 2 (two) times daily.     olmesartan 40 MG tablet  Commonly known as: BENICAR  TAKE 1 TABLET BY MOUTH EVERY DAY     omega-3 fatty acids-vitamin E 1,000-5 mg-unit Cap     potassium citrate 15 mEq  Tbsr  Commonly known as: UROCIT-K 15  Take 1 tablet by mouth 2 (two) times daily.     RYBELSUS 14 mg tablet  Generic drug: semaglutide  Take 1 tablet (14 mg total) by mouth once daily.               Where to Get Your Medications        These medications were sent to [facility] #[id number] - [place] - [address]  [address] [place] [address]      Phone: [phone]   ciprofloxacin HCl 500 MG tablet

## 2024-06-11 RX ORDER — METOPROLOL TARTRATE 25 MG/1
25 TABLET, FILM COATED ORAL 2 TIMES DAILY
Qty: 180 TABLET | Refills: 0 | Status: SHIPPED | OUTPATIENT
Start: 2024-06-11

## 2024-06-19 DIAGNOSIS — I25.10 CORONARY ARTERY DISEASE INVOLVING NATIVE CORONARY ARTERY OF NATIVE HEART WITHOUT ANGINA PECTORIS: Primary | ICD-10-CM

## 2024-06-19 DIAGNOSIS — E11.59 HYPERTENSION ASSOCIATED WITH DIABETES: ICD-10-CM

## 2024-06-19 DIAGNOSIS — I15.2 HYPERTENSION ASSOCIATED WITH DIABETES: ICD-10-CM

## 2024-06-19 RX ORDER — OLMESARTAN MEDOXOMIL 40 MG/1
40 TABLET ORAL DAILY
Qty: 30 TABLET | Refills: 0 | Status: SHIPPED | OUTPATIENT
Start: 2024-06-19 | End: 2024-07-19

## 2024-06-19 NOTE — TELEPHONE ENCOUNTER
Patient is requesting a refill for Benicar 40 Mg   patient was assigned a f/u visit for 7/19/24 @ 2:30 pm  will send in a 30 day supply until f/u

## 2024-06-21 RX ORDER — POTASSIUM CITRATE 15 MEQ/1
1 TABLET, EXTENDED RELEASE ORAL 2 TIMES DAILY
Qty: 180 TABLET | Refills: 4 | Status: SHIPPED | OUTPATIENT
Start: 2024-06-21 | End: 2024-09-19

## 2024-07-19 ENCOUNTER — TELEPHONE (OUTPATIENT)
Dept: CARDIOLOGY | Facility: CLINIC | Age: 74
End: 2024-07-19
Payer: MEDICARE

## 2024-07-19 NOTE — TELEPHONE ENCOUNTER
7/18/24 SW the patient about her concerns she stated that she had received a called asking her to change appt slots from 2:30 pm to 4:00 pm for today 7/18/24 .I informed her that there was no records of anyone from our office called to offer that she than ask to rescheduled she was assigned a new slot 8/9/24 @ 1:30 pm

## 2024-07-30 NOTE — TELEPHONE ENCOUNTER
Case Management Assessment Initial Evaluation    Date/Time of Evaluation: 2024 8:31 AM  Assessment Completed by: Merna Quintanilla RN    If patient is discharged prior to next notation, then this note serves as note for discharge by case management.    Patient Name: Noe Moctezuma                   YOB: 1973  Diagnosis: Colostomy in place (HCC) [Z93.3]  Incisional hernia with obstruction but no gangrene [K43.0]  Malignant neoplasm of colon, unspecified part of colon (HCC) [C18.9]                   Date / Time: 2024  5:42 AM  Location:      Patient Admission Status: Inpatient   Readmission Risk Low 0-14, Mod 15-19), High > 20: Readmission Risk Score: 10.2    Current PCP: Iliana Mejia MD  Health Care Decision Makers:     Additional Case Management Notes: Admitted from [facility] for stated procedure. Gen Surg attending. Drain care. Abd binder. NG maintenance. VSS this am. Room air. IVF at 100/hr. Movantik. Zosyn.    Procedures: 24 Takedown Colostomy with Incisional Hernia Repair with Phasix Mesh     Imagin24 XR Abd  1. Limited evaluation of the abdomen.  2. The tip of the nasogastric tube is seen overlying the left upper quadrant  below the diaphragm.  3. Prior cholecystectomy.    Patient Goals/Plan/Treatment Preferences: Met with Time. He resides with spouse and is typically self sufficient. He is current with his PCP and he is insured. Follow for discharge needs.        24 1203   Service Assessment   Patient Orientation Alert and Oriented   Cognition Alert   History Provided By Patient   Primary Caregiver Self   Support Systems Spouse/Significant Other   Patient's Healthcare Decision Maker is: Legal Next of Kin   PCP Verified by CM Yes   Last Visit to PCP Within last 3 months   Prior Functional Level Independent in ADLs/IADLs   Current Functional Level Shopping;Housework;Cooking   Can patient return to prior living arrangement Yes   Ability to make needs known:  Spoke to [facility] about Refills. KR

## 2024-08-02 ENCOUNTER — PATIENT MESSAGE (OUTPATIENT)
Dept: PHARMACY | Facility: CLINIC | Age: 74
End: 2024-08-02
Payer: MEDICARE

## 2024-08-02 ENCOUNTER — TELEPHONE (OUTPATIENT)
Dept: PHARMACY | Facility: CLINIC | Age: 74
End: 2024-08-02
Payer: MEDICARE

## 2024-08-02 NOTE — TELEPHONE ENCOUNTER
Patient has provided the necessary documents to begin the enrollment process into the [employer] program. The prescription portion of the Jardiance and Rybelsus application has been sent to the providers office for approval and signature.       Keli Bravo  Pharmacy Patient Assistance Team

## 2024-08-02 NOTE — TELEPHONE ENCOUNTER
A Patient Assistance Application for Jessica Luna   - MRN [id number]  was faxed to your office @ [phone]. Please have Dr. Skip Guerrero   review the application to ensure the prescription is correct. If correct, sign and fax the application back to the Pharmacy Patient Assistance Team @[phone].

## 2024-08-02 NOTE — TELEPHONE ENCOUNTER
The prescription portion of Ms. Luna 's Jardiance and Rybelsus application was received from the providers office. The completed patient assistance application has been submitted to  [employer] for consideration of eligibility.

## 2024-08-05 ENCOUNTER — PATIENT MESSAGE (OUTPATIENT)
Dept: PHARMACY | Facility: CLINIC | Age: 74
End: 2024-08-05
Payer: MEDICARE

## 2024-08-07 ENCOUNTER — PATIENT MESSAGE (OUTPATIENT)
Dept: PHARMACY | Facility: CLINIC | Age: 74
End: 2024-08-07
Payer: MEDICARE

## 2024-08-09 ENCOUNTER — OFFICE VISIT (OUTPATIENT)
Dept: CARDIOLOGY | Facility: CLINIC | Age: 74
End: 2024-08-09
Payer: MEDICARE

## 2024-08-09 VITALS
BODY MASS INDEX: 24.66 KG/M2 | HEIGHT: 62 IN | SYSTOLIC BLOOD PRESSURE: 118 MMHG | HEART RATE: 74 BPM | WEIGHT: 134 LBS | DIASTOLIC BLOOD PRESSURE: 72 MMHG | OXYGEN SATURATION: 99 % | RESPIRATION RATE: 16 BRPM

## 2024-08-09 DIAGNOSIS — E11.59 HYPERTENSION ASSOCIATED WITH DIABETES: ICD-10-CM

## 2024-08-09 DIAGNOSIS — I15.2 HYPERTENSION ASSOCIATED WITH DIABETES: ICD-10-CM

## 2024-08-09 DIAGNOSIS — I70.0 AORTIC ATHEROSCLEROSIS: ICD-10-CM

## 2024-08-09 DIAGNOSIS — I25.10 CORONARY ARTERY DISEASE INVOLVING NATIVE CORONARY ARTERY OF NATIVE HEART WITHOUT ANGINA PECTORIS: ICD-10-CM

## 2024-08-09 PROCEDURE — 99213 OFFICE O/P EST LOW 20 MIN: CPT | Mod: PBBFAC,PN | Performed by: NURSE PRACTITIONER

## 2024-08-09 PROCEDURE — 99999 PR PBB SHADOW E&M-EST. PATIENT-LVL III: CPT | Mod: PBBFAC,,, | Performed by: NURSE PRACTITIONER

## 2024-08-09 RX ORDER — ATORVASTATIN CALCIUM 40 MG/1
40 TABLET, FILM COATED ORAL DAILY
Qty: 90 TABLET | Refills: 3 | Status: SHIPPED | OUTPATIENT
Start: 2024-08-09

## 2024-08-09 RX ORDER — METOPROLOL TARTRATE 25 MG/1
25 TABLET, FILM COATED ORAL 2 TIMES DAILY
Qty: 180 TABLET | Refills: 3 | Status: SHIPPED | OUTPATIENT
Start: 2024-08-09

## 2024-08-09 RX ORDER — OLMESARTAN MEDOXOMIL 40 MG/1
40 TABLET ORAL DAILY
Qty: 90 TABLET | Refills: 3 | Status: SHIPPED | OUTPATIENT
Start: 2024-08-09

## 2024-08-12 DIAGNOSIS — E11.9 TYPE 2 DIABETES MELLITUS WITHOUT COMPLICATION, WITHOUT LONG-TERM CURRENT USE OF INSULIN: ICD-10-CM

## 2024-08-13 RX ORDER — METFORMIN HYDROCHLORIDE 1000 MG/1
1000 TABLET ORAL
Qty: 180 TABLET | Refills: 0 | Status: SHIPPED | OUTPATIENT
Start: 2024-08-13

## 2024-08-21 DIAGNOSIS — E11.9 TYPE 2 DIABETES MELLITUS WITHOUT COMPLICATION: ICD-10-CM

## 2024-08-27 ENCOUNTER — LAB VISIT (OUTPATIENT)
Dept: LAB | Facility: HOSPITAL | Age: 74
End: 2024-08-27
Attending: STUDENT IN AN ORGANIZED HEALTH CARE EDUCATION/TRAINING PROGRAM
Payer: MEDICARE

## 2024-08-27 DIAGNOSIS — R79.9 ABNORMAL FINDING OF BLOOD CHEMISTRY, UNSPECIFIED: ICD-10-CM

## 2024-08-27 LAB
ALBUMIN SERPL BCP-MCNC: 3.7 G/DL (ref 3.5–5.2)
ALP SERPL-CCNC: 118 U/L (ref 55–135)
ALT SERPL W/O P-5'-P-CCNC: 12 U/L (ref 10–44)
ANION GAP SERPL CALC-SCNC: 7 MMOL/L (ref 8–16)
AST SERPL-CCNC: 14 U/L (ref 10–40)
BILIRUB SERPL-MCNC: 0.7 MG/DL (ref 0.1–1)
BUN SERPL-MCNC: 21 MG/DL (ref 8–23)
CALCIUM SERPL-MCNC: 9.8 MG/DL (ref 8.7–10.5)
CHLORIDE SERPL-SCNC: 107 MMOL/L (ref 95–110)
CO2 SERPL-SCNC: 24 MMOL/L (ref 23–29)
CREAT SERPL-MCNC: 0.9 MG/DL (ref 0.5–1.4)
EST. GFR  (NO RACE VARIABLE): >60 ML/MIN/1.73 M^2
GLUCOSE SERPL-MCNC: 124 MG/DL (ref 70–110)
POTASSIUM SERPL-SCNC: 4.5 MMOL/L (ref 3.5–5.1)
PROT SERPL-MCNC: 6.7 G/DL (ref 6–8.4)
SODIUM SERPL-SCNC: 138 MMOL/L (ref 136–145)

## 2024-08-27 PROCEDURE — 80053 COMPREHEN METABOLIC PANEL: CPT | Performed by: STUDENT IN AN ORGANIZED HEALTH CARE EDUCATION/TRAINING PROGRAM

## 2024-08-29 LAB
CYSTATIN C SERPL-MCNC: 1.49 MG/L (ref 0.67–1.21)
GFR/BSA.PRED SERPLBLD CYS-BASED-ARV: 41 ML/MIN/BSA

## 2024-09-04 ENCOUNTER — PATIENT MESSAGE (OUTPATIENT)
Dept: GASTROENTEROLOGY | Facility: CLINIC | Age: 74
End: 2024-09-04
Payer: MEDICARE

## 2024-09-04 ENCOUNTER — LAB VISIT (OUTPATIENT)
Dept: LAB | Facility: HOSPITAL | Age: 74
End: 2024-09-04
Attending: STUDENT IN AN ORGANIZED HEALTH CARE EDUCATION/TRAINING PROGRAM
Payer: MEDICARE

## 2024-09-04 ENCOUNTER — OFFICE VISIT (OUTPATIENT)
Dept: FAMILY MEDICINE | Facility: CLINIC | Age: 74
End: 2024-09-04
Payer: MEDICARE

## 2024-09-04 VITALS
HEART RATE: 66 BPM | DIASTOLIC BLOOD PRESSURE: 70 MMHG | BODY MASS INDEX: 25.15 KG/M2 | WEIGHT: 136.69 LBS | TEMPERATURE: 98 F | OXYGEN SATURATION: 99 % | HEIGHT: 62 IN | SYSTOLIC BLOOD PRESSURE: 114 MMHG | RESPIRATION RATE: 16 BRPM

## 2024-09-04 DIAGNOSIS — E11.59 HYPERTENSION ASSOCIATED WITH DIABETES: ICD-10-CM

## 2024-09-04 DIAGNOSIS — E78.5 HYPERLIPIDEMIA ASSOCIATED WITH TYPE 2 DIABETES MELLITUS: ICD-10-CM

## 2024-09-04 DIAGNOSIS — E11.65 TYPE 2 DIABETES MELLITUS WITH HYPERGLYCEMIA, WITHOUT LONG-TERM CURRENT USE OF INSULIN: ICD-10-CM

## 2024-09-04 DIAGNOSIS — E11.69 HYPERLIPIDEMIA ASSOCIATED WITH TYPE 2 DIABETES MELLITUS: ICD-10-CM

## 2024-09-04 DIAGNOSIS — R79.9 ABNORMAL FINDING OF BLOOD CHEMISTRY, UNSPECIFIED: ICD-10-CM

## 2024-09-04 DIAGNOSIS — I25.10 CORONARY ARTERY DISEASE INVOLVING NATIVE CORONARY ARTERY OF NATIVE HEART WITHOUT ANGINA PECTORIS: ICD-10-CM

## 2024-09-04 DIAGNOSIS — Z00.00 HEALTHCARE MAINTENANCE: Primary | ICD-10-CM

## 2024-09-04 DIAGNOSIS — Z12.11 COLON CANCER SCREENING: ICD-10-CM

## 2024-09-04 DIAGNOSIS — I15.2 HYPERTENSION ASSOCIATED WITH DIABETES: ICD-10-CM

## 2024-09-04 LAB
BASOPHILS # BLD AUTO: 0.02 K/UL (ref 0–0.2)
BASOPHILS NFR BLD: 0.2 % (ref 0–1.9)
DIFFERENTIAL METHOD BLD: ABNORMAL
EOSINOPHIL # BLD AUTO: 0.2 K/UL (ref 0–0.5)
EOSINOPHIL NFR BLD: 2.2 % (ref 0–8)
ERYTHROCYTE [DISTWIDTH] IN BLOOD BY AUTOMATED COUNT: 13.2 % (ref 11.5–14.5)
ESTIMATED AVG GLUCOSE: 140 MG/DL (ref 68–131)
HBA1C MFR BLD: 6.5 % (ref 4–5.6)
HCT VFR BLD AUTO: 43.9 % (ref 37–48.5)
HGB BLD-MCNC: 13.9 G/DL (ref 12–16)
IMM GRANULOCYTES # BLD AUTO: 0.04 K/UL (ref 0–0.04)
IMM GRANULOCYTES NFR BLD AUTO: 0.5 % (ref 0–0.5)
LYMPHOCYTES # BLD AUTO: 1.5 K/UL (ref 1–4.8)
LYMPHOCYTES NFR BLD: 17.1 % (ref 18–48)
MCH RBC QN AUTO: 28 PG (ref 27–31)
MCHC RBC AUTO-ENTMCNC: 31.7 G/DL (ref 32–36)
MCV RBC AUTO: 89 FL (ref 82–98)
MONOCYTES # BLD AUTO: 0.5 K/UL (ref 0.3–1)
MONOCYTES NFR BLD: 5.6 % (ref 4–15)
NEUTROPHILS # BLD AUTO: 6.3 K/UL (ref 1.8–7.7)
NEUTROPHILS NFR BLD: 74.4 % (ref 38–73)
NRBC BLD-RTO: 0 /100 WBC
PLATELET # BLD AUTO: 226 K/UL (ref 150–450)
PMV BLD AUTO: 11.4 FL (ref 9.2–12.9)
RBC # BLD AUTO: 4.96 M/UL (ref 4–5.4)
WBC # BLD AUTO: 8.53 K/UL (ref 3.9–12.7)

## 2024-09-04 PROCEDURE — 99999 PR PBB SHADOW E&M-EST. PATIENT-LVL III: CPT | Mod: PBBFAC,,, | Performed by: STUDENT IN AN ORGANIZED HEALTH CARE EDUCATION/TRAINING PROGRAM

## 2024-09-04 PROCEDURE — 83036 HEMOGLOBIN GLYCOSYLATED A1C: CPT | Performed by: STUDENT IN AN ORGANIZED HEALTH CARE EDUCATION/TRAINING PROGRAM

## 2024-09-04 PROCEDURE — G2211 COMPLEX E/M VISIT ADD ON: HCPCS | Mod: S$PBB,,, | Performed by: STUDENT IN AN ORGANIZED HEALTH CARE EDUCATION/TRAINING PROGRAM

## 2024-09-04 PROCEDURE — 36415 COLL VENOUS BLD VENIPUNCTURE: CPT | Mod: PO | Performed by: STUDENT IN AN ORGANIZED HEALTH CARE EDUCATION/TRAINING PROGRAM

## 2024-09-04 PROCEDURE — 99214 OFFICE O/P EST MOD 30 MIN: CPT | Mod: S$PBB,,, | Performed by: STUDENT IN AN ORGANIZED HEALTH CARE EDUCATION/TRAINING PROGRAM

## 2024-09-04 PROCEDURE — 99213 OFFICE O/P EST LOW 20 MIN: CPT | Mod: PBBFAC,PO | Performed by: STUDENT IN AN ORGANIZED HEALTH CARE EDUCATION/TRAINING PROGRAM

## 2024-09-04 PROCEDURE — 85025 COMPLETE CBC W/AUTO DIFF WBC: CPT | Performed by: STUDENT IN AN ORGANIZED HEALTH CARE EDUCATION/TRAINING PROGRAM

## 2024-09-04 NOTE — PROGRESS NOTES
[facility] Note    Subjective:    The HPI and pertinent ROS is included in the Diagnostic Impression Remarks section at the end of the note. Please see below for further details. Chief complaint is at end of note.     Jessica is a pleasant intelligent patient who is here for evaluation.     Modified Medications    No medications on file       Data reviewed 274}  Previous medical records reviewed and summarized in plan section at end of note.      If you are due for any health screening(s) below please notify me so we can arrange them to be ordered and scheduled. Most healthy patients at your age complete them, but you are free to accept or refuse. If you can't do it, I'll definitely understand. If you can, I'd certainly appreciate it!     All of your core healthy metrics are met.      The following portions of the patient's history were reviewed and updated as appropriate: allergies, current medications, past family history, past medical history, past social history, past surgical history and problem list.    She  has a past medical history of Anticoagulant long-term use, Arthritis, CAD (coronary artery disease), Diabetes mellitus, Diabetes mellitus type II, Diverticulosis, Hyperlipidemia, Hypertension, Low magnesium levels, Myocardial infarction, Obstructive uropathy (10/16/2015), Renal stone, Status post cystoscopy - Left laser percutaneous nephrolithotripsy (2016), Ureterolithiasis Obstructive-left (1/3/2015), Wears dentures, Wears dentures, Wears glasses, and Wears glasses.  She  has a past surgical history that includes Coronary stent placement (); Rotator cuff repair; Cholecystectomy;  section; Hysterectomy; Colonoscopy (N/A, 10/7/2015); Cystoscopy; ureteroscopy (12/15/15); Cystoscopy w/ laser lithotripsy (12/15/15); Percutaneous nephrolithotripsy (2016); Laser lithotripsy (Left, 2019); Ureteroscopic removal of ureteric calculus (Left, 2019); Ureteral stent  placement (Left, 9/19/2019); Cystoscopy w/ retrogrades (Bilateral, 9/19/2019); Cystoscopy w/ ureteral stent removal (Left, 10/1/2019); Colonoscopy (N/A, 10/3/2019); Oophorectomy; Arthroscopic repair of rotator cuff of shoulder (Right, 4/8/2021); Arthroscopic tenotomy of biceps tendon (Right, 4/8/2021); Arthroscopy of shoulder with decompression of subacromial space (Right, 4/8/2021); and Cystoscopy (N/A, 5/21/2024).    She  reports that she has never smoked. She has never been exposed to tobacco smoke. She has never used smokeless tobacco. She reports that she does not drink alcohol and does not use drugs.  She family history includes Breast cancer (age of onset: 59) in her sister; Breast cancer (age of onset: 60) in her mother; Cancer in her mother and sister; Diabetes in her brother; Heart disease (age of onset: 90) in her father; Kidney disease in her brother; Spina bifida in her brother.    Review of patient's allergies indicates:   Allergen Reactions    No known drug allergies        Tobacco Use: Low Risk  (9/4/2024)    Patient History     Smoking Tobacco Use: Never     Smokeless Tobacco Use: Never     Passive Exposure: Never     Physical Examination  General appearance: alert, cooperative, no distress  Neck: no thyromegaly, no neck stiffness  Lungs: clear to auscultation, no wheezes, rales or rhonchi, symmetric air entry  Heart: normal rate, regular rhythm, normal S1, S2, no murmurs, rubs, clicks or gallops  Abdomen: soft, nontender, nondistended, no rigidity, rebound, or guarding.   Back: no point tenderness over spine  Extremities: peripheral pulses normal, no unilateral leg swelling or calf tenderness   Neurological:alert, oriented, normal speech, no new focal findings or movement disorder noted from baseline    BP Readings from Last 3 Encounters:   09/04/24 114/70   08/09/24 118/72   05/21/24 (!) 146/67     Wt Readings from Last 3 Encounters:   09/04/24 62 kg (136 lb 11 oz)   08/09/24 60.8 kg (134 lb)  "  05/20/24 59.9 kg (132 lb)     /70 (BP Location: Right arm, Patient Position: Sitting, BP Method: Large (Manual))   Pulse 66   Temp 98.4 °F (36.9 °C) (Oral)   Resp 16   Ht 5' 2" (1.575 m)   Wt 62 kg (136 lb 11 oz)   SpO2 99%   BMI 25.00 kg/m²    274}  Laboratory: I have reviewed old labs below:    274}    Lab Results   Component Value Date    WBC 8.24 04/01/2022    HGB 12.2 04/01/2022    HCT 38.9 04/01/2022    MCV 89 04/01/2022     04/01/2022     08/27/2024    K 4.5 08/27/2024     08/27/2024    CALCIUM 9.8 08/27/2024    PHOS 3.1 10/17/2015    CO2 24 08/27/2024     (H) 08/27/2024    BUN 21 08/27/2024    CREATININE 0.9 08/27/2024    EGFRNORACEVR >60.0 08/27/2024    ANIONGAP 7 (L) 08/27/2024    PROT 6.7 08/27/2024    ALBUMIN 3.7 08/27/2024    BILITOT 0.7 08/27/2024    ALKPHOS 118 08/27/2024    ALT 12 08/27/2024    AST 14 08/27/2024    INR 1.0 09/12/2019    CHOL 119 (L) 02/14/2024    TRIG 133 02/14/2024    HDL 39 (L) 02/14/2024    LDLCALC 53.4 (L) 02/14/2024    TSH 1.312 02/14/2024    GLUF 642 (HH) 01/03/2015    HGBA1C 6.2 (H) 02/14/2024      Lab reviewed by me: Particular labs of significance that I will monitor, workup, or treat to improve are mentioned below in diagnostic impression remarks.    Imaging/EKG: I have reviewed the pertinent results and my findings are noted in remarks.  274}    CC:   Chief Complaint   Patient presents with    Annual Exam    Hyperlipidemia    Hypertension    Diabetes        274}    Assessment/Plan  Jessica Luna is a 73 y.o. female who presents to clinic with:  1. Healthcare maintenance    2. Hypertension associated with diabetes    3. Hyperlipidemia associated with type 2 diabetes mellitus    4. Type 2 diabetes mellitus with hyperglycemia, without long-term current use of insulin    5. Coronary artery disease involving native coronary artery of native heart without angina pectoris    6. Colon cancer screening       274}  Diagnostic Impression " "Remarks + HPI     Documentation entered by me for this encounter may have been done in part using speech-recognition technology. Although I have made an effort to ensure accuracy, "sound like" errors may exist and should be interpreted in context.     Diabetes with hyperglycemia-stable continue current medications rybelsus was too expensive monitor A1c recommend eye exam yearly.  Low glycemic index diet and monitor kidney function.   Hypertension -stable continue current meds monitor no headache or chest pain f/u blood work   HLD associated with diabetes-stable continue current meds monitor blood work and recommend healthy diet.   CAD-stable continue antiplatelet and cholesterol medication. No chest pain. Monitor LDL levels.       This is the extent of this pleasant patient's concerns at this present time. She did not feel chest pain upon exertion, dyspnea, nausea, vomiting, diaphoresis, or syncope. No pleuritic chest pain, unilateral leg swelling, calf tenderness, or calf pain. Negative for unintentional weight loss night sweats, hematuria, and fevers.     Additional workup planned: see labs ordered below.    See below for labs and meds ordered with associated diagnosis      1. Healthcare maintenance    2. Hypertension associated with diabetes    3. Hyperlipidemia associated with type 2 diabetes mellitus    4. Type 2 diabetes mellitus with hyperglycemia, without long-term current use of insulin  - Hemoglobin A1C; Future    5. Coronary artery disease involving native coronary artery of native heart without angina pectoris    6. Colon cancer screening  - Case Request Endoscopy: COLONOSCOPY      Skip Guerrero MD   274}    If you are due for any health screening(s) below please notify me so we can arrange them to be ordered and scheduled. Most healthy patients at your age complete them, but you are free to accept or refuse.     If you can't do it, I'll definitely understand. If you can, I'd certainly appreciate it! "   All of your core healthy metrics are met.

## 2024-10-02 ENCOUNTER — TELEPHONE (OUTPATIENT)
Dept: GASTROENTEROLOGY | Facility: CLINIC | Age: 74
End: 2024-10-02
Payer: MEDICARE

## 2024-10-02 NOTE — TELEPHONE ENCOUNTER
Call placed to Ms. Lopez in regards to message received. No answer, left message to return call.     Case removed from snap board

## 2024-10-02 NOTE — TELEPHONE ENCOUNTER
----- Message from Jessica sent at 10/2/2024 10:39 AM CDT -----  Contact: pt  Type:  Needs Medical Advice    Who Called: pt    Additional Information:  is wanting to cancel upcoming procedure 11/15, sister is having surgery that day    Will call back when ready to reschedule    Thanks

## 2024-10-09 ENCOUNTER — PATIENT MESSAGE (OUTPATIENT)
Dept: FAMILY MEDICINE | Facility: CLINIC | Age: 74
End: 2024-10-09
Payer: MEDICARE

## 2024-11-22 ENCOUNTER — OFFICE VISIT (OUTPATIENT)
Dept: UROLOGY | Facility: CLINIC | Age: 74
End: 2024-11-22
Payer: MEDICARE

## 2024-11-22 VITALS — WEIGHT: 140 LBS | RESPIRATION RATE: 18 BRPM | HEIGHT: 62 IN | BODY MASS INDEX: 25.76 KG/M2

## 2024-11-22 DIAGNOSIS — Z87.898 HISTORY OF GROSS HEMATURIA: Primary | ICD-10-CM

## 2024-11-22 DIAGNOSIS — Z87.442 HISTORY OF KIDNEY STONES: ICD-10-CM

## 2024-11-22 LAB
BILIRUBIN, UA POC OHS: NEGATIVE
BLOOD, UA POC OHS: NEGATIVE
CLARITY, UA POC OHS: CLEAR
COLOR, UA POC OHS: YELLOW
GLUCOSE, UA POC OHS: >=1000
KETONES, UA POC OHS: NEGATIVE
LEUKOCYTES, UA POC OHS: ABNORMAL
NITRITE, UA POC OHS: NEGATIVE
PH, UA POC OHS: 6
PROTEIN, UA POC OHS: NEGATIVE
SPECIFIC GRAVITY, UA POC OHS: 1.01
UROBILINOGEN, UA POC OHS: 0.2

## 2024-11-22 PROCEDURE — 99999 PR PBB SHADOW E&M-EST. PATIENT-LVL III: CPT | Mod: PBBFAC,,, | Performed by: UROLOGY

## 2024-11-22 PROCEDURE — 99213 OFFICE O/P EST LOW 20 MIN: CPT | Mod: PBBFAC,PO | Performed by: UROLOGY

## 2024-11-22 NOTE — PROGRESS NOTES
[facility] Urology Progress Note    Jessica Luna is a 74 y.o. female who presents for follow up kidney stones and hematuria    history of uric acid stones found to have L renal pelvic stone on recent GH workup had cyto/RPGs (with possible subclinical bilateral obstruction), and L URS/HLL (with patent UPJ accomodating scope) on 9/21/19 to remove left stone burden  10/01/2019:  Left ureteral stent removal, s/p left ureteroscopy on 09/21/19  OV 10/15/2021: Recent imaging showed no stones seen at this time.   Overall, pt doing well with no complaints voiced at this time. No gross hematuria, No dysuria noted.  10/19/22: UA in office showed normal findings.No gross hematuria, no flank pain voiced by pt today.  10/30/23: UA in office showed 500 of glucose, but overall normal findings for all others. NO gross hematuria, no c/o flank pain or dysuria noted by pt.  Pt has been doing very well with no problems noted and no more stone episodes for at least one year or more.  Current  meds: Allopurinol 100 mg daily, Urocit-K 15 mEq  Renal/Bladder US done 10/4/2022:  A 5 mm calcification is seen in the lower pole of the left kidney consistent with a probable intrarenal stone.   5/21/24: new onset gross hematuria that began yesterday. Pt states that she was having some mild lower flank pains last week, but otherwise no other symptoms.     Saw NP late march for hematuria. Cystology with atypia. CT from 4/10 forwarded with possible blood clot vs mass. No stones  O/V reviewed and also had UTI with ecoli uti. Cipro. No repeat urine  complete workup BUT must rule out persistence or recurrence of UTI first. --> set NV for in.out cathd urine for ua ucx 5/21 or 5/28 so have results prior to 6/4 scope    5/21/24 cysto (Repeat ucx neg, urinalysis dipstick today is completely negative, and negative for blood) - negative cystoscopy, mild bladder trabeculations   At this time, would attribute her GH to urinary tract infection. Which since  "she was given culture specific antibiotics, no further gross or micro hematuria  Repeat cytology negative  - would recommend at least six-month NP follow-up for recheck, re-evaluation of urine before advising returning to annual stone screening visits     She returns today noting  No issues since last point of care.  No visible blood.  No dysuria or UTI concerns.  Urinalysis dipstick today just has glucose and trace leukocytes.  Labs 9/4/24 hba1c  6.5 (stable/slight rise from 6.2 previously), creatinine 0.9, EGFR greater than 60  Taking urocitK bid and hydrating well    Focused Physical Exam:    Vitals:    11/22/24 0824   Resp: 18     Body mass index is 25.61 kg/m². Weight: 63.5 kg (140 lb) Height: 5' 2" (157.5 cm)     Abdomen: Soft, non-tender, nondistended, no CVA tenderness      Recent Results (from the past 2 weeks)   POCT Urinalysis(Instrument)    Collection Time: 11/22/24  8:30 AM   Result Value Ref Range    Color, POC UA Yellow Yellow, Straw, Colorless    Clarity, POC UA Clear Clear    Glucose, POC UA >=1000 (A) Negative    Bilirubin, POC UA Negative Negative    Ketones, POC UA Negative Negative    Spec Grav POC UA 1.015 1.005 - 1.030    Blood, POC UA Negative Negative    pH, POC UA 6.0 5.0 - 8.0    Protein, POC UA Negative Negative    Urobilinogen, POC UA 0.2 <=1.0    Nitrite, POC UA Negative Negative    WBC, POC UA Trace (A) Negative       ASSESSMENT   1. History of gross hematuria  POCT Urinalysis(Instrument)    CANCELED: POCT Bladder Scan      2. History of kidney stones  POCT Urinalysis(Instrument)    US Retroperitoneal Complete    CANCELED: POCT Bladder Scan          Plan    Overall doing well.  No interim stone he concerns or UTI concerns or hematuria.  Urinalysis dipstick today is negative for blood.  She can resume annual routine stone follow-up.  Given her history of uric acid stones, will get routine annual screening renal ultrasound and have her follow up with NP in 1 year.  Any interim concerns " for infection, blood in the urine or flank pain consistent with stone episode, can follow up as needed in the interim      Level of Medical Decision Making  [ _ ]  Low: 2 minor/1 stable chronic/1 acute uncomplicated --- review record/result/order test x2  [ X ]  Mod: 1 chronic exac/2stable chronic/1 acute comp --- review record/result/order test x3 OR independent interp of outside test OR discuss mgmt of outside ordered test --- start drug therapy/plan minor surgery   [ _ ]  High: chronic with exacerbation/progression or trtmnt side effect vs acute/chronic w/ life/body threat ---  (2/3) review record/result/order test x3 OR independent interp of ouutside test OR discuss mgmt of outside ordered test --- drug with monitoring for toxicity, plan major surgery, hospitalize, deescalate/withdraw care bc of prognosis    *Visit today included increased complexity associated with the current or anticipated ongoing medical longitudinal care and management related to this patient's serious and/or complex managed problem(s) as described above.

## 2024-12-04 DIAGNOSIS — Z78.0 MENOPAUSE: ICD-10-CM

## 2024-12-09 DIAGNOSIS — E11.9 TYPE 2 DIABETES MELLITUS WITHOUT COMPLICATION, WITHOUT LONG-TERM CURRENT USE OF INSULIN: ICD-10-CM

## 2024-12-10 RX ORDER — METFORMIN HYDROCHLORIDE 1000 MG/1
1000 TABLET ORAL
Qty: 90 TABLET | Refills: 1 | Status: SHIPPED | OUTPATIENT
Start: 2024-12-10

## 2024-12-18 ENCOUNTER — HOSPITAL ENCOUNTER (OUTPATIENT)
Dept: RADIOLOGY | Facility: CLINIC | Age: 74
Discharge: HOME | End: 2024-12-18
Attending: STUDENT IN AN ORGANIZED HEALTH CARE EDUCATION/TRAINING PROGRAM
Payer: MEDICARE

## 2024-12-18 DIAGNOSIS — Z78.0 MENOPAUSE: ICD-10-CM

## 2024-12-18 PROCEDURE — 77080 DXA BONE DENSITY AXIAL: CPT | Mod: TC,PO

## 2024-12-18 PROCEDURE — 77080 DXA BONE DENSITY AXIAL: CPT | Mod: 26,,, | Performed by: RADIOLOGY

## 2025-01-26 RX ORDER — ALLOPURINOL 100 MG/1
100 TABLET ORAL
Qty: 90 TABLET | Refills: 4 | Status: SHIPPED | OUTPATIENT
Start: 2025-01-26

## 2025-01-29 ENCOUNTER — TELEPHONE (OUTPATIENT)
Dept: FAMILY MEDICINE | Facility: CLINIC | Age: 75
End: 2025-01-29
Payer: MEDICARE

## 2025-01-29 ENCOUNTER — OFFICE VISIT (OUTPATIENT)
Dept: FAMILY MEDICINE | Facility: CLINIC | Age: 75
End: 2025-01-29
Payer: MEDICARE

## 2025-01-29 VITALS
HEIGHT: 62 IN | DIASTOLIC BLOOD PRESSURE: 60 MMHG | SYSTOLIC BLOOD PRESSURE: 122 MMHG | WEIGHT: 145.75 LBS | OXYGEN SATURATION: 98 % | HEART RATE: 78 BPM | TEMPERATURE: 98 F | BODY MASS INDEX: 26.82 KG/M2

## 2025-01-29 DIAGNOSIS — E08.65 DIABETES MELLITUS DUE TO UNDERLYING CONDITION WITH HYPERGLYCEMIA, WITHOUT LONG-TERM CURRENT USE OF INSULIN: ICD-10-CM

## 2025-01-29 DIAGNOSIS — R30.0 DYSURIA: ICD-10-CM

## 2025-01-29 DIAGNOSIS — N89.8 VAGINAL IRRITATION: Primary | ICD-10-CM

## 2025-01-29 LAB
BILIRUBIN, UA POC OHS: NEGATIVE
BLOOD, UA POC OHS: ABNORMAL
CLARITY, UA POC OHS: ABNORMAL
COLOR, UA POC OHS: YELLOW
GLUCOSE, UA POC OHS: >=1000
KETONES, UA POC OHS: NEGATIVE
LEUKOCYTES, UA POC OHS: ABNORMAL
NITRITE, UA POC OHS: NEGATIVE
PH, UA POC OHS: 5.5
PROTEIN, UA POC OHS: NEGATIVE
SPECIFIC GRAVITY, UA POC OHS: 1.02
UROBILINOGEN, UA POC OHS: 0.2

## 2025-01-29 PROCEDURE — 87086 URINE CULTURE/COLONY COUNT: CPT

## 2025-01-29 PROCEDURE — 87186 SC STD MICRODIL/AGAR DIL: CPT

## 2025-01-29 PROCEDURE — 99999 PR PBB SHADOW E&M-EST. PATIENT-LVL IV: CPT | Mod: PBBFAC,,,

## 2025-01-29 PROCEDURE — 99214 OFFICE O/P EST MOD 30 MIN: CPT | Mod: PBBFAC,PO

## 2025-01-29 PROCEDURE — 87077 CULTURE AEROBIC IDENTIFY: CPT

## 2025-01-29 PROCEDURE — 99999PBSHW POCT URINALYSIS(INSTRUMENT): Mod: PBBFAC,,,

## 2025-01-29 PROCEDURE — 81003 URINALYSIS AUTO W/O SCOPE: CPT | Mod: PBBFAC,PO

## 2025-01-29 PROCEDURE — 81515 NFCT DS BV&VAGINITIS DNA ALG: CPT

## 2025-01-29 PROCEDURE — 87088 URINE BACTERIA CULTURE: CPT

## 2025-01-29 NOTE — TELEPHONE ENCOUNTER
----- Message from Jenniffer sent at 1/29/2025  8:20 AM CST -----  Contact: Patient  Type:  Same Day Appointment Request    Caller is requesting a same day appointment.  Caller declined first available appointment listed below.    Name of Caller:Patient    When is the first available appointment?Today at 1 pm/ appt on hold    Symptoms:Med reaction    Best Call Back Number:[phone]    Additional Information: Please call to advise

## 2025-01-29 NOTE — PROGRESS NOTES
Subjective:       Patient ID: Jessica Luna is a 74 y.o. female.    Chief Complaint: vaginal irritation       Jessica Luna is a 74 y.o. female with DM2, HTN, HLD who presents to clinic for evaluation of vaginal irritation x 1.5 months. She reports vaginal itching and a bump underneath the skin. She states she has been able to expel some white discharge from the bump. The bump is not painful but it is pruritic. Denies fever, chills, dysuria. She reports she has urinary frequency which is associated with onset of symptoms. She states she has painful urination only when she scratches the area/ when it is irritated. She has been using Vagisil which provides short term relief. She is not sexually active.         Review of Systems   Constitutional:  Negative for chills and fever.   Genitourinary:  Positive for dysuria (see HPI) and frequency. Negative for hematuria, urgency, vaginal bleeding and vaginal discharge.        Vaginal irritation         Past Medical History:   Diagnosis Date    Anticoagulant long-term use     Arthritis     CAD (coronary artery disease)     C. Stents X2 Dr. Og Jeronimo    Diabetes mellitus     Diabetes mellitus type II     Diverticulosis     Hyperlipidemia     Hypertension     Low magnesium levels     Myocardial infarction     Obstructive uropathy 10/16/2015    Renal stone     Status post cystoscopy - Left laser percutaneous nephrolithotripsy 6/29/2016    Ureterolithiasis Obstructive-left 1/3/2015    Wears dentures     FULL UPPER - PARTIAL BOTTOM    Wears dentures     Full upper; partial lower    Wears glasses     Wears glasses        Review of patient's allergies indicates:   Allergen Reactions    No known drug allergies          Current Outpatient Medications:     acetaminophen (TYLENOL) 500 MG tablet, Take 500 mg by mouth every 6 (six) hours as needed for Pain., Disp: , Rfl:     allopurinoL (ZYLOPRIM) 100 MG tablet, TAKE 1 TABLET BY MOUTH EVERY DAY, Disp: 90 tablet, Rfl: 4     "atorvastatin (LIPITOR) 40 MG tablet, Take 1 tablet (40 mg total) by mouth once daily., Disp: 90 tablet, Rfl: 3    empagliflozin (JARDIANCE) 25 mg tablet, Take 1 tablet (25 mg total) by mouth once daily., Disp: 30 tablet, Rfl: 11    magnesium oxide (MAG-OX) 250 mg magnesium Tab, Take 0.5 tablets by mouth once daily., Disp: , Rfl:     metFORMIN (GLUCOPHAGE) 1000 MG tablet, TAKE 1 TABLET BY MOUTH EVERY DAY WITH BREAKFAST, Disp: 90 tablet, Rfl: 1    metoprolol tartrate (LOPRESSOR) 25 MG tablet, Take 1 tablet (25 mg total) by mouth 2 (two) times daily., Disp: 180 tablet, Rfl: 3    olmesartan (BENICAR) 40 MG tablet, Take 1 tablet (40 mg total) by mouth once daily., Disp: 90 tablet, Rfl: 3    omega-3 fatty acids-vitamin E 1,000-5 mg-unit Cap, Take 1 capsule by mouth 2 (two) times daily. Two times a day, Disp: , Rfl:     aspirin 81 MG Chew, Take 1 tablet (81 mg total) by mouth once daily., Disp: 90 tablet, Rfl: 1    potassium citrate (UROCIT-K 15) 15 mEq TbSR, Take 1 tablet by mouth 2 (two) times daily., Disp: 180 tablet, Rfl: 4    Objective:        Physical Exam  Exam conducted with a chaperone present.   Constitutional:       Appearance: Normal appearance.   Cardiovascular:      Rate and Rhythm: Normal rate.   Pulmonary:      Effort: Pulmonary effort is normal.   Genitourinary:     General: Normal vulva.      Labia:         Right: No rash.         Left: No rash.       Vagina: No vaginal discharge.          Comments: Unable to advance speculum secondary to vaginal discomfort. No discharge seen.  Neurological:      Mental Status: She is alert.           Visit Vitals  /60 (BP Location: Right arm, Patient Position: Sitting)   Pulse 78   Temp 97.6 °F (36.4 °C) (Oral)   Ht 5' 2" (1.575 m)   Wt 66.1 kg (145 lb 11.6 oz)   SpO2 98%   BMI 26.65 kg/m²      Assessment:         1. Vaginal irritation    2. Dysuria    3. Diabetes mellitus due to underlying condition with hyperglycemia, without long-term current use of insulin      "   Plan:         Diagnoses and all orders for this visit:    Vaginal irritation  -     POCT Urinalysis(Instrument)  -     Urine Culture High Risk  -     Vaginosis Screen by DNA Probe; Future  -     Vaginosis Screen by DNA Probe    Dysuria  -     Urine Culture High Risk    Diabetes mellitus due to underlying condition with hyperglycemia, without long-term current use of insulin         -    Well controlled on Metformin and Jardiance. Vaginitis possible s/e from Jardiance. Continue to monitor for adverse effects.       Patient is asymptomatic today, so we will await urine culture and vaginal swab for further recommendations.         Family Medicine Physician Assistant     Future Appointments       Date Provider Specialty Appt Notes    3/10/2025 Skip Guerrero MD Family Medicine 6 months f/u             Tests to Keep You Healthy    Mammogram: Met on 5/3/2024  Eye Exam: Met on 8/16/2024  Colon Cancer Screening: DUE  Last Blood Pressure <= 139/89 (1/29/2025): Yes  Last HbA1c < 8 (09/04/2024): Yes       I spent a total of 30 minutes on the day of the visit.This includes face to face time and non-face to face time preparing to see the patient (eg, review of tests), obtaining and/or reviewing separately obtained history, documenting clinical information in the electronic or other health record, independently interpreting results and communicating results to the patient/family/caregiver, or care coordinator.

## 2025-01-29 NOTE — TELEPHONE ENCOUNTER
Spoke to patient.   She is having a vaginal reaction to Jardiance   It has been getting worse for about 1 1/2 month.    Appt scheduled today with Pita Juarez

## 2025-01-31 ENCOUNTER — TELEPHONE (OUTPATIENT)
Dept: FAMILY MEDICINE | Facility: CLINIC | Age: 75
End: 2025-01-31
Payer: MEDICARE

## 2025-01-31 NOTE — TELEPHONE ENCOUNTER
Mayra Lares Staff     ----- Message from Mayra sent at 1/31/2025  1:48 PM CST -----  Contact: self  Type: Needs Medical Advice  Who Called:  pt  Pharmacy name and phone #:    [facility]  [address], [address]  [facility] [address]  Phone: [phone] Fax: [phone]    [id number] [place] - [address] [facility]  [address] [facility]  [place] [address]  Phone: [phone] Fax: [phone]     Best Call Back Number: [phone]   Additional Information: pt would like to discuss jardance she thinks she maybe alleric to it.please call

## 2025-01-31 NOTE — TELEPHONE ENCOUNTER
LOV--1-29-25 with CHLOE Juarez  Patient reports having vaginal bumps that are causing itching sensation.  Patient states she believes these symptoms may be caused by Jardiance use.  Patient states she stopped taking Jardiance for 2 days and symptoms seem better.  Patient is requesting to send message to CHLOE Juarez to see if she should continue taking Jardiance.  Patient states if it is recommended to continue taking Jardiance she will need a new prescription sent to [facility].  It appears patient should have available refills of Jardiance at the pharmacy.  Will contact pharmacy upon provider's reply (if needed).  Patient states she had testing performed at visit, it appears some of the test results are still pending.  Please advise. Thank you.

## 2025-01-31 NOTE — TELEPHONE ENCOUNTER
Jardiance can cause some vaginal symptoms, but her tests are still pending. I am still awaiting the urine culture and vaginosis swab. She can hold the Jardiance over the weekend and see if symptoms improve. Please let me know on Monday how she is doing. If I get back any results by end of day I will let her know!

## 2025-02-01 LAB — BACTERIA UR CULT: ABNORMAL

## 2025-02-02 LAB
BACTERIAL VAGINOSIS DNA: NOT DETECTED
CANDIDA GLABRATA/KRUSEI: NOT DETECTED
CANDIDA RRNA VAG QL PROBE: DETECTED
TRICHOMONAS VAGINALIS: NOT DETECTED

## 2025-02-03 DIAGNOSIS — E08.65 DIABETES MELLITUS DUE TO UNDERLYING CONDITION WITH HYPERGLYCEMIA, WITHOUT LONG-TERM CURRENT USE OF INSULIN: ICD-10-CM

## 2025-02-03 DIAGNOSIS — B37.9 YEAST INFECTION: Primary | ICD-10-CM

## 2025-02-03 DIAGNOSIS — N30.00 ACUTE CYSTITIS WITHOUT HEMATURIA: ICD-10-CM

## 2025-02-03 RX ORDER — FLUCONAZOLE 150 MG/1
150 TABLET ORAL DAILY
Qty: 2 TABLET | Refills: 0 | Status: SHIPPED | OUTPATIENT
Start: 2025-02-03

## 2025-02-03 RX ORDER — NITROFURANTOIN 25; 75 MG/1; MG/1
100 CAPSULE ORAL 2 TIMES DAILY
Qty: 10 CAPSULE | Refills: 0 | Status: SHIPPED | OUTPATIENT
Start: 2025-02-03

## 2025-02-04 NOTE — TELEPHONE ENCOUNTER
Care Due:                  Date            Visit Type   Department     Provider  --------------------------------------------------------------------------------                                EP -                              PRIMARY      [facility]  Last Visit: 09-      CARE ([facility])   GAURI Guerrero                              EP -                              PRIMARY      [facility]  Next Visit: 03-      CARE ([facility])   MEDICINE       Skip Guerrero                                                            Last  Test          Frequency    Reason                     Performed    Due Date  --------------------------------------------------------------------------------    HBA1C.......  6 months...  empagliflozin, metFORMIN.  09- 03-    [facility] Embedded Care Due Messages. Reference number: [id number].   2/03/2025 6:42:11 PM CST

## 2025-02-06 ENCOUNTER — TELEPHONE (OUTPATIENT)
Dept: FAMILY MEDICINE | Facility: CLINIC | Age: 75
End: 2025-02-06
Payer: MEDICARE

## 2025-02-06 DIAGNOSIS — B37.9 YEAST INFECTION: Primary | ICD-10-CM

## 2025-02-06 RX ORDER — NYSTATIN 100000 U/G
OINTMENT TOPICAL 2 TIMES DAILY
Qty: 15 G | Refills: 0 | Status: SHIPPED | OUTPATIENT
Start: 2025-02-06

## 2025-02-06 NOTE — TELEPHONE ENCOUNTER
Patient states she has 3 Macrobid capsules left to take.  Reports vaginal itching is starting to return.  States itching is not as bad as before, but would like a cream or ointment prescribed related to vaginal itching.  Please advise. Thank you.     Britta Edwards Staff     ----- Message from Britta sent at 2/6/2025 12:33 PM CST -----  Contact: PT  Type: Needs Medical Advice  Who Called:  PT  Symptoms (please be specific):  Itching   How long has patient had these symptoms:    Pharmacy name and phone #:   [id number] [place] [address]  [email or web address] [address]  Phone: [phone] Fax: [phone]    [facility] Call Back Number: [phone]  Additional Information: PT asking to get a prescription for ointment patient said she has 3 (MACROBID) pills lefts

## 2025-02-06 NOTE — TELEPHONE ENCOUNTER
Please advise her to complete the Macrobid and follow up if no improvement. I have also sent in a cream for her to apply externally.

## 2025-02-22 DIAGNOSIS — Z00.00 ENCOUNTER FOR MEDICARE ANNUAL WELLNESS EXAM: ICD-10-CM

## 2025-02-26 DIAGNOSIS — E11.9 TYPE 2 DIABETES MELLITUS WITHOUT COMPLICATION: ICD-10-CM

## 2025-03-10 ENCOUNTER — OFFICE VISIT (OUTPATIENT)
Dept: FAMILY MEDICINE | Facility: CLINIC | Age: 75
End: 2025-03-10
Payer: MEDICARE

## 2025-03-10 ENCOUNTER — PATIENT MESSAGE (OUTPATIENT)
Dept: GASTROENTEROLOGY | Facility: CLINIC | Age: 75
End: 2025-03-10
Payer: MEDICARE

## 2025-03-10 VITALS
TEMPERATURE: 98 F | HEART RATE: 57 BPM | OXYGEN SATURATION: 99 % | DIASTOLIC BLOOD PRESSURE: 64 MMHG | RESPIRATION RATE: 16 BRPM | HEIGHT: 62 IN | SYSTOLIC BLOOD PRESSURE: 114 MMHG | WEIGHT: 143.31 LBS | BODY MASS INDEX: 26.37 KG/M2

## 2025-03-10 DIAGNOSIS — E11.59 HYPERTENSION ASSOCIATED WITH DIABETES: ICD-10-CM

## 2025-03-10 DIAGNOSIS — I15.2 HYPERTENSION ASSOCIATED WITH DIABETES: ICD-10-CM

## 2025-03-10 DIAGNOSIS — Z00.00 HEALTHCARE MAINTENANCE: Primary | ICD-10-CM

## 2025-03-10 DIAGNOSIS — E83.42 HYPOMAGNESEMIA: ICD-10-CM

## 2025-03-10 DIAGNOSIS — Z12.11 COLON CANCER SCREENING: ICD-10-CM

## 2025-03-10 DIAGNOSIS — N20.0 KIDNEY STONES: ICD-10-CM

## 2025-03-10 DIAGNOSIS — E78.5 HYPERLIPIDEMIA ASSOCIATED WITH TYPE 2 DIABETES MELLITUS: ICD-10-CM

## 2025-03-10 DIAGNOSIS — Z12.31 ENCOUNTER FOR SCREENING MAMMOGRAM FOR MALIGNANT NEOPLASM OF BREAST: ICD-10-CM

## 2025-03-10 DIAGNOSIS — R41.3 MEMORY LOSS: ICD-10-CM

## 2025-03-10 DIAGNOSIS — E11.69 HYPERLIPIDEMIA ASSOCIATED WITH TYPE 2 DIABETES MELLITUS: ICD-10-CM

## 2025-03-10 DIAGNOSIS — E08.65 DIABETES MELLITUS DUE TO UNDERLYING CONDITION WITH HYPERGLYCEMIA, WITHOUT LONG-TERM CURRENT USE OF INSULIN: ICD-10-CM

## 2025-03-10 DIAGNOSIS — R79.9 ABNORMAL FINDING OF BLOOD CHEMISTRY, UNSPECIFIED: ICD-10-CM

## 2025-03-10 DIAGNOSIS — I70.0 AORTIC ATHEROSCLEROSIS: ICD-10-CM

## 2025-03-10 PROCEDURE — 99214 OFFICE O/P EST MOD 30 MIN: CPT | Mod: S$PBB,,, | Performed by: STUDENT IN AN ORGANIZED HEALTH CARE EDUCATION/TRAINING PROGRAM

## 2025-03-10 PROCEDURE — G2211 COMPLEX E/M VISIT ADD ON: HCPCS | Mod: S$PBB,,, | Performed by: STUDENT IN AN ORGANIZED HEALTH CARE EDUCATION/TRAINING PROGRAM

## 2025-03-10 PROCEDURE — 99214 OFFICE O/P EST MOD 30 MIN: CPT | Mod: PBBFAC,PO | Performed by: STUDENT IN AN ORGANIZED HEALTH CARE EDUCATION/TRAINING PROGRAM

## 2025-03-10 PROCEDURE — 99999 PR PBB SHADOW E&M-EST. PATIENT-LVL IV: CPT | Mod: PBBFAC,,, | Performed by: STUDENT IN AN ORGANIZED HEALTH CARE EDUCATION/TRAINING PROGRAM

## 2025-03-10 NOTE — PROGRESS NOTES
[facility] Note    Subjective:    The HPI and pertinent ROS is included in the Diagnostic Impression Remarks section at the end of the note. Please see below for further details. Chief complaint is at end of note.     Jessica is a pleasant intelligent patient who is here for evaluation.     Modified Medications    No medications on file       Data reviewed    274}  Previous medical records reviewed and summarized in plan section at end of note.      If you are due for any health screening(s) below please notify me so we can arrange them to be ordered and scheduled. Most healthy patients at your age complete them, but you are free to accept or refuse. If you can't do it, I'll definitely understand. If you can, I'd certainly appreciate it!     Tests to Keep You Healthy    Mammogram: Met on 5/3/2024  Eye Exam: Met on 2024  Colon Cancer Screening: ORDERED BUT NOT SCHEDULED  Last Blood Pressure <= 139/89 (3/10/2025): Yes  Last HbA1c < 8 (2024): Yes      The following portions of the patient's history were reviewed and updated as appropriate: allergies, current medications, past family history, past medical history, past social history, past surgical history and problem list.    She  has a past medical history of Anticoagulant long-term use, Arthritis, CAD (coronary artery disease), Diabetes mellitus, Diabetes mellitus type II, Diverticulosis, Hyperlipidemia, Hypertension, Low magnesium levels, Myocardial infarction, Obstructive uropathy (10/16/2015), Renal stone, Status post cystoscopy - Left laser percutaneous nephrolithotripsy (2016), Ureterolithiasis Obstructive-left (1/3/2015), Wears dentures, Wears dentures, Wears glasses, and Wears glasses.  She  has a past surgical history that includes Coronary stent placement (); Rotator cuff repair; Cholecystectomy;  section; Hysterectomy; Colonoscopy (N/A, 10/7/2015); Cystoscopy; ureteroscopy (12/15/15); Cystoscopy w/ laser lithotripsy  (12/15/15); Percutaneous nephrolithotripsy (06/28/2016); Laser lithotripsy (Left, 9/19/2019); Ureteroscopic removal of ureteric calculus (Left, 9/19/2019); Ureteral stent placement (Left, 9/19/2019); Cystoscopy w/ retrogrades (Bilateral, 9/19/2019); Cystoscopy w/ ureteral stent removal (Left, 10/1/2019); Colonoscopy (N/A, 10/3/2019); Oophorectomy; Arthroscopic repair of rotator cuff of shoulder (Right, 4/8/2021); Arthroscopic tenotomy of biceps tendon (Right, 4/8/2021); Arthroscopy of shoulder with decompression of subacromial space (Right, 4/8/2021); and Cystoscopy (N/A, 5/21/2024).    She  reports that she has never smoked. She has never been exposed to tobacco smoke. She has never used smokeless tobacco. She reports that she does not drink alcohol and does not use drugs.  She family history includes Breast cancer (age of onset: 59) in her sister; Breast cancer (age of onset: 60) in her mother; Cancer in her mother and sister; Diabetes in her brother; Heart disease (age of onset: 90) in her father; Kidney disease in her brother; Spina bifida in her brother.    Review of patient's allergies indicates:   Allergen Reactions    No known drug allergies        Tobacco Use: Low Risk  (3/10/2025)    Patient History     Smoking Tobacco Use: Never     Smokeless Tobacco Use: Never     Passive Exposure: Never     Physical Examination  Physical Exam  Vital Signs  Blood pressure is 114/64.     General appearance: alert, cooperative, no distress  Neck: no thyromegaly, no neck stiffness  Lungs: clear to auscultation, no wheezes, rales or rhonchi, symmetric air entry  Heart: normal rate, regular rhythm, normal S1, S2, no murmurs, rubs, clicks or gallops  Abdomen: soft, nontender, nondistended, no rigidity, rebound, or guarding.   Back: no point tenderness over spine  Extremities: peripheral pulses normal, no unilateral leg swelling or calf tenderness   Neurological:alert, oriented, normal speech, no new focal findings or movement  "disorder noted from baseline    Protective Sensation (w/ 10 gram monofilament):  Right: Intact  Left: Intact    Visual Inspection:  Normal -  Bilateral    Pedal Pulses:   Right: Present  Left: Present    Posterior Tibialis Pulses:   Right:Present  Left: Present      BP Readings from Last 3 Encounters:   03/10/25 114/64   01/29/25 122/60   09/04/24 114/70     Wt Readings from Last 3 Encounters:   03/10/25 65 kg (143 lb 4.8 oz)   01/29/25 66.1 kg (145 lb 11.6 oz)   11/22/24 63.5 kg (140 lb)     /64 (BP Location: Right arm, Patient Position: Sitting)   Pulse (!) 57   Temp 98 °F (36.7 °C) (Oral)   Resp 16   Ht 5' 2" (1.575 m)   Wt 65 kg (143 lb 4.8 oz)   SpO2 99%   BMI 26.21 kg/m²       274}  Laboratory: I have reviewed old labs below:       274}    Lab Results   Component Value Date    WBC 8.53 09/04/2024    HGB 13.9 09/04/2024    HCT 43.9 09/04/2024    MCV 89 09/04/2024     09/04/2024     08/27/2024    K 4.5 08/27/2024     08/27/2024    CALCIUM 9.8 08/27/2024    PHOS 3.1 10/17/2015    CO2 24 08/27/2024     (H) 08/27/2024    BUN 21 08/27/2024    CREATININE 0.9 08/27/2024    EGFRNORACEVR >60.0 08/27/2024    ANIONGAP 7 (L) 08/27/2024    PROT 6.7 08/27/2024    ALBUMIN 3.7 08/27/2024    BILITOT 0.7 08/27/2024    ALKPHOS 118 08/27/2024    ALT 12 08/27/2024    AST 14 08/27/2024    INR 1.0 09/12/2019    CHOL 119 (L) 02/14/2024    TRIG 133 02/14/2024    HDL 39 (L) 02/14/2024    LDLCALC 53.4 (L) 02/14/2024    TSH 1.312 02/14/2024    GLUF 642 (HH) 01/03/2015    HGBA1C 6.5 (H) 09/04/2024      Lab reviewed by me: Particular labs of significance that I will monitor, workup, or treat to improve are mentioned below in diagnostic impression remarks.    Results  Laboratory Studies  Last A1c was 6.5.       Imaging/EKG: I have reviewed the pertinent results and my findings are noted in remarks.     274}    CC:   Chief Complaint   Patient presents with    Follow-up    Diabetes    Hypertension    " Hyperlipidemia           274}    History of Present Illness  The patient is a 74-year-old female who presents for follow-up.    She reports overall good health, with the exception of occasional itching, which she attributes to a reaction from Jardiance. She was advised to discontinue the medication for 2 days, but the itching persisted. Currently, the itching is under control, although it still occurs sporadically. She has been diagnosed with a yeast infection and a urinary tract infection (UTI), both of which are managed with Diflucan. These infections occur intermittently, but during flare-ups, they disrupt her sleep and daily activities. She finds relief from symptoms by sitting in the shower. She reports no issues with uric acid stones. She is currently on potassium citrate for kidney stone prevention but notes that she often finds the undissolved pill in her stool. She has not experienced a kidney stone attack in a year. She also reports frequent leg cramps and is currently taking magnesium oxide once daily. She has previously received magnesium infusions.    She has been experiencing memory loss for several months, specifically difficulty recalling common words. She frequently seeks assistance from her  or sister to remember these words. She reports no progression of these symptoms.    She experiences numbness in her toes and reduced sensation in one finger, which she attributes to rotator cuff surgery performed 3 to 4 years ago. Despite physical therapy, she continues to experience difficulty with hand movements, particularly in bending her hand and picking up objects. She is right-handed and reports no shoulder pain.    MEDICATIONS  Jardiance, metformin, potassium citrate, magnesium oxide       Assessment/Plan  Jessica Luna is a 74 y.o. female who presents to clinic with:  1. Healthcare maintenance    2. Diabetes mellitus due to underlying condition with hyperglycemia, without long-term current  use of insulin    3. Hypertension associated with diabetes    4. Hyperlipidemia associated with type 2 diabetes mellitus    5. Hypomagnesemia    6. Aortic atherosclerosis    7. Memory loss    8. Abnormal finding of blood chemistry, unspecified    9. Kidney stones    10. Colon cancer screening          274}    Assessment & Plan  1. Memory loss.  She reports experiencing memory loss, particularly with recalling common words, over the past few months. There are no focal deficits observed. Blood work will be conducted to rule out vitamin deficiencies and thyroid issues. A referral to a neurologist will be considered based on the blood work results.    2. Leg cramps.  She reports frequent leg cramps and a history of low magnesium levels. Blood work will be conducted to check magnesium and potassium levels.    3. Diabetes Mellitus.  Her last A1c was 6.5. She is currently taking Jardiance and metformin. She experienced itching, which she suspects might be related to Jardiance, but it has improved. Blood work will include an A1c test to monitor her diabetes.    4. Recurrent yeast infections.  She reports occasional yeast infections, which have been managed with Diflucan. No new treatment is required at this time.    5. Medication management.  She reports that potassium citrate pills are not being absorbed and are found intact in her stool. The pharmacy will be consulted for alternative formulations. She is currently taking potassium citrate for kidney stone prevention.    6. Health maintenance.  A colon screening is due, as her last one was in 2019. A mammogram is scheduled for 05/03/2025. Blood work will include tests for cholesterol, B12, thyroid, B6, folate, and magnesium.    PROCEDURE  The patient underwent rotator cuff surgery 3 to 4 years ago.      Hypertension associated with diabetes-stable continue current meds monitor no headache or chest pain and follow up blood work. Monitor urine protein.     HLD associated  with diabetes-stable continue current meds monitor blood work and recommend healthy diet.   Atherosclerosis of aorta- stable continue current medications and rec healthy diet monitor lipid levels       This note was generated with the assistance of ambient listening technology. Verbal consent was obtained by the patient and accompanying visitor(s) for the recording of patient appointment to facilitate this note. I attest to having reviewed and edited the generated note for accuracy, though some syntax or spelling errors may persist. Please contact the author of this note for any clarification.      1. Healthcare maintenance    2. Diabetes mellitus due to underlying condition with hyperglycemia, without long-term current use of insulin  - Hemoglobin A1C; Future  - Microalbumin/Creatinine Ratio, Urine; Future    3. Hypertension associated with diabetes    4. Hyperlipidemia associated with type 2 diabetes mellitus  - Lipid Panel; Future    5. Hypomagnesemia    6. Aortic atherosclerosis    7. Memory loss  - Vitamin B12 Deficiency Panel; Future  - Folate; Future  - VITAMIN B6; Future  - TSH; Future    8. Abnormal finding of blood chemistry, unspecified  - Vitamin B12 Deficiency Panel; Future  - Folate; Future  - VITAMIN B6; Future  - TSH; Future    9. Kidney stones  - E-Consult to Pharmacy    10. Colon cancer screening  - Case Request Endoscopy: COLONOSCOPY      Skip Guerrero MD      274}    If you are due for any health screening(s) below please notify me so we can arrange them to be ordered and scheduled. Most healthy patients at your age complete them, but you are free to accept or refuse.     If you can't do it, I'll definitely understand. If you can, I'd certainly appreciate it!   Tests to Keep You Healthy    Mammogram: Met on 5/3/2024  Eye Exam: Met on 8/16/2024  Colon Cancer Screening: ORDERED BUT NOT SCHEDULED  Last Blood Pressure <= 139/89 (3/10/2025): Yes  Last HbA1c < 8 (09/04/2024): Yes

## 2025-03-11 ENCOUNTER — TELEPHONE (OUTPATIENT)
Dept: FAMILY MEDICINE | Facility: CLINIC | Age: 75
End: 2025-03-11
Payer: MEDICARE

## 2025-03-11 ENCOUNTER — RESULTS FOLLOW-UP (OUTPATIENT)
Dept: FAMILY MEDICINE | Facility: CLINIC | Age: 75
End: 2025-03-11

## 2025-03-11 ENCOUNTER — E-CONSULT (OUTPATIENT)
Dept: PHARMACY | Facility: CLINIC | Age: 75
End: 2025-03-11
Payer: MEDICARE

## 2025-03-11 ENCOUNTER — PATIENT MESSAGE (OUTPATIENT)
Dept: FAMILY MEDICINE | Facility: CLINIC | Age: 75
End: 2025-03-11
Payer: MEDICARE

## 2025-03-11 DIAGNOSIS — E11.65 TYPE 2 DIABETES MELLITUS WITH HYPERGLYCEMIA, WITHOUT LONG-TERM CURRENT USE OF INSULIN: ICD-10-CM

## 2025-03-11 DIAGNOSIS — E11.65 TYPE 2 DIABETES MELLITUS WITH HYPERGLYCEMIA, WITHOUT LONG-TERM CURRENT USE OF INSULIN: Primary | ICD-10-CM

## 2025-03-11 DIAGNOSIS — N20.0 RENAL CALCULUS, LEFT: Primary | ICD-10-CM

## 2025-03-11 RX ORDER — PIOGLITAZONEHYDROCHLORIDE 15 MG/1
15 TABLET ORAL DAILY
Qty: 90 TABLET | Refills: 3 | Status: SHIPPED | OUTPATIENT
Start: 2025-03-11 | End: 2025-03-11 | Stop reason: SDUPTHER

## 2025-03-11 RX ORDER — PIOGLITAZONEHYDROCHLORIDE 15 MG/1
15 TABLET ORAL DAILY
Qty: 90 TABLET | Refills: 3 | Status: SHIPPED | OUTPATIENT
Start: 2025-03-11 | End: 2026-03-11

## 2025-03-11 NOTE — TELEPHONE ENCOUNTER
----- Message from Skip Guerrero MD sent at 3/11/2025  6:52 AM CDT -----  Patient had a high A1c and sent in a another oral medication you can take called Actos to her current diabetic medications please recheck an A1c in 3 months.  The pharmacist says that seen the pill   the potassium is fine in the stool and it is already absorbed and it is just the shell that is empty she is seeing   ----- Message -----  From: [facility] [employer] [facility]  Sent: 3/10/2025   9:43 PM CDT  To: Skip Guerrero MD

## 2025-03-11 NOTE — CONSULTS
[facility]  Response for E-Consult     Patient Name: Jessica Luna  MRN: [id number]  Primary Care Provider: Skip Guerrero MD   Requesting Provider: Skip Guerrero MD  E-Consult to Pharmacy  Consult performed by: Onur Hammer PharmD  Consult ordered by: Skip Guerrero MD  Reason for consult: Pt taking potassium citrate for kidney stone ppx and sees the pill in the stool and wants to know is there a potassium citrate that is better absorbed.  Assessment/Recommendations: It is normal to see what looks like the tablet in the stool.  The medication has already been absorbed and what is left is the carrier shell.          Recommendation: see above    Additional future steps to consider: NO    Total time of Consultation: 10 minute    I did not speak to the requesting provider verbally about this.     *This eConsult is based on the clinical data available to me and is furnished without benefit of a physical examination. The eConsult will need to be interpreted in light of any clinical issues or changes in patient status not available to me at the time of filing this eConsults. Significant changes in patient condition or level of acuity should result in immediate formal consultation and reevaluation. Please alert me if you have further questions.    Thank you for this eConsult referral.     Onur Hammer PharmD  [facility]

## 2025-03-11 NOTE — TELEPHONE ENCOUNTER
No care due was identified.  [facility] Embedded Care Due Messages. Reference number: [id number].   3/11/2025 3:47:22 PM CDT

## 2025-03-11 NOTE — TELEPHONE ENCOUNTER
----- Message from Carlos Manuel sent at 3/11/2025  4:08 PM CDT -----  Contact: Self  Type: Needs Medical AdviceWho Called:  PatientPharmacy name and phone #:  [facility]/pharmacy #[id number] - [place] - [address] [address]Iqrpj: [phone] Fax: [phone] Call Back Number: [phone] Information: Patient is requesting a call back regarding a prescription and if it was sent to the [facility] pharmacy

## 2025-03-12 ENCOUNTER — TELEPHONE (OUTPATIENT)
Dept: FAMILY MEDICINE | Facility: CLINIC | Age: 75
End: 2025-03-12
Payer: MEDICARE

## 2025-03-12 ENCOUNTER — PATIENT MESSAGE (OUTPATIENT)
Dept: FAMILY MEDICINE | Facility: CLINIC | Age: 75
End: 2025-03-12
Payer: MEDICARE

## 2025-03-12 DIAGNOSIS — E53.8 B12 DEFICIENCY: Primary | ICD-10-CM

## 2025-03-12 RX ORDER — LANOLIN ALCOHOL/MO/W.PET/CERES
1000 CREAM (GRAM) TOPICAL DAILY
Qty: 90 TABLET | Refills: 3 | Status: SHIPPED | OUTPATIENT
Start: 2025-03-12 | End: 2026-03-12

## 2025-03-17 ENCOUNTER — PATIENT MESSAGE (OUTPATIENT)
Dept: FAMILY MEDICINE | Facility: CLINIC | Age: 75
End: 2025-03-17
Payer: MEDICARE

## 2025-03-17 NOTE — PROGRESS NOTES
Please let pt know she had a low vitamin b6 and recommend she take a b6 supplement over the counter

## 2025-04-20 ENCOUNTER — HOSPITAL ENCOUNTER (EMERGENCY)
Facility: HOSPITAL | Age: 75
Discharge: HOME | End: 2025-04-20
Attending: EMERGENCY MEDICINE
Payer: MEDICARE

## 2025-04-20 VITALS
TEMPERATURE: 98 F | BODY MASS INDEX: 25.76 KG/M2 | RESPIRATION RATE: 18 BRPM | HEART RATE: 94 BPM | SYSTOLIC BLOOD PRESSURE: 178 MMHG | DIASTOLIC BLOOD PRESSURE: 76 MMHG | OXYGEN SATURATION: 99 % | HEIGHT: 62 IN | WEIGHT: 140 LBS

## 2025-04-20 DIAGNOSIS — T30.0 FIRST DEGREE BURN: Primary | ICD-10-CM

## 2025-04-20 PROCEDURE — 99284 EMERGENCY DEPT VISIT MOD MDM: CPT

## 2025-04-20 PROCEDURE — 25000003 PHARM REV CODE 250: Performed by: NURSE PRACTITIONER

## 2025-04-20 RX ORDER — SILVER SULFADIAZINE 10 G/1000G
1 CREAM TOPICAL
Status: COMPLETED | OUTPATIENT
Start: 2025-04-20 | End: 2025-04-20

## 2025-04-20 RX ORDER — SILVER SULFADIAZINE 10 G/1000G
CREAM TOPICAL 2 TIMES DAILY
Qty: 400 G | Refills: 1 | Status: SHIPPED | OUTPATIENT
Start: 2025-04-20

## 2025-04-20 RX ORDER — CEPHALEXIN 250 MG/1
500 CAPSULE ORAL
Status: COMPLETED | OUTPATIENT
Start: 2025-04-20 | End: 2025-04-20

## 2025-04-20 RX ORDER — CEPHALEXIN 500 MG/1
500 CAPSULE ORAL EVERY 6 HOURS
Qty: 20 CAPSULE | Refills: 0 | Status: SHIPPED | OUTPATIENT
Start: 2025-04-20 | End: 2025-04-25

## 2025-04-20 RX ADMIN — SILVER SULFADIAZINE 1 TUBE: 10 CREAM TOPICAL at 06:04

## 2025-04-20 RX ADMIN — CEPHALEXIN 500 MG: 250 CAPSULE ORAL at 06:04
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
## 2025-04-25 ENCOUNTER — OFFICE VISIT (OUTPATIENT)
Dept: FAMILY MEDICINE | Facility: CLINIC | Age: 75
End: 2025-04-25
Payer: MEDICARE

## 2025-04-25 VITALS
RESPIRATION RATE: 16 BRPM | OXYGEN SATURATION: 97 % | TEMPERATURE: 98 F | BODY MASS INDEX: 26.37 KG/M2 | WEIGHT: 143.31 LBS | HEIGHT: 62 IN | SYSTOLIC BLOOD PRESSURE: 110 MMHG | DIASTOLIC BLOOD PRESSURE: 70 MMHG | HEART RATE: 58 BPM

## 2025-04-25 DIAGNOSIS — E11.59 HYPERTENSION ASSOCIATED WITH DIABETES: ICD-10-CM

## 2025-04-25 DIAGNOSIS — T30.0 BURN: ICD-10-CM

## 2025-04-25 DIAGNOSIS — E78.5 HYPERLIPIDEMIA ASSOCIATED WITH TYPE 2 DIABETES MELLITUS: ICD-10-CM

## 2025-04-25 DIAGNOSIS — E11.69 HYPERLIPIDEMIA ASSOCIATED WITH TYPE 2 DIABETES MELLITUS: ICD-10-CM

## 2025-04-25 DIAGNOSIS — B37.9 YEAST INFECTION: ICD-10-CM

## 2025-04-25 DIAGNOSIS — Z00.00 HEALTHCARE MAINTENANCE: Primary | ICD-10-CM

## 2025-04-25 DIAGNOSIS — R79.9 ABNORMAL FINDING OF BLOOD CHEMISTRY, UNSPECIFIED: ICD-10-CM

## 2025-04-25 DIAGNOSIS — I15.2 HYPERTENSION ASSOCIATED WITH DIABETES: ICD-10-CM

## 2025-04-25 PROCEDURE — 99999 PR PBB SHADOW E&M-EST. PATIENT-LVL IV: CPT | Mod: PBBFAC,,, | Performed by: STUDENT IN AN ORGANIZED HEALTH CARE EDUCATION/TRAINING PROGRAM

## 2025-04-25 PROCEDURE — 99214 OFFICE O/P EST MOD 30 MIN: CPT | Mod: PBBFAC,PO | Performed by: STUDENT IN AN ORGANIZED HEALTH CARE EDUCATION/TRAINING PROGRAM

## 2025-04-25 RX ORDER — NYSTATIN 100000 U/G
OINTMENT TOPICAL 2 TIMES DAILY
Qty: 15 G | Refills: 0 | Status: SHIPPED | OUTPATIENT
Start: 2025-04-25

## 2025-04-25 RX ORDER — POTASSIUM CITRATE 15 MEQ/1
1 TABLET, EXTENDED RELEASE ORAL DAILY
Start: 2025-04-25 | End: 2025-07-24

## 2025-04-25 NOTE — PROGRESS NOTES
[facility] Note    Subjective:    The HPI and pertinent ROS is included in the Diagnostic Impression Remarks section at the end of the note. Please see below for further details. Chief complaint is at end of note.     Jessica is a pleasant intelligent patient who is here for evaluation.     Modified Medications    Modified Medication Previous Medication    NYSTATIN (MYCOSTATIN) OINTMENT nystatin (MYCOSTATIN) ointment       Apply topically 2 (two) times daily.    Apply topically 2 (two) times daily.    POTASSIUM CITRATE (UROCIT-K 15) 15 MEQ TBSR potassium citrate (UROCIT-K 15) 15 mEq TbSR       Take 1 tablet by mouth once daily.    Take 1 tablet by mouth 2 (two) times daily.       Data reviewed 274}  Previous medical records reviewed and summarized in plan section at end of note.      If you are due for any health screening(s) below please notify me so we can arrange them to be ordered and scheduled. Most healthy patients at your age complete them, but you are free to accept or refuse. If you can't do it, I'll definitely understand. If you can, I'd certainly appreciate it!     Tests to Keep You Healthy    Mammogram: Met on 5/3/2024  Eye Exam: Met on 8/16/2024  Colon Cancer Screening: DUE  Last Blood Pressure <= 139/89 (4/25/2025): Yes  Last HbA1c < 8 (03/10/2025): NO      The following portions of the patient's history were reviewed and updated as appropriate: allergies, current medications, past family history, past medical history, past social history, past surgical history and problem list.    She  has a past medical history of Anticoagulant long-term use, Arthritis, CAD (coronary artery disease), Diabetes mellitus, Diabetes mellitus type II, Diverticulosis, Hyperlipidemia, Hypertension, Low magnesium levels, Myocardial infarction, Obstructive uropathy (10/16/2015), Renal stone, Status post cystoscopy - Left laser percutaneous nephrolithotripsy (6/29/2016), Ureterolithiasis Obstructive-left  (1/3/2015), Wears dentures, Wears dentures, Wears glasses, and Wears glasses.  She  has a past surgical history that includes Coronary stent placement (); Rotator cuff repair; Cholecystectomy;  section; Hysterectomy; Colonoscopy (N/A, 10/7/2015); Cystoscopy; ureteroscopy (12/15/15); Cystoscopy w/ laser lithotripsy (12/15/15); Percutaneous nephrolithotripsy (2016); Laser lithotripsy (Left, 2019); Ureteroscopic removal of ureteric calculus (Left, 2019); Ureteral stent placement (Left, 2019); Cystoscopy w/ retrogrades (Bilateral, 2019); Cystoscopy w/ ureteral stent removal (Left, 10/1/2019); Colonoscopy (N/A, 10/3/2019); Oophorectomy; Arthroscopic repair of rotator cuff of shoulder (Right, 2021); Arthroscopic tenotomy of biceps tendon (Right, 2021); Arthroscopy of shoulder with decompression of subacromial space (Right, 2021); and Cystoscopy (N/A, 2024).    She  reports that she has never smoked. She has never been exposed to tobacco smoke. She has never used smokeless tobacco. She reports that she does not drink alcohol and does not use drugs.  She family history includes Breast cancer (age of onset: 59) in her sister; Breast cancer (age of onset: 60) in her mother; Cancer in her mother and sister; Diabetes in her brother; Heart disease (age of onset: 90) in her father; Kidney disease in her brother; Spina bifida in her brother.    Review of patient's allergies indicates:   Allergen Reactions    Keflex [cephalexin]      diarrhea       Tobacco Use: Low Risk  (2025)    Patient History     Smoking Tobacco Use: Never     Smokeless Tobacco Use: Never     Passive Exposure: Never     Physical Examination  Physical Exam       General appearance: alert, cooperative, no distress  Neck: no thyromegaly, no neck stiffness  Lungs: clear to auscultation, no wheezes, rales or rhonchi, symmetric air entry  Heart: normal rate, regular rhythm, normal S1, S2, no murmurs, rubs,  "clicks or gallops  Abdomen: soft, nontender, nondistended, no rigidity, rebound, or guarding.   Back: no point tenderness over spine  Extremities: peripheral pulses normal, no unilateral leg swelling or calf tenderness   Neurological:alert, oriented, normal speech, no new focal findings or movement disorder noted from baseline      BP Readings from Last 3 Encounters:   04/25/25 110/70   04/20/25 (!) 178/76   03/10/25 114/64     Wt Readings from Last 3 Encounters:   04/25/25 65 kg (143 lb 4.8 oz)   04/20/25 63.5 kg (140 lb)   03/10/25 65 kg (143 lb 4.8 oz)     /70 (BP Location: Right arm, Patient Position: Sitting)   Pulse (!) 58   Temp 97.6 °F (36.4 °C) (Oral)   Resp 16   Ht 5' 2" (1.575 m)   Wt 65 kg (143 lb 4.8 oz)   SpO2 97%   BMI 26.21 kg/m²    274}  Laboratory: I have reviewed old labs below:    274}    Lab Results   Component Value Date    WBC 8.53 09/04/2024    HGB 13.9 09/04/2024    HCT 43.9 09/04/2024    MCV 89 09/04/2024     09/04/2024     03/10/2025    K 5.2 (H) 03/10/2025     03/10/2025    CALCIUM 10.0 03/10/2025    PHOS 3.1 10/17/2015    CO2 25 03/10/2025    BUN 25 (H) 03/10/2025    CREATININE 0.9 03/10/2025    EGFRNORACEVR >60.0 03/10/2025    LABPROT 10.7 09/12/2019    ALBUMIN 3.7 08/27/2024    BILITOT 0.7 08/27/2024    ALKPHOS 118 08/27/2024    ALT 12 08/27/2024    AST 14 08/27/2024    INR 1.0 09/12/2019    CHOL 178 03/10/2025    TRIG 142 03/10/2025    HDL 52 03/10/2025    TSH 1.167 03/10/2025    GLUF 642 (HH) 01/03/2015    HGBA1C 9.2 (H) 03/10/2025      Lab reviewed by me: Particular labs of significance that I will monitor, workup, or treat to improve are mentioned below in diagnostic impression remarks.    Results         Imaging/EKG: I have reviewed the pertinent results and my findings are noted in remarks.  274}    CC:   Chief Complaint   Patient presents with    Hospital Follow Up    Burn        274}    History of Present Illness         Assessment/Plan  Jessica " Shelley Luna is a 74 y.o. female who presents to clinic with:  1. Healthcare maintenance    2. Yeast infection    3. Burn    4. Hyperlipidemia associated with type 2 diabetes mellitus    5. Hypertension associated with diabetes    6. Abnormal finding of blood chemistry, unspecified       274}    Assessment & Plan      Hyperkalemia-will decrease potassium to once daily recheck in the future is taking this for kidney stone prevention   Yeast infection-will send in medication monitor   Burn-healing she had side effects and diarrhea with antibiotic this stop Keflex   Hypertension -stable continue current meds monitor no headache or chest pain f/u blood work   Diabetes-needs improvement will recheck blood work continue meds  HLD associated with diabetes-stable continue current meds monitor blood work and recommend healthy diet.   Rec colon cancer screenimg    This note was generated with the assistance of ambient listening technology. Verbal consent was obtained by the patient and accompanying visitor(s) for the recording of patient appointment to facilitate this note. I attest to having reviewed and edited the generated note for accuracy, though some syntax or spelling errors may persist. Please contact the author of this note for any clarification.      1. Yeast infection  - nystatin (MYCOSTATIN) ointment; Apply topically 2 (two) times daily.  Dispense: 15 g; Refill: 0    2. Healthcare maintenance    3. Burn    4. Hyperlipidemia associated with type 2 diabetes mellitus    5. Hypertension associated with diabetes  - Basic Metabolic Panel; Future  - Magnesium; Future    6. Abnormal finding of blood chemistry, unspecified  - Basic Metabolic Panel; Future  - Magnesium; Future      Skip Guerrero MD   274}    If you are due for any health screening(s) below please notify me so we can arrange them to be ordered and scheduled. Most healthy patients at your age complete them, but you are free to accept or refuse.     If  you can't do it, I'll definitely understand. If you can, I'd certainly appreciate it!   Tests to Keep You Healthy    Mammogram: Met on 5/3/2024  Eye Exam: Met on 8/16/2024  Colon Cancer Screening: DUE  Last Blood Pressure <= 139/89 (4/25/2025): Yes  Last HbA1c < 8 (03/10/2025): NO

## 2025-05-02 ENCOUNTER — LAB VISIT (OUTPATIENT)
Dept: LAB | Facility: HOSPITAL | Age: 75
End: 2025-05-02
Attending: STUDENT IN AN ORGANIZED HEALTH CARE EDUCATION/TRAINING PROGRAM
Payer: MEDICARE

## 2025-05-02 DIAGNOSIS — R79.9 ABNORMAL FINDING OF BLOOD CHEMISTRY, UNSPECIFIED: ICD-10-CM

## 2025-05-02 DIAGNOSIS — I15.2 HYPERTENSION ASSOCIATED WITH DIABETES: ICD-10-CM

## 2025-05-02 DIAGNOSIS — E11.59 HYPERTENSION ASSOCIATED WITH DIABETES: ICD-10-CM

## 2025-05-02 LAB
ANION GAP (OHS): 12 MMOL/L (ref 8–16)
BUN SERPL-MCNC: 31 MG/DL (ref 8–23)
CALCIUM SERPL-MCNC: 9.7 MG/DL (ref 8.7–10.5)
CHLORIDE SERPL-SCNC: 107 MMOL/L (ref 95–110)
CO2 SERPL-SCNC: 25 MMOL/L (ref 23–29)
CREAT SERPL-MCNC: 0.9 MG/DL (ref 0.5–1.4)
GFR SERPLBLD CREATININE-BSD FMLA CKD-EPI: >60 ML/MIN/1.73/M2
GLUCOSE SERPL-MCNC: 110 MG/DL (ref 70–110)
MAGNESIUM SERPL-MCNC: 1.6 MG/DL (ref 1.6–2.6)
POTASSIUM SERPL-SCNC: 4.6 MMOL/L (ref 3.5–5.1)
SODIUM SERPL-SCNC: 144 MMOL/L (ref 136–145)

## 2025-05-02 PROCEDURE — 36415 COLL VENOUS BLD VENIPUNCTURE: CPT | Mod: PO

## 2025-05-02 PROCEDURE — 80048 BASIC METABOLIC PNL TOTAL CA: CPT

## 2025-05-02 PROCEDURE — 83735 ASSAY OF MAGNESIUM: CPT

## 2025-05-03 ENCOUNTER — PATIENT MESSAGE (OUTPATIENT)
Dept: URGENT CARE | Facility: CLINIC | Age: 75
End: 2025-05-03
Payer: MEDICARE

## 2025-05-03 ENCOUNTER — PATIENT MESSAGE (OUTPATIENT)
Dept: FAMILY MEDICINE | Facility: CLINIC | Age: 75
End: 2025-05-03
Payer: MEDICARE

## 2025-05-03 DIAGNOSIS — E83.42 HYPOMAGNESEMIA: Primary | ICD-10-CM

## 2025-05-05 ENCOUNTER — E-CONSULT (OUTPATIENT)
Dept: PHARMACY | Facility: CLINIC | Age: 75
End: 2025-05-05
Payer: MEDICARE

## 2025-05-05 ENCOUNTER — TELEPHONE (OUTPATIENT)
Dept: FAMILY MEDICINE | Facility: CLINIC | Age: 75
End: 2025-05-05
Payer: MEDICARE

## 2025-05-05 ENCOUNTER — RESULTS FOLLOW-UP (OUTPATIENT)
Dept: FAMILY MEDICINE | Facility: CLINIC | Age: 75
End: 2025-05-05
Payer: MEDICARE

## 2025-05-05 DIAGNOSIS — E83.42 HYPOMAGNESEMIA: Primary | ICD-10-CM

## 2025-05-05 NOTE — TELEPHONE ENCOUNTER
----- Message from Skip Guerrero MD sent at 5/3/2025  6:15 AM CDT -----  Please let pt know potassium looked good but magnesium is low and would recommend consider taking mag glycinate which is better absorbed than oxide form of magnesium   ----- Message -----  From: Lab, Background User  Sent: 5/2/2025   7:11 PM CDT  To: Skip Guerrero MD

## 2025-05-05 NOTE — CONSULTS
[facility]  Response for E-Consult     Patient Name: Jessica Luna  MRN: [id number]  Primary Care Provider: Skip Guerrero MD   Requesting Provider: Skip Guerrero MD  E-Consult to Pharmacy  Consult performed by: Onur Hammer PharmD  Consult ordered by: Skip Guerrero MD  Reason for consult: Pt has low mag taking mag oxide and what would be conversion of mag oxide to mag bisglycinate in order to try a higher dose?  Assessment/Recommendations: Bioavailability of Mag oxide has been reported to be about 4%.  Magnesium glycinate is generally considered to have high bioavailability, but a concrete number has not been documented.  Switch to the glycinate form would definitely be recommended over the oxide form.  Starting with 200-400mg daily would be advised.        https://pubmed.ncbi.nlm.nih.gov/08328429/#:~:text=Abstract,equivalent%20to%20organic%20magnesium%20salts.          Recommendation: Magnesium Glycinate 200-400mg/day    Additional future steps to consider: Labs to monitor magnesium levels    Total time of Consultation: 20 minute    I did not speak to the requesting provider verbally about this.     *This eConsult is based on the clinical data available to me and is furnished without benefit of a physical examination. The eConsult will need to be interpreted in light of any clinical issues or changes in patient status not available to me at the time of filing this eConsults. Significant changes in patient condition or level of acuity should result in immediate formal consultation and reevaluation. Please alert me if you have further questions.    Thank you for this eConsult referral.     Onur Hammer PharmD  [facility]

## 2025-05-05 NOTE — LETTER
"     May 7, 2025    Jessica Luna  [address] [facility] [place] [address]             [facility]  [address]  [place] [address]  Phone: [phone]  Fax: [phone] Dear Mrs. Jessica Luna:    Below are the results from your recent visit:    Resulted Orders   Basic Metabolic Panel   Result Value Ref Range    Sodium 144 136 - 145 mmol/L    Potassium 4.6 3.5 - 5.1 mmol/L    Chloride 107 95 - 110 mmol/L    CO2 25 23 - 29 mmol/L    Glucose 110 70 - 110 mg/dL    BUN 31 (H) 8 - 23 mg/dL    Creatinine 0.9 0.5 - 1.4 mg/dL    Calcium 9.7 8.7 - 10.5 mg/dL    Anion Gap 12 8 - 16 mmol/L    eGFR >60 >60 mL/min/1.73/m2      Comment:      Estimated GFR calculated using the CKD-EPI creatinine (2021) equation.   Magnesium   Result Value Ref Range    Magnesium  1.6 1.6 - 2.6 mg/dL     We have tried to contact you several attempts without success. Please see below for message from provider.    "Please let pt know potassium looked good but magnesium is low and would recommend consider taking mag glycinate which is better absorbed than oxide form of magnesium "    If you have any question, please contact our office at [phone].       Sincerely,    Hearty, Marie ESCOTO MA   "

## 2025-05-06 ENCOUNTER — TELEPHONE (OUTPATIENT)
Dept: FAMILY MEDICINE | Facility: CLINIC | Age: 75
End: 2025-05-06
Payer: MEDICARE

## 2025-05-07 ENCOUNTER — HOSPITAL ENCOUNTER (OUTPATIENT)
Dept: RADIOLOGY | Facility: CLINIC | Age: 75
Discharge: HOME | End: 2025-05-07
Attending: STUDENT IN AN ORGANIZED HEALTH CARE EDUCATION/TRAINING PROGRAM
Payer: MEDICARE

## 2025-05-07 ENCOUNTER — TELEPHONE (OUTPATIENT)
Dept: FAMILY MEDICINE | Facility: CLINIC | Age: 75
End: 2025-05-07
Payer: MEDICARE

## 2025-05-07 DIAGNOSIS — Z12.31 ENCOUNTER FOR SCREENING MAMMOGRAM FOR MALIGNANT NEOPLASM OF BREAST: ICD-10-CM

## 2025-05-07 PROCEDURE — 77063 BREAST TOMOSYNTHESIS BI: CPT | Mod: 26,,, | Performed by: RADIOLOGY

## 2025-05-07 PROCEDURE — 77067 SCR MAMMO BI INCL CAD: CPT | Mod: 26,,, | Performed by: RADIOLOGY

## 2025-05-07 PROCEDURE — 77063 BREAST TOMOSYNTHESIS BI: CPT | Mod: TC,PO

## 2025-05-07 NOTE — TELEPHONE ENCOUNTER
----- Message from Skip Guerrero MD sent at 5/6/2025 11:14 AM CDT -----  Regarding: pharmacy recs for magnesium  The pharmacy team recommends that she take magnesium glycinate over-the-counter around 200 to 400 mg per day

## 2025-05-08 ENCOUNTER — TELEPHONE (OUTPATIENT)
Dept: FAMILY MEDICINE | Facility: CLINIC | Age: 75
End: 2025-05-08
Payer: MEDICARE

## 2025-05-14 DIAGNOSIS — E11.9 TYPE 2 DIABETES MELLITUS WITHOUT COMPLICATION, UNSPECIFIED WHETHER LONG TERM INSULIN USE: ICD-10-CM

## 2025-05-15 ENCOUNTER — PATIENT MESSAGE (OUTPATIENT)
Dept: ADMINISTRATIVE | Facility: HOSPITAL | Age: 75
End: 2025-05-15
Payer: MEDICARE

## 2025-05-15 ENCOUNTER — TELEPHONE (OUTPATIENT)
Dept: FAMILY MEDICINE | Facility: CLINIC | Age: 75
End: 2025-05-15
Payer: MEDICARE

## 2025-05-15 NOTE — TELEPHONE ENCOUNTER
----- Message from Tess sent at 5/15/2025  1:30 PM CDT -----  Type:  Patient Returning CallWho Called:ptWho Left Message for Patient:Lauren the patient know what this is regarding?:yesWould the patient rather a call back or a response via [facility]? Call James Call Back Number:[phone] Information: She says she has been getting your messages (having issues with phone) and yes she has been taking the magnesium      Please call Back to advise. Thanks!

## 2025-05-21 ENCOUNTER — PATIENT MESSAGE (OUTPATIENT)
Dept: GASTROENTEROLOGY | Facility: CLINIC | Age: 75
End: 2025-05-21
Payer: MEDICARE

## 2025-05-27 DIAGNOSIS — E08.65 DIABETES MELLITUS DUE TO UNDERLYING CONDITION WITH HYPERGLYCEMIA, WITHOUT LONG-TERM CURRENT USE OF INSULIN: ICD-10-CM

## 2025-05-27 NOTE — TELEPHONE ENCOUNTER
Care Due:                  Date            Visit Type   Department     Provider  --------------------------------------------------------------------------------                                EP -                              PRIMARY      [facility]  Last Visit: 04-      CARE ([facility])   GAURI Guerrero                              EP -                              PRIMARY      [facility]  Next Visit: 09-      CARE ([facility])   MEDICINE       Skip Guerrero                                                            Last  Test          Frequency    Reason                     Performed    Due Date  --------------------------------------------------------------------------------    CMP.........  12 months..  pioglitazone.............  08- 08-    [facility] Embedded Care Due Messages. Reference number: [id number].   5/27/2025 12:39:33 PM CDT

## 2025-06-11 DIAGNOSIS — B37.9 YEAST INFECTION: ICD-10-CM

## 2025-06-11 DIAGNOSIS — I15.2 HYPERTENSION ASSOCIATED WITH DIABETES: ICD-10-CM

## 2025-06-11 DIAGNOSIS — I25.10 CORONARY ARTERY DISEASE INVOLVING NATIVE CORONARY ARTERY OF NATIVE HEART WITHOUT ANGINA PECTORIS: ICD-10-CM

## 2025-06-11 DIAGNOSIS — E11.59 HYPERTENSION ASSOCIATED WITH DIABETES: ICD-10-CM

## 2025-06-11 RX ORDER — NYSTATIN 100000 U/G
1 OINTMENT TOPICAL 2 TIMES DAILY
Qty: 15 G | Refills: 0 | Status: SHIPPED | OUTPATIENT
Start: 2025-06-11

## 2025-06-12 RX ORDER — METOPROLOL TARTRATE 25 MG/1
25 TABLET, FILM COATED ORAL 2 TIMES DAILY
Qty: 180 TABLET | Refills: 3 | OUTPATIENT
Start: 2025-06-12

## 2025-06-12 NOTE — TELEPHONE ENCOUNTER
Sw the patient ask if she was in need of refills she stated she does not at this time she wanted to know who keep asking for refill I inform her that the her refill request  may have been on auto fill  she stated that she will look into that

## 2025-06-25 ENCOUNTER — TELEPHONE (OUTPATIENT)
Dept: GASTROENTEROLOGY | Facility: CLINIC | Age: 75
End: 2025-06-25
Payer: MEDICARE

## 2025-06-25 NOTE — TELEPHONE ENCOUNTER
Copied from CRM #[id number]. Topic: General Inquiry - Patient Advice  >> Jun 25, 2025  1:36 PM Holly wrote:  Type:  Needs Medical Advice    Who Called: pt  Symptoms (please be specific):    How long has patient had these symptoms:    Pharmacy name and phone #:    Would the patient rather a call back or a response via [facility]? call  Best Call Back Number: [phone]    Additional Information: pt would like for a copy of her instructions for her colon to be printed out. Pt can't need pull it up on my chart and can pt get a call tomorrow to  the copy

## 2025-07-01 ENCOUNTER — TELEPHONE (OUTPATIENT)
Dept: GASTROENTEROLOGY | Facility: CLINIC | Age: 75
End: 2025-07-01
Payer: MEDICARE

## 2025-07-01 NOTE — TELEPHONE ENCOUNTER
Copied from CRM #[id number]. Topic: General Inquiry - Patient Advice  >> Jul 1, 2025  2:31 PM Rita wrote:  Type: Needs Medical Advice  Who Called:  Pt    Best Call Back Number: [phone]    Additional Information: Pt needs some clarification on her directions for colonscopy please advsie

## 2025-07-01 NOTE — TELEPHONE ENCOUNTER
Call placed to MsRj Luna in regards to message received. She had questions about her diabetic mediations on when to stop. I advised her she is to hold her diabetic medications the morning of her procedure. She verbalized understanding. She also asked to confirm her arrival time because she was given two different ones. I informed her to arrive for 1130 on 7/3. No further issues noted.

## 2025-07-03 ENCOUNTER — HOSPITAL ENCOUNTER (OUTPATIENT)
Facility: HOSPITAL | Age: 75
Discharge: HOME | End: 2025-07-03
Attending: INTERNAL MEDICINE | Admitting: INTERNAL MEDICINE
Payer: MEDICARE

## 2025-07-03 ENCOUNTER — ANESTHESIA EVENT (OUTPATIENT)
Dept: ENDOSCOPY | Facility: HOSPITAL | Age: 75
End: 2025-07-03
Payer: MEDICARE

## 2025-07-03 ENCOUNTER — ANESTHESIA (OUTPATIENT)
Dept: ENDOSCOPY | Facility: HOSPITAL | Age: 75
End: 2025-07-03
Payer: MEDICARE

## 2025-07-03 DIAGNOSIS — Z86.0100 HISTORY OF COLON POLYPS: ICD-10-CM

## 2025-07-03 PROCEDURE — 88305 TISSUE EXAM BY PATHOLOGIST: CPT | Mod: TC | Performed by: PATHOLOGY

## 2025-07-03 PROCEDURE — 45380 COLONOSCOPY AND BIOPSY: CPT | Mod: PT | Performed by: INTERNAL MEDICINE

## 2025-07-03 PROCEDURE — 37000009 HC ANESTHESIA EA ADD 15 MINS: Performed by: INTERNAL MEDICINE

## 2025-07-03 PROCEDURE — 27201012 HC FORCEPS, HOT/COLD, DISP: Performed by: INTERNAL MEDICINE

## 2025-07-03 PROCEDURE — 45380 COLONOSCOPY AND BIOPSY: CPT | Mod: PT,,, | Performed by: INTERNAL MEDICINE

## 2025-07-03 PROCEDURE — 37000008 HC ANESTHESIA 1ST 15 MINUTES: Performed by: INTERNAL MEDICINE

## 2025-07-03 PROCEDURE — 63600175 PHARM REV CODE 636 W HCPCS: Performed by: NURSE ANESTHETIST, CERTIFIED REGISTERED

## 2025-07-03 RX ORDER — PROPOFOL 10 MG/ML
VIAL (ML) INTRAVENOUS
Status: DISCONTINUED | OUTPATIENT
Start: 2025-07-03 | End: 2025-07-03

## 2025-07-03 RX ORDER — SODIUM CHLORIDE 9 MG/ML
INJECTION, SOLUTION INTRAVENOUS CONTINUOUS
Status: DISCONTINUED | OUTPATIENT
Start: 2025-07-03 | End: 2025-07-03 | Stop reason: HOSPADM

## 2025-07-03 RX ORDER — LIDOCAINE HYDROCHLORIDE 20 MG/ML
INJECTION INTRAVENOUS
Status: DISCONTINUED | OUTPATIENT
Start: 2025-07-03 | End: 2025-07-03

## 2025-07-03 RX ADMIN — PROPOFOL 30 MG: 10 INJECTION, EMULSION INTRAVENOUS at 01:07

## 2025-07-03 RX ADMIN — PROPOFOL 100 MG: 10 INJECTION, EMULSION INTRAVENOUS at 01:07

## 2025-07-03 RX ADMIN — LIDOCAINE HYDROCHLORIDE 100 MG: 20 INJECTION, SOLUTION INTRAVENOUS at 01:07

## 2025-07-03 RX ADMIN — GLYCOPYRROLATE 0.2 MG: 0.2 INJECTION, SOLUTION INTRAMUSCULAR; INTRAVITREAL at 01:07

## 2025-07-03 NOTE — PLAN OF CARE
Vss, gus po fluids, denies pain, ambulates easily. IV removed, catheter intact. Discharge instructions provided and states understanding. States ready to go home.  Discharged from facility with family per wheelchair.

## 2025-07-03 NOTE — [employer] PATIENT INSTRUCTIONS
Discharge Summary/Instructions after an Endoscopic Procedure  Patient Name: Jessica Easley  Patient MRN: [id number]  Patient YOB: 1950  Thursday, July 3, 2025  Viviane Simeon MD  Dear patient,  As a result of recent federal legislation (The Federal Cures Act), you may   receive lab or pathology results from your procedure in your [facility]   account before your physician is able to contact you. Your physician or   their representative will relay the results to you with their   recommendations at their soonest availability.  Thank you,  RESTRICTIONS:  During your procedure today, you received medications for sedation.  These   medications may affect your judgment, balance and coordination.  Therefore,   for 24 hours, you have the following restrictions:   - DO NOT drive a car, operate machinery, make legal/financial decisions,   sign important papers or drink alcohol.    ACTIVITY:  Today: no heavy lifting, straining or running due to procedural   sedation/anesthesia.  The following day: return to full activity including work.  DIET:  Eat and drink normally unless instructed otherwise.     TREATMENT FOR COMMON SIDE EFFECTS:  - Mild abdominal pain, nausea, belching, bloating or excessive gas:  rest,   eat lightly and use a heating pad.  - Sore Throat: treat with throat lozenges and/or gargle with warm salt   water.  - Because air was used during the procedure, expelling large amounts of air   from your rectum or belching is normal.  - If a bowel prep was taken, you may not have a bowel movement for 1-3 days.    This is normal.  SYMPTOMS TO WATCH FOR AND REPORT TO YOUR PHYSICIAN:  1. Abdominal pain or bloating, other than gas cramps.  2. Chest pain.  3. Back pain.  4. Signs of infection such as: chills or fever occurring within 24 hours   after the procedure.  5. Rectal bleeding, which would show as bright red, maroon, or black stools.   (A tablespoon of blood from the rectum is not serious,  especially if   hemorrhoids are present.)  6. Vomiting.  7. Weakness or dizziness.  GO DIRECTLY TO THE NEAREST EMERGENCY ROOM IF YOU HAVE ANY OF THE FOLLOWING:      Difficulty breathing              Chills and/or fever over 101 F   Persistent vomiting and/or vomiting blood   Severe abdominal pain   Severe chest pain   Black, tarry stools   Bleeding- more than one tablespoon   Any other symptom or condition that you feel may need urgent attention  Your doctor recommends these additional instructions:  If any biopsies were taken, your doctors clinic will contact you in 1 to 2   weeks with any results.  - Discharge patient to home (with escort).   - Patient has a contact number available for emergencies.  The signs and   symptoms of potential delayed complications were discussed with the   patient.  Return to normal activities tomorrow.  Written discharge   instructions were provided to the patient.   - Resume previous diet.   - Continue present medications.   - Await pathology results.   - Repeat colonoscopy in 5-10 years for surveillance.   - Return to my office PRN.  For questions, problems or results please call your physician - Viviane Simeon MD at Work:  [phone].  [facility], EMERGENCY ROOM PHONE NUMBER: [phone]  IF A COMPLICATION OR EMERGENCY SITUATION ARISES AND YOU ARE UNABLE TO REACH   YOUR PHYSICIAN - GO DIRECTLY TO THE EMERGENCY ROOM.  Viviane Simeon MD  7/3/2025 1:45:38 PM  This report has been verified and signed electronically.  Dear patient,  As a result of recent federal legislation (The Federal Cures Act), you may   receive lab or pathology results from your procedure in your [facility]   account before your physician is able to contact you. Your physician or   their representative will relay the results to you with their   recommendations at their soonest availability.  Thank you,  [employer]ATION

## 2025-07-03 NOTE — TRANSFER OF CARE
Anesthesia Transfer of Care Note    Patient: Jessica Luna    Procedure(s) Performed: Procedure(s) (LRB):  COLONOSCOPY, SCREENING, HIGH RISK PATIENT (N/A)    Patient location: PACU    Anesthesia Type: general    Transport from OR: Transported from OR on 2-3 L/min O2 by NC with adequate spontaneous ventilation    Post pain: adequate analgesia    Post assessment: no apparent anesthetic complications and tolerated procedure well    Post vital signs: stable    Level of consciousness: sedated and responds to stimulation    Nausea/Vomiting: no nausea/vomiting    Complications: none    Transfer of care protocol was followed    Last vitals: Visit Vitals  BP (!) 125/58 (BP Location: Left arm, Patient Position: Lying)   Pulse 77   Temp 36.7 °C (98.1 °F) (Skin)   Resp 16   SpO2 98%

## 2025-07-03 NOTE — ANESTHESIA PREPROCEDURE EVALUATION
07/03/2025  Jessica Luna is a 74 y.o., female.      Pre-op Assessment    I have reviewed the Patient Summary Reports.     I have reviewed the Nursing Notes. I have reviewed the NPO Status.   I have reviewed the Medications.     Review of Systems  Anesthesia Hx:  No problems with previous Anesthesia                Social:  Non-Smoker       Cardiovascular:     Hypertension, well controlled  Past MI CAD  asymptomatic         hyperlipidemia                               Pulmonary:  Pulmonary Normal                       Renal/:  Chronic Renal Disease renal calculi               Musculoskeletal:  Arthritis               Neurological:    Neuromuscular Disease,                                   Endocrine:  Diabetes, well controlled, type 2               Physical Exam  General: Well nourished, Cooperative, Alert and Oriented    Airway:  Mallampati: II   Mouth Opening: Normal  TM Distance: Normal  Neck ROM: Normal ROM    Anesthesia Plan  Type of Anesthesia, risks & benefits discussed:    Anesthesia Type: Gen ETT, Gen Supraglottic Airway, Gen Natural Airway, MAC  Intra-op Monitoring Plan: Standard ASA Monitors  Post Op Pain Control Plan: multimodal analgesia  Induction:  IV  Airway Plan: Direct, Video and Fiberoptic, Post-Induction  Informed Consent: Informed consent signed with the Patient and all parties understand the risks and agree with anesthesia plan.  All questions answered.   ASA Score: 3    Ready For Surgery From Anesthesia Perspective.   .

## 2025-07-03 NOTE — H&P
[facility]    CC: Colon polyps    HPI 74 y.o. female presents for surveillance colonoscopy due to history of polyps, last colonoscopy 2019    Past Medical History:   Diagnosis Date    Anticoagulant long-term use     Arthritis     CAD (coronary artery disease)     C. Stents X2 Dr. Og Jeronimo    Diabetes mellitus     Diabetes mellitus type II     Diverticulosis     Hyperlipidemia     Hypertension     Low magnesium levels     Myocardial infarction     Obstructive uropathy 10/16/2015    Renal stone     Status post cystoscopy - Left laser percutaneous nephrolithotripsy 6/29/2016    Ureterolithiasis Obstructive-left 1/3/2015    Wears dentures     FULL UPPER - PARTIAL BOTTOM    Wears dentures     Full upper; partial lower    Wears glasses     Wears glasses        Allergies and Medications reviewed     Review of Systems  General ROS: negative for - chills, fever or weight loss  Cardiovascular ROS: no chest pain or dyspnea on exertion  Gastrointestinal ROS: no blood in stool    Physical Examination  There were no vitals taken for this visit.  General appearance: alert, cooperative, no distress  HENT: Normocephalic, atraumatic, neck symmetrical, no nasal discharge, sclera anicteric   Lungs: clear to auscultation bilaterally, symmetric chest wall expansion bilaterally  Heart: regular rate and rhythm without rub; no displacement of the PMI   Abdomen: soft  Extremities: extremities symmetric; no clubbing, cyanosis, or edema        Labs:  Lab Results   Component Value Date    WBC 8.53 09/04/2024    HGB 13.9 09/04/2024    HCT 43.9 09/04/2024    MCV 89 09/04/2024     09/04/2024       Assessment:   74 y.o. female presents for colonoscopy    Plan:  Proceed to colonoscopy     Viviane Simeon MD  [facility]  [address], [address]  [place] [address]  Office: [phone]  Fax: [phone]

## 2025-07-03 NOTE — ANESTHESIA POSTPROCEDURE EVALUATION
Anesthesia Post Evaluation    Patient: Jessica Luna    Procedure(s) Performed: Procedure(s) (LRB):  COLONOSCOPY, SCREENING, HIGH RISK PATIENT (N/A)    Final Anesthesia Type: general      Patient location during evaluation: PACU  Patient participation: Yes- Able to Participate  Level of consciousness: awake and alert and oriented  Post-procedure vital signs: reviewed and stable  Pain management: adequate  Airway patency: patent    PONV status at discharge: No PONV  Anesthetic complications: no      Cardiovascular status: blood pressure returned to baseline and stable  Respiratory status: unassisted and spontaneous ventilation  Hydration status: euvolemic  Follow-up not needed.              Vitals Value Taken Time   /72 07/03/25 14:10   Temp 36.5 °C (97.7 °F) 07/03/25 13:47   Pulse 60 07/03/25 14:10   Resp 22 07/03/25 14:10   SpO2 99 % 07/03/25 14:10   Vitals shown include unfiled device data.      No case tracking events are documented in the log.      Pain/Gisel Score: Gisel Score: 10 (7/3/2025  2:00 PM)

## 2025-07-07 VITALS
OXYGEN SATURATION: 99 % | TEMPERATURE: 98 F | RESPIRATION RATE: 22 BRPM | HEART RATE: 60 BPM | SYSTOLIC BLOOD PRESSURE: 115 MMHG | DIASTOLIC BLOOD PRESSURE: 72 MMHG

## 2025-07-09 RX ORDER — POTASSIUM CITRATE 15 MEQ/1
1 TABLET, EXTENDED RELEASE ORAL 2 TIMES DAILY
Qty: 180 TABLET | Refills: 4 | Status: SHIPPED | OUTPATIENT
Start: 2025-07-09

## 2025-07-15 ENCOUNTER — TELEPHONE (OUTPATIENT)
Dept: FAMILY MEDICINE | Facility: CLINIC | Age: 75
End: 2025-07-15
Payer: MEDICARE

## 2025-07-15 DIAGNOSIS — R41.3 MEMORY LOSS: Primary | ICD-10-CM

## 2025-07-21 ENCOUNTER — TELEPHONE (OUTPATIENT)
Dept: NEUROLOGY | Facility: CLINIC | Age: 75
End: 2025-07-21
Payer: MEDICARE

## 2025-07-21 NOTE — TELEPHONE ENCOUNTER
Copied from CRM #[id number]. Topic: Appointments - Appointment Access  >> Jul 21, 2025 11:37 AM Virgen wrote:  Type: Apoointment Request    Name of Caller:PT   When is the first available appointment?N/A  Symptoms:MEMORY   Would the patient rather a call back or a response via [facility]? CALL   Best Call Back Number:[phone]  Additional Information: THANK YOU

## 2025-07-23 ENCOUNTER — TELEPHONE (OUTPATIENT)
Dept: NEUROLOGY | Facility: CLINIC | Age: 75
End: 2025-07-23
Payer: MEDICARE

## 2025-07-23 NOTE — TELEPHONE ENCOUNTER
Copied from CRM #[id number]. Topic: General Inquiry - Return Call  >> Jul 23, 2025  2:45 PM Virgen wrote:  Type:  Patient Returning Call    Who Called:PT   Who Left Message for Patient:JIGNESH  Does the patient know what this is regarding?:MEMORY APPT   Would the patient rather a call back or a response via [facility]? CALL   Best Call Back Number:[phone]  Additional Information: THANK YOU

## 2025-07-23 NOTE — TELEPHONE ENCOUNTER
Spoke with pt. She states she has already scheduled an appointment with another provider and doesn't need this appointment.

## 2025-07-23 NOTE — TELEPHONE ENCOUNTER
Copied from CRM #[id number]. Topic: Appointments - Appointment Access  >> Jul 23, 2025  9:57 AM Deejay wrote:  Type:  Appointment Request    Caller is requesting a appointment.  Caller declined first available appointment listed below.  Caller will not accept being placed on the waitlist and is requesting a message be sent to doctor.  Name of Caller:PT  When is the first available appointment? Dept Book  Symptoms:R41.3 (ICD-10-CM) - Memory loss  Would the patient rather a call back or a response via [facility]?   Best Call Back Number:[phone]   Additional Information: New pt need appt. PT has referral. Please advise.

## 2025-07-30 ENCOUNTER — HOSPITAL ENCOUNTER (OUTPATIENT)
Dept: RADIOLOGY | Facility: HOSPITAL | Age: 75
Discharge: HOME | End: 2025-07-30
Attending: PSYCHIATRY & NEUROLOGY
Payer: MEDICARE

## 2025-07-30 DIAGNOSIS — R41.3 MEMORY LOSS: ICD-10-CM

## 2025-07-30 PROCEDURE — 70551 MRI BRAIN STEM W/O DYE: CPT | Mod: 26,,, | Performed by: RADIOLOGY

## 2025-07-30 PROCEDURE — 70551 MRI BRAIN STEM W/O DYE: CPT | Mod: TC,PO

## 2025-08-02 DIAGNOSIS — B37.9 YEAST INFECTION: ICD-10-CM

## 2025-08-04 RX ORDER — NYSTATIN 100000 U/G
1 OINTMENT TOPICAL 2 TIMES DAILY
Qty: 15 G | Refills: 2 | Status: SHIPPED | OUTPATIENT
Start: 2025-08-04

## 2025-08-06 ENCOUNTER — OFFICE VISIT (OUTPATIENT)
Dept: CARDIOLOGY | Facility: CLINIC | Age: 75
End: 2025-08-06
Payer: MEDICARE

## 2025-08-06 VITALS
WEIGHT: 146.19 LBS | OXYGEN SATURATION: 97 % | HEIGHT: 62 IN | HEART RATE: 86 BPM | BODY MASS INDEX: 26.9 KG/M2 | DIASTOLIC BLOOD PRESSURE: 66 MMHG | SYSTOLIC BLOOD PRESSURE: 129 MMHG

## 2025-08-06 DIAGNOSIS — I25.10 CORONARY ARTERY DISEASE INVOLVING NATIVE CORONARY ARTERY OF NATIVE HEART WITHOUT ANGINA PECTORIS: ICD-10-CM

## 2025-08-06 DIAGNOSIS — I15.2 HYPERTENSION ASSOCIATED WITH DIABETES: ICD-10-CM

## 2025-08-06 DIAGNOSIS — Z00.00 ANNUAL PHYSICAL EXAM: Primary | ICD-10-CM

## 2025-08-06 DIAGNOSIS — E11.59 HYPERTENSION ASSOCIATED WITH DIABETES: ICD-10-CM

## 2025-08-06 DIAGNOSIS — E78.5 HYPERLIPIDEMIA ASSOCIATED WITH TYPE 2 DIABETES MELLITUS: ICD-10-CM

## 2025-08-06 DIAGNOSIS — E11.69 HYPERLIPIDEMIA ASSOCIATED WITH TYPE 2 DIABETES MELLITUS: ICD-10-CM

## 2025-08-06 PROCEDURE — 99214 OFFICE O/P EST MOD 30 MIN: CPT | Mod: S$PBB,,, | Performed by: NURSE PRACTITIONER

## 2025-08-06 PROCEDURE — 99999 PR PBB SHADOW E&M-EST. PATIENT-LVL IV: CPT | Mod: PBBFAC,,, | Performed by: NURSE PRACTITIONER

## 2025-08-06 PROCEDURE — 99214 OFFICE O/P EST MOD 30 MIN: CPT | Mod: PBBFAC,PN | Performed by: NURSE PRACTITIONER

## 2025-08-06 NOTE — PROGRESS NOTES
Subjective:    Patient ID:  Jessica Luna is a 74 y.o. female.  Chief Complaint   Patient presents with    Annual Exam      No issue stated by pt        History of Present Illness    CHIEF COMPLAINT:  Patient presents today for follow up    MEMORY CONCERNS:  She reports progressive memory concerns characterized by increasing forgetfulness, which prompted her to seek medical evaluation. She describes difficulty remembering words and completing cognitive tasks. Her  encouraged her to investigate these memory changes. She underwent cognitive testing and MRI, including word recall and drawing tasks, at a specialist's office in [place]. She appears motivated to understand the underlying cause of her memory issues and has been proactive in seeking specialized medical assessment.    FAMILY HISTORY:  Her sister currently resides in a nursing home due to advanced cancer. She expresses emotional distress regarding her sister's medical condition and its impact on her personal stress levels.      ROS:  ROS as indicated in HPI.           Review of patient's allergies indicates:   Allergen Reactions    Keflex [cephalexin]      diarrhea       Past Medical History:   Diagnosis Date    Anticoagulant long-term use     Arthritis     CAD (coronary artery disease)     C. Stents X2 Dr. Og Jeronimo    Diabetes mellitus     Diabetes mellitus type II     Diverticulosis     Hyperlipidemia     Hypertension     Low magnesium levels     Myocardial infarction     2005    Obstructive uropathy 10/16/2015    Renal stone     Status post cystoscopy - Left laser percutaneous nephrolithotripsy 06/29/2016    Ureterolithiasis Obstructive-left 01/03/2015    Wears dentures     FULL UPPER - PARTIAL BOTTOM    Wears dentures     Full upper; partial lower    Wears glasses     Wears glasses      Past Surgical History:   Procedure Laterality Date    ARTHROSCOPIC REPAIR OF ROTATOR CUFF OF SHOULDER Right 4/8/2021    Procedure: REPAIR, ROTATOR  CUFF, ARTHROSCOPIC;  Surgeon: Rudy Graves MD;  Location: [facility] OR;  Service: Orthopedics;  Laterality: Right;  GENERAL AND BLOCK    ARTHROSCOPIC TENOTOMY OF BICEPS TENDON Right 2021    Procedure: TENOTOMY, BICEPS, ARTHROSCOPIC;  Surgeon: Rudy Graves MD;  Location: [facility] OR;  Service: Orthopedics;  Laterality: Right;  GENERAL AND BLOCK    ARTHROSCOPY OF SHOULDER WITH DECOMPRESSION OF SUBACROMIAL SPACE Right 2021    Procedure: ARTHROSCOPY, SHOULDER, WITH SUBACROMIAL SPACE DECOMPRESSION;  Surgeon: Rudy Graves MD;  Location: [facility] OR;  Service: Orthopedics;  Laterality: Right;  GENERAL AND BLOCK  MITEK     SECTION      CHOLECYSTECTOMY      COLONOSCOPY N/A 10/7/2015    Procedure: COLONOSCOPY;  Surgeon: Washington Rodriguez MD;  Location: [facility] ENDO;  Service: Endoscopy;  Laterality: N/A;    COLONOSCOPY N/A 10/3/2019    Procedure: COLONOSCOPY;  Surgeon: Viviane Simeon MD;  Location: [facility] ENDO;  Service: Endoscopy;  Laterality: N/A;    COLONOSCOPY, SCREENING, HIGH RISK PATIENT N/A 7/3/2025    Procedure: COLONOSCOPY, SCREENING, HIGH RISK PATIENT;  Surgeon: Viviane Simeon MD;  Location: [facility] ENDO;  Service: Endoscopy;  Laterality: N/A;  m/ppm (will call back, 3/12)    CORONARY STENT PLACEMENT      2 vessels    CYSTOSCOPY      CYSTOSCOPY N/A 2024    Procedure: CYSTOSCOPY;  Surgeon: Ubaldo Cho MD;  Location: [facility] OR;  Service: Urology;  Laterality: N/A;    CYSTOSCOPY W/ LASER LITHOTRIPSY  12/15/15    CYSTOSCOPY W/ RETROGRADES Bilateral 2019    Procedure: CYSTOSCOPY, WITH RETROGRADE PYELOGRAM;  Surgeon: Ubaldo Cho MD;  Location: [facility] OR;  Service: Urology;  Laterality: Bilateral;    CYSTOSCOPY W/ URETERAL STENT REMOVAL Left 10/1/2019    Procedure: CYSTOSCOPY, WITH URETERAL STENT REMOVAL;  Surgeon: Ubaldo Cho MD;  [facility];  Service: Urology;  Laterality: Left;    HYSTERECTOMY      GRACE/BSO, DUB    LASER LITHOTRIPSY Left 2019     Procedure: LITHOTRIPSY, USING LASER;  Surgeon: Ubaldo Cho MD;  Location: [facility] OR;  Service: Urology;  Laterality: Left;    OOPHORECTOMY      PERCUTANEOUS NEPHROLITHOTRIPSY  06/28/2016    ROTATOR CUFF REPAIR      left    URETERAL STENT PLACEMENT Left 9/19/2019    Procedure: INSERTION, STENT, URETER;  Surgeon: Ubaldo Cho MD;  Location: [facility] OR;  Service: Urology;  Laterality: Left;    URETEROSCOPIC REMOVAL OF URETERIC CALCULUS Left 9/19/2019    Procedure: REMOVAL, CALCULUS, URETER, URETEROSCOPIC;  Surgeon: Ubaldo Cho MD;  Location: [facility] OR;  Service: Urology;  Laterality: Left;    ureteroscopy  12/15/15     Social History[1]  Family History   Problem Relation Name Age of Onset    Heart disease Father  90    Cancer Mother          breast cancer    Breast cancer Mother  60    Diabetes Brother      Kidney disease Brother          nephrectomy due to cancer    Spina bifida Brother      Cancer Sister          breast cancer    Breast cancer Sister  59          Objective:        Vitals:    08/06/25 1028   BP: 129/66   Pulse: 86       Lab Results   Component Value Date    WBC 8.53 09/04/2024    HGB 13.9 09/04/2024    HCT 43.9 09/04/2024     09/04/2024    CHOL 178 03/10/2025    TRIG 142 03/10/2025    HDL 52 03/10/2025    ALT 12 08/27/2024    AST 14 08/27/2024     05/02/2025    K 4.6 05/02/2025     05/02/2025    CREATININE 0.9 05/02/2025    BUN 31 (H) 05/02/2025    CO2 25 05/02/2025    TSH 1.167 03/10/2025    INR 1.0 09/12/2019    GLUF 642 (HH) 01/03/2015    HGBA1C 9.2 (H) 03/10/2025        ECHOCARDIOGRAM RESULTS  Results for orders placed during the hospital encounter of 03/25/24    Echo    Interpretation Summary    Left Ventricle: There is normal systolic function with a visually estimated ejection fraction of 60 - 65%. There is normal diastolic function.    Right Ventricle: Normal right ventricular cavity size. Wall thickness is normal. Right ventricle wall motion  is normal.  Systolic function is normal.    Left Atrium: Left atrium is mildly dilated.    Aortic Valve: The aortic valve is structurally normal. Moderately calcified noncoronary cusp.    Mitral Valve: Moderately thickened anterior subvalvular apparatus. There is moderate posterior mitral annular calcification present.    Tricuspid Valve: There is mild regurgitation with a centrally directed jet.    IVC/SVC: Normal venous pressure at 3 mmHg.        CURRENT/PREVIOUS VISIT EKG  Results for orders placed or performed in visit on 10/16/23   IN OFFICE EKG 12-LEAD (to [facility])    Collection Time: 10/16/23  9:58 AM    Narrative    Test Reason : R94.31,    Vent. Rate : 067 BPM     Atrial Rate : 067 BPM     P-R Int : 192 ms          QRS Dur : 104 ms      QT Int : 402 ms       P-R-T Axes : 073 081 071 degrees     QTc Int : 424 ms    Normal sinus rhythm  Low voltage QRS  ST elevation, consider early repolarization, pericarditis, or injury  Cannot rule out Anterior infarct (cited on or before 12-APR-2023)  Abnormal ECG  When compared with ECG of 12-APR-2023 10:25,  No significant change was found  Confirmed by Og Jeronimo MD ([id number]) on 10/18/2023 11:31:18 AM    Referred By:             Confirmed By:Og Jeronimo MD     No valid procedures specified.   Results for orders placed in visit on 04/06/21    Nuclear Stress Test    Interpretation Summary    The EKG portion of this study is negative for ischemia.    The patient reported no chest pain during the stress test.    The nuclear portion of this study will be reported separately.      Physical Exam    CONSTITUTIONAL: No fever.  HEENT: Normocephalic. Atraumatic. Pupils reactive to light.  NECK: No JVD. No carotid bruit.  CVS: S1S2+. RRR  LUNGS: Clear.  ABDOMEN: Soft. NT. BS+.  EXTREMITIES: No cyanosis. No edema.  : No Contreras catheter.  NEURO: AAO X 3.  PSY: Normal affect.           Medication List with Changes/Refills   Current Medications    ACETAMINOPHEN (TYLENOL) 500 MG TABLET    Take 500  mg by mouth every 6 (six) hours as needed for Pain.    ALLOPURINOL (ZYLOPRIM) 100 MG TABLET    TAKE 1 TABLET BY MOUTH EVERY DAY    ASPIRIN 81 MG CHEW    Take 1 tablet (81 mg total) by mouth once daily.    ATORVASTATIN (LIPITOR) 40 MG TABLET    Take 1 tablet (40 mg total) by mouth once daily.    CYANOCOBALAMIN (VITAMIN B-12) 1000 MCG TABLET    Take 1 tablet (1,000 mcg total) by mouth once daily.    EMPAGLIFLOZIN (JARDIANCE) 25 MG TABLET    Take 1 tablet (25 mg total) by mouth once daily.    MAGNESIUM OXIDE (MAG-OX) 250 MG MAGNESIUM TAB    Take 0.5 tablets by mouth once daily.    METFORMIN (GLUCOPHAGE) 1000 MG TABLET    TAKE 1 TABLET BY MOUTH EVERY DAY WITH BREAKFAST    METOPROLOL TARTRATE (LOPRESSOR) 25 MG TABLET    Take 1 tablet (25 mg total) by mouth 2 (two) times daily.    NYSTATIN (MYCOSTATIN) OINTMENT    Apply 1 application  topically 2 (two) times daily. Apply to affected area    OLMESARTAN (BENICAR) 40 MG TABLET    Take 1 tablet (40 mg total) by mouth once daily.    OMEGA-3 FATTY ACIDS-VITAMIN E 1,000-5 MG-UNIT CAP    Take 1 capsule by mouth 2 (two) times daily. Two times a day    PIOGLITAZONE (ACTOS) 15 MG TABLET    Take 1 tablet (15 mg total) by mouth once daily.    POTASSIUM CITRATE (UROCIT-K 15) 15 MEQ TBSR    TAKE 1 TABLET BY MOUTH TWICE A DAY   Discontinued Medications    FLUCONAZOLE (DIFLUCAN) 150 MG TAB    Take 1 tablet (150 mg total) by mouth once daily. May repeat dose in 72 hours if symptoms persist or fail to improve.    SILVER SULFADIAZINE 1% (SILVADENE) 1 % CREAM    Apply topically 2 (two) times daily.             Assessment & Plan:       1. Annual physical exam    2. Coronary artery disease involving native coronary artery of native heart without angina pectoris    3. Hypertension associated with diabetes    4. Hyperlipidemia associated with type 2 diabetes mellitus      Assessment & Plan      Coronary artery disease:  - vascular calcifications including coronary artery noted on previous CT.   Patient is asymptomatic.  Continue risk factor modifications.  Recommend low-fat low-cholesterol diet and regular exercise.    HYPERTENSION ASSOCIATED WITH DIABETES:  - blood pressure stable at 129/66 took stent today's visit.  - continue metoprolol tartrate 25 mg p.o. b.i.d., olmesartan 40 mg p.o. daily.    HYPERLIPIDEMIA ASSOCIATED WITH TYPE 2 DIABETES MELLITUS:  - Recent lipid panel results were satisfactory.  - continue heart healthy diet and regular exercise as tolerated.  - Follow up in about a month with Dr. Guerrero, with hemoglobin A1C ordered.              Follow up in about 6 months (around 2/6/2026).    This note was generated with the assistance of ambient listening technology. Verbal consent was obtained by the patient and accompanying visitor(s) for the recording of patient appointment to facilitate this note. I attest to having reviewed and edited the generated note for accuracy, though some syntax or spelling errors may persist. Please contact the author of this note for any clarification.             Jessica Casiano NP-C  Department of Cardiology   [facility] [place], [place]           [1]   Social History  Tobacco Use    Smoking status: Never     Passive exposure: Never    Smokeless tobacco: Never   Vaping Use    Vaping status: Never Used   Substance Use Topics    Alcohol use: No    Drug use: No

## 2025-08-07 LAB
OHS QRS DURATION: 110 MS
OHS QTC CALCULATION: 449 MS

## 2025-08-18 DIAGNOSIS — E11.9 TYPE 2 DIABETES MELLITUS WITHOUT COMPLICATION, WITHOUT LONG-TERM CURRENT USE OF INSULIN: ICD-10-CM

## 2025-08-18 RX ORDER — METFORMIN HYDROCHLORIDE 1000 MG/1
1000 TABLET ORAL
Qty: 90 TABLET | Refills: 0 | Status: SHIPPED | OUTPATIENT
Start: 2025-08-18

## 2025-08-21 DIAGNOSIS — F02.80 DEMENTIA OF THE ALZHEIMER'S TYPE, WITH LATE ONSET, WITH DELIRIUM: Primary | ICD-10-CM

## 2025-08-21 DIAGNOSIS — G30.1 DEMENTIA OF THE ALZHEIMER'S TYPE, WITH LATE ONSET, WITH DELIRIUM: Primary | ICD-10-CM

## 2025-08-21 DIAGNOSIS — F05 DEMENTIA OF THE ALZHEIMER'S TYPE, WITH LATE ONSET, WITH DELIRIUM: Primary | ICD-10-CM

## 2025-08-25 DIAGNOSIS — I15.2 HYPERTENSION ASSOCIATED WITH DIABETES: ICD-10-CM

## 2025-08-25 DIAGNOSIS — I25.10 CORONARY ARTERY DISEASE INVOLVING NATIVE CORONARY ARTERY OF NATIVE HEART WITHOUT ANGINA PECTORIS: ICD-10-CM

## 2025-08-25 DIAGNOSIS — E11.59 HYPERTENSION ASSOCIATED WITH DIABETES: ICD-10-CM

## 2025-08-26 RX ORDER — OLMESARTAN MEDOXOMIL 40 MG/1
40 TABLET ORAL DAILY
Qty: 90 TABLET | Refills: 3 | Status: SHIPPED | OUTPATIENT
Start: 2025-08-26

## 2025-09-04 ENCOUNTER — TELEPHONE (OUTPATIENT)
Facility: CLINIC | Age: 75
End: 2025-09-04
Payer: MEDICARE

## (undated) DEVICE — Device

## (undated) DEVICE — SEE MEDLINE ITEM 146270

## (undated) DEVICE — SOL 9P NACL IRR PIC IL

## (undated) DEVICE — GLOVE SENSICARE PI ALOE 7

## (undated) DEVICE — DRAPE INCISE IOBAN 2 23X17IN

## (undated) DEVICE — SEE MEDLINE ITEM 146420

## (undated) DEVICE — ELECTRODE REM PLYHSV RETURN 9

## (undated) DEVICE — SET CYSTO IRRIGATION UNIV SPIK

## (undated) DEVICE — SPONGE SUPER KERLIX 6X6.75IN

## (undated) DEVICE — APPLICATOR CHLORAPREP ORN 26ML

## (undated) DEVICE — SUT 2-0 ETHILON 18 FS

## (undated) DEVICE — WATER STERILE INJ 500ML BAG

## (undated) DEVICE — CATH URTRL OPEN END STR TIP 5F

## (undated) DEVICE — PACK SHOULDER

## (undated) DEVICE — CUBE COLD THERAPY POLAR CARE

## (undated) DEVICE — BLADE SURG CARBON STEEL SZ11

## (undated) DEVICE — DRAPE STERI-DRAPE 1000 17X11IN

## (undated) DEVICE — NDL SPINAL 18GX3.5 SPINOCAN

## (undated) DEVICE — SET IRR URLGY 2LINE UNIV SPIKE

## (undated) DEVICE — GLOVE SURG ULTRA TOUCH 7

## (undated) DEVICE — CONTAINER SPECIMEN STRL 4OZ

## (undated) DEVICE — SEE MEDLINE ITEM 157118

## (undated) DEVICE — CANNULA THREADED 7.0 X 72MM

## (undated) DEVICE — DRESSING N ADH OIL EMUL 3X3

## (undated) DEVICE — BLADE INCISOR PLATINUM 4.5MM

## (undated) DEVICE — SHEATH FLEXOR ACCESS 12X35

## (undated) DEVICE — SCRUB 10% POVIDONE IODINE 4OZ

## (undated) DEVICE — IMMOBILIZER SHOULDER 52IN

## (undated) DEVICE — COVER SURG LIGHT HANDLE

## (undated) DEVICE — GUIDEWIRE AMPLATZ .035X145 STR

## (undated) DEVICE — FIBER LASER HOLMIUM 273 MICRON

## (undated) DEVICE — SEE MEDLINE ITEM 157166

## (undated) DEVICE — DRAPE STERI U-SHAPED 47X51IN

## (undated) DEVICE — TRAY DRY SPONGE SCRUB W FOAM

## (undated) DEVICE — SOL POVIDONE PREP IODINE 4 OZ

## (undated) DEVICE — SLEEVE SCD EXPRESS CALF MEDIUM

## (undated) DEVICE — SLEEVE LATERAL TRACTION ARM

## (undated) DEVICE — MAT SURGICAL ECOSUCTIONER

## (undated) DEVICE — SKINMARKER W/RULER DEVON

## (undated) DEVICE — GLOVE SURG ULTRA TOUCH 7.5

## (undated) DEVICE — SOL PVP-I SCRUB 7.5% 4OZ

## (undated) DEVICE — ALCOHOL 70% ISOP RUBBING 4OZ

## (undated) DEVICE — SET CYSTO IRR DRP CHMBR 84IN

## (undated) DEVICE — CATH URETERAL DUAL LUMEN 10FR

## (undated) DEVICE — MAT QUICK 40X30 FLOOR FLUID LF

## (undated) DEVICE — SOL IRR NACL .9% 3000ML

## (undated) DEVICE — SEE MEDLINE ITEM 152622

## (undated) DEVICE — MANIFOLD 4 PORT

## (undated) DEVICE — DRAPE MINOR PROCEDURE

## (undated) DEVICE — SEE MEDLINE ITEM 157117

## (undated) DEVICE — SEE MEDLINE ITEM 152651

## (undated) DEVICE — SEE MEDLINE ITEM 157131

## (undated) DEVICE — TUBING ARTHROSCOPY

## (undated) DEVICE — STRAP OR TABLE 5IN X 72IN

## (undated) DEVICE — CANNULA THREADED 8.5 X 72MM

## (undated) DEVICE — EXTRACTOR STONE 4WR NIT 2.2F 1

## (undated) DEVICE — SEE MEDLINE ITEM 157116

## (undated) DEVICE — SPONGE COTTON TRAY 4X4IN

## (undated) DEVICE — SYR ONLY LUER LOCK 20CC

## (undated) DEVICE — GLOVE SURG ULTRA TOUCH 8.5

## (undated) DEVICE — SEE MEDLINE ITEM 146313

## (undated) DEVICE — SLEEVE SCD EXPRESS KNEE MEDIUM

## (undated) DEVICE — BURR 4.0MM

## (undated) DEVICE — TAPE MEDIPORE 3 X 10YD

## (undated) DEVICE — NDL SUTURE CAPTURE FIRSTPASS

## (undated) DEVICE — SEE MEDLINE ITEM 152487

## (undated) DEVICE — ELECTRODE COOLPULSE 90 W/HAND

## (undated) DEVICE — SOL WATER STRL IRR 1000ML

## (undated) DEVICE — PAD ABD 8X10 STERILE

## (undated) DEVICE — GUIDE WIRE MOTION .035 X 150CM

## (undated) DEVICE — DRAPE STERI INSTRUMENT 1018

## (undated) DEVICE — SPONGE GAUZE 16PLY 4X4

## (undated) DEVICE — LUBRICANT SURGILUBE 2 OZ

## (undated) DEVICE — UNDERGLOVES BIOGEL PI SIZE 8.5

## (undated) DEVICE — PACK SET UP 190 OMC-NS

## (undated) DEVICE — GLOVE SURG ULTRA TOUCH 6

## (undated) DEVICE — SEE MEDLINE ITEM 157185